# Patient Record
Sex: MALE | Race: WHITE | NOT HISPANIC OR LATINO | Employment: UNEMPLOYED | URBAN - METROPOLITAN AREA
[De-identification: names, ages, dates, MRNs, and addresses within clinical notes are randomized per-mention and may not be internally consistent; named-entity substitution may affect disease eponyms.]

---

## 2017-09-29 ENCOUNTER — HOSPITAL ENCOUNTER (OUTPATIENT)
Dept: ADMINISTRATIVE | Facility: HOSPITAL | Age: 52
End: 2017-09-30
Attending: INTERNAL MEDICINE | Admitting: INTERNAL MEDICINE

## 2017-09-29 LAB
ABS NEUT (OLG): 8.77 X10(3)/MCL (ref 2.1–9.2)
ALBUMIN SERPL-MCNC: 3.5 GM/DL (ref 3.4–5)
ALBUMIN/GLOB SERPL: 1 {RATIO}
ALP SERPL-CCNC: 122 UNIT/L (ref 45–117)
ALT SERPL-CCNC: 54 UNIT/L (ref 16–61)
AST SERPL-CCNC: 96 UNIT/L (ref 15–37)
BASOPHILS # BLD AUTO: 0.03 X10(3)/MCL (ref 0–0.2)
BASOPHILS NFR BLD AUTO: 0.3 % (ref 0–0.9)
BILIRUB SERPL-MCNC: 0.7 MG/DL (ref 0.2–1)
BILIRUBIN DIRECT+TOT PNL SERPL-MCNC: 0.2 MG/DL (ref 0–0.2)
BILIRUBIN DIRECT+TOT PNL SERPL-MCNC: 0.5 MG/DL (ref 0–1)
BUN SERPL-MCNC: 6 MG/DL (ref 7–18)
CALCIUM SERPL-MCNC: 8.8 MG/DL (ref 8.5–10.1)
CHLORIDE SERPL-SCNC: 99 MMOL/L (ref 98–107)
CO2 SERPL-SCNC: 27 MMOL/L (ref 21–32)
CREAT SERPL-MCNC: 0.93 MG/DL (ref 0.7–1.3)
EOSINOPHIL # BLD AUTO: 0.03 X10(3)/MCL (ref 0–0.9)
EOSINOPHIL NFR BLD AUTO: 0.3 % (ref 0–6.5)
ERYTHROCYTE [DISTWIDTH] IN BLOOD BY AUTOMATED COUNT: 13.2 % (ref 11.5–17)
GLOBULIN SER-MCNC: 3 GM/DL (ref 2–4)
GLUCOSE SERPL-MCNC: 154 MG/DL (ref 74–106)
HCT VFR BLD AUTO: 41.1 % (ref 42–52)
HGB BLD-MCNC: 14.2 GM/DL (ref 14–18)
IMM GRANULOCYTES # BLD AUTO: 0.05 10*3/UL (ref 0–0.02)
IMM GRANULOCYTES NFR BLD AUTO: 0.5 % (ref 0–0.43)
LYMPHOCYTES # BLD AUTO: 0.93 X10(3)/MCL (ref 0.6–4.6)
LYMPHOCYTES NFR BLD AUTO: 8.7 % (ref 16.2–38.3)
MAGNESIUM SERPL-MCNC: 1.9 MG/DL (ref 1.8–2.4)
MCH RBC QN AUTO: 33.5 PG (ref 27–31)
MCHC RBC AUTO-ENTMCNC: 34.5 GM/DL (ref 33–36)
MCV RBC AUTO: 96.9 FL (ref 80–94)
MONOCYTES # BLD AUTO: 0.9 X10(3)/MCL (ref 0.1–1.3)
MONOCYTES NFR BLD AUTO: 8.4 % (ref 4.7–11.3)
NEUTROPHILS # BLD AUTO: 8.77 X10(3)/MCL (ref 2.1–9.2)
NEUTROPHILS NFR BLD AUTO: 81.8 % (ref 49.1–73.4)
NRBC BLD AUTO-RTO: 0 % (ref 0–0.2)
PLATELET # BLD AUTO: 88 X10(3)/MCL (ref 130–400)
PMV BLD AUTO: 10.7 FL (ref 7.4–10.4)
POTASSIUM SERPL-SCNC: 3.5 MMOL/L (ref 3.5–5.1)
PROT SERPL-MCNC: 6.9 GM/DL (ref 6.4–8.2)
RBC # BLD AUTO: 4.24 X10(6)/MCL (ref 4.7–6.1)
SODIUM SERPL-SCNC: 136 MMOL/L (ref 136–145)
WBC # SPEC AUTO: 10.7 X10(3)/MCL (ref 4.5–11.5)

## 2018-07-03 ENCOUNTER — HISTORICAL (OUTPATIENT)
Dept: INTERNAL MEDICINE | Facility: CLINIC | Age: 53
End: 2018-07-03

## 2018-07-03 LAB
ABS NEUT (OLG): 2.69 X10(3)/MCL (ref 2.1–9.2)
ALBUMIN SERPL-MCNC: 4.1 GM/DL (ref 3.4–5)
ALBUMIN/GLOB SERPL: 1 RATIO (ref 1–2)
ALP SERPL-CCNC: 64 UNIT/L (ref 45–117)
ALT SERPL-CCNC: 15 UNIT/L (ref 12–78)
AMPHET UR QL SCN: NEGATIVE
APPEARANCE, UA: CLEAR
AST SERPL-CCNC: 14 UNIT/L (ref 15–37)
BACTERIA #/AREA URNS AUTO: ABNORMAL /[HPF]
BARBITURATE SCN PRESENT UR: NEGATIVE
BASOPHILS # BLD AUTO: 0.07 X10(3)/MCL
BASOPHILS NFR BLD AUTO: 1 %
BENZODIAZ UR QL SCN: NEGATIVE
BILIRUB SERPL-MCNC: 0.5 MG/DL (ref 0.2–1)
BILIRUB UR QL STRIP: 1 MG/DL
BILIRUBIN DIRECT+TOT PNL SERPL-MCNC: 0.2 MG/DL
BILIRUBIN DIRECT+TOT PNL SERPL-MCNC: 0.3 MG/DL
BUN SERPL-MCNC: 11 MG/DL (ref 7–18)
CALCIUM SERPL-MCNC: 9.1 MG/DL (ref 8.5–10.1)
CANNABINOIDS UR QL SCN: POSITIVE
CHLORIDE SERPL-SCNC: 103 MMOL/L (ref 98–107)
CHOLEST SERPL-MCNC: 226 MG/DL
CHOLEST/HDLC SERPL: 3.5 {RATIO} (ref 0–5)
CO2 SERPL-SCNC: 29 MMOL/L (ref 21–32)
COCAINE UR QL SCN: NEGATIVE
COLOR UR: YELLOW
CREAT SERPL-MCNC: 0.8 MG/DL (ref 0.6–1.3)
DEPRECATED CALCIDIOL+CALCIFEROL SERPL-MC: 20.41 NG/ML (ref 30–80)
EOSINOPHIL # BLD AUTO: 0.29 X10(3)/MCL
EOSINOPHIL NFR BLD AUTO: 6 %
ERYTHROCYTE [DISTWIDTH] IN BLOOD BY AUTOMATED COUNT: 12.9 % (ref 11.5–14.5)
EST. AVERAGE GLUCOSE BLD GHB EST-MCNC: 105 MG/DL
GLOBULIN SER-MCNC: 3.8 GM/ML (ref 2.3–3.5)
GLUCOSE (UA): NORMAL
GLUCOSE SERPL-MCNC: 96 MG/DL (ref 74–106)
HBA1C MFR BLD: 5.3 % (ref 4.2–6.3)
HCT VFR BLD AUTO: 45.7 % (ref 40–51)
HDLC SERPL-MCNC: 64 MG/DL
HGB BLD-MCNC: 15.5 GM/DL (ref 13.5–17.5)
HGB UR QL STRIP: NEGATIVE
HIV 1+2 AB+HIV1 P24 AG SERPL QL IA: NONREACTIVE
HYALINE CASTS #/AREA URNS LPF: ABNORMAL /[LPF]
IMM GRANULOCYTES # BLD AUTO: 0.01 10*3/UL
IMM GRANULOCYTES NFR BLD AUTO: 0 %
KETONES UR QL STRIP: ABNORMAL
LDLC SERPL CALC-MCNC: 135 MG/DL (ref 0–130)
LEUKOCYTE ESTERASE UR QL STRIP: NEGATIVE
LYMPHOCYTES # BLD AUTO: 1.73 X10(3)/MCL
LYMPHOCYTES NFR BLD AUTO: 33 % (ref 13–40)
MCH RBC QN AUTO: 33.3 PG (ref 26–34)
MCHC RBC AUTO-ENTMCNC: 33.9 GM/DL (ref 31–37)
MCV RBC AUTO: 98.1 FL (ref 80–100)
MONOCYTES # BLD AUTO: 0.47 X10(3)/MCL
MONOCYTES NFR BLD AUTO: 9 % (ref 4–12)
NEUTROPHILS # BLD AUTO: 2.69 X10(3)/MCL
NEUTROPHILS NFR BLD AUTO: 51 X10(3)/MCL
NITRITE UR QL STRIP: NEGATIVE
OPIATES UR QL SCN: NEGATIVE
PCP UR QL: NEGATIVE
PH UR STRIP.AUTO: 6 [PH] (ref 5–8)
PH UR STRIP: 6 [PH] (ref 4.5–8)
PHENYTOIN SERPL-MCNC: 13 MCG/ML (ref 10–20)
PLATELET # BLD AUTO: 248 X10(3)/MCL (ref 130–400)
PMV BLD AUTO: 9.6 FL (ref 7.4–10.4)
POTASSIUM SERPL-SCNC: 3.7 MMOL/L (ref 3.5–5.1)
PROT SERPL-MCNC: 7.9 GM/DL (ref 6.4–8.2)
PROT UR QL STRIP: 70 MG/DL
PSA SERPL-MCNC: 0.6 NG/ML
RBC # BLD AUTO: 4.66 X10(6)/MCL (ref 4.5–5.9)
RBC #/AREA URNS AUTO: ABNORMAL /[HPF]
SODIUM SERPL-SCNC: 139 MMOL/L (ref 136–145)
SP GR UR STRIP: 1.03 (ref 1–1.03)
SQUAMOUS #/AREA URNS LPF: ABNORMAL /[LPF]
TEMPERATURE, URINE (OHS): 25 DEGC (ref 20–25)
TRIGL SERPL-MCNC: 137 MG/DL
TSH SERPL-ACNC: 3.49 MIU/L (ref 0.36–3.74)
UROBILINOGEN UR STRIP-ACNC: 6 MG/DL
VLDLC SERPL CALC-MCNC: 27 MG/DL
WBC # SPEC AUTO: 5.3 X10(3)/MCL (ref 4.5–11)
WBC #/AREA URNS AUTO: ABNORMAL /HPF

## 2019-05-13 ENCOUNTER — HISTORICAL (OUTPATIENT)
Dept: INTERNAL MEDICINE | Facility: CLINIC | Age: 54
End: 2019-05-13

## 2019-05-13 LAB
ABS NEUT (OLG): 6.04 X10(3)/MCL (ref 2.1–9.2)
ALBUMIN SERPL-MCNC: 3.7 GM/DL (ref 3.4–5)
ALBUMIN/GLOB SERPL: 1 RATIO (ref 1.1–2)
ALP SERPL-CCNC: 91 UNIT/L (ref 45–117)
ALT SERPL-CCNC: 30 UNIT/L (ref 12–78)
AST SERPL-CCNC: 29 UNIT/L (ref 15–37)
BASOPHILS # BLD AUTO: 0.03 X10(3)/MCL
BASOPHILS NFR BLD AUTO: 0 %
BILIRUB SERPL-MCNC: 0.6 MG/DL (ref 0.2–1)
BILIRUBIN DIRECT+TOT PNL SERPL-MCNC: 0.2 MG/DL
BILIRUBIN DIRECT+TOT PNL SERPL-MCNC: 0.4 MG/DL
BUN SERPL-MCNC: 8 MG/DL (ref 7–18)
CALCIUM SERPL-MCNC: 9 MG/DL (ref 8.5–10.1)
CHLORIDE SERPL-SCNC: 107 MMOL/L (ref 98–107)
CHOLEST SERPL-MCNC: 213 MG/DL
CHOLEST/HDLC SERPL: 2.7 {RATIO} (ref 0–5)
CO2 SERPL-SCNC: 30 MMOL/L (ref 21–32)
CREAT SERPL-MCNC: 0.8 MG/DL (ref 0.6–1.3)
EOSINOPHIL # BLD AUTO: 0.15 X10(3)/MCL
EOSINOPHIL NFR BLD AUTO: 2 %
ERYTHROCYTE [DISTWIDTH] IN BLOOD BY AUTOMATED COUNT: 13.2 % (ref 11.5–14.5)
GLOBULIN SER-MCNC: 3.6 GM/ML (ref 2.3–3.5)
GLUCOSE SERPL-MCNC: 116 MG/DL (ref 74–106)
HCT VFR BLD AUTO: 42.2 % (ref 40–51)
HDLC SERPL-MCNC: 79 MG/DL
HGB BLD-MCNC: 14.6 GM/DL (ref 13.5–17.5)
IMM GRANULOCYTES # BLD AUTO: 0.03 10*3/UL
IMM GRANULOCYTES NFR BLD AUTO: 0 %
LDLC SERPL CALC-MCNC: 106 MG/DL (ref 0–130)
LYMPHOCYTES # BLD AUTO: 1.26 X10(3)/MCL
LYMPHOCYTES NFR BLD AUTO: 16 % (ref 13–40)
MCH RBC QN AUTO: 34.8 PG (ref 26–34)
MCHC RBC AUTO-ENTMCNC: 34.6 GM/DL (ref 31–37)
MCV RBC AUTO: 100.7 FL (ref 80–100)
MONOCYTES # BLD AUTO: 0.63 X10(3)/MCL
MONOCYTES NFR BLD AUTO: 8 % (ref 4–12)
NEUTROPHILS # BLD AUTO: 6.04 X10(3)/MCL
NEUTROPHILS NFR BLD AUTO: 74 X10(3)/MCL
PHENYTOIN SERPL-MCNC: 12.4 MCG/ML (ref 10–20)
PLATELET # BLD AUTO: 175 X10(3)/MCL (ref 130–400)
PMV BLD AUTO: 9.1 FL (ref 7.4–10.4)
POTASSIUM SERPL-SCNC: 4.9 MMOL/L (ref 3.5–5.1)
PROT SERPL-MCNC: 7.3 GM/DL (ref 6.4–8.2)
RBC # BLD AUTO: 4.19 X10(6)/MCL (ref 4.5–5.9)
SODIUM SERPL-SCNC: 141 MMOL/L (ref 136–145)
TRIGL SERPL-MCNC: 142 MG/DL
VLDLC SERPL CALC-MCNC: 28 MG/DL
WBC # SPEC AUTO: 8.1 X10(3)/MCL (ref 4.5–11)

## 2019-08-14 ENCOUNTER — HISTORICAL (OUTPATIENT)
Dept: ADMINISTRATIVE | Facility: HOSPITAL | Age: 54
End: 2019-08-14

## 2019-08-14 LAB
ALBUMIN SERPL-MCNC: 3.9 GM/DL (ref 3.4–5)
ALBUMIN/GLOB SERPL: 1.1 RATIO (ref 1.1–2)
ALP SERPL-CCNC: 87 UNIT/L (ref 45–117)
ALT SERPL-CCNC: 20 UNIT/L (ref 12–78)
AST SERPL-CCNC: 19 UNIT/L (ref 15–37)
BILIRUB SERPL-MCNC: 0.5 MG/DL (ref 0.2–1)
BILIRUBIN DIRECT+TOT PNL SERPL-MCNC: 0.2 MG/DL
BILIRUBIN DIRECT+TOT PNL SERPL-MCNC: 0.3 MG/DL
BUN SERPL-MCNC: 15 MG/DL (ref 7–18)
CALCIUM SERPL-MCNC: 9 MG/DL (ref 8.5–10.1)
CHLORIDE SERPL-SCNC: 102 MMOL/L (ref 98–107)
CO2 SERPL-SCNC: 32 MMOL/L (ref 21–32)
CREAT SERPL-MCNC: 0.8 MG/DL (ref 0.6–1.3)
DEPRECATED CALCIDIOL+CALCIFEROL SERPL-MC: 19.49 NG/ML (ref 30–80)
ETHANOL SERPL-MCNC: <3 MG/DL
GLOBULIN SER-MCNC: 3.7 GM/ML (ref 2.3–3.5)
GLUCOSE SERPL-MCNC: 88 MG/DL (ref 74–106)
HAV IGM SERPL QL IA: NONREACTIVE
HBV CORE IGM SERPL QL IA: NONREACTIVE
HBV SURFACE AG SERPL QL IA: NEGATIVE
HCV AB SERPL QL IA: NONREACTIVE
HIV 1+2 AB+HIV1 P24 AG SERPL QL IA: NONREACTIVE
POTASSIUM SERPL-SCNC: 3.9 MMOL/L (ref 3.5–5.1)
PROT SERPL-MCNC: 7.6 GM/DL (ref 6.4–8.2)
PSA SERPL-MCNC: 0.5 NG/ML
SODIUM SERPL-SCNC: 138 MMOL/L (ref 136–145)
T PALLIDUM AB SER QL: NONREACTIVE

## 2020-02-17 ENCOUNTER — HISTORICAL (OUTPATIENT)
Dept: INTERNAL MEDICINE | Facility: CLINIC | Age: 55
End: 2020-02-17

## 2020-02-17 LAB
ABS NEUT (OLG): 6.73 X10(3)/MCL (ref 2.1–9.2)
ALBUMIN SERPL-MCNC: 3.7 GM/DL (ref 3.4–5)
ALBUMIN/GLOB SERPL: 0.9 RATIO (ref 1.1–2)
ALP SERPL-CCNC: 92 UNIT/L (ref 45–117)
ALT SERPL-CCNC: 32 UNIT/L (ref 12–78)
AST SERPL-CCNC: 20 UNIT/L (ref 15–37)
BASOPHILS # BLD AUTO: 0 X10(3)/MCL (ref 0–0.2)
BASOPHILS NFR BLD AUTO: 0 %
BILIRUB SERPL-MCNC: 0.4 MG/DL (ref 0.2–1)
BILIRUBIN DIRECT+TOT PNL SERPL-MCNC: 0.2 MG/DL
BILIRUBIN DIRECT+TOT PNL SERPL-MCNC: 0.2 MG/DL (ref 0–0.2)
BUN SERPL-MCNC: 14 MG/DL (ref 7–18)
CALCIUM SERPL-MCNC: 8.8 MG/DL (ref 8.5–10.1)
CHLORIDE SERPL-SCNC: 106 MMOL/L (ref 98–107)
CHOLEST SERPL-MCNC: 193 MG/DL
CHOLEST/HDLC SERPL: 3.1 {RATIO} (ref 0–5)
CO2 SERPL-SCNC: 28 MMOL/L (ref 21–32)
CREAT SERPL-MCNC: 0.7 MG/DL (ref 0.6–1.3)
DEPRECATED CALCIDIOL+CALCIFEROL SERPL-MC: 85.4 NG/ML (ref 30–80)
EOSINOPHIL # BLD AUTO: 0.4 X10(3)/MCL (ref 0–0.9)
EOSINOPHIL NFR BLD AUTO: 4 %
ERYTHROCYTE [DISTWIDTH] IN BLOOD BY AUTOMATED COUNT: 11.9 % (ref 11.5–14.5)
GLOBULIN SER-MCNC: 4 GM/ML (ref 2.3–3.5)
GLUCOSE SERPL-MCNC: 117 MG/DL (ref 74–106)
HCT VFR BLD AUTO: 45.5 % (ref 40–51)
HDLC SERPL-MCNC: 63 MG/DL (ref 40–59)
HGB BLD-MCNC: 15.7 GM/DL (ref 13.5–17.5)
IMM GRANULOCYTES # BLD AUTO: 0.04 10*3/UL
IMM GRANULOCYTES NFR BLD AUTO: 0 %
LDLC SERPL CALC-MCNC: 100 MG/DL
LYMPHOCYTES # BLD AUTO: 1.7 X10(3)/MCL (ref 0.6–4.6)
LYMPHOCYTES NFR BLD AUTO: 18 %
MCH RBC QN AUTO: 35.1 PG (ref 26–34)
MCHC RBC AUTO-ENTMCNC: 34.5 GM/DL (ref 31–37)
MCV RBC AUTO: 101.8 FL (ref 80–100)
MONOCYTES # BLD AUTO: 0.7 X10(3)/MCL (ref 0.1–1.3)
MONOCYTES NFR BLD AUTO: 8 %
NEUTROPHILS # BLD AUTO: 6.73 X10(3)/MCL (ref 2.1–9.2)
NEUTROPHILS NFR BLD AUTO: 70 %
PHENYTOIN SERPL-MCNC: 14.8 MCG/ML (ref 10–20)
PLATELET # BLD AUTO: 265 X10(3)/MCL (ref 130–400)
PMV BLD AUTO: 9.1 FL (ref 7.4–10.4)
POTASSIUM SERPL-SCNC: 4.2 MMOL/L (ref 3.5–5.1)
PROT SERPL-MCNC: 7.7 GM/DL (ref 6.4–8.2)
RBC # BLD AUTO: 4.47 X10(6)/MCL (ref 4.5–5.9)
SODIUM SERPL-SCNC: 137 MMOL/L (ref 136–145)
TRIGL SERPL-MCNC: 151 MG/DL
TSH SERPL-ACNC: 3.17 MIU/L (ref 0.36–3.74)
VLDLC SERPL CALC-MCNC: 30 MG/DL
WBC # SPEC AUTO: 9.6 X10(3)/MCL (ref 4.5–11)

## 2020-02-27 ENCOUNTER — HISTORICAL (OUTPATIENT)
Dept: INTERNAL MEDICINE | Facility: CLINIC | Age: 55
End: 2020-02-27

## 2020-06-29 ENCOUNTER — HISTORICAL (OUTPATIENT)
Dept: INTERNAL MEDICINE | Facility: CLINIC | Age: 55
End: 2020-06-29

## 2020-06-29 LAB
ABS NEUT (OLG): 10.75 X10(3)/MCL (ref 2.1–9.2)
ALBUMIN SERPL-MCNC: 2.6 GM/DL (ref 3.4–5)
ALBUMIN/GLOB SERPL: 0.6 RATIO (ref 1.1–2)
ALP SERPL-CCNC: 145 UNIT/L (ref 45–117)
ALT SERPL-CCNC: 25 UNIT/L (ref 12–78)
AST SERPL-CCNC: 23 UNIT/L (ref 15–37)
BASOPHILS # BLD AUTO: 0.2 X10(3)/MCL (ref 0–0.2)
BASOPHILS NFR BLD AUTO: 1 %
BILIRUB SERPL-MCNC: 0.7 MG/DL (ref 0.2–1)
BILIRUBIN DIRECT+TOT PNL SERPL-MCNC: 0.3 MG/DL
BILIRUBIN DIRECT+TOT PNL SERPL-MCNC: 0.4 MG/DL (ref 0–0.2)
BUN SERPL-MCNC: 8 MG/DL (ref 7–18)
CALCIUM SERPL-MCNC: 8.7 MG/DL (ref 8.5–10.1)
CHLORIDE SERPL-SCNC: 106 MMOL/L (ref 98–107)
CO2 SERPL-SCNC: 30 MMOL/L (ref 21–32)
CREAT SERPL-MCNC: 1.3 MG/DL (ref 0.6–1.3)
DEPRECATED CALCIDIOL+CALCIFEROL SERPL-MC: 42.3 NG/ML (ref 30–80)
EOSINOPHIL # BLD AUTO: 1 X10(3)/MCL (ref 0–0.9)
EOSINOPHIL NFR BLD AUTO: 6 %
ERYTHROCYTE [DISTWIDTH] IN BLOOD BY AUTOMATED COUNT: 13.6 % (ref 11.5–14.5)
FOLATE SERPL-MCNC: 9 NG/ML (ref 3.1–17.5)
GLOBULIN SER-MCNC: 4.7 GM/ML (ref 2.3–3.5)
GLUCOSE SERPL-MCNC: 102 MG/DL (ref 74–106)
HCT VFR BLD AUTO: 32.4 % (ref 40–51)
HGB BLD-MCNC: 10.7 GM/DL (ref 13.5–17.5)
IMM GRANULOCYTES # BLD AUTO: 0.09 10*3/UL
IMM GRANULOCYTES NFR BLD AUTO: 1 %
LYMPHOCYTES # BLD AUTO: 2.1 X10(3)/MCL (ref 0.6–4.6)
LYMPHOCYTES NFR BLD AUTO: 14 %
MCH RBC QN AUTO: 34 PG (ref 26–34)
MCHC RBC AUTO-ENTMCNC: 33 GM/DL (ref 31–37)
MCV RBC AUTO: 102.9 FL (ref 80–100)
MONOCYTES # BLD AUTO: 0.9 X10(3)/MCL (ref 0.1–1.3)
MONOCYTES NFR BLD AUTO: 6 %
NEUTROPHILS # BLD AUTO: 10.75 X10(3)/MCL (ref 2.1–9.2)
NEUTROPHILS NFR BLD AUTO: 72 %
PLATELET # BLD AUTO: 667 X10(3)/MCL (ref 130–400)
PMV BLD AUTO: 8.6 FL (ref 7.4–10.4)
POTASSIUM SERPL-SCNC: 4.2 MMOL/L (ref 3.5–5.1)
PROT SERPL-MCNC: 7.3 GM/DL (ref 6.4–8.2)
RBC # BLD AUTO: 3.15 X10(6)/MCL (ref 4.5–5.9)
SODIUM SERPL-SCNC: 140 MMOL/L (ref 136–145)
VIT B12 SERPL-MCNC: 1480 PG/ML (ref 193–986)
WBC # SPEC AUTO: 14.9 X10(3)/MCL (ref 4.5–11)

## 2021-04-05 ENCOUNTER — HISTORICAL (OUTPATIENT)
Dept: INTERNAL MEDICINE | Facility: CLINIC | Age: 56
End: 2021-04-05

## 2021-04-05 LAB
ABS NEUT (OLG): 4.34 X10(3)/MCL (ref 2.1–9.2)
ALBUMIN SERPL-MCNC: 3.9 GM/DL (ref 3.5–5)
ALBUMIN/GLOB SERPL: 1.1 RATIO (ref 1.1–2)
ALP SERPL-CCNC: 81 UNIT/L (ref 40–150)
ALT SERPL-CCNC: 14 UNIT/L (ref 0–55)
APPEARANCE, UA: CLEAR
AST SERPL-CCNC: 20 UNIT/L (ref 5–34)
BACTERIA #/AREA URNS AUTO: ABNORMAL /HPF
BASOPHILS # BLD AUTO: 0 X10(3)/MCL (ref 0–0.2)
BASOPHILS NFR BLD AUTO: 1 %
BILIRUB SERPL-MCNC: 0.4 MG/DL
BILIRUB UR QL STRIP: NEGATIVE
BILIRUBIN DIRECT+TOT PNL SERPL-MCNC: 0.2 MG/DL (ref 0–0.5)
BILIRUBIN DIRECT+TOT PNL SERPL-MCNC: 0.2 MG/DL (ref 0–0.8)
BUN SERPL-MCNC: 14.8 MG/DL (ref 8.4–25.7)
CALCIUM SERPL-MCNC: 9.1 MG/DL (ref 8.4–10.2)
CHLORIDE SERPL-SCNC: 104 MMOL/L (ref 98–107)
CHOLEST SERPL-MCNC: 281 MG/DL
CHOLEST/HDLC SERPL: 3 {RATIO} (ref 0–5)
CO2 SERPL-SCNC: 26 MMOL/L (ref 22–29)
COLOR UR: YELLOW
CREAT SERPL-MCNC: 0.91 MG/DL (ref 0.73–1.18)
EOSINOPHIL # BLD AUTO: 0.2 X10(3)/MCL (ref 0–0.9)
EOSINOPHIL NFR BLD AUTO: 4 %
ERYTHROCYTE [DISTWIDTH] IN BLOOD BY AUTOMATED COUNT: 13.8 % (ref 11.5–14.5)
FOLATE SERPL-MCNC: 18.1 NG/ML (ref 7–31.4)
GLOBULIN SER-MCNC: 3.4 GM/DL (ref 2.4–3.5)
GLUCOSE (UA): NEGATIVE
GLUCOSE SERPL-MCNC: 143 MG/DL (ref 74–100)
HAV IGM SERPL QL IA: NONREACTIVE
HBV CORE IGM SERPL QL IA: NONREACTIVE
HBV SURFACE AG SERPL QL IA: NONREACTIVE
HCT VFR BLD AUTO: 41.4 % (ref 40–51)
HCV AB SERPL QL IA: NONREACTIVE
HDLC SERPL-MCNC: 95 MG/DL (ref 35–60)
HGB BLD-MCNC: 13.9 GM/DL (ref 13.5–17.5)
HGB UR QL STRIP: NEGATIVE
HIV 1+2 AB+HIV1 P24 AG SERPL QL IA: NONREACTIVE
HYALINE CASTS #/AREA URNS LPF: ABNORMAL /LPF
IMM GRANULOCYTES # BLD AUTO: 0.03 10*3/UL
IMM GRANULOCYTES NFR BLD AUTO: 0 %
KETONES UR QL STRIP: NEGATIVE
LDLC SERPL CALC-MCNC: 170 MG/DL (ref 50–140)
LEUKOCYTE ESTERASE UR QL STRIP: 25 LEU/UL
LYMPHOCYTES # BLD AUTO: 1.5 X10(3)/MCL (ref 0.6–4.6)
LYMPHOCYTES NFR BLD AUTO: 23 %
MCH RBC QN AUTO: 34.6 PG (ref 26–34)
MCHC RBC AUTO-ENTMCNC: 33.6 GM/DL (ref 31–37)
MCV RBC AUTO: 103 FL (ref 80–100)
MONOCYTES # BLD AUTO: 0.5 X10(3)/MCL (ref 0.1–1.3)
MONOCYTES NFR BLD AUTO: 8 %
NEUTROPHILS # BLD AUTO: 4.34 X10(3)/MCL (ref 2.1–9.2)
NEUTROPHILS NFR BLD AUTO: 65 %
NITRITE UR QL STRIP: NEGATIVE
PH UR STRIP: 6.5 [PH] (ref 4.5–8)
PLATELET # BLD AUTO: 269 X10(3)/MCL (ref 130–400)
PMV BLD AUTO: 8.8 FL (ref 7.4–10.4)
POTASSIUM SERPL-SCNC: 4.3 MMOL/L (ref 3.5–5.1)
PROT SERPL-MCNC: 7.3 GM/DL (ref 6.4–8.3)
PROT UR QL STRIP: 20 MG/DL
PSA SERPL-MCNC: 1.51 NG/ML
RBC # BLD AUTO: 4.02 X10(6)/MCL (ref 4.5–5.9)
RBC #/AREA URNS AUTO: ABNORMAL /HPF
SODIUM SERPL-SCNC: 141 MMOL/L (ref 136–145)
SP GR UR STRIP: 1.02 (ref 1–1.03)
SQUAMOUS #/AREA URNS LPF: ABNORMAL /LPF
T PALLIDUM AB SER QL: NONREACTIVE
T4 SERPL-MCNC: 3.97 UG/DL (ref 4.87–11.72)
TRIGL SERPL-MCNC: 82 MG/DL (ref 34–140)
TSH SERPL-ACNC: 2.63 UIU/ML (ref 0.35–4.94)
UROBILINOGEN UR STRIP-ACNC: NORMAL
VIT B12 SERPL-MCNC: 418 PG/ML (ref 213–816)
VLDLC SERPL CALC-MCNC: 16 MG/DL
WBC # SPEC AUTO: 6.7 X10(3)/MCL (ref 4.5–11)
WBC #/AREA URNS AUTO: ABNORMAL /HPF

## 2021-04-07 LAB — FINAL CULTURE: NO GROWTH

## 2021-10-20 ENCOUNTER — HISTORICAL (OUTPATIENT)
Dept: INTERNAL MEDICINE | Facility: CLINIC | Age: 56
End: 2021-10-20

## 2021-10-20 LAB
ABS NEUT (OLG): 4.62 X10(3)/MCL (ref 2.1–9.2)
ALBUMIN SERPL-MCNC: 4.8 GM/DL (ref 3.5–5)
ALBUMIN/GLOB SERPL: 1.4 RATIO (ref 1.1–2)
ALP SERPL-CCNC: 94 UNIT/L (ref 40–150)
ALT SERPL-CCNC: 82 UNIT/L (ref 0–55)
AST SERPL-CCNC: 122 UNIT/L (ref 5–34)
BASOPHILS # BLD AUTO: 0 X10(3)/MCL (ref 0–0.2)
BASOPHILS NFR BLD AUTO: 1 %
BILIRUB SERPL-MCNC: 0.8 MG/DL
BILIRUBIN DIRECT+TOT PNL SERPL-MCNC: 0.4 MG/DL (ref 0–0.5)
BILIRUBIN DIRECT+TOT PNL SERPL-MCNC: 0.4 MG/DL (ref 0–0.8)
BUN SERPL-MCNC: 12.7 MG/DL (ref 8.4–25.7)
CALCIUM SERPL-MCNC: 10.4 MG/DL (ref 8.4–10.2)
CHLORIDE SERPL-SCNC: 101 MMOL/L (ref 98–107)
CHOLEST SERPL-MCNC: 290 MG/DL
CHOLEST/HDLC SERPL: 2 {RATIO} (ref 0–5)
CO2 SERPL-SCNC: 24 MMOL/L (ref 22–29)
CREAT SERPL-MCNC: 1.04 MG/DL (ref 0.73–1.18)
EOSINOPHIL # BLD AUTO: 0.1 X10(3)/MCL (ref 0–0.9)
EOSINOPHIL NFR BLD AUTO: 2 %
ERYTHROCYTE [DISTWIDTH] IN BLOOD BY AUTOMATED COUNT: 15.2 % (ref 11.5–14.5)
GLOBULIN SER-MCNC: 3.5 GM/DL (ref 2.4–3.5)
GLUCOSE SERPL-MCNC: 109 MG/DL (ref 74–100)
HCT VFR BLD AUTO: 40.8 % (ref 40–51)
HDLC SERPL-MCNC: 138 MG/DL (ref 35–60)
HGB BLD-MCNC: 13.7 GM/DL (ref 13.5–17.5)
IMM GRANULOCYTES # BLD AUTO: 0.03 10*3/UL
IMM GRANULOCYTES NFR BLD AUTO: 0 %
LDLC SERPL CALC-MCNC: 141 MG/DL (ref 50–140)
LYMPHOCYTES # BLD AUTO: 1 X10(3)/MCL (ref 0.6–4.6)
LYMPHOCYTES NFR BLD AUTO: 15 %
MCH RBC QN AUTO: 35.2 PG (ref 26–34)
MCHC RBC AUTO-ENTMCNC: 33.6 GM/DL (ref 31–37)
MCV RBC AUTO: 104.9 FL (ref 80–100)
MONOCYTES # BLD AUTO: 0.7 X10(3)/MCL (ref 0.1–1.3)
MONOCYTES NFR BLD AUTO: 11 %
NEUTROPHILS # BLD AUTO: 4.62 X10(3)/MCL (ref 2.1–9.2)
NEUTROPHILS NFR BLD AUTO: 70 %
NRBC BLD AUTO-RTO: 0 % (ref 0–0.2)
PLATELET # BLD AUTO: 141 X10(3)/MCL (ref 130–400)
PMV BLD AUTO: 9.7 FL (ref 7.4–10.4)
POTASSIUM SERPL-SCNC: 4.1 MMOL/L (ref 3.5–5.1)
PROT SERPL-MCNC: 8.3 GM/DL (ref 6.4–8.3)
RBC # BLD AUTO: 3.89 X10(6)/MCL (ref 4.5–5.9)
SODIUM SERPL-SCNC: 139 MMOL/L (ref 136–145)
TRIGL SERPL-MCNC: 53 MG/DL (ref 34–140)
VLDLC SERPL CALC-MCNC: 11 MG/DL
WBC # SPEC AUTO: 6.6 X10(3)/MCL (ref 4.5–11)

## 2021-10-25 ENCOUNTER — HISTORICAL (OUTPATIENT)
Dept: ADMINISTRATIVE | Facility: HOSPITAL | Age: 56
End: 2021-10-25

## 2021-11-16 ENCOUNTER — HISTORICAL (OUTPATIENT)
Dept: RADIOLOGY | Facility: HOSPITAL | Age: 56
End: 2021-11-16

## 2022-01-01 ENCOUNTER — TELEPHONE (OUTPATIENT)
Dept: INTERNAL MEDICINE | Facility: CLINIC | Age: 57
End: 2022-01-01

## 2022-01-01 RX ORDER — LEVETIRACETAM 750 MG/1
750 TABLET ORAL 2 TIMES DAILY
Qty: 60 TABLET | Refills: 0 | Status: SHIPPED | OUTPATIENT
Start: 2022-01-01 | End: 2023-01-01 | Stop reason: SDUPTHER

## 2022-02-16 ENCOUNTER — HISTORICAL (OUTPATIENT)
Dept: INTERNAL MEDICINE | Facility: CLINIC | Age: 57
End: 2022-02-16

## 2022-02-16 LAB
ABS NEUT (OLG): 3.65 (ref 2.1–9.2)
ALBUMIN SERPL-MCNC: 4.3 G/DL (ref 3.5–5)
ALBUMIN/GLOB SERPL: 1.4 {RATIO} (ref 1.1–2)
ALP SERPL-CCNC: 65 U/L (ref 40–150)
ALT SERPL-CCNC: 38 U/L (ref 0–55)
APPEARANCE, UA: CLEAR
AST SERPL-CCNC: 50 U/L (ref 5–34)
BACTERIA SPEC CULT: NORMAL
BASOPHILS # BLD AUTO: 0.1 10*3/UL (ref 0–0.2)
BASOPHILS NFR BLD AUTO: 1 %
BILIRUB SERPL-MCNC: 0.3 MG/DL
BILIRUB UR QL STRIP: NEGATIVE
BILIRUBIN DIRECT+TOT PNL SERPL-MCNC: 0.1 (ref 0–0.8)
BILIRUBIN DIRECT+TOT PNL SERPL-MCNC: 0.2 (ref 0–0.5)
BUN SERPL-MCNC: 16.8 MG/DL (ref 8.4–25.7)
CALCIUM SERPL-MCNC: 9.5 MG/DL (ref 8.7–10.5)
CHLORIDE SERPL-SCNC: 106 MMOL/L (ref 98–107)
CHOLEST SERPL-MCNC: 235 MG/DL
CHOLEST/HDLC SERPL: 3 {RATIO} (ref 0–5)
CO2 SERPL-SCNC: 24 MMOL/L (ref 22–29)
COLOR UR: YELLOW
CREAT SERPL-MCNC: 1.16 MG/DL (ref 0.73–1.18)
DEPRECATED CALCIDIOL+CALCIFEROL SERPL-MC: 27.3 NG/ML (ref 30–80)
EOSINOPHIL # BLD AUTO: 0.3 10*3/UL (ref 0–0.9)
EOSINOPHIL NFR BLD AUTO: 6 %
ERYTHROCYTE [DISTWIDTH] IN BLOOD BY AUTOMATED COUNT: 13.3 % (ref 11.5–14.5)
FLAG2 (OHS): 80
FLAG3 (OHS): 90
FLAGS (OHS): 70
GLOBULIN SER-MCNC: 3 G/DL (ref 2.4–3.5)
GLUCOSE (UA): NORMAL
GLUCOSE SERPL-MCNC: 108 MG/DL (ref 74–100)
HCT VFR BLD AUTO: 39.6 % (ref 40–51)
HDLC SERPL-MCNC: 91 MG/DL (ref 35–60)
HEMOLYSIS INTERF INDEX SERPL-ACNC: 4
HGB BLD-MCNC: 13.5 G/DL (ref 13.5–17.5)
HGB UR QL STRIP: NEGATIVE
HYALINE CASTS #/AREA URNS LPF: NORMAL /[LPF]
ICTERIC INTERF INDEX SERPL-ACNC: 0
IMM GRANULOCYTES # BLD AUTO: 0.02 10*3/UL
IMM GRANULOCYTES NFR BLD AUTO: 0 %
KETONES UR QL STRIP: NORMAL
LDLC SERPL CALC-MCNC: 128 MG/DL (ref 50–140)
LEUKOCYTE ESTERASE UR QL STRIP: 25
LIPEMIC INTERF INDEX SERPL-ACNC: 8
LOW EVENT # SUSPECT FLAG (OHS): 70
LYMPHOCYTES # BLD AUTO: 1.4 10*3/UL (ref 0.6–4.6)
LYMPHOCYTES NFR BLD AUTO: 23 %
MANUAL DIFF? (OHS): NO
MCH RBC QN AUTO: 35.9 PG (ref 26–34)
MCHC RBC AUTO-ENTMCNC: 34.1 G/DL (ref 31–37)
MCV RBC AUTO: 105.3 FL (ref 80–100)
MO BLASTS SUSPECT FLAG (OHS): 40
MONOCYTES # BLD AUTO: 0.7 10*3/UL (ref 0.1–1.3)
MONOCYTES NFR BLD AUTO: 11 %
MUCOUS THREADS URNS QL MICRO: NORMAL
NEUTROPHILS # BLD AUTO: 3.65 10*3/UL (ref 2.1–9.2)
NEUTROPHILS NFR BLD AUTO: 59 %
NITRITE UR QL STRIP: NEGATIVE
NRBC BLD AUTO-RTO: 0 % (ref 0–0.2)
PH UR STRIP: 5.5 [PH] (ref 4.5–8)
PLATELET # BLD AUTO: 216 10*3/UL (ref 130–400)
PMV BLD AUTO: 9.4 FL (ref 7.4–10.4)
POTASSIUM SERPL-SCNC: 4.2 MMOL/L (ref 3.5–5.1)
PROT SERPL-MCNC: 7.3 G/DL (ref 6.4–8.3)
PROT UR QL STRIP: NORMAL
RBC # BLD AUTO: 3.76 10*6/UL (ref 4.5–5.9)
RBC #/AREA URNS HPF: NORMAL /[HPF] (ref 0–5)
SODIUM SERPL-SCNC: 140 MMOL/L (ref 136–145)
SP GR UR STRIP: 1.03 (ref 1–1.03)
SQUAMOUS EPITHELIAL, UA: NORMAL
TRIGL SERPL-MCNC: 80 MG/DL (ref 34–140)
UROBILINOGEN UR STRIP-ACNC: NORMAL
VLDLC SERPL CALC-MCNC: 16 MG/DL
WBC # SPEC AUTO: 6.2 10*3/UL (ref 4.5–11)
WBC #/AREA URNS HPF: NORMAL /[HPF] (ref 0–5)

## 2022-04-30 NOTE — ED PROVIDER NOTES
Patient:   Los Alatorre             MRN: [id number]            FIN: [id number]               Age:   52 years     Sex:  Male     :  1965   Associated Diagnoses:   Seizure   Author:   Los Begum MD      Basic Information   Time seen: Date & time 2017 14:18:00.   History source: Patient, EMS.   Arrival mode: Ambulance.   History limitation: None.   Additional information: Chief Complaint from Nursing Triage Note : Chief Complaint   2017 14:04 CDT      Chief Complaint           pt to er c/o having two minute witnessed seizure.  .      History of Present Illness   Presents with seizure.  The onset was just prior to arrival.  The occurrence was single episode.  Witnessed: yes by family.  The location where the incident occurred was at home.  The character of symptoms is generalized.  There are Postictal symptomss including confusion and HA.  The exacerbating factor is missed medication.  Risk factors consist of alcohol abuse.  Therapy today: emergency medical services.  Associated symptoms: headache.  Associated injury: none.  pt seen 3 days ago for same at [facility]. unable to afford the keppra prescribed.        Review of Systems             Additional review of systems information: All other systems reviewed and otherwise negative.      Health Status   Allergies:    Allergic Reactions (Selected)  No Known Medication Allergies,    Allergies (1) Active Reaction  No Known Medication Allergies None Documented  .   Medications:  (Selected)   Inpatient Medications  Ordered  Cerebyx (PE) 500 mg/10 mL injectable solution: 1,000 mg, form: Injection, IV, Once, first dose 17 15:00:00 CDT, stop date 17 15:00:00 CDT, 24  Prescriptions  Prescribed  Keppra 500 mg oral tablet: 500 mg = 1 tab(s), Oral, BID, # 60 tab(s), 0 Refill(s)  Documented Medications  Documented  nicotine 21 mg/24 hr transdermal film, extended release: TD, Daily, 0 Refill(s).      Past Medical/ Family/ Social History   Medical  history:    Resolved  Seizure ([id number]):  Resolved., Reviewed as documented in chart.   Surgical history:    No active procedure history items have been selected or recorded., Reviewed as documented in chart.   Family history:    No family history items have been selected or recorded., Reviewed as documented in chart.   Social history:    Social & Psychosocial Habits    Alcohol  04/29/2015 Risk Assessment: High Risk    08/20/2017  Use: Current    Type: Liquor    Frequency: 1-2 times per month    Previous treatment: None    Employment/School  08/20/2017  Status: disabled    Exercise  08/20/2017  Duration (average number of minutes): 30    Times per week: 1-2 times/week    Exercise type: Walking, Weight lifting    Substance Abuse  04/29/2015 Risk Assessment: Denies Substance Abuse    08/20/2017  Use: Never    Tobacco  04/29/2015 Risk Assessment: High Risk    08/20/2017  Use: Current every day smoker    Type: Cigarettes    Tobacco use per day: 10  , Reviewed as documented in chart, Tobacco use: Regularly.   Problem list:    Active Problems (8)  Appendectomy   Atrial fibrillation   Cholecystectomy   Forehead laceration   Seizure disorder   Tobacco user   Tobacco user   Tobacco user   .      Physical Examination               Vital Signs   Vital Signs   9/29/2017 14:04 CDT      Temperature Oral          37.1 DegC                             Peripheral Pulse Rate     107 bpm  HI                             SpO2                      95 %                             Oxygen Therapy            Room air                             Systolic Blood Pressure   149 mmHg  HI                             Diastolic Blood Pressure  85 mmHg  .      Vital Signs (last 24 hrs)_____  Last Charted___________  Temp Oral     37.1 DegC  (SEP 29 14:04)  Heart Rate Peripheral   H 107bpm  (SEP 29 14:04)  SBP      H 149mmHg  (SEP 29 14:04)  DBP      85 mmHg  (SEP 29 14:04)  SpO2      95 %  (SEP 29 14:04)  .   Measurements   9/29/2017 14:04 CDT       Weight Dosing             77 kg                             Weight Measured and Calculated in Lbs     169.75 lb                             Weight Estimated          77 kg                             Height/Length Dosing      179 cm                             Height/Length Estimated   179 cm                             Body Mass Index Estimated 24.03 kg/m2  .   Basic Oxygen Information   9/29/2017 14:04 CDT      SpO2                      95 %                             Oxygen Therapy            Room air  .   General:  Alert, no acute distress.    Skin:  Warm.   Head:  Normocephalic.   Neck:  Supple.   Eye:  Pupils are equal, round and reactive to light, extraocular movements are intact.    Ears, nose, mouth and throat:  Oral mucosa moist.   Cardiovascular:  Regular rate and rhythm.   Respiratory:  Lungs are clear to auscultation.   Chest wall:  No tenderness.   Back:  Normal range of motion.   Musculoskeletal:  Normal ROM.   Gastrointestinal:  Non distended.   Neurological:  Alert and oriented to person, place, time, and situation, No focal neurological deficit observed, CN II-XII intact, normal sensory observed, normal motor observed, normal speech observed.    Psychiatric:  Cooperative.      Medical Decision Making   Documents reviewed:  Emergency department nurses' notes.   Orders  Launch Order Profile (Selected)   Inpatient Orders  Ordered  Cerebyx (PE) 500 mg/10 mL injectable solution: 1,000 mg, form: Injection, IV, Once, first dose 09/29/17 15:00:00 CDT, stop date 09/29/17 15:00:00 CDT, 24  Discharge Planning Ongoing Assessment: 10/02/17 9:00:00 CDT, q3day.   Results review:     No qualifying data available, Interpretation Labs unremarkable.       Reexamination/ Reevaluation   Vital signs   Basic Oxygen Information   9/29/2017 14:04 CDT      SpO2                      95 %                             Oxygen Therapy            Room air        Impression and Plan   Diagnosis   Seizure ([id number] R56.9)       Calls-Consults   -  9/29/2017 17:42:00 , King AGUSTO, Michael LONDON    Plan   Condition: Stable.    Disposition: Admit time  9/29/2017 17:42:00, Admit to Inpatient Unit,  Discharged: To home, time  9/29/2017 16:11:00    Prescriptions:  Launch prescriptions   Pharmacy:  Dilantin 100 mg oral capsule, extended release (Prescribe): 300 mg = 3 cap(s), Oral, Once a day (at bedtime), # 90 cap(s), 0 Refill(s)  Keppra 500 mg oral tablet (Discontinue): 9/29/2017 16:11 CDT      Patient was given the following educational materials:  Seizure, Adult    Follow up with:  Your physician Within 2 to 4 days       Addendum      Teaching-Supervisory Addendum-Brief   Notes: pt had another seizure while in the lobby waiting for a cab. will admit. .

## 2022-04-30 NOTE — H&P
SHORT STAY SUMMARY:      CHIEF COMPLAINT:  Seizure.    HISTORY OF PRESENT ILLNESS:  This is a 52-year-old gentleman with a history of epilepsy, who presented to the emergency room complaining of having had a seizure at home.  He does have a history of epilepsy since he was a teenager.  He is not sure what triggered the onset of the seizures, they started spontaneously.  He said he has a few seizures a month.  He was treated here last month after he had three seizures.  At the time, he was supposed to be on Dilantin, but had run out, said he could not afford it. He was switched to Keppra which he never filled also because of the cost.  He was supposed to have followup at [facility].  He says he did get an appointment but missed it, he says he has been having a lot of family issues and his mother is sick and he is caring for her.  He does admit to drinking alcohol every couple of days.  I stressed to him the importance of refraining from alcohol due to his history of seizures and he has had elevated LFTs in the past.  At this point, he is cleared to be released.  I did stress to him the importance of taking the seizure medications to prevent ongoing seizures and also stressed to him the importance of following up at [facility] or a physician of his choice for ongoing treatment of this and his other medical problems.    REVIEW OF SYSTEMS:  He also complains of sinus trouble.  Except as documented, the rest of the review of systems are negative.  The patient's girlfriend is at bedside.    PAST MEDICAL HISTORY:    1. Seizure disorder.    2. Low platelets in the past.  3. Atrial fibrillation with rapid ventricular response, treated last month.  4. Alcohol abuse.    PAST SURGICAL HISTORY:    1. Appendectomy.  2. Cholecystectomy.    HOME MEDICATIONS:  None.    SOCIAL HISTORY:  He drinks a few times a week.  He does smoke.  No other drugs.    FAMILY HISTORY:  Denies history of epilepsy.    PHYSICAL EXAMINATION:  VITAL  SIGNS:  Blood pressure 125/73, temperature 36.7, pulse 103, respirations 18 and saturation 97%.   GENERAL:   Middle-aged gentleman in no acute distress.     HEENT:   Pupils are equal, round and icteric.  Mouth is pink and moist.   NECK:  No jugular venous distention.  No lymphadenopathy.   LUNGS:  Clear.  No wheezing, rhonchi or crackles.   CHEST:  S1 and S2.  No murmurs or gallops.   ABDOMEN:  Soft and nontender.  No rebound or guarding.     EXTREMITIES:  No clubbing, cyanosis or edema.   SKIN:  No rashes.   NEUROLOGIC: Awake, alert and oriented x3.  Cranial nerves II through XII are intact.   PSYCH:  Good judgment and insight.    LABORATORY DATA:  Sodium 136, potassium 3.5, BUN 6, creatinine 0.9.  Total bilirubin 0.7.  AST 96, ALT 54.  Liver function tests were higher last month.  Alkaline phosphatase 122, albumin 3.5.  White blood cell count 10.7, hemoglobin 14, platelets 88 (has been in the 40s in the past, last month).    IMAGING STUDIES:  CT scan of head without contrast showed no acute intracranial abnormalities, some paranasal sinus disease.    ASSESSMENT:    1. Seizures with a history of epilepsy, noncompliant with medical treatment.  2. Thrombocytopenia, unspecified.    3. Chronic alcohol use.    PLANS:  Will prescribe the patient antiepileptic medication again.  He does not believe he will be able to afford it.  I stressed to him the importance of having followup at [facility] where hopefully they can assist him with his medications.  I also stressed the importance of refraining from any alcohol use due to abnormal liver function tests in the past and thrombocytopenia along with seizure history.         Discharge time greater than 30 minutes.        ______________________________  MD KEERTHI Hawkins/RUTHY  DD:  09/30/2017  Time:  10:18AM  DT:  09/30/2017  Time:  10:55AM  Job #:  [id number]    The H&P was reviewed, the patient was examined, and the following changes to the patients condition are  noted:  ______________________________________________________________________________  ______________________________________________________________________________  ______________________________________________________________________________  [  ] No changes to the patient's condition:      ______________________________                                             ___________________  PHYSICIAN SIGNATURE                                                             DATE/TIME

## 2022-04-30 NOTE — DISCHARGE SUMMARY
* Final Report *    HP (Verified)    SHORT STAY SUMMARY:      CHIEF COMPLAINT:  Seizure.    HISTORY OF PRESENT ILLNESS:  This is a 52-year-old gentleman with a history of epilepsy, who presented to the emergency room complaining of having had a seizure at home.  He does have a history of epilepsy since he was a teenager.  He is not sure what triggered the onset of the seizures, they started spontaneously.  He said he has a few seizures a month.  He was treated here last month after he had three seizures.  At the time, he was supposed to be on Dilantin, but had run out, said he could not afford it. He was switched to Keppra which he never filled also because of the cost.  He was supposed to have followup at [facility].  He says he did get an appointment but missed it, he says he has been having a lot of family issues and his mother is sick and he is caring for her.  He does admit to drinking alcohol every couple of days.  I stressed to him the importance of refraining from alcohol due to his history of seizures and he has had elevated LFTs in the past.  At this point, he is cleared to be released.  I did stress to him the importance of taking the seizure medications to prevent ongoing seizures and also stressed to him the importance of following up at [facility] or a physician of his choice for ongoing treatment of this and his other medical problems.    REVIEW OF SYSTEMS:  He also complains of sinus trouble.  Except as documented, the rest of the review of systems are negative.  The patient's girlfriend is at bedside.    PAST MEDICAL HISTORY:    1. Seizure disorder.    2. Low platelets in the past.  3. Atrial fibrillation with rapid ventricular response, treated last month.  4. Alcohol abuse.    PAST SURGICAL HISTORY:    1. Appendectomy.  2. Cholecystectomy.    HOME MEDICATIONS:  None.    SOCIAL HISTORY:  He drinks a few times a week.  He does smoke.  No other drugs.    FAMILY HISTORY:  Denies history  of epilepsy.    PHYSICAL EXAMINATION:  VITAL SIGNS:  Blood pressure 125/73, temperature 36.7, pulse 103, respirations 18 and saturation 97%.   GENERAL:   Middle-aged gentleman in no acute distress.     HEENT:   Pupils are equal, round and icteric.  Mouth is pink and moist.   NECK:  No jugular venous distention.  No lymphadenopathy.   LUNGS:  Clear.  No wheezing, rhonchi or crackles.   CHEST:  S1 and S2.  No murmurs or gallops.   ABDOMEN:  Soft and nontender.  No rebound or guarding.     EXTREMITIES:  No clubbing, cyanosis or edema.   SKIN:  No rashes.   NEUROLOGIC: Awake, alert and oriented x3.  Cranial nerves II through XII are intact.   PSYCH:  Good judgment and insight.    LABORATORY DATA:  Sodium 136, potassium 3.5, BUN 6, creatinine 0.9.  Total bilirubin 0.7.  AST 96, ALT 54.  Liver function tests were higher last month.  Alkaline phosphatase 122, albumin 3.5.  White blood cell count 10.7, hemoglobin 14, platelets 88 (has been in the 40s in the past, last month).    IMAGING STUDIES:  CT scan of head without contrast showed no acute intracranial abnormalities, some paranasal sinus disease.    ASSESSMENT:    1. Seizures with a history of epilepsy, noncompliant with medical treatment.  2. Thrombocytopenia, unspecified.    3. Chronic alcohol use.    PLANS:  Will prescribe the patient antiepileptic medication again.  He does not believe he will be able to afford it.  I stressed to him the importance of having followup at [facility] where hopefully they can assist him with his medications.  I also stressed the importance of refraining from any alcohol use due to abnormal liver function tests in the past and thrombocytopenia along with seizure history.         Discharge time greater than 30 minutes.        ______________________________  MD KEERTHI Hawkins/RUTHY  DD:  09/30/2017  Time:  10:18AM  DT:  09/30/2017  Time:  10:55AM  Job #:  [id number]    The H&P was reviewed, the patient was examined, and the following changes to the  patients condition are noted:  ______________________________________________________________________________  ______________________________________________________________________________  ______________________________________________________________________________  [  ] No changes to the patient's condition:      ______________________________                                             ___________________  PHYSICIAN SIGNATURE                                                             DATE/TIME       Result type: History and Physical  Result date: September 30, 2017 10:55 CDT  Result status: Auth (Verified)  Result title: HP  Performed by: Michael Cartagena MD on September 30, 2017 10:18 CDT  Verified by: Michael Cartagena MD on September 30, 2017 16:09 CDT  Encounter info: [id number], LGSW, Observation, 9/29/2017 - 9/30/2017
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
## 2022-05-03 NOTE — HISTORICAL [facility] CERNER
This is a historical note converted from [employer]. Formatting and pictures may have been removed.  Please reference [employer] for original formatting and attached multimedia. Chief Complaint  FOLLOW UP  History of Present Illness  53 year old white male, here for f/u labs.?PMH seizure do, depression/anxiety,?tobacco use 3/4ppd, Etoh abuse, and marijuana use. states he has cut back on etoh use; drinking twice a week. States last seizure was in?19 at the  home. Compliant with Dilantin 300mg qd. Lexapro was started at last OV, with improvement in mood. Takes Vistaril bid prn. 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 Reports productive cough x 3 days, white sputum. No fever. Denies chest pain, shortness of breath,?cough, fever, headache,?dizziness, weakness, abdominal pain, nausea,?vomiting, diarrhea, constipation, dysuria, SI, HI. [facility] Neuro apt pending; referred in Oct 2017 by ED.  Review of Systems  Constitutional: negative except as stated in HPI  Eye: negative except as stated in HPI  ENMT: negative except as stated in HPI  Respiratory: negative except as stated in HPI  Cardiovascular: negative except as stated in HPI  Gastrointestinal: negative except as stated in HPI  Genitourinary: negative except as stated in HPI  Hema/Lymph: negative except as stated in HPI  Endocrine: negative except as stated in HPI  Immunologic: negative except as stated in HPI  Musculoskeletal: negative except as stated in HPI  Integumentary: negative except as stated in HPI  Neurologic: negative except as stated in HPI  ?   All Other ROS_ ?negative except as stated in HPI  Physical Exam  Vitals & Measurements  T:?36.5? ?C (Oral)? HR:?73(Peripheral)? RR:?16? BP:?130/77?  HT:?184?cm? WT:?71.9?kg? BMI:?21.24?  General: Alert and oriented, No acute distress.  Eye: Pupils are equal, round and reactive to light, Extraocular movements are intact, Normal  conjunctiva.  HENT: Normocephalic, Normal hearing, Oral mucosa is moist, No pharyngeal erythema.  Neck: Supple, Non-tender, No lymphadenopathy, No thyromegaly.  Respiratory: Lungs are clear to auscultation, Respirations are non-labored, Breath sounds are equal, Symmetrical chest wall expansion.  Cardiovascular: Normal rate, Regular rhythm, No murmur, Normal peripheral perfusion, No edema.  Gastrointestinal: Soft, Non-tender, Non-distended, Normal bowel sounds, No organomegaly.  Genitourinary: No costovertebral angle tenderness, No inguinal tenderness.  Lymphatics: No lymphadenopathy neck, axilla, groin.  Musculoskeletal: Normal range of motion, Normal strength, No tenderness, Normal gait.  Neurologic: No focal deficits, Cranial Nerves II-XII are grossly intact.  Integumentary: Warm, Dry, Intact.  Feet: Normal by visual exam, Normal pulses, Sensation intact, By monofilament exam; No ulcers, lesions, or calluses.? 2+ pedal pulses bilaterally.  ?  Assessment/Plan  Anxiety?F41.9  ?Refill on vistaril and lexapro  Mood improved  Ordered:  1160F- Medication reconciliation completed during visit, Seizure disorder  AR (allergic rhinitis)  Chronic low back pain  Vitamin D deficiency  Anxiety  Tobacco user  Thyroid disorder screen, [facility], 08/14/19 15:06:00 CDT  Clinic Follow up, *Est. 02/17/20 12:20:00 CST, Order for future visit, Seizure disorder, [facility]  Office/Outpatient Visit Level 4 Established 09421 , Seizure disorder  AR (allergic rhinitis)  Chronic low back pain  Vitamin D deficiency  Anxiety  Tobacco user  Thyroid disorder screen, [facility], 08/14/19 15:06:00 CDT  ?  AR (allergic rhinitis)?J30.9  flonase  mucinex for cough, notify clinic for no improvement, fever, or worsening of symptoms  Ordered:  1160F- Medication reconciliation completed during visit, Seizure disorder  AR (allergic rhinitis)  Chronic low back pain  Vitamin D deficiency  Anxiety  Tobacco user  Thyroid disorder  screen, [facility], 08/14/19 15:06:00 CDT  Clinic Follow up, *Est. 02/17/20 12:20:00 CST, Order for future visit, Seizure disorder, [facility]  Office/Outpatient Visit Level 4 Established [id number] PC, Seizure disorder  AR (allergic rhinitis)  Chronic low back pain  Vitamin D deficiency  Anxiety  Tobacco user  Thyroid disorder screen, [facility] , 08/14/19 15:06:00 CDT  ?  Chronic low back pain?M54.5  naproxen prn  HEP  Ordered:  1160F- Medication reconciliation completed during visit, Seizure disorder  AR (allergic rhinitis)  Chronic low back pain  Vitamin D deficiency  Anxiety  Tobacco user  Thyroid disorder screen, [facility] , 08/14/19 15:06:00 CDT  Clinic Follow up, *Est. 02/17/20 12:20:00 CST, Order for future visit, Seizure disorder, [facility]  Office/Outpatient Visit Level 4 Established 34866 PC, Seizure disorder  AR (allergic rhinitis)  Chronic low back pain  Vitamin D deficiency  Anxiety  Tobacco user  Thyroid disorder screen, [facility] , 08/14/19 15:06:00 CDT  ?  Seizure disorder?G40.909  Dilantin level  Seizure free x4 months  Ordered:  1160F- Medication reconciliation completed during visit, Seizure disorder  AR (allergic rhinitis)  Chronic low back pain  Vitamin D deficiency  Anxiety  Tobacco user  Thyroid disorder screen, [facility] , 08/14/19 15:06:00 CDT  CBC w/ Auto Diff, Routine collect, *Est. 02/14/20 3:00:00 CST, Blood, Order for future visit, *Est. Stop date 02/14/20 3:00:00 CST, Lab Collect, Seizure disorder, 08/14/19 15:06:00 CDT  Clinic Follow up, *Est. 02/17/20 12:20:00 CST, Order for future visit, Seizure disorder, [facility]  Comprehensive Metabolic Panel, Routine collect, *Est. 02/14/20 3:00:00 CST, Blood, Order for future visit, *Est. Stop date 02/14/20 3:00:00 CST, Lab Collect, Seizure disorder, 08/14/19 15:06:00 CDT  Lipid Panel, Routine collect, *Est. 02/14/20 3:00:00 CST, Blood, Order for future visit, *Est. Stop date 02/14/20 3:00:00 CST,  Lab Collect, Seizure disorder, 08/14/19 15:06:00 CDT  Office/Outpatient Visit Level 4 Established [id number] PC, Seizure disorder  AR (allergic rhinitis)  Chronic low back pain  Vitamin D deficiency  Anxiety  Tobacco user  Thyroid disorder screen, [facility], 08/14/19 15:06:00 CDT  Phenytoin Level Total, Routine collect, *Est. 02/14/20 3:00:00 CST, Blood, Order for future visit, *Est. Stop date 02/14/20 3:00:00 CST, Lab Collect, Seizure disorder, 08/14/19 15:08:00 CDT  ?  Thyroid disorder screen?Z13.29  ?TSH at f/u  Ordered:  1160F- Medication reconciliation completed during visit, Seizure disorder  AR (allergic rhinitis)  Chronic low back pain  Vitamin D deficiency  Anxiety  Tobacco user  Thyroid disorder screen, [facility], 08/14/19 15:06:00 CDT  Clinic Follow up, *Est. 02/17/20 12:20:00 CST, Order for future visit, Seizure disorder, [facility]  Office/Outpatient Visit Level 4 Established [id number] PC, Seizure disorder  AR (allergic rhinitis)  Chronic low back pain  Vitamin D deficiency  Anxiety  Tobacco user  Thyroid disorder screen, [facility], 08/14/19 15:06:00 CDT  Thyroid Stimulating Hormone, Routine collect, *Est. 02/14/20 3:00:00 CST, Blood, Order for future visit, *Est. Stop date 02/14/20 3:00:00 CST, Lab Collect, Thyroid disorder screen, 08/14/19 15:07:00 CDT  ?  Tobacco user?Z72.0  ?not ready to quit  smoking cessation  Ordered:  1160F- Medication reconciliation completed during visit, Seizure disorder  AR (allergic rhinitis)  Chronic low back pain  Vitamin D deficiency  Anxiety  Tobacco user  Thyroid disorder screen, [facility], 08/14/19 15:06:00 CDT  Clinic Follow up, *Est. 02/17/20 12:20:00 CST, Order for future visit, Seizure disorder, [facility]  Office/Outpatient Visit Level 4 Established [id number] PC, Seizure disorder  AR (allergic rhinitis)  Chronic low back pain  Vitamin D deficiency  Anxiety  Tobacco user  Thyroid disorder screen, [facility], 08/14/19  15:06:00 CDT  ?  Vitamin D deficiency?E55.9  ?vit d low at 19, Rx vit d started for the next 12 wks, then otc vit d + ca  Ordered:  1160F- Medication reconciliation completed during visit, Seizure disorder  AR (allergic rhinitis)  Chronic low back pain  Vitamin D deficiency  Anxiety  Tobacco user  Thyroid disorder screen, [facility], 08/14/19 15:06:00 CDT  Clinic Follow up, *Est. 02/17/20 12:20:00 CST, Order for future visit, Seizure disorder, [facility]  Office/Outpatient Visit Level 4 Established [id number] PC, Seizure disorder  AR (allergic rhinitis)  Chronic low back pain  Vitamin D deficiency  Anxiety  Tobacco user  Thyroid disorder screen, [facility], 08/14/19 15:06:00 CDT  Vitamin D, 25-Hydroxy Level, Routine collect, *Est. 02/14/20 3:00:00 CST, Blood, Order for future visit, *Est. Stop date 02/14/20 3:00:00 CST, Lab Collect, Vitamin D deficiency, 08/14/19 15:07:00 CDT  ?  Orders:  cholecalciferol, 50,000 IntUnit = 1 cap(s), Oral, qWeek, # 12 cap(s), 0 Refill(s), Pharmacy: [id number]  escitalopram, 5 mg = 1 tab(s), Oral, Daily, # 30 tab(s), 6 Refill(s), Pharmacy: [id number]  fluticasone nasal, 1 spray(s), Nasal, BID, # 1 bottle(s), 6 Refill(s), Pharmacy: [id number]  guaiFENesin, 600 mg = 1 tab(s), Oral, q12hr, # 20 tab(s), 0 Refill(s), Pharmacy: [id number]  hydrOXYzine, 25 mg = 1 cap(s), Oral, TID, PRN PRN for anxiety, # 60 cap(s), 4 Refill(s), Pharmacy: [id number]  naproxen, See Instructions, TAKE 1 TABLET BY MOUTH TWICE A DAY WITH FOOD AS NEEDED FOR PAIN, # 30 tab(s), 4 Refill(s), Pharmacy: [id number]  phenytoin, 300 mg = 3 cap(s), Oral, Once a day (at bedtime), # 90 cap(s), 6 Refill(s), Pharmacy: [id number]  Labs thoroughly reviewed with patient. Medication refills addressed today.  RTC prn and?6 months, with labs 1 week prior to  the apt.  Patient voices understanding of all discharge instructions.?  Referrals  Clinic Follow up, *Est. 02/17/20 12:20:00 CST, Order for future visit, Seizure disorder, [facility]   Problem List/Past Medical History  Ongoing  Appendectomy  Atrial fibrillation  Cholecystectomy  Forehead laceration  Seizure disorder  Tobacco user  Tobacco user  Tobacco user  Historical  Seizure  Procedure/Surgical History  Repair Face Skin, External Approach (08/20/2017)  Appendectomy  Cholecystectomy   Medications  Dilantin 100 mg oral capsule, extended release, 300 mg= 3 cap(s), Oral, Once a day (at bedtime), 3 refills,? ?Not taking: DUPLICATE  Dilantin 100 mg oral capsule, extended release, 300 mg= 3 cap(s), Oral, Once a day (at bedtime), 6 refills  Flonase 50 mcg/inh nasal spray, 1 spray(s), Nasal, BID, 6 refills  Lexapro 5 mg oral tablet, 5 mg= 1 tab(s), Oral, Daily, 3 refills  naproxen 500 mg oral tablet, See Instructions  sertraline 50 mg oral tablet, 50 mg= 1 tab(s), Oral, Daily,? ?Not Taking per Prescriber  Viagra 25 mg oral tablet, 25 mg= 1 tab(s), Oral, Daily, PRN, 1 refills,? ?Not taking  Vistaril 25 mg oral capsule, 25 mg= 1 cap(s), Oral, TID, PRN, 3 refills  Allergies  No Known Medication Allergies  Social History  Abuse/Neglect  No, 08/14/2019  Alcohol - High Risk, 04/29/2015  Current, Liquor, 1-2 times per week, Previous treatment: None., 01/11/2019  Employment/School  disabled, 08/20/2017  Home/Environment  Lives with Significant other, brother and sister in law. Living situation: Home/Independent. Home equipment: Walker/Cane, Wheelchair. Alcohol abuse in household: No. Substance abuse in household: No. Smoker in household: Yes. Injuries/Abuse/Neglect in household: No. Feels unsafe at home: No. Safe place to go: Yes. Family/Friends available for support: Yes. Concern for family members at home: No. Major illness in household: No. Financial concerns: No. TV/Computer concerns: No.,  03/01/2018  Nutrition/Health  Regular, Caffeine intake amount: caffeine--2 large cups., 01/11/2019  Substance Use - Denies Substance Abuse, 04/29/2015  Never, 08/20/2017  Tobacco - High Risk, 04/29/2015  10 or more cigarettes (1/2 pack or more)/day in last 30 days, Cigarettes, No, 08/14/2019  Family History  Alzheimers disease: Father.  Dementia: Father.  Primary malignant neoplasm of lung: Mother.  Immunizations  Vaccine Date Status   influenza virus vaccine, inactivated 11/30/2018 Recorded   influenza virus vaccine, inactivated 09/30/2017 Given   Health Maintenance  Health Maintenance  ???Pending?(in the next year)  ??? ??OverDue  ??? ? ? ?Diabetes Screening due??and every?  ??? ? ? ?Influenza Vaccine due??10/02/18??and every?  ??? ? ? ?Smoking Cessation due??01/01/19??and every 1??year(s)  ??? ? ? ?Aspirin Therapy for CVD Prevention due??03/01/19??and every 1??year(s)  ??? ??Due?  ??? ? ? ?Colorectal Screening due??07/03/19??and every 1??year(s)  ??? ? ? ?Tetanus Vaccine due??08/14/19??and every 10??year(s)  ??? ??Due In Future?  ??? ? ? ?Alcohol Misuse Screening not due until??01/01/20??and every 1??year(s)  ??? ? ? ?Obesity Screening not due until??01/01/20??and every 1??year(s)  ??? ? ? ?Blood Pressure Screening not due until??08/13/20??and every 1??year(s)  ??? ? ? ?Body Mass Index Check not due until??08/13/20??and every 1??year(s)  ??? ? ? ?Depression Screening not due until??08/13/20??and every 1??year(s)  ???Satisfied?(in the past 1 year)  ??? ??Satisfied?  ??? ? ? ?ADL Screening on??08/14/19.??Satisfied by Allie Sandra LPN  ??? ? ? ?Alcohol Misuse Screening on??05/13/19.??Satisfied by Elis Chavez  ??? ? ? ?Blood Pressure Screening on??08/14/19.??Satisfied by Allie Sandra LPN  ??? ? ? ?Body Mass Index Check on??08/14/19.??Satisfied by Allie Sandra LPN  ??? ? ? ?Depression Screening on??08/14/19.??Satisfied by Allie Sandra LPN  ??? ? ? ?Diabetes Screening  on??08/14/19.??Satisfied by Ava Mann  ??? ? ? ?Influenza Vaccine on??11/30/18.??Satisfied by Allie Sandra LPN  ??? ? ? ?Lipid Screening on??05/13/19.??Satisfied by Sindy North  ??? ? ? ?Obesity Screening on??08/14/19.??Satisfied by Allie Sandra LPN  ?

## 2022-05-11 RX ORDER — LEVETIRACETAM 750 MG/1
TABLET ORAL
Qty: 60 TABLET | Refills: 0 | Status: SHIPPED | OUTPATIENT
Start: 2022-05-11 | End: 2022-01-01

## 2022-06-17 ENCOUNTER — LAB VISIT (OUTPATIENT)
Dept: LAB | Facility: HOSPITAL | Age: 57
End: 2022-06-17
Attending: NURSE PRACTITIONER
Payer: MEDICAID

## 2022-06-17 DIAGNOSIS — I10 ESSENTIAL HYPERTENSION: ICD-10-CM

## 2022-06-17 DIAGNOSIS — E78.5 HYPERLIPIDEMIA, UNSPECIFIED HYPERLIPIDEMIA TYPE: Primary | ICD-10-CM

## 2022-06-17 LAB
ALBUMIN SERPL-MCNC: 4.6 GM/DL (ref 3.5–5)
ALBUMIN/GLOB SERPL: 1.4 RATIO (ref 1.1–2)
ALP SERPL-CCNC: 78 UNIT/L (ref 40–150)
ALT SERPL-CCNC: 37 UNIT/L (ref 0–55)
AST SERPL-CCNC: 55 UNIT/L (ref 5–34)
BASOPHILS # BLD AUTO: 0.05 X10(3)/MCL (ref 0–0.2)
BASOPHILS NFR BLD AUTO: 0.6 %
BILIRUBIN DIRECT+TOT PNL SERPL-MCNC: 0.8 MG/DL
BUN SERPL-MCNC: 25.6 MG/DL (ref 8.4–25.7)
CALCIUM SERPL-MCNC: 9.7 MG/DL (ref 8.4–10.2)
CHLORIDE SERPL-SCNC: 100 MMOL/L (ref 98–107)
CHOLEST SERPL-MCNC: 295 MG/DL
CHOLEST/HDLC SERPL: 4 {RATIO} (ref 0–5)
CO2 SERPL-SCNC: 27 MMOL/L (ref 22–29)
CREAT SERPL-MCNC: 2.2 MG/DL (ref 0.73–1.18)
DEPRECATED CALCIDIOL+CALCIFEROL SERPL-MC: 102.6 NG/ML (ref 30–80)
EOSINOPHIL # BLD AUTO: 0.15 X10(3)/MCL (ref 0–0.9)
EOSINOPHIL NFR BLD AUTO: 1.7 %
ERYTHROCYTE [DISTWIDTH] IN BLOOD BY AUTOMATED COUNT: 12.3 % (ref 11.5–17)
GLOBULIN SER-MCNC: 3.3 GM/DL (ref 2.4–3.5)
GLUCOSE SERPL-MCNC: 98 MG/DL (ref 74–100)
HCT VFR BLD AUTO: 38.7 % (ref 42–52)
HDLC SERPL-MCNC: 83 MG/DL (ref 35–60)
HGB BLD-MCNC: 13.4 GM/DL (ref 14–18)
IMM GRANULOCYTES # BLD AUTO: 0.03 X10(3)/MCL (ref 0–0.02)
IMM GRANULOCYTES NFR BLD AUTO: 0.3 % (ref 0–0.43)
LDLC SERPL CALC-MCNC: 195 MG/DL (ref 50–140)
LYMPHOCYTES # BLD AUTO: 1.52 X10(3)/MCL (ref 0.6–4.6)
LYMPHOCYTES NFR BLD AUTO: 17.7 %
MCH RBC QN AUTO: 35.9 PG (ref 27–31)
MCHC RBC AUTO-ENTMCNC: 34.6 MG/DL (ref 33–36)
MCV RBC AUTO: 103.8 FL (ref 80–94)
MONOCYTES # BLD AUTO: 0.75 X10(3)/MCL (ref 0.1–1.3)
MONOCYTES NFR BLD AUTO: 8.7 %
NEUTROPHILS # BLD AUTO: 6.1 X10(3)/MCL (ref 2.1–9.2)
NEUTROPHILS NFR BLD AUTO: 71 %
NRBC BLD AUTO-RTO: 0 %
PLATELET # BLD AUTO: 226 X10(3)/MCL (ref 130–400)
PMV BLD AUTO: 9.8 FL (ref 9.4–12.4)
POTASSIUM SERPL-SCNC: 5.5 MMOL/L (ref 3.5–5.1)
PROT SERPL-MCNC: 7.9 GM/DL (ref 6.4–8.3)
PSA SERPL-MCNC: 0.88 NG/ML
RBC # BLD AUTO: 3.73 X10(6)/MCL (ref 4.7–6.1)
SODIUM SERPL-SCNC: 140 MMOL/L (ref 136–145)
TRIGL SERPL-MCNC: 87 MG/DL (ref 34–140)
TSH SERPL-ACNC: 2.44 UIU/ML (ref 0.35–4.94)
VLDLC SERPL CALC-MCNC: 17 MG/DL
WBC # SPEC AUTO: 8.6 X10(3)/MCL (ref 4.5–11.5)

## 2022-06-17 PROCEDURE — 80061 LIPID PANEL: CPT

## 2022-06-17 PROCEDURE — 80053 COMPREHEN METABOLIC PANEL: CPT

## 2022-06-17 PROCEDURE — 84443 ASSAY THYROID STIM HORMONE: CPT

## 2022-06-17 PROCEDURE — 36415 COLL VENOUS BLD VENIPUNCTURE: CPT

## 2022-06-17 PROCEDURE — 82306 VITAMIN D 25 HYDROXY: CPT

## 2022-06-17 PROCEDURE — 84153 ASSAY OF PSA TOTAL: CPT

## 2022-06-17 PROCEDURE — 85025 COMPLETE CBC W/AUTO DIFF WBC: CPT

## 2022-06-24 ENCOUNTER — TELEPHONE (OUTPATIENT)
Dept: INTERNAL MEDICINE | Facility: CLINIC | Age: 57
End: 2022-06-24
Payer: MEDICAID

## 2022-06-24 NOTE — TELEPHONE ENCOUNTER
----- Message from DECLAN Small sent at 6/17/2022  3:37 PM CDT -----  Pt notified by phone of abnormal lab results. GFR decrease to 33, and his baseline is >79.   Cr high at 2.2. K elevated at 5.5. Advised to check in to ED for IV fluids, and assessment.  He verbalizes understanding. He complains of muscle aches, from working outside in the sun.

## 2023-01-01 ENCOUNTER — OFFICE VISIT (OUTPATIENT)
Dept: INTERNAL MEDICINE | Facility: CLINIC | Age: 58
End: 2023-01-01
Payer: MEDICAID

## 2023-01-01 ENCOUNTER — OFFICE VISIT (OUTPATIENT)
Dept: INTERNAL MEDICINE | Facility: CLINIC | Age: 58
DRG: 871 | End: 2023-01-01
Payer: MEDICAID

## 2023-01-01 ENCOUNTER — HOSPITAL ENCOUNTER (EMERGENCY)
Facility: HOSPITAL | Age: 58
Discharge: HOME | End: 2023-06-14
Attending: STUDENT IN AN ORGANIZED HEALTH CARE EDUCATION/TRAINING PROGRAM
Payer: MEDICAID

## 2023-01-01 ENCOUNTER — HOSPITAL ENCOUNTER (INPATIENT)
Facility: HOSPITAL | Age: 58
LOS: 19 days | DRG: 871 | End: 2023-07-19
Attending: EMERGENCY MEDICINE | Admitting: INTERNAL MEDICINE
Payer: MEDICAID

## 2023-01-01 ENCOUNTER — PATIENT OUTREACH (OUTPATIENT)
Dept: ADMINISTRATIVE | Facility: CLINIC | Age: 58
End: 2023-01-01
Payer: MEDICAID

## 2023-01-01 ENCOUNTER — HOSPITAL ENCOUNTER (EMERGENCY)
Facility: HOSPITAL | Age: 58
Discharge: ANOTHER HOSPITAL | End: 2023-06-04
Attending: EMERGENCY MEDICINE
Payer: MEDICAID

## 2023-01-01 ENCOUNTER — TELEPHONE (OUTPATIENT)
Dept: INTERNAL MEDICINE | Facility: CLINIC | Age: 58
End: 2023-01-01
Payer: MEDICAID

## 2023-01-01 ENCOUNTER — HOSPITAL ENCOUNTER (INPATIENT)
Facility: HOSPITAL | Age: 58
LOS: 4 days | Discharge: HOME HEALTH | DRG: 433 | End: 2023-05-03
Attending: FAMILY MEDICINE | Admitting: STUDENT IN AN ORGANIZED HEALTH CARE EDUCATION/TRAINING PROGRAM
Payer: MEDICAID

## 2023-01-01 VITALS
BODY MASS INDEX: 24.58 KG/M2 | SYSTOLIC BLOOD PRESSURE: 57 MMHG | OXYGEN SATURATION: 79 % | TEMPERATURE: 96 F | DIASTOLIC BLOOD PRESSURE: 36 MMHG | HEIGHT: 72 IN | WEIGHT: 181.44 LBS

## 2023-01-01 VITALS
OXYGEN SATURATION: 98 % | RESPIRATION RATE: 20 BRPM | SYSTOLIC BLOOD PRESSURE: 103 MMHG | DIASTOLIC BLOOD PRESSURE: 74 MMHG | HEART RATE: 117 BPM | TEMPERATURE: 98 F

## 2023-01-01 VITALS
TEMPERATURE: 99 F | BODY MASS INDEX: 21.77 KG/M2 | HEART RATE: 98 BPM | WEIGHT: 160.69 LBS | SYSTOLIC BLOOD PRESSURE: 123 MMHG | RESPIRATION RATE: 18 BRPM | HEIGHT: 72 IN | DIASTOLIC BLOOD PRESSURE: 84 MMHG | OXYGEN SATURATION: 98 %

## 2023-01-01 VITALS
TEMPERATURE: 98 F | HEART RATE: 103 BPM | RESPIRATION RATE: 16 BRPM | BODY MASS INDEX: 20.37 KG/M2 | WEIGHT: 150.38 LBS | DIASTOLIC BLOOD PRESSURE: 63 MMHG | HEIGHT: 72 IN | SYSTOLIC BLOOD PRESSURE: 108 MMHG

## 2023-01-01 VITALS
TEMPERATURE: 98 F | DIASTOLIC BLOOD PRESSURE: 75 MMHG | HEART RATE: 92 BPM | BODY MASS INDEX: 20.74 KG/M2 | SYSTOLIC BLOOD PRESSURE: 111 MMHG | HEIGHT: 73 IN | OXYGEN SATURATION: 97 % | RESPIRATION RATE: 15 BRPM | WEIGHT: 156.5 LBS

## 2023-01-01 VITALS
HEART RATE: 101 BPM | RESPIRATION RATE: 20 BRPM | DIASTOLIC BLOOD PRESSURE: 79 MMHG | HEIGHT: 72 IN | RESPIRATION RATE: 18 BRPM | WEIGHT: 160 LBS | WEIGHT: 148.19 LBS | DIASTOLIC BLOOD PRESSURE: 54 MMHG | HEIGHT: 72 IN | TEMPERATURE: 100 F | OXYGEN SATURATION: 98 % | SYSTOLIC BLOOD PRESSURE: 82 MMHG | HEART RATE: 125 BPM | OXYGEN SATURATION: 98 % | SYSTOLIC BLOOD PRESSURE: 118 MMHG | BODY MASS INDEX: 21.67 KG/M2 | BODY MASS INDEX: 20.07 KG/M2 | TEMPERATURE: 98 F

## 2023-01-01 DIAGNOSIS — F10.10 ALCOHOL ABUSE: ICD-10-CM

## 2023-01-01 DIAGNOSIS — I10 HYPERTENSION, UNSPECIFIED TYPE: ICD-10-CM

## 2023-01-01 DIAGNOSIS — K76.82 HEPATIC ENCEPHALOPATHY: ICD-10-CM

## 2023-01-01 DIAGNOSIS — N17.9 ACUTE RENAL FAILURE, UNSPECIFIED ACUTE RENAL FAILURE TYPE: Primary | ICD-10-CM

## 2023-01-01 DIAGNOSIS — K70.11 ALCOHOLIC HEPATITIS WITH ASCITES: ICD-10-CM

## 2023-01-01 DIAGNOSIS — D72.829 LEUKOCYTOSIS, UNSPECIFIED TYPE: ICD-10-CM

## 2023-01-01 DIAGNOSIS — R74.8 ELEVATED LIVER ENZYMES: ICD-10-CM

## 2023-01-01 DIAGNOSIS — R00.0 TACHYCARDIA: ICD-10-CM

## 2023-01-01 DIAGNOSIS — Z12.11 COLON CANCER SCREENING: ICD-10-CM

## 2023-01-01 DIAGNOSIS — K70.11 ASCITES DUE TO ALCOHOLIC HEPATITIS: ICD-10-CM

## 2023-01-01 DIAGNOSIS — N39.0 URINARY TRACT INFECTION WITHOUT HEMATURIA, SITE UNSPECIFIED: ICD-10-CM

## 2023-01-01 DIAGNOSIS — R09.02 HYPOXEMIA: ICD-10-CM

## 2023-01-01 DIAGNOSIS — K70.9 ALCOHOLIC LIVER DISEASE: ICD-10-CM

## 2023-01-01 DIAGNOSIS — D64.9 ANEMIA, UNSPECIFIED TYPE: ICD-10-CM

## 2023-01-01 DIAGNOSIS — R53.1 GENERALIZED WEAKNESS: Primary | ICD-10-CM

## 2023-01-01 DIAGNOSIS — K59.00 CONSTIPATION, UNSPECIFIED CONSTIPATION TYPE: ICD-10-CM

## 2023-01-01 DIAGNOSIS — R07.9 CHEST PAIN: ICD-10-CM

## 2023-01-01 DIAGNOSIS — D53.9 MACROCYTIC ANEMIA: ICD-10-CM

## 2023-01-01 DIAGNOSIS — R00.1 BRADYCARDIA: ICD-10-CM

## 2023-01-01 DIAGNOSIS — F17.210 CIGARETTE NICOTINE DEPENDENCE WITHOUT COMPLICATION: ICD-10-CM

## 2023-01-01 DIAGNOSIS — R53.1 WEAKNESS: ICD-10-CM

## 2023-01-01 DIAGNOSIS — K72.00 ACUTE LIVER FAILURE WITHOUT HEPATIC COMA: ICD-10-CM

## 2023-01-01 DIAGNOSIS — K70.31 ALCOHOLIC CIRRHOSIS OF LIVER WITH ASCITES: ICD-10-CM

## 2023-01-01 DIAGNOSIS — I95.9 HYPOTENSION, UNSPECIFIED HYPOTENSION TYPE: ICD-10-CM

## 2023-01-01 DIAGNOSIS — R53.1 WEAKNESS GENERALIZED: ICD-10-CM

## 2023-01-01 DIAGNOSIS — R10.9 ABDOMINAL PAIN: ICD-10-CM

## 2023-01-01 DIAGNOSIS — R17 JAUNDICE, PERSISTENT: ICD-10-CM

## 2023-01-01 DIAGNOSIS — E55.9 VITAMIN D DEFICIENCY: ICD-10-CM

## 2023-01-01 DIAGNOSIS — E78.5 HYPERLIPIDEMIA, UNSPECIFIED HYPERLIPIDEMIA TYPE: ICD-10-CM

## 2023-01-01 DIAGNOSIS — K92.2 GASTROINTESTINAL HEMORRHAGE, UNSPECIFIED GASTROINTESTINAL HEMORRHAGE TYPE: Primary | ICD-10-CM

## 2023-01-01 DIAGNOSIS — L29.9 ITCHING: ICD-10-CM

## 2023-01-01 DIAGNOSIS — R53.1 GENERAL WEAKNESS: ICD-10-CM

## 2023-01-01 DIAGNOSIS — F10.10 ALCOHOL ABUSE: Primary | ICD-10-CM

## 2023-01-01 DIAGNOSIS — H26.9 CATARACT, UNSPECIFIED CATARACT TYPE, UNSPECIFIED LATERALITY: Primary | ICD-10-CM

## 2023-01-01 DIAGNOSIS — R18.8 TENSE ASCITES: ICD-10-CM

## 2023-01-01 DIAGNOSIS — Z11.3 SCREEN FOR STD (SEXUALLY TRANSMITTED DISEASE): ICD-10-CM

## 2023-01-01 DIAGNOSIS — G40.909 SEIZURE DISORDER: ICD-10-CM

## 2023-01-01 DIAGNOSIS — F10.20 ALCOHOLISM: ICD-10-CM

## 2023-01-01 LAB
%MONO NUCL BF (OHS): 55 %
%POLYMORP BF(OHS): 45 %
A-ADO2 BLOOD GAS (OHS): 201 MMHG
A-ADO2 BLOOD GAS (OHS): 209 MMHG
A-ADO2 BLOOD GAS (OHS): 223 MMHG
A-ADO2 BLOOD GAS (OHS): 238 MMHG
A-ADO2 BLOOD GAS (OHS): 241 MMHG
A-ADO2 BLOOD GAS (OHS): 324 MMHG
A-ADO2 BLOOD GAS (OHS): 353 MMHG
A-ADO2 BLOOD GAS (OHS): 445 MMHG
A1AT STL-MCNC: 9 MG/DL
ABO + RH BLD: NORMAL
ABORH RETYPE: NORMAL
ABS NEUT CALC (OHS): 23.9 X10(3)/MCL (ref 2.1–9.2)
ALBUMIN FLD-MCNC: 1.2 GM/DL
ALBUMIN SERPL-MCNC: 1.8 G/DL (ref 3.5–5)
ALBUMIN SERPL-MCNC: 1.8 G/DL (ref 3.5–5)
ALBUMIN SERPL-MCNC: 2 G/DL (ref 3.5–5)
ALBUMIN SERPL-MCNC: 2.1 G/DL (ref 3.5–5)
ALBUMIN SERPL-MCNC: 2.2 G/DL (ref 3.5–5)
ALBUMIN SERPL-MCNC: 2.3 G/DL (ref 3.5–5)
ALBUMIN SERPL-MCNC: 2.4 G/DL (ref 3.5–5)
ALBUMIN SERPL-MCNC: 2.6 G/DL (ref 3.5–5)
ALBUMIN SERPL-MCNC: 2.8 G/DL (ref 3.5–5)
ALBUMIN SERPL-MCNC: 3.1 G/DL (ref 3.5–5)
ALBUMIN SERPL-MCNC: 3.2 G/DL (ref 3.5–5)
ALBUMIN SERPL-MCNC: 3.2 G/DL (ref 3.5–5)
ALBUMIN SERPL-MCNC: 3.4 G/DL (ref 3.5–5)
ALBUMIN SERPL-MCNC: 3.4 G/DL (ref 3.5–5)
ALBUMIN SERPL-MCNC: 3.5 G/DL (ref 3.5–5)
ALBUMIN SERPL-MCNC: 3.8 G/DL (ref 3.5–5)
ALBUMIN SERPL-MCNC: 3.8 G/DL (ref 3.5–5)
ALBUMIN SERPL-MCNC: 3.9 G/DL (ref 3.5–5)
ALBUMIN SERPL-MCNC: 4 G/DL (ref 3.5–5)
ALBUMIN SERPL-MCNC: 4.1 G/DL (ref 3.5–5)
ALBUMIN SERPL-MCNC: 4.2 G/DL (ref 3.5–5)
ALBUMIN/GLOB SERPL: 0.5 RATIO (ref 1.1–2)
ALBUMIN/GLOB SERPL: 0.5 RATIO (ref 1.1–2)
ALBUMIN/GLOB SERPL: 0.6 RATIO (ref 1.1–2)
ALBUMIN/GLOB SERPL: 0.8 RATIO (ref 1.1–2)
ALBUMIN/GLOB SERPL: 1 RATIO (ref 1.1–2)
ALBUMIN/GLOB SERPL: 1.2 RATIO (ref 1.1–2)
ALBUMIN/GLOB SERPL: 1.2 RATIO (ref 1.1–2)
ALBUMIN/GLOB SERPL: 1.3 RATIO (ref 1.1–2)
ALBUMIN/GLOB SERPL: 1.4 RATIO (ref 1.1–2)
ALBUMIN/GLOB SERPL: 1.5 RATIO (ref 1.1–2)
ALBUMIN/GLOB SERPL: 1.7 RATIO (ref 1.1–2)
ALBUMIN/GLOB SERPL: 1.9 RATIO (ref 1.1–2)
ALBUMIN/GLOB SERPL: 1.9 RATIO (ref 1.1–2)
ALBUMIN/GLOB SERPL: 2.1 RATIO (ref 1.1–2)
ALBUMIN/GLOB SERPL: 2.2 RATIO (ref 1.1–2)
ALBUMIN/GLOB SERPL: 2.3 RATIO (ref 1.1–2)
ALBUMIN/GLOB SERPL: 2.4 RATIO (ref 1.1–2)
ALBUMIN/GLOB SERPL: 2.8 RATIO (ref 1.1–2)
ALBUMIN/GLOB SERPL: 2.9 RATIO (ref 1.1–2)
ALLENS TEST BLOOD GAS (OHS): YES
ALP SERPL-CCNC: 124 UNIT/L (ref 40–150)
ALP SERPL-CCNC: 128 UNIT/L (ref 40–150)
ALP SERPL-CCNC: 132 UNIT/L (ref 40–150)
ALP SERPL-CCNC: 139 UNIT/L (ref 40–150)
ALP SERPL-CCNC: 140 UNIT/L (ref 40–150)
ALP SERPL-CCNC: 145 UNIT/L (ref 40–150)
ALP SERPL-CCNC: 148 UNIT/L (ref 40–150)
ALP SERPL-CCNC: 150 UNIT/L (ref 40–150)
ALP SERPL-CCNC: 160 UNIT/L (ref 40–150)
ALP SERPL-CCNC: 167 UNIT/L (ref 40–150)
ALP SERPL-CCNC: 169 UNIT/L (ref 40–150)
ALP SERPL-CCNC: 171 UNIT/L (ref 40–150)
ALP SERPL-CCNC: 176 UNIT/L (ref 40–150)
ALP SERPL-CCNC: 191 UNIT/L (ref 40–150)
ALP SERPL-CCNC: 211 UNIT/L (ref 40–150)
ALP SERPL-CCNC: 242 UNIT/L (ref 40–150)
ALP SERPL-CCNC: 249 UNIT/L (ref 40–150)
ALP SERPL-CCNC: 249 UNIT/L (ref 40–150)
ALP SERPL-CCNC: 253 UNIT/L (ref 40–150)
ALP SERPL-CCNC: 277 UNIT/L (ref 40–150)
ALP SERPL-CCNC: 295 UNIT/L (ref 40–150)
ALP SERPL-CCNC: 337 UNIT/L (ref 40–150)
ALP SERPL-CCNC: 396 UNIT/L (ref 40–150)
ALP SERPL-CCNC: 417 UNIT/L (ref 40–150)
ALP SERPL-CCNC: 470 UNIT/L (ref 40–150)
ALP SERPL-CCNC: 549 UNIT/L (ref 40–150)
ALP SERPL-CCNC: 776 UNIT/L (ref 40–150)
ALT SERPL-CCNC: 14 UNIT/L (ref 0–55)
ALT SERPL-CCNC: 14 UNIT/L (ref 0–55)
ALT SERPL-CCNC: 16 UNIT/L (ref 0–55)
ALT SERPL-CCNC: 19 UNIT/L (ref 0–55)
ALT SERPL-CCNC: 23 UNIT/L (ref 0–55)
ALT SERPL-CCNC: 31 UNIT/L (ref 0–55)
ALT SERPL-CCNC: 5 UNIT/L (ref 0–55)
ALT SERPL-CCNC: 6 UNIT/L (ref 0–55)
ALT SERPL-CCNC: 7 UNIT/L (ref 0–55)
ALT SERPL-CCNC: 8 UNIT/L (ref 0–55)
ALT SERPL-CCNC: 9 UNIT/L (ref 0–55)
ALT SERPL-CCNC: 9 UNIT/L (ref 0–55)
ALT SERPL-CCNC: <5 UNIT/L (ref 0–55)
AMMONIA PLAS-MSCNC: 112.9 UMOL/L (ref 18–72)
AMMONIA PLAS-MSCNC: 43.6 UMOL/L (ref 18–72)
AMMONIA PLAS-MSCNC: 45.2 UMOL/L (ref 18–72)
AMMONIA PLAS-MSCNC: 57.2 UMOL/L (ref 18–72)
AMMONIA PLAS-MSCNC: 60.6 UMOL/L (ref 18–72)
AMPHET UR QL SCN: NEGATIVE
AMPHET UR QL SCN: NEGATIVE
ANION GAP SERPL CALC-SCNC: 7 MEQ/L
ANISOCYTOSIS BLD QL SMEAR: ABNORMAL
APAP SERPL-MCNC: <17.4 UG/ML (ref 17.4–30)
APAP SERPL-MCNC: <17.4 UG/ML (ref 17.4–30)
APPEARANCE UR: CLEAR
APTT PPP: 32 SECONDS
APTT PPP: 35.8 SECONDS
AST SERPL-CCNC: 126 UNIT/L (ref 5–34)
AST SERPL-CCNC: 139 UNIT/L (ref 5–34)
AST SERPL-CCNC: 19 UNIT/L (ref 5–34)
AST SERPL-CCNC: 25 UNIT/L (ref 5–34)
AST SERPL-CCNC: 28 UNIT/L (ref 5–34)
AST SERPL-CCNC: 29 UNIT/L (ref 5–34)
AST SERPL-CCNC: 32 UNIT/L (ref 5–34)
AST SERPL-CCNC: 34 UNIT/L (ref 5–34)
AST SERPL-CCNC: 35 UNIT/L (ref 5–34)
AST SERPL-CCNC: 39 UNIT/L (ref 5–34)
AST SERPL-CCNC: 39 UNIT/L (ref 5–34)
AST SERPL-CCNC: 41 UNIT/L (ref 5–34)
AST SERPL-CCNC: 44 UNIT/L (ref 5–34)
AST SERPL-CCNC: 55 UNIT/L (ref 5–34)
AST SERPL-CCNC: 56 UNIT/L (ref 5–34)
AST SERPL-CCNC: 56 UNIT/L (ref 5–34)
AST SERPL-CCNC: 61 UNIT/L (ref 5–34)
AST SERPL-CCNC: 64 UNIT/L (ref 5–34)
AST SERPL-CCNC: 68 UNIT/L (ref 5–34)
AST SERPL-CCNC: 70 UNIT/L (ref 5–34)
AST SERPL-CCNC: 78 UNIT/L (ref 5–34)
AST SERPL-CCNC: 90 UNIT/L (ref 5–34)
AV INDEX (PROSTH): 0.85
AV MEAN GRADIENT: 4 MMHG
AV PEAK GRADIENT: 6 MMHG
AV VALVE AREA: 3.19 CM2
AV VELOCITY RATIO: 0.72
BACTERIA #/AREA URNS AUTO: ABNORMAL /HPF
BACTERIA BLD CULT: NORMAL
BACTERIA FLD CULT: NORMAL
BACTERIA FLD CULT: NORMAL
BACTERIA STL CULT: NORMAL
BARBITURATE SCN PRESENT UR: NEGATIVE
BARBITURATE SCN PRESENT UR: NEGATIVE
BASE EXCESS BLD CALC-SCNC: -0.2 MMOL/L (ref -2–2)
BASE EXCESS BLD CALC-SCNC: -0.5 MMOL/L (ref -2–2)
BASE EXCESS BLD CALC-SCNC: 0.1 MMOL/L (ref -2–2)
BASE EXCESS BLD CALC-SCNC: 0.2 MMOL/L (ref -2–2)
BASE EXCESS BLD CALC-SCNC: 0.5 MMOL/L (ref -2–2)
BASE EXCESS BLD CALC-SCNC: 0.9 MMOL/L (ref -2–2)
BASE EXCESS BLD CALC-SCNC: 2.9 MMOL/L (ref -2–2)
BASE EXCESS BLD CALC-SCNC: 4.7 MMOL/L (ref -2–2)
BASOPHIL MANUAL BF (OHS): 1 %
BASOPHILS # BLD AUTO: 0.03 X10(3)/MCL
BASOPHILS # BLD AUTO: 0.06 X10(3)/MCL
BASOPHILS # BLD AUTO: 0.07 X10(3)/MCL
BASOPHILS # BLD AUTO: 0.08 X10(3)/MCL
BASOPHILS # BLD AUTO: 0.09 X10(3)/MCL
BASOPHILS # BLD AUTO: 0.1 X10(3)/MCL
BASOPHILS # BLD AUTO: 0.1 X10(3)/MCL
BASOPHILS # BLD AUTO: 0.11 X10(3)/MCL
BASOPHILS # BLD AUTO: 0.11 X10(3)/MCL
BASOPHILS # BLD AUTO: 0.12 X10(3)/MCL (ref 0–0.2)
BASOPHILS # BLD AUTO: 0.13 X10(3)/MCL
BASOPHILS # BLD AUTO: 0.14 X10(3)/MCL
BASOPHILS # BLD AUTO: 0.15 X10(3)/MCL
BASOPHILS # BLD AUTO: 0.18 X10(3)/MCL (ref 0–0.2)
BASOPHILS NFR BLD AUTO: 0.2 %
BASOPHILS NFR BLD AUTO: 0.3 %
BASOPHILS NFR BLD AUTO: 0.4 %
BASOPHILS NFR BLD AUTO: 0.5 %
BASOPHILS NFR BLD AUTO: 0.6 %
BASOPHILS NFR BLD AUTO: 0.7 %
BASOPHILS NFR BLD AUTO: 0.7 %
BASOPHILS NFR BLD AUTO: 0.8 %
BASOPHILS NFR BLD AUTO: 1 %
BASOPHILS NFR BLD AUTO: 1.2 %
BENZODIAZ UR QL SCN: NEGATIVE
BENZODIAZ UR QL SCN: NEGATIVE
BILIRUB UR QL STRIP.AUTO: ABNORMAL MG/DL
BILIRUB UR QL STRIP.AUTO: NEGATIVE MG/DL
BILIRUBIN DIRECT+TOT PNL SERPL-MCNC: 1.8 MG/DL
BILIRUBIN DIRECT+TOT PNL SERPL-MCNC: 1.8 MG/DL
BILIRUBIN DIRECT+TOT PNL SERPL-MCNC: 1.9 MG/DL (ref 0–?)
BILIRUBIN DIRECT+TOT PNL SERPL-MCNC: 2.1 MG/DL
BILIRUBIN DIRECT+TOT PNL SERPL-MCNC: 2.3 MG/DL
BILIRUBIN DIRECT+TOT PNL SERPL-MCNC: 2.3 MG/DL
BILIRUBIN DIRECT+TOT PNL SERPL-MCNC: 2.4 MG/DL
BILIRUBIN DIRECT+TOT PNL SERPL-MCNC: 2.8 MG/DL (ref 0–?)
BILIRUBIN DIRECT+TOT PNL SERPL-MCNC: 2.9 MG/DL
BILIRUBIN DIRECT+TOT PNL SERPL-MCNC: 3 MG/DL
BILIRUBIN DIRECT+TOT PNL SERPL-MCNC: 3 MG/DL
BILIRUBIN DIRECT+TOT PNL SERPL-MCNC: 3.1 MG/DL
BILIRUBIN DIRECT+TOT PNL SERPL-MCNC: 3.7 MG/DL
BILIRUBIN DIRECT+TOT PNL SERPL-MCNC: 3.8 MG/DL
BILIRUBIN DIRECT+TOT PNL SERPL-MCNC: 3.9 MG/DL (ref 0–?)
BILIRUBIN DIRECT+TOT PNL SERPL-MCNC: 4.2 MG/DL
BILIRUBIN DIRECT+TOT PNL SERPL-MCNC: 4.5 MG/DL
BILIRUBIN DIRECT+TOT PNL SERPL-MCNC: 4.7 MG/DL
BILIRUBIN DIRECT+TOT PNL SERPL-MCNC: 4.8 MG/DL
BILIRUBIN DIRECT+TOT PNL SERPL-MCNC: 5 MG/DL
BILIRUBIN DIRECT+TOT PNL SERPL-MCNC: 5.1 MG/DL
BILIRUBIN DIRECT+TOT PNL SERPL-MCNC: 5.1 MG/DL
BILIRUBIN DIRECT+TOT PNL SERPL-MCNC: 5.3 MG/DL
BILIRUBIN DIRECT+TOT PNL SERPL-MCNC: 5.6 MG/DL
BILIRUBIN DIRECT+TOT PNL SERPL-MCNC: 6.3 MG/DL
BILIRUBIN DIRECT+TOT PNL SERPL-MCNC: 6.7 MG/DL
BILIRUBIN DIRECT+TOT PNL SERPL-MCNC: 6.7 MG/DL
BILIRUBIN DIRECT+TOT PNL SERPL-MCNC: 6.8 MG/DL
BILIRUBIN DIRECT+TOT PNL SERPL-MCNC: 7 MG/DL
BILIRUBIN DIRECT+TOT PNL SERPL-MCNC: 7.1 MG/DL
BIPAP(E) BLOOD GAS (OHS): 6 CM H2O
BIPAP(I) BLOOD GAS (OHS): 12 CM H2O
BLD PROD TYP BPU: NORMAL
BLOOD GAS SAMPLE TYPE (OHS): ABNORMAL
BLOOD UNIT EXPIRATION DATE: NORMAL
BLOOD UNIT TYPE CODE: 5100
BLOOD UNIT TYPE CODE: 7300
BNP BLD-MCNC: 1083.5 PG/ML
BSA FOR ECHO PROCEDURE: 1.93 M2
BUN SERPL-MCNC: 16.5 MG/DL (ref 8.4–25.7)
BUN SERPL-MCNC: 16.9 MG/DL (ref 8.4–25.7)
BUN SERPL-MCNC: 17 MG/DL (ref 8.4–25.7)
BUN SERPL-MCNC: 17.2 MG/DL (ref 8.4–25.7)
BUN SERPL-MCNC: 18.9 MG/DL (ref 8.4–25.7)
BUN SERPL-MCNC: 19.9 MG/DL (ref 8.4–25.7)
BUN SERPL-MCNC: 20.3 MG/DL (ref 8.4–25.7)
BUN SERPL-MCNC: 23.1 MG/DL (ref 8.4–25.7)
BUN SERPL-MCNC: 23.7 MG/DL (ref 8.4–25.7)
BUN SERPL-MCNC: 26.3 MG/DL (ref 8.4–25.7)
BUN SERPL-MCNC: 26.5 MG/DL (ref 8.4–25.7)
BUN SERPL-MCNC: 27.8 MG/DL (ref 8.4–25.7)
BUN SERPL-MCNC: 28.9 MG/DL (ref 8.4–25.7)
BUN SERPL-MCNC: 29.4 MG/DL (ref 8.4–25.7)
BUN SERPL-MCNC: 3.1 MG/DL (ref 8.4–25.7)
BUN SERPL-MCNC: 3.7 MG/DL (ref 8.4–25.7)
BUN SERPL-MCNC: 3.9 MG/DL (ref 8.4–25.7)
BUN SERPL-MCNC: 31.2 MG/DL (ref 8.4–25.7)
BUN SERPL-MCNC: 31.9 MG/DL (ref 8.4–25.7)
BUN SERPL-MCNC: 35.5 MG/DL (ref 8.4–25.7)
BUN SERPL-MCNC: 39.8 MG/DL (ref 8.4–25.7)
BUN SERPL-MCNC: 4.3 MG/DL (ref 8.4–25.7)
BUN SERPL-MCNC: 44.7 MG/DL (ref 8.4–25.7)
BUN SERPL-MCNC: 45.6 MG/DL (ref 8.4–25.7)
BUN SERPL-MCNC: 50.4 MG/DL (ref 8.4–25.7)
BUN SERPL-MCNC: 51.2 MG/DL (ref 8.4–25.7)
BUN SERPL-MCNC: 6.3 MG/DL (ref 8.4–25.7)
BUN SERPL-MCNC: <3 MG/DL (ref 8.4–25.7)
BURR CELLS (OLG): SLIGHT
C DIFF TOX GENS STL QL NAA+PROBE: NOT DETECTED
CALCIUM SERPL-MCNC: 10 MG/DL (ref 8.4–10.2)
CALCIUM SERPL-MCNC: 10 MG/DL (ref 8.4–10.2)
CALCIUM SERPL-MCNC: 10.1 MG/DL (ref 8.4–10.2)
CALCIUM SERPL-MCNC: 10.2 MG/DL (ref 8.4–10.2)
CALCIUM SERPL-MCNC: 10.3 MG/DL (ref 8.4–10.2)
CALCIUM SERPL-MCNC: 7.5 MG/DL (ref 8.4–10.2)
CALCIUM SERPL-MCNC: 7.6 MG/DL (ref 8.4–10.2)
CALCIUM SERPL-MCNC: 7.7 MG/DL (ref 8.4–10.2)
CALCIUM SERPL-MCNC: 8 MG/DL (ref 8.4–10.2)
CALCIUM SERPL-MCNC: 8.1 MG/DL (ref 8.4–10.2)
CALCIUM SERPL-MCNC: 8.1 MG/DL (ref 8.4–10.2)
CALCIUM SERPL-MCNC: 8.5 MG/DL (ref 8.4–10.2)
CALCIUM SERPL-MCNC: 8.6 MG/DL (ref 8.4–10.2)
CALCIUM SERPL-MCNC: 8.8 MG/DL (ref 8.4–10.2)
CALCIUM SERPL-MCNC: 8.9 MG/DL (ref 8.4–10.2)
CALCIUM SERPL-MCNC: 9.1 MG/DL (ref 8.4–10.2)
CALCIUM SERPL-MCNC: 9.2 MG/DL (ref 8.4–10.2)
CALCIUM SERPL-MCNC: 9.3 MG/DL (ref 8.4–10.2)
CALCIUM SERPL-MCNC: 9.4 MG/DL (ref 8.4–10.2)
CALCIUM SERPL-MCNC: 9.5 MG/DL (ref 8.4–10.2)
CALCIUM SERPL-MCNC: 9.5 MG/DL (ref 8.4–10.2)
CALCIUM SERPL-MCNC: 9.7 MG/DL (ref 8.4–10.2)
CALCIUM SERPL-MCNC: 9.7 MG/DL (ref 8.4–10.2)
CALCIUM SERPL-MCNC: 9.9 MG/DL (ref 8.4–10.2)
CALPROTECTIN STL-MCNT: 58 MCG/G
CANNABINOIDS UR QL SCN: POSITIVE
CANNABINOIDS UR QL SCN: POSITIVE
CASTS URNS MICRO: ABNORMAL /LPF
CHLORIDE SERPL-SCNC: 102 MMOL/L (ref 98–107)
CHLORIDE SERPL-SCNC: 102 MMOL/L (ref 98–107)
CHLORIDE SERPL-SCNC: 103 MMOL/L (ref 98–107)
CHLORIDE SERPL-SCNC: 103 MMOL/L (ref 98–107)
CHLORIDE SERPL-SCNC: 104 MMOL/L (ref 98–107)
CHLORIDE SERPL-SCNC: 105 MMOL/L (ref 98–107)
CHLORIDE SERPL-SCNC: 106 MMOL/L (ref 98–107)
CHLORIDE SERPL-SCNC: 108 MMOL/L (ref 98–107)
CHLORIDE SERPL-SCNC: 109 MMOL/L (ref 98–107)
CHLORIDE SERPL-SCNC: 110 MMOL/L (ref 98–107)
CHLORIDE SERPL-SCNC: 111 MMOL/L (ref 98–107)
CHLORIDE SERPL-SCNC: 97 MMOL/L (ref 98–107)
CHLORIDE SERPL-SCNC: 98 MMOL/L (ref 98–107)
CHLORIDE SERPL-SCNC: 99 MMOL/L (ref 98–107)
CHLORIDE UR-SCNC: 69.4 MMOL/L
CHLORIDE UR-SCNC: <20 MMOL/L
CLARITY BODY FLUID (OHS): CLEAR
CLARITY BODY FLUID (OHS): NORMAL
CLINICAL BIOCHEMIST REVIEW: NORMAL
CLOSTRIDIUM DIFFICILE GDH ANTIGEN (OHS): POSITIVE
CLOSTRIDIUM DIFFICILE TOXIN A/B (OHS): NEGATIVE
CO2 BLDA-SCNC: 26.4 MMOL/L (ref 22–26)
CO2 BLDA-SCNC: 26.7 MMOL/L (ref 22–26)
CO2 BLDA-SCNC: 26.7 MMOL/L (ref 22–26)
CO2 BLDA-SCNC: 27.3 MMOL/L (ref 22–26)
CO2 BLDA-SCNC: 29.3 MMOL/L (ref 22–26)
CO2 BLDA-SCNC: 30.1 MMOL/L (ref 22–26)
CO2 BLDA-SCNC: 30.3 MMOL/L (ref 22–26)
CO2 BLDA-SCNC: 33.6 MMOL/L (ref 22–26)
CO2 SERPL-SCNC: 17 MMOL/L (ref 22–29)
CO2 SERPL-SCNC: 18 MMOL/L (ref 22–29)
CO2 SERPL-SCNC: 19 MMOL/L (ref 22–29)
CO2 SERPL-SCNC: 20 MMOL/L (ref 22–29)
CO2 SERPL-SCNC: 21 MMOL/L (ref 22–29)
CO2 SERPL-SCNC: 22 MMOL/L (ref 22–29)
CO2 SERPL-SCNC: 23 MMOL/L (ref 22–29)
CO2 SERPL-SCNC: 24 MMOL/L (ref 22–29)
CO2 SERPL-SCNC: 25 MMOL/L (ref 22–29)
CO2 SERPL-SCNC: 25 MMOL/L (ref 22–29)
CO2 SERPL-SCNC: 26 MMOL/L (ref 22–29)
CO2 SERPL-SCNC: 28 MMOL/L (ref 22–29)
COCAINE UR QL SCN: NEGATIVE
COCAINE UR QL SCN: NEGATIVE
COHGB MFR BLDA: 2 % (ref 0.5–1.5)
COHGB MFR BLDA: 2.3 % (ref 0.5–1.5)
COHGB MFR BLDA: 2.6 % (ref 0.5–1.5)
COHGB MFR BLDA: 2.8 % (ref 0.5–1.5)
COHGB MFR BLDA: 3.2 % (ref 0.5–1.5)
COHGB MFR BLDA: 3.4 % (ref 0.5–1.5)
COHGB MFR BLDA: 4 % (ref 0.5–1.5)
COHGB MFR BLDA: 4 % (ref 0.5–1.5)
COLOR BODY FLUID (OHS): YELLOW
COLOR BODY FLUID (OHS): YELLOW
COLOR STL: NORMAL
COLOR UR AUTO: ABNORMAL
COLOR UR: ABNORMAL
COLOR UR: ABNORMAL
COLOR UR: YELLOW
CONSISTENCY STL: NORMAL
CREAT SERPL-MCNC: 0.64 MG/DL (ref 0.73–1.18)
CREAT SERPL-MCNC: 0.66 MG/DL (ref 0.73–1.18)
CREAT SERPL-MCNC: 0.68 MG/DL (ref 0.73–1.18)
CREAT SERPL-MCNC: 0.7 MG/DL (ref 0.73–1.18)
CREAT SERPL-MCNC: 0.73 MG/DL (ref 0.73–1.18)
CREAT SERPL-MCNC: 0.91 MG/DL (ref 0.73–1.18)
CREAT SERPL-MCNC: 2.35 MG/DL (ref 0.73–1.18)
CREAT SERPL-MCNC: 2.46 MG/DL (ref 0.73–1.18)
CREAT SERPL-MCNC: 2.48 MG/DL (ref 0.73–1.18)
CREAT SERPL-MCNC: 2.56 MG/DL (ref 0.73–1.18)
CREAT SERPL-MCNC: 2.68 MG/DL (ref 0.73–1.18)
CREAT SERPL-MCNC: 3.12 MG/DL (ref 0.73–1.18)
CREAT SERPL-MCNC: 3.16 MG/DL (ref 0.73–1.18)
CREAT SERPL-MCNC: 3.16 MG/DL (ref 0.73–1.18)
CREAT SERPL-MCNC: 3.3 MG/DL (ref 0.73–1.18)
CREAT SERPL-MCNC: 3.32 MG/DL (ref 0.73–1.18)
CREAT SERPL-MCNC: 3.56 MG/DL (ref 0.73–1.18)
CREAT SERPL-MCNC: 3.6 MG/DL (ref 0.73–1.18)
CREAT SERPL-MCNC: 3.61 MG/DL (ref 0.73–1.18)
CREAT SERPL-MCNC: 3.61 MG/DL (ref 0.73–1.18)
CREAT SERPL-MCNC: 3.64 MG/DL (ref 0.73–1.18)
CREAT SERPL-MCNC: 3.77 MG/DL (ref 0.73–1.18)
CREAT SERPL-MCNC: 3.83 MG/DL (ref 0.73–1.18)
CREAT SERPL-MCNC: 3.88 MG/DL (ref 0.73–1.18)
CREAT SERPL-MCNC: 3.94 MG/DL (ref 0.73–1.18)
CREAT SERPL-MCNC: 3.97 MG/DL (ref 0.73–1.18)
CREAT SERPL-MCNC: 4.54 MG/DL (ref 0.73–1.18)
CREAT SERPL-MCNC: 4.82 MG/DL (ref 0.73–1.18)
CREAT UR-MCNC: 180.2 MG/DL (ref 63–166)
CREAT UR-MCNC: 70.3 MG/DL (ref 63–166)
CREAT UR-MCNC: 70.5 MG/DL (ref 63–166)
CREAT/UREA NIT SERPL: 5
CROSSMATCH INTERPRETATION: NORMAL
CRP SERPL-MCNC: 39.3 MG/L
CTP QC/QA: YES
CV ECHO LV RWT: 0.29 CM
DISPENSE STATUS: NORMAL
DOP CALC AO PEAK VEL: 1.19 M/S
DOP CALC AO VTI: 21 CM
DOP CALC LVOT AREA: 3.8 CM2
DOP CALC LVOT DIAMETER: 2.19 CM
DOP CALC LVOT PEAK VEL: 0.86 M/S
DOP CALC LVOT STROKE VOLUME: 67.02 CM3
DOP CALC MV VTI: 30.2 CM
DOP CALCLVOT PEAK VEL VTI: 17.8 CM
DRAWN BY BLOOD GAS (OHS): ABNORMAL
E WAVE DECELERATION TIME: 112.23 MSEC
E/A RATIO: 0.86
ECHO LV POSTERIOR WALL: 0.74 CM (ref 0.6–1.1)
EJECTION FRACTION: 54 %
ELASTASE PANC STL-MCNT: <40 MCG/G
EOSINOPHIL # BLD AUTO: 0.05 X10(3)/MCL (ref 0–0.9)
EOSINOPHIL # BLD AUTO: 0.08 X10(3)/MCL (ref 0–0.9)
EOSINOPHIL # BLD AUTO: 0.12 X10(3)/MCL (ref 0–0.9)
EOSINOPHIL # BLD AUTO: 0.12 X10(3)/MCL (ref 0–0.9)
EOSINOPHIL # BLD AUTO: 0.16 X10(3)/MCL (ref 0–0.9)
EOSINOPHIL # BLD AUTO: 0.18 X10(3)/MCL (ref 0–0.9)
EOSINOPHIL # BLD AUTO: 0.18 X10(3)/MCL (ref 0–0.9)
EOSINOPHIL # BLD AUTO: 0.19 X10(3)/MCL (ref 0–0.9)
EOSINOPHIL # BLD AUTO: 0.2 X10(3)/MCL (ref 0–0.9)
EOSINOPHIL # BLD AUTO: 0.2 X10(3)/MCL (ref 0–0.9)
EOSINOPHIL # BLD AUTO: 0.21 X10(3)/MCL (ref 0–0.9)
EOSINOPHIL # BLD AUTO: 0.22 X10(3)/MCL (ref 0–0.9)
EOSINOPHIL # BLD AUTO: 0.23 X10(3)/MCL (ref 0–0.9)
EOSINOPHIL # BLD AUTO: 0.25 X10(3)/MCL (ref 0–0.9)
EOSINOPHIL # BLD AUTO: 0.26 X10(3)/MCL (ref 0–0.9)
EOSINOPHIL # BLD AUTO: 0.27 X10(3)/MCL (ref 0–0.9)
EOSINOPHIL # BLD AUTO: 0.27 X10(3)/MCL (ref 0–0.9)
EOSINOPHIL # BLD AUTO: 0.31 X10(3)/MCL (ref 0–0.9)
EOSINOPHIL NFR BLD AUTO: 0.2 %
EOSINOPHIL NFR BLD AUTO: 0.5 %
EOSINOPHIL NFR BLD AUTO: 0.5 %
EOSINOPHIL NFR BLD AUTO: 0.6 %
EOSINOPHIL NFR BLD AUTO: 0.7 %
EOSINOPHIL NFR BLD AUTO: 0.8 %
EOSINOPHIL NFR BLD AUTO: 0.9 %
EOSINOPHIL NFR BLD AUTO: 1 %
EOSINOPHIL NFR BLD AUTO: 1.1 %
EOSINOPHIL NFR BLD AUTO: 1.3 %
EOSINOPHIL NFR BLD AUTO: 1.4 %
EOSINOPHIL NFR BLD AUTO: 1.7 %
EOSINOPHIL NFR BLD AUTO: 1.8 %
EOSINOPHIL NFR BLD AUTO: 2 %
EOSINOPHIL NFR BLD AUTO: 2.1 %
EOSINOPHIL NFR BLD AUTO: 2.2 %
EOSINOPHIL NFR BLD MANUAL: 0.51 X10(3)/MCL (ref 0–0.9)
EOSINOPHIL NFR BLD MANUAL: 2 % (ref 0–8)
EPAP (OHS): 6 CMH2O
EPAP (OHS): 6 CMH2O
ERYTHROCYTE [DISTWIDTH] IN BLOOD BY AUTOMATED COUNT: 13.6 % (ref 11.5–17)
ERYTHROCYTE [DISTWIDTH] IN BLOOD BY AUTOMATED COUNT: 13.6 % (ref 11.5–17)
ERYTHROCYTE [DISTWIDTH] IN BLOOD BY AUTOMATED COUNT: 14 % (ref 11.5–17)
ERYTHROCYTE [DISTWIDTH] IN BLOOD BY AUTOMATED COUNT: 15.1 % (ref 11.5–17)
ERYTHROCYTE [DISTWIDTH] IN BLOOD BY AUTOMATED COUNT: 15.3 % (ref 11.5–17)
ERYTHROCYTE [DISTWIDTH] IN BLOOD BY AUTOMATED COUNT: 15.4 % (ref 11.5–17)
ERYTHROCYTE [DISTWIDTH] IN BLOOD BY AUTOMATED COUNT: 15.5 % (ref 11.5–17)
ERYTHROCYTE [DISTWIDTH] IN BLOOD BY AUTOMATED COUNT: 15.8 % (ref 11.5–17)
ERYTHROCYTE [DISTWIDTH] IN BLOOD BY AUTOMATED COUNT: 15.9 % (ref 11.5–17)
ERYTHROCYTE [DISTWIDTH] IN BLOOD BY AUTOMATED COUNT: 16.1 % (ref 11.5–17)
ERYTHROCYTE [DISTWIDTH] IN BLOOD BY AUTOMATED COUNT: 16.7 % (ref 11.5–17)
ERYTHROCYTE [DISTWIDTH] IN BLOOD BY AUTOMATED COUNT: 17.2 % (ref 11.5–17)
ERYTHROCYTE [DISTWIDTH] IN BLOOD BY AUTOMATED COUNT: 17.4 % (ref 11.5–17)
ERYTHROCYTE [DISTWIDTH] IN BLOOD BY AUTOMATED COUNT: 17.4 % (ref 11.5–17)
ERYTHROCYTE [DISTWIDTH] IN BLOOD BY AUTOMATED COUNT: 17.7 % (ref 11.5–17)
ERYTHROCYTE [DISTWIDTH] IN BLOOD BY AUTOMATED COUNT: 17.9 % (ref 11.5–17)
ERYTHROCYTE [DISTWIDTH] IN BLOOD BY AUTOMATED COUNT: 18 % (ref 11.5–17)
ERYTHROCYTE [DISTWIDTH] IN BLOOD BY AUTOMATED COUNT: 18.7 % (ref 11.5–17)
ERYTHROCYTE [DISTWIDTH] IN BLOOD BY AUTOMATED COUNT: 18.9 % (ref 11.5–17)
ERYTHROCYTE [DISTWIDTH] IN BLOOD BY AUTOMATED COUNT: 20 % (ref 11.5–17)
ERYTHROCYTE [DISTWIDTH] IN BLOOD BY AUTOMATED COUNT: 20.1 % (ref 11.5–17)
ERYTHROCYTE [DISTWIDTH] IN BLOOD BY AUTOMATED COUNT: 20.4 % (ref 11.5–17)
ERYTHROCYTE [DISTWIDTH] IN BLOOD BY AUTOMATED COUNT: 21.2 % (ref 11.5–17)
ERYTHROCYTE [DISTWIDTH] IN BLOOD BY AUTOMATED COUNT: 21.3 % (ref 11.5–17)
ERYTHROCYTE [DISTWIDTH] IN BLOOD BY AUTOMATED COUNT: 21.8 % (ref 11.5–17)
ERYTHROCYTE [DISTWIDTH] IN BLOOD BY AUTOMATED COUNT: 21.9 % (ref 11.5–17)
ESTROGEN SERPL-MCNC: NORMAL PG/ML
ETHANOL SERPL-MCNC: 369 MG/DL
ETHANOL SERPL-MCNC: <10 MG/DL
FECAL OCCULT BLOOD, POC: NEGATIVE
FENTANYL UR QL SCN: NEGATIVE
FENTANYL UR QL SCN: NEGATIVE
FERRITIN SERPL-MCNC: 174.79 NG/ML (ref 21.81–274.66)
FERRITIN SERPL-MCNC: 220.52 NG/ML (ref 21.81–274.66)
FIO2 BLOOD GAS (OHS): 50 %
FIO2 BLOOD GAS (OHS): 65 %
FIO2 BLOOD GAS (OHS): 65 %
FIO2 BLOOD GAS (OHS): 80 %
FLUAV AG UPPER RESP QL IA.RAPID: NOT DETECTED
FLUAV AG UPPER RESP QL IA.RAPID: NOT DETECTED
FLUBV AG UPPER RESP QL IA.RAPID: NOT DETECTED
FLUBV AG UPPER RESP QL IA.RAPID: NOT DETECTED
FOLATE SERPL-MCNC: 6.8 NG/ML (ref 7–31.4)
FOLATE SERPL-MCNC: >80 NG/ML (ref 7–31.4)
FRACTIONAL SHORTENING: 26 % (ref 28–44)
GFR SERPLBLD CREATININE-BSD FMLA CKD-EPI: 13 MLS/MIN/1.73/M2
GFR SERPLBLD CREATININE-BSD FMLA CKD-EPI: 14 MLS/MIN/1.73/M2
GFR SERPLBLD CREATININE-BSD FMLA CKD-EPI: 17 MLS/MIN/1.73/M2
GFR SERPLBLD CREATININE-BSD FMLA CKD-EPI: 18 MLS/MIN/1.73/M2
GFR SERPLBLD CREATININE-BSD FMLA CKD-EPI: 18 MLS/MIN/1.73/M2
GFR SERPLBLD CREATININE-BSD FMLA CKD-EPI: 19 MLS/MIN/1.73/M2
GFR SERPLBLD CREATININE-BSD FMLA CKD-EPI: 21 MLS/MIN/1.73/M2
GFR SERPLBLD CREATININE-BSD FMLA CKD-EPI: 21 MLS/MIN/1.73/M2
GFR SERPLBLD CREATININE-BSD FMLA CKD-EPI: 22 MLS/MIN/1.73/M2
GFR SERPLBLD CREATININE-BSD FMLA CKD-EPI: 27 MLS/MIN/1.73/M2
GFR SERPLBLD CREATININE-BSD FMLA CKD-EPI: 28 MLS/MIN/1.73/M2
GFR SERPLBLD CREATININE-BSD FMLA CKD-EPI: 30 MLS/MIN/1.73/M2
GFR SERPLBLD CREATININE-BSD FMLA CKD-EPI: 30 MLS/MIN/1.73/M2
GFR SERPLBLD CREATININE-BSD FMLA CKD-EPI: 31 MLS/MIN/1.73/M2
GFR SERPLBLD CREATININE-BSD FMLA CKD-EPI: >60 MLS/MIN/1.73/M2
GLOBULIN SER-MCNC: 1.4 GM/DL (ref 2.4–3.5)
GLOBULIN SER-MCNC: 1.4 GM/DL (ref 2.4–3.5)
GLOBULIN SER-MCNC: 1.7 GM/DL (ref 2.4–3.5)
GLOBULIN SER-MCNC: 1.9 GM/DL (ref 2.4–3.5)
GLOBULIN SER-MCNC: 2 GM/DL (ref 2.4–3.5)
GLOBULIN SER-MCNC: 2.1 GM/DL (ref 2.4–3.5)
GLOBULIN SER-MCNC: 2.1 GM/DL (ref 2.4–3.5)
GLOBULIN SER-MCNC: 2.2 GM/DL (ref 2.4–3.5)
GLOBULIN SER-MCNC: 2.3 GM/DL (ref 2.4–3.5)
GLOBULIN SER-MCNC: 2.4 GM/DL (ref 2.4–3.5)
GLOBULIN SER-MCNC: 2.4 GM/DL (ref 2.4–3.5)
GLOBULIN SER-MCNC: 2.5 GM/DL (ref 2.4–3.5)
GLOBULIN SER-MCNC: 2.8 GM/DL (ref 2.4–3.5)
GLOBULIN SER-MCNC: 2.9 GM/DL (ref 2.4–3.5)
GLOBULIN SER-MCNC: 2.9 GM/DL (ref 2.4–3.5)
GLOBULIN SER-MCNC: 3.1 GM/DL (ref 2.4–3.5)
GLOBULIN SER-MCNC: 3.6 GM/DL (ref 2.4–3.5)
GLOBULIN SER-MCNC: 4.2 GM/DL (ref 2.4–3.5)
GLOBULIN SER-MCNC: 4.6 GM/DL (ref 2.4–3.5)
GLUCOSE SERPL-MCNC: 100 MG/DL (ref 74–100)
GLUCOSE SERPL-MCNC: 101 MG/DL (ref 74–100)
GLUCOSE SERPL-MCNC: 104 MG/DL (ref 74–100)
GLUCOSE SERPL-MCNC: 105 MG/DL (ref 74–100)
GLUCOSE SERPL-MCNC: 106 MG/DL (ref 74–100)
GLUCOSE SERPL-MCNC: 110 MG/DL (ref 74–100)
GLUCOSE SERPL-MCNC: 111 MG/DL (ref 74–100)
GLUCOSE SERPL-MCNC: 111 MG/DL (ref 74–100)
GLUCOSE SERPL-MCNC: 115 MG/DL (ref 74–100)
GLUCOSE SERPL-MCNC: 120 MG/DL (ref 74–100)
GLUCOSE SERPL-MCNC: 124 MG/DL (ref 74–100)
GLUCOSE SERPL-MCNC: 124 MG/DL (ref 74–100)
GLUCOSE SERPL-MCNC: 125 MG/DL (ref 74–100)
GLUCOSE SERPL-MCNC: 127 MG/DL (ref 74–100)
GLUCOSE SERPL-MCNC: 128 MG/DL (ref 74–100)
GLUCOSE SERPL-MCNC: 129 MG/DL (ref 74–100)
GLUCOSE SERPL-MCNC: 129 MG/DL (ref 74–100)
GLUCOSE SERPL-MCNC: 130 MG/DL (ref 74–100)
GLUCOSE SERPL-MCNC: 130 MG/DL (ref 74–100)
GLUCOSE SERPL-MCNC: 131 MG/DL (ref 74–100)
GLUCOSE SERPL-MCNC: 132 MG/DL (ref 74–100)
GLUCOSE SERPL-MCNC: 134 MG/DL (ref 74–100)
GLUCOSE SERPL-MCNC: 139 MG/DL (ref 74–100)
GLUCOSE SERPL-MCNC: 75 MG/DL (ref 74–100)
GLUCOSE SERPL-MCNC: 84 MG/DL (ref 74–100)
GLUCOSE SERPL-MCNC: 87 MG/DL (ref 74–100)
GLUCOSE SERPL-MCNC: 94 MG/DL (ref 74–100)
GLUCOSE SERPL-MCNC: 97 MG/DL (ref 74–100)
GLUCOSE UR QL STRIP.AUTO: NEGATIVE MG/DL
GLUCOSE UR QL STRIP.AUTO: NORMAL MG/DL
GRAM STN SPEC: NORMAL
GROUP & RH: NORMAL
HAPTOGLOB SERPL-MCNC: 55 MG/DL (ref 14–258)
HAV AB SER QL IA: NONREACTIVE
HAV IGM SERPL QL IA: NONREACTIVE
HAV IGM SERPL QL IA: NONREACTIVE
HBV CORE IGM SERPL QL IA: NONREACTIVE
HBV CORE IGM SERPL QL IA: NONREACTIVE
HBV SURFACE AB SER-ACNC: 0.38 MIU/ML
HBV SURFACE AB SERPL IA-ACNC: NONREACTIVE M[IU]/ML
HBV SURFACE AG SERPL QL IA: NONREACTIVE
HBV SURFACE AG SERPL QL IA: NONREACTIVE
HCO3 BLDA-SCNC: 25.2 MMOL/L (ref 22–26)
HCO3 BLDA-SCNC: 25.4 MMOL/L (ref 22–26)
HCO3 BLDA-SCNC: 25.4 MMOL/L (ref 22–26)
HCO3 BLDA-SCNC: 25.8 MMOL/L (ref 22–26)
HCO3 BLDA-SCNC: 27.9 MMOL/L (ref 22–26)
HCO3 BLDA-SCNC: 28.1 MMOL/L (ref 22–26)
HCO3 BLDA-SCNC: 28.2 MMOL/L (ref 22–26)
HCO3 BLDA-SCNC: 31.7 MMOL/L (ref 22–26)
HCT VFR BLD AUTO: 19.2 % (ref 42–52)
HCT VFR BLD AUTO: 20.5 % (ref 42–52)
HCT VFR BLD AUTO: 21.1 % (ref 42–52)
HCT VFR BLD AUTO: 21.3 % (ref 42–52)
HCT VFR BLD AUTO: 22 % (ref 42–52)
HCT VFR BLD AUTO: 22 % (ref 42–52)
HCT VFR BLD AUTO: 22.7 % (ref 42–52)
HCT VFR BLD AUTO: 23.7 % (ref 42–52)
HCT VFR BLD AUTO: 23.9 % (ref 42–52)
HCT VFR BLD AUTO: 23.9 % (ref 42–52)
HCT VFR BLD AUTO: 24.1 % (ref 42–52)
HCT VFR BLD AUTO: 24.2 % (ref 42–52)
HCT VFR BLD AUTO: 24.3 % (ref 42–52)
HCT VFR BLD AUTO: 24.5 % (ref 42–52)
HCT VFR BLD AUTO: 24.7 % (ref 42–52)
HCT VFR BLD AUTO: 24.7 % (ref 42–52)
HCT VFR BLD AUTO: 24.8 % (ref 42–52)
HCT VFR BLD AUTO: 25.2 % (ref 42–52)
HCT VFR BLD AUTO: 25.4 % (ref 42–52)
HCT VFR BLD AUTO: 25.5 % (ref 42–52)
HCT VFR BLD AUTO: 25.9 % (ref 42–52)
HCT VFR BLD AUTO: 27.1 % (ref 42–52)
HCT VFR BLD AUTO: 28 % (ref 42–52)
HCT VFR BLD AUTO: 28.7 % (ref 42–52)
HCT VFR BLD AUTO: 28.7 % (ref 42–52)
HCT VFR BLD AUTO: 28.9 % (ref 42–52)
HCT VFR BLD AUTO: 29.3 % (ref 42–52)
HCT VFR BLD AUTO: 30 % (ref 42–52)
HCT VFR BLD AUTO: 33.4 % (ref 42–52)
HCT VFR BLD AUTO: 35.7 % (ref 42–52)
HCV AB SERPL QL IA: NONREACTIVE
HCV AB SERPL QL IA: NONREACTIVE
HEMOCCULT SP1 STL QL: NEGATIVE
HEMOCCULT SP1 STL QL: NEGATIVE
HEMOCCULT SP2 STL QL: NEGATIVE
HEMOCCULT SP3 STL QL: NEGATIVE
HGB BLD-MCNC: 10.1 G/DL (ref 14–18)
HGB BLD-MCNC: 11.4 G/DL (ref 14–18)
HGB BLD-MCNC: 11.7 G/DL (ref 14–18)
HGB BLD-MCNC: 6.1 G/DL (ref 14–18)
HGB BLD-MCNC: 6.7 G/DL (ref 14–18)
HGB BLD-MCNC: 6.9 G/DL (ref 14–18)
HGB BLD-MCNC: 7 G/DL (ref 14–18)
HGB BLD-MCNC: 7.1 G/DL (ref 14–18)
HGB BLD-MCNC: 7.2 G/DL (ref 14–18)
HGB BLD-MCNC: 7.4 G/DL (ref 14–18)
HGB BLD-MCNC: 7.5 G/DL (ref 14–18)
HGB BLD-MCNC: 7.8 G/DL (ref 14–18)
HGB BLD-MCNC: 8 G/DL (ref 14–18)
HGB BLD-MCNC: 8.1 G/DL (ref 14–18)
HGB BLD-MCNC: 8.1 G/DL (ref 14–18)
HGB BLD-MCNC: 8.2 G/DL (ref 14–18)
HGB BLD-MCNC: 8.2 G/DL (ref 14–18)
HGB BLD-MCNC: 8.3 G/DL (ref 14–18)
HGB BLD-MCNC: 8.4 G/DL (ref 14–18)
HGB BLD-MCNC: 8.5 G/DL (ref 14–18)
HGB BLD-MCNC: 8.5 G/DL (ref 14–18)
HGB BLD-MCNC: 8.6 G/DL (ref 14–18)
HGB BLD-MCNC: 9 G/DL (ref 14–18)
HGB BLD-MCNC: 9 G/DL (ref 14–18)
HGB BLD-MCNC: 9.3 G/DL (ref 14–18)
HGB BLD-MCNC: 9.4 G/DL (ref 14–18)
HGB BLD-MCNC: 9.4 G/DL (ref 14–18)
HGB BLD-MCNC: 9.5 G/DL (ref 14–18)
HIV 1+2 AB+HIV1 P24 AG SERPL QL IA: NONREACTIVE
HYALINE CASTS #/AREA URNS LPF: ABNORMAL /LPF
HYPOCHROMIA BLD QL SMEAR: ABNORMAL
IMM GRANULOCYTES # BLD AUTO: 0.04 X10(3)/MCL (ref 0–0.04)
IMM GRANULOCYTES # BLD AUTO: 0.05 X10(3)/MCL (ref 0–0.04)
IMM GRANULOCYTES # BLD AUTO: 0.05 X10(3)/MCL (ref 0–0.04)
IMM GRANULOCYTES # BLD AUTO: 0.06 X10(3)/MCL (ref 0–0.04)
IMM GRANULOCYTES # BLD AUTO: 0.07 X10(3)/MCL (ref 0–0.04)
IMM GRANULOCYTES # BLD AUTO: 0.08 X10(3)/MCL (ref 0–0.04)
IMM GRANULOCYTES # BLD AUTO: 0.08 X10(3)/MCL (ref 0–0.04)
IMM GRANULOCYTES # BLD AUTO: 0.1 X10(3)/MCL (ref 0–0.04)
IMM GRANULOCYTES # BLD AUTO: 0.11 X10(3)/MCL (ref 0–0.04)
IMM GRANULOCYTES # BLD AUTO: 0.12 X10(3)/MCL (ref 0–0.04)
IMM GRANULOCYTES # BLD AUTO: 0.12 X10(3)/MCL (ref 0–0.04)
IMM GRANULOCYTES # BLD AUTO: 0.15 X10(3)/MCL (ref 0–0.04)
IMM GRANULOCYTES # BLD AUTO: 0.15 X10(3)/MCL (ref 0–0.04)
IMM GRANULOCYTES # BLD AUTO: 0.16 X10(3)/MCL (ref 0–0.04)
IMM GRANULOCYTES # BLD AUTO: 0.16 X10(3)/MCL (ref 0–0.04)
IMM GRANULOCYTES # BLD AUTO: 0.21 X10(3)/MCL (ref 0–0.04)
IMM GRANULOCYTES # BLD AUTO: 0.24 X10(3)/MCL (ref 0–0.04)
IMM GRANULOCYTES # BLD AUTO: 0.25 X10(3)/MCL (ref 0–0.04)
IMM GRANULOCYTES # BLD AUTO: 0.25 X10(3)/MCL (ref 0–0.04)
IMM GRANULOCYTES # BLD AUTO: 0.27 X10(3)/MCL (ref 0–0.04)
IMM GRANULOCYTES # BLD AUTO: 0.28 X10(3)/MCL (ref 0–0.04)
IMM GRANULOCYTES # BLD AUTO: 0.54 X10(3)/MCL (ref 0–0.04)
IMM GRANULOCYTES NFR BLD AUTO: 0.3 %
IMM GRANULOCYTES NFR BLD AUTO: 0.3 %
IMM GRANULOCYTES NFR BLD AUTO: 0.4 %
IMM GRANULOCYTES NFR BLD AUTO: 0.5 %
IMM GRANULOCYTES NFR BLD AUTO: 0.6 %
IMM GRANULOCYTES NFR BLD AUTO: 0.7 %
IMM GRANULOCYTES NFR BLD AUTO: 0.8 %
IMM GRANULOCYTES NFR BLD AUTO: 0.9 %
IMM GRANULOCYTES NFR BLD AUTO: 1 %
IMM GRANULOCYTES NFR BLD AUTO: 1 %
IMM GRANULOCYTES NFR BLD AUTO: 1.1 %
IMM GRANULOCYTES NFR BLD AUTO: 1.2 %
IMM GRANULOCYTES NFR BLD AUTO: 1.2 %
IMM GRANULOCYTES NFR BLD AUTO: 1.5 %
INDIRECT COOMBS GEL: NORMAL
INSULIN SERPL-ACNC: NORMAL U[IU]/ML
INTERVENTRICULAR SEPTUM: 1.01 CM (ref 0.6–1.1)
IPAP (OHS): 12 CMH2O
IPAP (OHS): 12 CMH2O
IRON SATN MFR SERPL: 31 % (ref 20–50)
IRON SATN MFR SERPL: 9 % (ref 20–50)
IRON SERPL-MCNC: 17 UG/DL (ref 65–175)
IRON SERPL-MCNC: 58 UG/DL (ref 65–175)
KETONES UR QL STRIP.AUTO: ABNORMAL MG/DL
KETONES UR QL STRIP.AUTO: NEGATIVE MG/DL
LAB AP CLINICAL INFORMATION: NORMAL
LAB AP GROSS DESCRIPTION: NORMAL
LACTATE SERPL-SCNC: 0.9 MMOL/L (ref 0.5–2.2)
LACTATE SERPL-SCNC: 1.6 MMOL/L (ref 0.5–2.2)
LACTATE SERPL-SCNC: 1.8 MMOL/L (ref 0.5–2.2)
LACTATE SERPL-SCNC: 3.2 MMOL/L (ref 0.5–2.2)
LDH SERPL-CCNC: 160 U/L (ref 125–220)
LEFT ATRIUM SIZE: 4.05 CM
LEFT INTERNAL DIMENSION IN SYSTOLE: 3.78 CM (ref 2.1–4)
LEFT VENTRICLE DIASTOLIC VOLUME INDEX: 63.19 ML/M2
LEFT VENTRICLE DIASTOLIC VOLUME: 122.58 ML
LEFT VENTRICLE MASS INDEX: 81 G/M2
LEFT VENTRICLE SYSTOLIC VOLUME INDEX: 31.6 ML/M2
LEFT VENTRICLE SYSTOLIC VOLUME: 61.21 ML
LEFT VENTRICULAR INTERNAL DIMENSION IN DIASTOLE: 5.08 CM (ref 3.5–6)
LEFT VENTRICULAR MASS: 156.61 G
LEUKOCYTE ESTERASE UR QL STRIP.AUTO: 25 UNIT/L
LEUKOCYTE ESTERASE UR QL STRIP.AUTO: 75 UNIT/L
LEUKOCYTE ESTERASE UR QL STRIP.AUTO: NEGATIVE UNIT/L
LEUKOCYTE ESTERASE UR QL STRIP.AUTO: NEGATIVE UNIT/L
LEVETIRACETAM SERPL-MCNC: 20.8 MCG/ML (ref 10–40)
LIPASE SERPL-CCNC: 117 U/L
LIPASE SERPL-CCNC: 127 U/L
LIPASE SERPL-CCNC: 52 U/L
LVOT MG: 1.51 MMHG
LVOT MV: 0.58 CM/S
LYMPHOCYTE MANUAL BF (OHS): 17 %
LYMPHOCYTE MANUAL BF (OHS): 24 %
LYMPHOCYTES # BLD AUTO: 0.5 X10(3)/MCL (ref 0.6–4.6)
LYMPHOCYTES # BLD AUTO: 0.61 X10(3)/MCL (ref 0.6–4.6)
LYMPHOCYTES # BLD AUTO: 0.74 X10(3)/MCL (ref 0.6–4.6)
LYMPHOCYTES # BLD AUTO: 0.81 X10(3)/MCL (ref 0.6–4.6)
LYMPHOCYTES # BLD AUTO: 0.98 X10(3)/MCL (ref 0.6–4.6)
LYMPHOCYTES # BLD AUTO: 0.98 X10(3)/MCL (ref 0.6–4.6)
LYMPHOCYTES # BLD AUTO: 1.04 X10(3)/MCL (ref 0.6–4.6)
LYMPHOCYTES # BLD AUTO: 1.15 X10(3)/MCL (ref 0.6–4.6)
LYMPHOCYTES # BLD AUTO: 1.17 X10(3)/MCL (ref 0.6–4.6)
LYMPHOCYTES # BLD AUTO: 1.18 X10(3)/MCL (ref 0.6–4.6)
LYMPHOCYTES # BLD AUTO: 1.18 X10(3)/MCL (ref 0.6–4.6)
LYMPHOCYTES # BLD AUTO: 1.26 X10(3)/MCL (ref 0.6–4.6)
LYMPHOCYTES # BLD AUTO: 1.3 X10(3)/MCL (ref 0.6–4.6)
LYMPHOCYTES # BLD AUTO: 1.33 X10(3)/MCL (ref 0.6–4.6)
LYMPHOCYTES # BLD AUTO: 1.34 X10(3)/MCL (ref 0.6–4.6)
LYMPHOCYTES # BLD AUTO: 1.35 X10(3)/MCL (ref 0.6–4.6)
LYMPHOCYTES # BLD AUTO: 1.35 X10(3)/MCL (ref 0.6–4.6)
LYMPHOCYTES # BLD AUTO: 1.41 X10(3)/MCL (ref 0.6–4.6)
LYMPHOCYTES # BLD AUTO: 1.42 X10(3)/MCL (ref 0.6–4.6)
LYMPHOCYTES # BLD AUTO: 1.43 X10(3)/MCL (ref 0.6–4.6)
LYMPHOCYTES # BLD AUTO: 1.47 X10(3)/MCL (ref 0.6–4.6)
LYMPHOCYTES # BLD AUTO: 1.55 X10(3)/MCL (ref 0.6–4.6)
LYMPHOCYTES # BLD AUTO: 1.64 X10(3)/MCL (ref 0.6–4.6)
LYMPHOCYTES # BLD AUTO: 1.7 X10(3)/MCL (ref 0.6–4.6)
LYMPHOCYTES # BLD AUTO: 1.8 X10(3)/MCL (ref 0.6–4.6)
LYMPHOCYTES NFR BLD AUTO: 11.6 %
LYMPHOCYTES NFR BLD AUTO: 4.3 %
LYMPHOCYTES NFR BLD AUTO: 4.3 %
LYMPHOCYTES NFR BLD AUTO: 4.8 %
LYMPHOCYTES NFR BLD AUTO: 4.9 %
LYMPHOCYTES NFR BLD AUTO: 4.9 %
LYMPHOCYTES NFR BLD AUTO: 5.2 %
LYMPHOCYTES NFR BLD AUTO: 5.8 %
LYMPHOCYTES NFR BLD AUTO: 5.8 %
LYMPHOCYTES NFR BLD AUTO: 6.1 %
LYMPHOCYTES NFR BLD AUTO: 6.3 %
LYMPHOCYTES NFR BLD AUTO: 6.7 %
LYMPHOCYTES NFR BLD AUTO: 6.7 %
LYMPHOCYTES NFR BLD AUTO: 6.8 %
LYMPHOCYTES NFR BLD AUTO: 7 %
LYMPHOCYTES NFR BLD AUTO: 7.2 %
LYMPHOCYTES NFR BLD AUTO: 7.9 %
LYMPHOCYTES NFR BLD AUTO: 8 %
LYMPHOCYTES NFR BLD AUTO: 8.1 %
LYMPHOCYTES NFR BLD AUTO: 8.6 %
LYMPHOCYTES NFR BLD AUTO: 8.7 %
LYMPHOCYTES NFR BLD AUTO: 9.1 %
LYMPHOCYTES NFR BLD AUTO: 9.1 %
LYMPHOCYTES NFR BLD AUTO: 9.4 %
LYMPHOCYTES NFR BLD AUTO: 9.9 %
LYMPHOCYTES NFR BLD MANUAL: 0.76 X10(3)/MCL
LYMPHOCYTES NFR BLD MANUAL: 3 % (ref 13–40)
MACROPHAGES, FLUID MAN COUNT (OHS): 16
MACROPHAGES, FLUID MAN COUNT (OHS): 23
MAGNESIUM SERPL-MCNC: 1.2 MG/DL (ref 1.6–2.6)
MAGNESIUM SERPL-MCNC: 1.4 MG/DL (ref 1.6–2.6)
MAGNESIUM SERPL-MCNC: 1.5 MG/DL (ref 1.6–2.6)
MAGNESIUM SERPL-MCNC: 1.6 MG/DL (ref 1.6–2.6)
MAGNESIUM SERPL-MCNC: 1.8 MG/DL (ref 1.6–2.6)
MAGNESIUM SERPL-MCNC: 1.9 MG/DL (ref 1.6–2.6)
MAGNESIUM SERPL-MCNC: 2 MG/DL (ref 1.6–2.6)
MAGNESIUM SERPL-MCNC: 2.1 MG/DL (ref 1.6–2.6)
MAGNESIUM SERPL-MCNC: 2.4 MG/DL (ref 1.6–2.6)
MAGNESIUM SERPL-MCNC: 2.7 MG/DL (ref 1.6–2.6)
MCH RBC QN AUTO: 30.5 PG (ref 27–31)
MCH RBC QN AUTO: 30.6 PG (ref 27–31)
MCH RBC QN AUTO: 30.8 PG (ref 27–31)
MCH RBC QN AUTO: 31 PG (ref 27–31)
MCH RBC QN AUTO: 31.3 PG (ref 27–31)
MCH RBC QN AUTO: 31.4 PG (ref 27–31)
MCH RBC QN AUTO: 31.4 PG (ref 27–31)
MCH RBC QN AUTO: 31.6 PG (ref 27–31)
MCH RBC QN AUTO: 31.6 PG (ref 27–31)
MCH RBC QN AUTO: 31.7 PG (ref 27–31)
MCH RBC QN AUTO: 31.8 PG (ref 27–31)
MCH RBC QN AUTO: 31.9 PG (ref 27–31)
MCH RBC QN AUTO: 31.9 PG (ref 27–31)
MCH RBC QN AUTO: 32.1 PG (ref 27–31)
MCH RBC QN AUTO: 32.2 PG (ref 27–31)
MCH RBC QN AUTO: 32.3 PG (ref 27–31)
MCH RBC QN AUTO: 32.6 PG (ref 27–31)
MCH RBC QN AUTO: 32.8 PG (ref 27–31)
MCH RBC QN AUTO: 32.9 PG (ref 27–31)
MCH RBC QN AUTO: 34.1 PG (ref 27–31)
MCH RBC QN AUTO: 35.4 PG (ref 27–31)
MCH RBC QN AUTO: 35.6 PG (ref 27–31)
MCHC RBC AUTO-ENTMCNC: 30.7 G/DL (ref 33–36)
MCHC RBC AUTO-ENTMCNC: 31.7 G/DL (ref 33–36)
MCHC RBC AUTO-ENTMCNC: 31.8 G/DL (ref 33–36)
MCHC RBC AUTO-ENTMCNC: 31.9 G/DL (ref 33–36)
MCHC RBC AUTO-ENTMCNC: 32.1 G/DL (ref 33–36)
MCHC RBC AUTO-ENTMCNC: 32.1 G/DL (ref 33–36)
MCHC RBC AUTO-ENTMCNC: 32.3 G/DL (ref 33–36)
MCHC RBC AUTO-ENTMCNC: 32.4 G/DL (ref 33–36)
MCHC RBC AUTO-ENTMCNC: 32.5 G/DL (ref 33–36)
MCHC RBC AUTO-ENTMCNC: 32.7 G/DL (ref 33–36)
MCHC RBC AUTO-ENTMCNC: 32.7 G/DL (ref 33–36)
MCHC RBC AUTO-ENTMCNC: 32.8 G/DL (ref 33–36)
MCHC RBC AUTO-ENTMCNC: 32.8 G/DL (ref 33–36)
MCHC RBC AUTO-ENTMCNC: 32.9 G/DL (ref 33–36)
MCHC RBC AUTO-ENTMCNC: 33 G/DL (ref 33–36)
MCHC RBC AUTO-ENTMCNC: 33.2 G/DL (ref 33–36)
MCHC RBC AUTO-ENTMCNC: 33.2 G/DL (ref 33–36)
MCHC RBC AUTO-ENTMCNC: 33.5 G/DL (ref 33–36)
MCHC RBC AUTO-ENTMCNC: 33.5 G/DL (ref 33–36)
MCHC RBC AUTO-ENTMCNC: 33.7 G/DL (ref 33–36)
MCHC RBC AUTO-ENTMCNC: 33.9 G/DL (ref 33–36)
MCHC RBC AUTO-ENTMCNC: 34.1 G/DL (ref 33–36)
MCHC RBC AUTO-ENTMCNC: 34.3 G/DL (ref 33–36)
MCHC RBC AUTO-ENTMCNC: 34.4 G/DL (ref 33–36)
MCV RBC AUTO: 100.7 FL (ref 80–94)
MCV RBC AUTO: 100.7 FL (ref 80–94)
MCV RBC AUTO: 106.2 FL (ref 80–94)
MCV RBC AUTO: 108.2 FL (ref 80–94)
MCV RBC AUTO: 111.1 FL (ref 80–94)
MCV RBC AUTO: 93 FL (ref 80–94)
MCV RBC AUTO: 93.7 FL (ref 80–94)
MCV RBC AUTO: 93.7 FL (ref 80–94)
MCV RBC AUTO: 94 FL (ref 80–94)
MCV RBC AUTO: 94.6 FL (ref 80–94)
MCV RBC AUTO: 94.7 FL (ref 80–94)
MCV RBC AUTO: 95 FL (ref 80–94)
MCV RBC AUTO: 95.4 FL (ref 80–94)
MCV RBC AUTO: 95.6 FL (ref 80–94)
MCV RBC AUTO: 95.7 FL (ref 80–94)
MCV RBC AUTO: 96 FL (ref 80–94)
MCV RBC AUTO: 96.1 FL (ref 80–94)
MCV RBC AUTO: 96.3 FL (ref 80–94)
MCV RBC AUTO: 96.4 FL (ref 80–94)
MCV RBC AUTO: 96.5 FL (ref 80–94)
MCV RBC AUTO: 96.5 FL (ref 80–94)
MCV RBC AUTO: 96.9 FL (ref 80–94)
MCV RBC AUTO: 97.7 FL (ref 80–94)
MCV RBC AUTO: 98.4 FL (ref 80–94)
MCV RBC AUTO: 98.6 FL (ref 80–94)
MCV RBC AUTO: 99.4 FL (ref 80–94)
MDMA UR QL SCN: NEGATIVE
MDMA UR QL SCN: NEGATIVE
MESOTHELIAL CELLS, FLUID MAN COUNT (OHS): 2
MESOTHELIAL CELLS, FLUID MAN COUNT (OHS): 2
METHGB MFR BLDA: 0 % (ref 0–1.5)
METHGB MFR BLDA: 0.5 % (ref 0–1.5)
METHGB MFR BLDA: 0.7 % (ref 0–1.5)
METHGB MFR BLDA: 0.8 % (ref 0–1.5)
METHGB MFR BLDA: 0.9 % (ref 0–1.5)
METHGB MFR BLDA: 1 % (ref 0–1.5)
METHGB MFR BLDA: 1.3 % (ref 0–1.5)
METHGB MFR BLDA: 1.9 % (ref 0–1.5)
MICROCYTES BLD QL SMEAR: ABNORMAL
MODE (OHS): ABNORMAL
MONOCYTE MANUAL BF (OHS): 39 %
MONOCYTE MANUAL BF (OHS): 60 %
MONOCYTES # BLD AUTO: 0.94 X10(3)/MCL (ref 0.1–1.3)
MONOCYTES # BLD AUTO: 1 X10(3)/MCL (ref 0.1–1.3)
MONOCYTES # BLD AUTO: 1 X10(3)/MCL (ref 0.1–1.3)
MONOCYTES # BLD AUTO: 1.05 X10(3)/MCL (ref 0.1–1.3)
MONOCYTES # BLD AUTO: 1.11 X10(3)/MCL (ref 0.1–1.3)
MONOCYTES # BLD AUTO: 1.11 X10(3)/MCL (ref 0.1–1.3)
MONOCYTES # BLD AUTO: 1.21 X10(3)/MCL (ref 0.1–1.3)
MONOCYTES # BLD AUTO: 1.27 X10(3)/MCL (ref 0.1–1.3)
MONOCYTES # BLD AUTO: 1.32 X10(3)/MCL (ref 0.1–1.3)
MONOCYTES # BLD AUTO: 1.33 X10(3)/MCL (ref 0.1–1.3)
MONOCYTES # BLD AUTO: 1.49 X10(3)/MCL (ref 0.1–1.3)
MONOCYTES # BLD AUTO: 1.5 X10(3)/MCL (ref 0.1–1.3)
MONOCYTES # BLD AUTO: 1.5 X10(3)/MCL (ref 0.1–1.3)
MONOCYTES # BLD AUTO: 1.65 X10(3)/MCL (ref 0.1–1.3)
MONOCYTES # BLD AUTO: 1.68 X10(3)/MCL (ref 0.1–1.3)
MONOCYTES # BLD AUTO: 1.7 X10(3)/MCL (ref 0.1–1.3)
MONOCYTES # BLD AUTO: 1.73 X10(3)/MCL (ref 0.1–1.3)
MONOCYTES # BLD AUTO: 1.88 X10(3)/MCL (ref 0.1–1.3)
MONOCYTES # BLD AUTO: 1.93 X10(3)/MCL (ref 0.1–1.3)
MONOCYTES # BLD AUTO: 2 X10(3)/MCL (ref 0.1–1.3)
MONOCYTES # BLD AUTO: 2.16 X10(3)/MCL (ref 0.1–1.3)
MONOCYTES # BLD AUTO: 2.18 X10(3)/MCL (ref 0.1–1.3)
MONOCYTES # BLD AUTO: 2.46 X10(3)/MCL (ref 0.1–1.3)
MONOCYTES # BLD AUTO: 2.66 X10(3)/MCL (ref 0.1–1.3)
MONOCYTES # BLD AUTO: 2.96 X10(3)/MCL (ref 0.1–1.3)
MONOCYTES NFR BLD AUTO: 10 %
MONOCYTES NFR BLD AUTO: 10.5 %
MONOCYTES NFR BLD AUTO: 10.6 %
MONOCYTES NFR BLD AUTO: 11.6 %
MONOCYTES NFR BLD AUTO: 11.7 %
MONOCYTES NFR BLD AUTO: 6.4 %
MONOCYTES NFR BLD AUTO: 7.6 %
MONOCYTES NFR BLD AUTO: 7.7 %
MONOCYTES NFR BLD AUTO: 7.8 %
MONOCYTES NFR BLD AUTO: 8.1 %
MONOCYTES NFR BLD AUTO: 8.1 %
MONOCYTES NFR BLD AUTO: 8.3 %
MONOCYTES NFR BLD AUTO: 8.4 %
MONOCYTES NFR BLD AUTO: 8.5 %
MONOCYTES NFR BLD AUTO: 8.5 %
MONOCYTES NFR BLD AUTO: 8.6 %
MONOCYTES NFR BLD AUTO: 8.8 %
MONOCYTES NFR BLD AUTO: 9.1 %
MONOCYTES NFR BLD AUTO: 9.2 %
MONOCYTES NFR BLD AUTO: 9.5 %
MONOCYTES NFR BLD AUTO: 9.6 %
MONOCYTES NFR BLD AUTO: 9.6 %
MONOCYTES NFR BLD AUTO: 9.8 %
MONOCYTES NFR BLD MANUAL: 0.25 X10(3)/MCL (ref 0.1–1.3)
MONOCYTES NFR BLD MANUAL: 1 % (ref 2–11)
MRSA PCR SCRN (OHS): NOT DETECTED
MUCOUS THREADS URNS QL MICRO: ABNORMAL /LPF
MV MEAN GRADIENT: 3 MMHG
MV PEAK A VEL: 1.2 M/S
MV PEAK E VEL: 1.03 M/S
MV PEAK GRADIENT: 5 MMHG
MV STENOSIS PRESSURE HALF TIME: 32.55 MS
MV VALVE AREA BY CONTINUITY EQUATION: 2.22 CM2
MV VALVE AREA P 1/2 METHOD: 6.76 CM2
NEUTROPHILS # BLD AUTO: 10.08 X10(3)/MCL (ref 2.1–9.2)
NEUTROPHILS # BLD AUTO: 11.02 X10(3)/MCL (ref 2.1–9.2)
NEUTROPHILS # BLD AUTO: 11.25 X10(3)/MCL (ref 2.1–9.2)
NEUTROPHILS # BLD AUTO: 11.32 X10(3)/MCL (ref 2.1–9.2)
NEUTROPHILS # BLD AUTO: 11.95 X10(3)/MCL (ref 2.1–9.2)
NEUTROPHILS # BLD AUTO: 12.07 X10(3)/MCL (ref 2.1–9.2)
NEUTROPHILS # BLD AUTO: 12.3 X10(3)/MCL (ref 2.1–9.2)
NEUTROPHILS # BLD AUTO: 12.34 X10(3)/MCL (ref 2.1–9.2)
NEUTROPHILS # BLD AUTO: 12.46 X10(3)/MCL (ref 2.1–9.2)
NEUTROPHILS # BLD AUTO: 13.43 X10(3)/MCL (ref 2.1–9.2)
NEUTROPHILS # BLD AUTO: 14.21 X10(3)/MCL (ref 2.1–9.2)
NEUTROPHILS # BLD AUTO: 17.46 X10(3)/MCL (ref 2.1–9.2)
NEUTROPHILS # BLD AUTO: 17.8 X10(3)/MCL (ref 2.1–9.2)
NEUTROPHILS # BLD AUTO: 18.11 X10(3)/MCL (ref 2.1–9.2)
NEUTROPHILS # BLD AUTO: 18.14 X10(3)/MCL (ref 2.1–9.2)
NEUTROPHILS # BLD AUTO: 18.66 X10(3)/MCL (ref 2.1–9.2)
NEUTROPHILS # BLD AUTO: 18.71 X10(3)/MCL (ref 2.1–9.2)
NEUTROPHILS # BLD AUTO: 18.72 X10(3)/MCL (ref 2.1–9.2)
NEUTROPHILS # BLD AUTO: 18.76 X10(3)/MCL (ref 2.1–9.2)
NEUTROPHILS # BLD AUTO: 20.17 X10(3)/MCL (ref 2.1–9.2)
NEUTROPHILS # BLD AUTO: 29.36 X10(3)/MCL (ref 2.1–9.2)
NEUTROPHILS # BLD AUTO: 9.31 X10(3)/MCL (ref 2.1–9.2)
NEUTROPHILS # BLD AUTO: 9.31 X10(3)/MCL (ref 2.1–9.2)
NEUTROPHILS # BLD AUTO: 9.93 X10(3)/MCL (ref 2.1–9.2)
NEUTROPHILS # BLD AUTO: 9.97 X10(3)/MCL (ref 2.1–9.2)
NEUTROPHILS MAN BF (OHS): 22 %
NEUTROPHILS MAN BF (OHS): 37 %
NEUTROPHILS NFR BLD AUTO: 76 %
NEUTROPHILS NFR BLD AUTO: 77.4 %
NEUTROPHILS NFR BLD AUTO: 78.6 %
NEUTROPHILS NFR BLD AUTO: 78.7 %
NEUTROPHILS NFR BLD AUTO: 78.7 %
NEUTROPHILS NFR BLD AUTO: 79.2 %
NEUTROPHILS NFR BLD AUTO: 79.5 %
NEUTROPHILS NFR BLD AUTO: 80.1 %
NEUTROPHILS NFR BLD AUTO: 80.6 %
NEUTROPHILS NFR BLD AUTO: 80.8 %
NEUTROPHILS NFR BLD AUTO: 80.8 %
NEUTROPHILS NFR BLD AUTO: 81.1 %
NEUTROPHILS NFR BLD AUTO: 81.2 %
NEUTROPHILS NFR BLD AUTO: 81.5 %
NEUTROPHILS NFR BLD AUTO: 81.6 %
NEUTROPHILS NFR BLD AUTO: 81.9 %
NEUTROPHILS NFR BLD AUTO: 81.9 %
NEUTROPHILS NFR BLD AUTO: 82 %
NEUTROPHILS NFR BLD AUTO: 82.8 %
NEUTROPHILS NFR BLD AUTO: 83.1 %
NEUTROPHILS NFR BLD AUTO: 84 %
NEUTROPHILS NFR BLD AUTO: 84.2 %
NEUTROPHILS NFR BLD AUTO: 84.5 %
NEUTROPHILS NFR BLD AUTO: 85.7 %
NEUTROPHILS NFR BLD AUTO: 86.2 %
NEUTROPHILS NFR BLD MANUAL: 94 % (ref 47–80)
NITRITE UR QL STRIP.AUTO: NEGATIVE
NON-SQ EPI CELLS URNS QL MICRO: ABNORMAL /HPF
NRBC BLD AUTO-RTO: 0 %
NRBC BLD AUTO-RTO: 0.1 %
NRBC BLD AUTO-RTO: 0.4 %
NRBC BLD AUTO-RTO: 0.7 %
O+P STL MICRO: NORMAL
O2 HB BLOOD GAS (OHS): 85.7 % (ref 94–100)
O2 HB BLOOD GAS (OHS): 88.7 % (ref 94–100)
O2 HB BLOOD GAS (OHS): 89.9 % (ref 94–100)
O2 HB BLOOD GAS (OHS): 90.5 % (ref 94–100)
O2 HB BLOOD GAS (OHS): 92.7 % (ref 94–100)
O2 HB BLOOD GAS (OHS): 93.2 % (ref 94–100)
O2 HB BLOOD GAS (OHS): 93.6 % (ref 94–100)
O2 HB BLOOD GAS (OHS): 96.2 % (ref 94–100)
OHS LV EJECTION FRACTION SIMPSONS BIPLANE MOD: 6 %
OPIATES UR QL SCN: NEGATIVE
OPIATES UR QL SCN: NEGATIVE
OSMOLALITY SERPL: 315 MOSM/KG (ref 280–300)
OSMOLALITY UR: 334 MOSM/KG (ref 300–1300)
OVALOCYTES (OLG): SLIGHT
OXYGEN DEVICE BLOOD GAS (OHS): ABNORMAL
OXYHGB MFR BLDA: 7.2 G/DL (ref 12–18)
OXYHGB MFR BLDA: 7.6 G/DL (ref 12–18)
OXYHGB MFR BLDA: 7.9 G/DL (ref 12–18)
OXYHGB MFR BLDA: 8.1 G/DL (ref 12–18)
OXYHGB MFR BLDA: 8.5 G/DL (ref 12–18)
OXYHGB MFR BLDA: 8.6 G/DL (ref 12–18)
OXYHGB MFR BLDA: 8.9 G/DL (ref 12–18)
OXYHGB MFR BLDA: 9.6 G/DL (ref 12–18)
PAO2 BLOOD GAS (OHS): 270 MMHG
PAO2 BLOOD GAS (OHS): 277 MMHG
PAO2 BLOOD GAS (OHS): 303 MMHG
PAO2 BLOOD GAS (OHS): 305 MMHG
PAO2 BLOOD GAS (OHS): 309 MMHG
PAO2 BLOOD GAS (OHS): 385 MMHG
PAO2 BLOOD GAS (OHS): 408 MMHG
PAO2 BLOOD GAS (OHS): 508 MMHG
PATH REV: NORMAL
PATH REV: NORMAL
PCO2 BLDA: 38 MMHG (ref 35–45)
PCO2 BLDA: 41 MMHG (ref 35–45)
PCO2 BLDA: 43 MMHG (ref 35–45)
PCO2 BLDA: 44 MMHG (ref 35–45)
PCO2 BLDA: 50 MMHG (ref 35–45)
PCO2 BLDA: 63 MMHG (ref 35–45)
PCO2 BLDA: 64 MMHG (ref 35–45)
PCO2 BLDA: 69 MMHG (ref 35–45)
PCP UR QL: NEGATIVE
PCP UR QL: NEGATIVE
PH BLDA: 7.22 [PH] (ref 7.35–7.45)
PH BLDA: 7.25 [PH] (ref 7.35–7.45)
PH BLDA: 7.31 [PH] (ref 7.35–7.45)
PH BLDA: 7.32 [PH] (ref 7.35–7.45)
PH BLDA: 7.38 [PH] (ref 7.35–7.45)
PH BLDA: 7.4 [PH] (ref 7.35–7.45)
PH BLDA: 7.41 [PH] (ref 7.35–7.45)
PH BLDA: 7.43 [PH] (ref 7.35–7.45)
PH UR STRIP.AUTO: 5.5 [PH]
PH UR STRIP.AUTO: 5.5 [PH]
PH UR STRIP.AUTO: 6 [PH]
PH UR STRIP.AUTO: 6.5 [PH]
PH UR: 6 [PH] (ref 3–11)
PH UR: 6.5 [PH] (ref 3–11)
PHOSPHATE SERPL-MCNC: 1.4 MG/DL (ref 2.3–4.7)
PHOSPHATE SERPL-MCNC: 1.7 MG/DL (ref 2.3–4.7)
PHOSPHATE SERPL-MCNC: 1.9 MG/DL (ref 2.3–4.7)
PHOSPHATE SERPL-MCNC: 2.1 MG/DL (ref 2.3–4.7)
PHOSPHATE SERPL-MCNC: 2.2 MG/DL (ref 2.3–4.7)
PHOSPHATE SERPL-MCNC: 2.5 MG/DL (ref 2.3–4.7)
PHOSPHATE SERPL-MCNC: 2.6 MG/DL (ref 2.3–4.7)
PHOSPHATE SERPL-MCNC: 2.8 MG/DL (ref 2.3–4.7)
PHOSPHATE SERPL-MCNC: 2.9 MG/DL (ref 2.3–4.7)
PHOSPHATE SERPL-MCNC: 2.9 MG/DL (ref 2.3–4.7)
PHOSPHATE SERPL-MCNC: 3 MG/DL (ref 2.3–4.7)
PHOSPHATE SERPL-MCNC: 3.1 MG/DL (ref 2.3–4.7)
PHOSPHATE SERPL-MCNC: 3.4 MG/DL (ref 2.3–4.7)
PHOSPHATE SERPL-MCNC: 3.5 MG/DL (ref 2.3–4.7)
PHOSPHATE SERPL-MCNC: 3.6 MG/DL (ref 2.3–4.7)
PHOSPHATE SERPL-MCNC: 4.2 MG/DL (ref 2.3–4.7)
PHOSPHATE SERPL-MCNC: 4.4 MG/DL (ref 2.3–4.7)
PHOSPHATE SERPL-MCNC: 4.5 MG/DL (ref 2.3–4.7)
PHOSPHATE SERPL-MCNC: 4.9 MG/DL (ref 2.3–4.7)
PISA TR MAX VEL: 2.42 M/S
PLATELET # BLD AUTO: 103 X10(3)/MCL (ref 130–400)
PLATELET # BLD AUTO: 113 X10(3)/MCL (ref 130–400)
PLATELET # BLD AUTO: 117 X10(3)/MCL (ref 130–400)
PLATELET # BLD AUTO: 130 X10(3)/MCL (ref 130–400)
PLATELET # BLD AUTO: 130 X10(3)/MCL (ref 130–400)
PLATELET # BLD AUTO: 134 X10(3)/MCL (ref 130–400)
PLATELET # BLD AUTO: 138 X10(3)/MCL (ref 130–400)
PLATELET # BLD AUTO: 142 X10(3)/MCL (ref 130–400)
PLATELET # BLD AUTO: 146 X10(3)/MCL (ref 130–400)
PLATELET # BLD AUTO: 148 X10(3)/MCL (ref 130–400)
PLATELET # BLD AUTO: 156 X10(3)/MCL (ref 130–400)
PLATELET # BLD AUTO: 157 X10(3)/MCL (ref 130–400)
PLATELET # BLD AUTO: 176 X10(3)/MCL (ref 130–400)
PLATELET # BLD AUTO: 221 X10(3)/MCL (ref 130–400)
PLATELET # BLD AUTO: 233 X10(3)/MCL (ref 130–400)
PLATELET # BLD AUTO: 255 X10(3)/MCL (ref 130–400)
PLATELET # BLD AUTO: 256 X10(3)/MCL (ref 130–400)
PLATELET # BLD AUTO: 286 X10(3)/MCL (ref 130–400)
PLATELET # BLD AUTO: 398 X10(3)/MCL (ref 130–400)
PLATELET # BLD AUTO: 71 X10(3)/MCL (ref 130–400)
PLATELET # BLD AUTO: 72 X10(3)/MCL (ref 130–400)
PLATELET # BLD AUTO: 76 X10(3)/MCL (ref 130–400)
PLATELET # BLD AUTO: 85 X10(3)/MCL (ref 130–400)
PLATELET # BLD AUTO: 92 X10(3)/MCL (ref 130–400)
PLATELET # BLD EST: ADEQUATE 10*3/UL
PLATELETS.RETICULATED NFR BLD AUTO: 11.4 % (ref 0.9–11.2)
PLATELETS.RETICULATED NFR BLD AUTO: 18.6 % (ref 0.9–11.2)
PLATELETS.RETICULATED NFR BLD AUTO: 21.6 % (ref 0.9–11.2)
PLATELETS.RETICULATED NFR BLD AUTO: 5.8 % (ref 0.9–11.2)
PLATELETS.RETICULATED NFR BLD AUTO: 5.9 % (ref 0.9–11.2)
PLATELETS.RETICULATED NFR BLD AUTO: 7.9 % (ref 0.9–11.2)
PLATELETS.RETICULATED NFR BLD AUTO: 7.9 % (ref 0.9–11.2)
PLATELETS.RETICULATED NFR BLD AUTO: 8.8 % (ref 0.9–11.2)
PLATELETS.RETICULATED NFR BLD AUTO: 9.2 % (ref 0.9–11.2)
PLPETH BLD-MCNC: <20 NG/ML
PMV BLD AUTO: 10 FL (ref 7.4–10.4)
PMV BLD AUTO: 10.4 FL (ref 7.4–10.4)
PMV BLD AUTO: 10.7 FL (ref 7.4–10.4)
PMV BLD AUTO: 10.9 FL (ref 7.4–10.4)
PMV BLD AUTO: 11 FL (ref 7.4–10.4)
PMV BLD AUTO: 11.1 FL (ref 7.4–10.4)
PMV BLD AUTO: 11.1 FL (ref 7.4–10.4)
PMV BLD AUTO: 11.2 FL (ref 7.4–10.4)
PMV BLD AUTO: 11.2 FL (ref 7.4–10.4)
PMV BLD AUTO: 11.4 FL (ref 7.4–10.4)
PMV BLD AUTO: 11.5 FL (ref 7.4–10.4)
PMV BLD AUTO: 11.7 FL (ref 7.4–10.4)
PMV BLD AUTO: 11.9 FL (ref 7.4–10.4)
PMV BLD AUTO: 12.1 FL (ref 7.4–10.4)
PMV BLD AUTO: 12.1 FL (ref 7.4–10.4)
PMV BLD AUTO: 12.7 FL (ref 7.4–10.4)
PMV BLD AUTO: 12.7 FL (ref 7.4–10.4)
PMV BLD AUTO: 12.9 FL (ref 7.4–10.4)
PMV BLD AUTO: 13.5 FL (ref 7.4–10.4)
PMV BLD AUTO: 13.9 FL (ref 7.4–10.4)
PMV BLD AUTO: 9.3 FL (ref 7.4–10.4)
PMV BLD AUTO: 9.5 FL (ref 7.4–10.4)
PMV BLD AUTO: 9.7 FL (ref 7.4–10.4)
PO2 BLDA: 55 MMHG (ref 75–100)
PO2 BLDA: 61 MMHG (ref 75–100)
PO2 BLDA: 61 MMHG (ref 75–100)
PO2 BLDA: 63 MMHG (ref 75–100)
PO2 BLDA: 65 MMHG (ref 75–100)
PO2 BLDA: 68 MMHG (ref 75–100)
PO2 BLDA: 76 MMHG (ref 75–100)
PO2 BLDA: 82 MMHG (ref 75–100)
POIKILOCYTOSIS BLD QL SMEAR: SLIGHT
POPETH BLD-MCNC: <20 NG/ML
POTASSIUM SERPL-SCNC: 2.8 MMOL/L (ref 3.5–5.1)
POTASSIUM SERPL-SCNC: 2.9 MMOL/L (ref 3.5–5.1)
POTASSIUM SERPL-SCNC: 3 MMOL/L (ref 3.5–5.1)
POTASSIUM SERPL-SCNC: 3.1 MMOL/L (ref 3.5–5.1)
POTASSIUM SERPL-SCNC: 3.2 MMOL/L (ref 3.5–5.1)
POTASSIUM SERPL-SCNC: 3.4 MMOL/L (ref 3.5–5.1)
POTASSIUM SERPL-SCNC: 3.5 MMOL/L (ref 3.5–5.1)
POTASSIUM SERPL-SCNC: 3.6 MMOL/L (ref 3.5–5.1)
POTASSIUM SERPL-SCNC: 3.6 MMOL/L (ref 3.5–5.1)
POTASSIUM SERPL-SCNC: 3.8 MMOL/L (ref 3.5–5.1)
POTASSIUM SERPL-SCNC: 4 MMOL/L (ref 3.5–5.1)
POTASSIUM SERPL-SCNC: 4.3 MMOL/L (ref 3.5–5.1)
POTASSIUM UR-SCNC: 42 MMOL/L
POTASSIUM UR-SCNC: 75 MMOL/L
PROT FLD-MCNC: 1.6 GM/DL
PROT SERPL-MCNC: 4.6 GM/DL (ref 6.4–8.3)
PROT SERPL-MCNC: 4.7 GM/DL (ref 6.4–8.3)
PROT SERPL-MCNC: 5.1 GM/DL (ref 6.4–8.3)
PROT SERPL-MCNC: 5.1 GM/DL (ref 6.4–8.3)
PROT SERPL-MCNC: 5.2 GM/DL (ref 6.4–8.3)
PROT SERPL-MCNC: 5.3 GM/DL (ref 6.4–8.3)
PROT SERPL-MCNC: 5.5 GM/DL (ref 6.4–8.3)
PROT SERPL-MCNC: 5.5 GM/DL (ref 6.4–8.3)
PROT SERPL-MCNC: 5.6 GM/DL (ref 6.4–8.3)
PROT SERPL-MCNC: 5.7 GM/DL (ref 6.4–8.3)
PROT SERPL-MCNC: 5.7 GM/DL (ref 6.4–8.3)
PROT SERPL-MCNC: 5.8 GM/DL (ref 6.4–8.3)
PROT SERPL-MCNC: 5.9 GM/DL (ref 6.4–8.3)
PROT SERPL-MCNC: 6 GM/DL (ref 6.4–8.3)
PROT SERPL-MCNC: 6.1 GM/DL (ref 6.4–8.3)
PROT SERPL-MCNC: 6.3 GM/DL (ref 6.4–8.3)
PROT SERPL-MCNC: 6.5 GM/DL (ref 6.4–8.3)
PROT SERPL-MCNC: 6.7 GM/DL (ref 6.4–8.3)
PROT UR QL STRIP.AUTO: 100 MG/DL
PROT UR QL STRIP.AUTO: ABNORMAL MG/DL
PROT UR STRIP-MCNC: 107.8 MG/DL
PROT UR STRIP-MCNC: 45.6 MG/DL
RA PRESSURE: 3 MMHG
RBC # BLD AUTO: 1.95 X10(6)/MCL (ref 4.7–6.1)
RBC # BLD AUTO: 2 X10(6)/MCL (ref 4.7–6.1)
RBC # BLD AUTO: 2.17 X10(6)/MCL (ref 4.7–6.1)
RBC # BLD AUTO: 2.25 X10(6)/MCL (ref 4.7–6.1)
RBC # BLD AUTO: 2.27 X10(6)/MCL (ref 4.7–6.1)
RBC # BLD AUTO: 2.28 X10(6)/MCL (ref 4.7–6.1)
RBC # BLD AUTO: 2.29 X10(6)/MCL (ref 4.7–6.1)
RBC # BLD AUTO: 2.39 X10(6)/MCL (ref 4.7–6.1)
RBC # BLD AUTO: 2.52 X10(6)/MCL (ref 4.7–6.1)
RBC # BLD AUTO: 2.53 X10(6)/MCL (ref 4.7–6.1)
RBC # BLD AUTO: 2.55 X10(6)/MCL (ref 4.7–6.1)
RBC # BLD AUTO: 2.56 X10(6)/MCL (ref 4.7–6.1)
RBC # BLD AUTO: 2.58 X10(6)/MCL (ref 4.7–6.1)
RBC # BLD AUTO: 2.59 X10(6)/MCL (ref 4.7–6.1)
RBC # BLD AUTO: 2.59 X10(6)/MCL (ref 4.7–6.1)
RBC # BLD AUTO: 2.61 X10(6)/MCL (ref 4.7–6.1)
RBC # BLD AUTO: 2.62 X10(6)/MCL (ref 4.7–6.1)
RBC # BLD AUTO: 2.68 X10(6)/MCL (ref 4.7–6.1)
RBC # BLD AUTO: 2.71 X10(6)/MCL (ref 4.7–6.1)
RBC # BLD AUTO: 2.82 X10(6)/MCL (ref 4.7–6.1)
RBC # BLD AUTO: 2.84 X10(6)/MCL (ref 4.7–6.1)
RBC # BLD AUTO: 2.91 X10(6)/MCL (ref 4.7–6.1)
RBC # BLD AUTO: 2.98 X10(6)/MCL (ref 4.7–6.1)
RBC # BLD AUTO: 3 X10(6)/MCL (ref 4.7–6.1)
RBC # BLD AUTO: 3.47 X10(6)/MCL (ref 4.7–6.1)
RBC # BLD AUTO: 3.59 X10(6)/MCL (ref 4.7–6.1)
RBC #/AREA URNS AUTO: ABNORMAL /HPF
RBC COUNT BODY FLUID (OHS): 0 /UL
RBC COUNT BODY FLUID (OHS): 39 /UL
RBC MORPH BLD: ABNORMAL
RBC UR QL AUTO: ABNORMAL UNIT/L
RBC UR QL AUTO: ABNORMAL UNIT/L
RBC UR QL AUTO: NEGATIVE UNIT/L
RBC UR QL AUTO: NEGATIVE UNIT/L
RET# (OHS): 0.13 (ref 0.03–0.1)
RETICULOCYTE COUNT AUTOMATED (OLG): 5.67 % (ref 1.1–2.1)
RIGHT VENTRICULAR END-DIASTOLIC DIMENSION: 3.45 CM
ROTA NEG CONTROL (OHS): NEGATIVE
ROTA POS CONTROL (OHS): POSITIVE
ROTAVIRUS ANTIGEN STOOL (OHS): NEGATIVE
SALICYLATES SERPL-MCNC: <5 MG/DL
SAMPLE SITE BLOOD GAS (OHS): ABNORMAL
SAO2 % BLDA: 88.6 %
SAO2 % BLDA: 92.4 %
SAO2 % BLDA: 94.3 %
SAO2 % BLDA: 94.9 %
SAO2 % BLDA: 96.2 %
SAO2 % BLDA: 96.8 %
SAO2 % BLDA: 98.3 %
SAO2 % BLDA: 98.9 %
SARS-COV-2 RNA RESP QL NAA+PROBE: NOT DETECTED
SARS-COV-2 RNA RESP QL NAA+PROBE: NOT DETECTED
SODIUM SERPL-SCNC: 132 MMOL/L (ref 136–145)
SODIUM SERPL-SCNC: 134 MMOL/L (ref 136–145)
SODIUM SERPL-SCNC: 134 MMOL/L (ref 136–145)
SODIUM SERPL-SCNC: 135 MMOL/L (ref 136–145)
SODIUM SERPL-SCNC: 135 MMOL/L (ref 136–145)
SODIUM SERPL-SCNC: 136 MMOL/L (ref 136–145)
SODIUM SERPL-SCNC: 137 MMOL/L (ref 136–145)
SODIUM SERPL-SCNC: 138 MMOL/L (ref 136–145)
SODIUM SERPL-SCNC: 140 MMOL/L (ref 136–145)
SODIUM SERPL-SCNC: 141 MMOL/L (ref 136–145)
SODIUM SERPL-SCNC: 142 MMOL/L (ref 136–145)
SODIUM SERPL-SCNC: 143 MMOL/L (ref 136–145)
SODIUM SERPL-SCNC: 143 MMOL/L (ref 136–145)
SODIUM SERPL-SCNC: 144 MMOL/L (ref 136–145)
SODIUM SERPL-SCNC: 145 MMOL/L (ref 136–145)
SODIUM SERPL-SCNC: 145 MMOL/L (ref 136–145)
SODIUM SERPL-SCNC: 146 MMOL/L (ref 136–145)
SODIUM SERPL-SCNC: 147 MMOL/L (ref 136–145)
SODIUM SERPL-SCNC: 147 MMOL/L (ref 136–145)
SODIUM SERPL-SCNC: 149 MMOL/L (ref 136–145)
SODIUM UR-SCNC: 31.7 MMOL/L
SODIUM UR-SCNC: <20 MMOL/L
SP GR UR STRIP.AUTO: 1.02
SP GR UR STRIP.AUTO: 1.02
SP GR UR STRIP.AUTO: 1.05
SP GR UR STRIP.AUTO: >1.05
SPECIFIC GRAVITY, URINE AUTO (.000) (OHS): 1.02 (ref 1–1.03)
SPECIMEN OUTDATE: NORMAL
SQUAMOUS #/AREA URNS LPF: ABNORMAL /HPF
TARGETS BLD QL SMEAR: SLIGHT
TIBC SERPL-MCNC: 130 UG/DL (ref 69–240)
TIBC SERPL-MCNC: 180 UG/DL (ref 69–240)
TIBC SERPL-MCNC: 188 UG/DL (ref 250–450)
TIBC SERPL-MCNC: 197 UG/DL (ref 250–450)
TR MAX PG: 23 MMHG
TRANSFERRIN SERPL-MCNC: 163 MG/DL (ref 174–364)
TRANSFERRIN SERPL-MCNC: 163 MG/DL (ref 174–364)
TRANSFERRIN SERPL-MCNC: 176 MG/DL (ref 174–364)
TROPONIN I SERPL-MCNC: <0.01 NG/ML (ref 0–0.04)
TV REST PULMONARY ARTERY PRESSURE: 26 MMHG
UNIT NUMBER: NORMAL
URATE SERPL-MCNC: 9.9 MG/DL (ref 3.5–7.2)
URATE UR-MCNC: 34.6 MG/DL
URINE PROTEIN/CREATININE RATIO (OHS): 0.3
URINE PROTEIN/CREATININE RATIO (OHS): 1.5
UROBILINOGEN UR STRIP-ACNC: ABNORMAL
UROBILINOGEN UR STRIP-ACNC: ABNORMAL MG/DL
UROBILINOGEN UR STRIP-ACNC: NORMAL MG/DL
UROBILINOGEN UR STRIP-ACNC: NORMAL MG/DL
VANCOMYCIN TROUGH SERPL-MCNC: 15.2 UG/ML (ref 15–20)
VANCOMYCIN TROUGH SERPL-MCNC: 18.7 UG/ML (ref 15–20)
VIT B12 SERPL-MCNC: >2000 PG/ML (ref 213–816)
VIT B12 SERPL-MCNC: >2000 PG/ML (ref 213–816)
WBC # FLD AUTO: 20 /UL
WBC # FLD AUTO: 69 /UL
WBC # SPEC AUTO: 11.63 X10(3)/MCL (ref 4.5–11.5)
WBC # SPEC AUTO: 11.73 X10(3)/MCL (ref 4.5–11.5)
WBC # SPEC AUTO: 11.84 X10(3)/MCL (ref 4.5–11.5)
WBC # SPEC AUTO: 12.29 X10(3)/MCL (ref 4.5–11.5)
WBC # SPEC AUTO: 12.4 X10(3)/MCL (ref 4.5–11.5)
WBC # SPEC AUTO: 13.64 X10(3)/MCL (ref 4.5–11.5)
WBC # SPEC AUTO: 14.34 X10(3)/MCL (ref 4.5–11.5)
WBC # SPEC AUTO: 14.64 X10(3)/MCL (ref 4.5–11.5)
WBC # SPEC AUTO: 14.8 X10(3)/MCL (ref 4.5–11.5)
WBC # SPEC AUTO: 15.02 X10(3)/MCL (ref 4.5–11.5)
WBC # SPEC AUTO: 15.19 X10(3)/MCL (ref 4.5–11.5)
WBC # SPEC AUTO: 15.62 X10(3)/MCL (ref 4.5–11.5)
WBC # SPEC AUTO: 15.7 X10(3)/MCL (ref 4.5–11.5)
WBC # SPEC AUTO: 16.46 X10(3)/MCL (ref 4.5–11.5)
WBC # SPEC AUTO: 17.62 X10(3)/MCL (ref 4.5–11.5)
WBC # SPEC AUTO: 21.3 X10(3)/MCL (ref 4.5–11.5)
WBC # SPEC AUTO: 21.77 X10(3)/MCL (ref 4.5–11.5)
WBC # SPEC AUTO: 21.8 X10(3)/MCL (ref 4.5–11.5)
WBC # SPEC AUTO: 21.82 X10(3)/MCL (ref 4.5–11.5)
WBC # SPEC AUTO: 22.59 X10(3)/MCL (ref 4.5–11.5)
WBC # SPEC AUTO: 22.82 X10(3)/MCL (ref 4.5–11.5)
WBC # SPEC AUTO: 22.91 X10(3)/MCL (ref 4.5–11.5)
WBC # SPEC AUTO: 23.23 X10(3)/MCL (ref 4.5–11.5)
WBC # SPEC AUTO: 23.4 X10(3)/MCL (ref 4.5–11.5)
WBC # SPEC AUTO: 25.43 X10(3)/MCL (ref 4.5–11.5)
WBC # SPEC AUTO: 34.95 X10(3)/MCL (ref 4.5–11.5)
WBC #/AREA URNS AUTO: ABNORMAL /HPF

## 2023-01-01 PROCEDURE — 85014 HEMATOCRIT: CPT

## 2023-01-01 PROCEDURE — 94660 CPAP INITIATION&MGMT: CPT

## 2023-01-01 PROCEDURE — 97166 OT EVAL MOD COMPLEX 45 MIN: CPT

## 2023-01-01 PROCEDURE — 80143 DRUG ASSAY ACETAMINOPHEN: CPT

## 2023-01-01 PROCEDURE — 25000003 PHARM REV CODE 250: Performed by: STUDENT IN AN ORGANIZED HEALTH CARE EDUCATION/TRAINING PROGRAM

## 2023-01-01 PROCEDURE — 36600 WITHDRAWAL OF ARTERIAL BLOOD: CPT

## 2023-01-01 PROCEDURE — 25000003 PHARM REV CODE 250

## 2023-01-01 PROCEDURE — P9047 ALBUMIN (HUMAN), 25%, 50ML: HCPCS | Mod: JZ,JG

## 2023-01-01 PROCEDURE — 82570 ASSAY OF URINE CREATININE: CPT | Performed by: STUDENT IN AN ORGANIZED HEALTH CARE EDUCATION/TRAINING PROGRAM

## 2023-01-01 PROCEDURE — 3078F DIAST BP <80 MM HG: CPT | Mod: CPTII,,, | Performed by: NURSE PRACTITIONER

## 2023-01-01 PROCEDURE — 99900035 HC TECH TIME PER 15 MIN (STAT)

## 2023-01-01 PROCEDURE — 83605 ASSAY OF LACTIC ACID: CPT

## 2023-01-01 PROCEDURE — 84560 ASSAY OF URINE/URIC ACID: CPT | Performed by: STUDENT IN AN ORGANIZED HEALTH CARE EDUCATION/TRAINING PROGRAM

## 2023-01-01 PROCEDURE — 87040 BLOOD CULTURE FOR BACTERIA: CPT | Performed by: EMERGENCY MEDICINE

## 2023-01-01 PROCEDURE — 84133 ASSAY OF URINE POTASSIUM: CPT | Performed by: STUDENT IN AN ORGANIZED HEALTH CARE EDUCATION/TRAINING PROGRAM

## 2023-01-01 PROCEDURE — 63600175 PHARM REV CODE 636 W HCPCS: Performed by: STUDENT IN AN ORGANIZED HEALTH CARE EDUCATION/TRAINING PROGRAM

## 2023-01-01 PROCEDURE — 99214 PR OFFICE/OUTPT VISIT, EST, LEVL IV, 30-39 MIN: ICD-10-PCS | Mod: S$PBB,25,, | Performed by: NURSE PRACTITIONER

## 2023-01-01 PROCEDURE — 85025 COMPLETE CBC W/AUTO DIFF WBC: CPT

## 2023-01-01 PROCEDURE — 80053 COMPREHEN METABOLIC PANEL: CPT | Performed by: FAMILY MEDICINE

## 2023-01-01 PROCEDURE — 25000003 PHARM REV CODE 250: Performed by: INTERNAL MEDICINE

## 2023-01-01 PROCEDURE — 82803 BLOOD GASES ANY COMBINATION: CPT

## 2023-01-01 PROCEDURE — 97116 GAIT TRAINING THERAPY: CPT

## 2023-01-01 PROCEDURE — 85045 AUTOMATED RETICULOCYTE COUNT: CPT

## 2023-01-01 PROCEDURE — 85610 PROTHROMBIN TIME: CPT | Performed by: STUDENT IN AN ORGANIZED HEALTH CARE EDUCATION/TRAINING PROGRAM

## 2023-01-01 PROCEDURE — 63600175 PHARM REV CODE 636 W HCPCS: Mod: JA | Performed by: STUDENT IN AN ORGANIZED HEALTH CARE EDUCATION/TRAINING PROGRAM

## 2023-01-01 PROCEDURE — 21400001 HC TELEMETRY ROOM

## 2023-01-01 PROCEDURE — 1159F PR MEDICATION LIST DOCUMENTED IN MEDICAL RECORD: ICD-10-PCS | Mod: CPTII,,, | Performed by: NURSE PRACTITIONER

## 2023-01-01 PROCEDURE — 63600175 PHARM REV CODE 636 W HCPCS

## 2023-01-01 PROCEDURE — 63600175 PHARM REV CODE 636 W HCPCS: Performed by: INTERNAL MEDICINE

## 2023-01-01 PROCEDURE — 82040 ASSAY OF SERUM ALBUMIN: CPT

## 2023-01-01 PROCEDURE — 89051 BODY FLUID CELL COUNT: CPT

## 2023-01-01 PROCEDURE — 86318 IA INFECTIOUS AGENT ANTIBODY: CPT | Performed by: STUDENT IN AN ORGANIZED HEALTH CARE EDUCATION/TRAINING PROGRAM

## 2023-01-01 PROCEDURE — 94761 N-INVAS EAR/PLS OXIMETRY MLT: CPT

## 2023-01-01 PROCEDURE — G0378 HOSPITAL OBSERVATION PER HR: HCPCS

## 2023-01-01 PROCEDURE — 27000249 HC VAPOTHERM CIRCUIT

## 2023-01-01 PROCEDURE — 96367 TX/PROPH/DG ADDL SEQ IV INF: CPT

## 2023-01-01 PROCEDURE — 3078F PR MOST RECENT DIASTOLIC BLOOD PRESSURE < 80 MM HG: ICD-10-PCS | Mod: CPTII,,, | Performed by: NURSE PRACTITIONER

## 2023-01-01 PROCEDURE — P9045 ALBUMIN (HUMAN), 5%, 250 ML: HCPCS | Mod: JZ,JG | Performed by: STUDENT IN AN ORGANIZED HEALTH CARE EDUCATION/TRAINING PROGRAM

## 2023-01-01 PROCEDURE — 80053 COMPREHEN METABOLIC PANEL: CPT | Performed by: STUDENT IN AN ORGANIZED HEALTH CARE EDUCATION/TRAINING PROGRAM

## 2023-01-01 PROCEDURE — 27000207 HC ISOLATION

## 2023-01-01 PROCEDURE — 83735 ASSAY OF MAGNESIUM: CPT | Performed by: FAMILY MEDICINE

## 2023-01-01 PROCEDURE — 86900 BLOOD TYPING SEROLOGIC ABO: CPT | Performed by: STUDENT IN AN ORGANIZED HEALTH CARE EDUCATION/TRAINING PROGRAM

## 2023-01-01 PROCEDURE — 83615 LACTATE (LD) (LDH) ENZYME: CPT | Performed by: STUDENT IN AN ORGANIZED HEALTH CARE EDUCATION/TRAINING PROGRAM

## 2023-01-01 PROCEDURE — 84100 ASSAY OF PHOSPHORUS: CPT

## 2023-01-01 PROCEDURE — 63600175 PHARM REV CODE 636 W HCPCS: Mod: JZ,JG

## 2023-01-01 PROCEDURE — 99213 OFFICE O/P EST LOW 20 MIN: CPT | Mod: PBBFAC,25 | Performed by: NURSE PRACTITIONER

## 2023-01-01 PROCEDURE — 25000003 PHARM REV CODE 250: Performed by: PHYSICIAN ASSISTANT

## 2023-01-01 PROCEDURE — 83735 ASSAY OF MAGNESIUM: CPT

## 2023-01-01 PROCEDURE — 27100171 HC OXYGEN HIGH FLOW UP TO 24 HOURS

## 2023-01-01 PROCEDURE — 84484 ASSAY OF TROPONIN QUANT: CPT | Performed by: PHYSICIAN ASSISTANT

## 2023-01-01 PROCEDURE — 82607 VITAMIN B-12: CPT

## 2023-01-01 PROCEDURE — 20000000 HC ICU ROOM

## 2023-01-01 PROCEDURE — P9016 RBC LEUKOCYTES REDUCED: HCPCS | Performed by: STUDENT IN AN ORGANIZED HEALTH CARE EDUCATION/TRAINING PROGRAM

## 2023-01-01 PROCEDURE — 99215 OFFICE O/P EST HI 40 MIN: CPT | Mod: S$PBB,,, | Performed by: NURSE PRACTITIONER

## 2023-01-01 PROCEDURE — 63600175 PHARM REV CODE 636 W HCPCS: Performed by: NURSE PRACTITIONER

## 2023-01-01 PROCEDURE — 87205 SMEAR GRAM STAIN: CPT | Performed by: EMERGENCY MEDICINE

## 2023-01-01 PROCEDURE — 80053 COMPREHEN METABOLIC PANEL: CPT

## 2023-01-01 PROCEDURE — 86900 BLOOD TYPING SEROLOGIC ABO: CPT | Performed by: EMERGENCY MEDICINE

## 2023-01-01 PROCEDURE — 99291 PR CRITICAL CARE, E/M 30-74 MINUTES: ICD-10-PCS | Mod: ,,, | Performed by: INTERNAL MEDICINE

## 2023-01-01 PROCEDURE — 80074 ACUTE HEPATITIS PANEL: CPT

## 2023-01-01 PROCEDURE — 86706 HEP B SURFACE ANTIBODY: CPT | Performed by: INTERNAL MEDICINE

## 2023-01-01 PROCEDURE — 25000003 PHARM REV CODE 250: Performed by: NURSE PRACTITIONER

## 2023-01-01 PROCEDURE — 99214 OFFICE O/P EST MOD 30 MIN: CPT | Mod: S$PBB,25,, | Performed by: NURSE PRACTITIONER

## 2023-01-01 PROCEDURE — 82248 BILIRUBIN DIRECT: CPT | Performed by: STUDENT IN AN ORGANIZED HEALTH CARE EDUCATION/TRAINING PROGRAM

## 2023-01-01 PROCEDURE — 99285 EMERGENCY DEPT VISIT HI MDM: CPT | Mod: 25

## 2023-01-01 PROCEDURE — 99214 OFFICE O/P EST MOD 30 MIN: CPT | Mod: PBBFAC,25 | Performed by: NURSE PRACTITIONER

## 2023-01-01 PROCEDURE — 63600175 PHARM REV CODE 636 W HCPCS: Mod: JZ,JG | Performed by: STUDENT IN AN ORGANIZED HEALTH CARE EDUCATION/TRAINING PROGRAM

## 2023-01-01 PROCEDURE — 80053 COMPREHEN METABOLIC PANEL: CPT | Performed by: EMERGENCY MEDICINE

## 2023-01-01 PROCEDURE — 85027 COMPLETE CBC AUTOMATED: CPT | Performed by: FAMILY MEDICINE

## 2023-01-01 PROCEDURE — 87209 SMEAR COMPLEX STAIN: CPT | Mod: 90

## 2023-01-01 PROCEDURE — 82607 VITAMIN B-12: CPT | Performed by: EMERGENCY MEDICINE

## 2023-01-01 PROCEDURE — 80053 COMPREHEN METABOLIC PANEL: CPT | Performed by: INTERNAL MEDICINE

## 2023-01-01 PROCEDURE — 82140 ASSAY OF AMMONIA: CPT | Performed by: STUDENT IN AN ORGANIZED HEALTH CARE EDUCATION/TRAINING PROGRAM

## 2023-01-01 PROCEDURE — 87641 MR-STAPH DNA AMP PROBE: CPT | Performed by: STUDENT IN AN ORGANIZED HEALTH CARE EDUCATION/TRAINING PROGRAM

## 2023-01-01 PROCEDURE — 88108 CYTOPATH CONCENTRATE TECH: CPT | Mod: TC

## 2023-01-01 PROCEDURE — 82653 EL-1 FECAL QUANTITATIVE: CPT | Mod: 90

## 2023-01-01 PROCEDURE — 27000221 HC OXYGEN, UP TO 24 HOURS

## 2023-01-01 PROCEDURE — 84100 ASSAY OF PHOSPHORUS: CPT | Performed by: STUDENT IN AN ORGANIZED HEALTH CARE EDUCATION/TRAINING PROGRAM

## 2023-01-01 PROCEDURE — 97535 SELF CARE MNGMENT TRAINING: CPT

## 2023-01-01 PROCEDURE — 85730 THROMBOPLASTIN TIME PARTIAL: CPT | Performed by: EMERGENCY MEDICINE

## 2023-01-01 PROCEDURE — 3008F PR BODY MASS INDEX (BMI) DOCUMENTED: ICD-10-PCS | Mod: CPTII,,, | Performed by: NURSE PRACTITIONER

## 2023-01-01 PROCEDURE — 82103 ALPHA-1-ANTITRYPSIN TOTAL: CPT | Mod: 90

## 2023-01-01 PROCEDURE — 83550 IRON BINDING TEST: CPT

## 2023-01-01 PROCEDURE — 99291 CRITICAL CARE FIRST HOUR: CPT | Mod: ,,, | Performed by: INTERNAL MEDICINE

## 2023-01-01 PROCEDURE — 83605 ASSAY OF LACTIC ACID: CPT | Performed by: EMERGENCY MEDICINE

## 2023-01-01 PROCEDURE — 82746 ASSAY OF FOLIC ACID SERUM: CPT

## 2023-01-01 PROCEDURE — 84484 ASSAY OF TROPONIN QUANT: CPT | Performed by: FAMILY MEDICINE

## 2023-01-01 PROCEDURE — 0240U COVID/FLU A&B PCR: CPT | Performed by: FAMILY MEDICINE

## 2023-01-01 PROCEDURE — 97162 PT EVAL MOD COMPLEX 30 MIN: CPT

## 2023-01-01 PROCEDURE — 25000003 PHARM REV CODE 250: Performed by: FAMILY MEDICINE

## 2023-01-01 PROCEDURE — 85025 COMPLETE CBC W/AUTO DIFF WBC: CPT | Performed by: EMERGENCY MEDICINE

## 2023-01-01 PROCEDURE — 93005 ELECTROCARDIOGRAM TRACING: CPT

## 2023-01-01 PROCEDURE — S5010 5% DEXTROSE AND 0.45% SALINE: HCPCS | Performed by: INTERNAL MEDICINE

## 2023-01-01 PROCEDURE — 99233 PR SUBSEQUENT HOSPITAL CARE,LEVL III: ICD-10-PCS | Mod: ,,, | Performed by: INTERNAL MEDICINE

## 2023-01-01 PROCEDURE — 87045 FECES CULTURE AEROBIC BACT: CPT

## 2023-01-01 PROCEDURE — 94760 N-INVAS EAR/PLS OXIMETRY 1: CPT

## 2023-01-01 PROCEDURE — 83735 ASSAY OF MAGNESIUM: CPT | Performed by: EMERGENCY MEDICINE

## 2023-01-01 PROCEDURE — 86140 C-REACTIVE PROTEIN: CPT

## 2023-01-01 PROCEDURE — 82077 ASSAY SPEC XCP UR&BREATH IA: CPT | Performed by: EMERGENCY MEDICINE

## 2023-01-01 PROCEDURE — 25000003 PHARM REV CODE 250: Performed by: EMERGENCY MEDICINE

## 2023-01-01 PROCEDURE — 87070 CULTURE OTHR SPECIMN AEROBIC: CPT | Performed by: STUDENT IN AN ORGANIZED HEALTH CARE EDUCATION/TRAINING PROGRAM

## 2023-01-01 PROCEDURE — 83550 IRON BINDING TEST: CPT | Performed by: EMERGENCY MEDICINE

## 2023-01-01 PROCEDURE — 1159F MED LIST DOCD IN RCRD: CPT | Mod: CPTII,,, | Performed by: NURSE PRACTITIONER

## 2023-01-01 PROCEDURE — 82728 ASSAY OF FERRITIN: CPT | Performed by: EMERGENCY MEDICINE

## 2023-01-01 PROCEDURE — 3008F BODY MASS INDEX DOCD: CPT | Mod: CPTII,,, | Performed by: NURSE PRACTITIONER

## 2023-01-01 PROCEDURE — 99285 EMERGENCY DEPT VISIT HI MDM: CPT | Mod: 25,27

## 2023-01-01 PROCEDURE — 80074 ACUTE HEPATITIS PANEL: CPT | Performed by: FAMILY MEDICINE

## 2023-01-01 PROCEDURE — 80202 ASSAY OF VANCOMYCIN: CPT

## 2023-01-01 PROCEDURE — C9113 INJ PANTOPRAZOLE SODIUM, VIA: HCPCS | Performed by: STUDENT IN AN ORGANIZED HEALTH CARE EDUCATION/TRAINING PROGRAM

## 2023-01-01 PROCEDURE — 80143 DRUG ASSAY ACETAMINOPHEN: CPT | Performed by: EMERGENCY MEDICINE

## 2023-01-01 PROCEDURE — 27000190 HC CPAP FULL FACE MASK W/VALVE

## 2023-01-01 PROCEDURE — 11000001 HC ACUTE MED/SURG PRIVATE ROOM

## 2023-01-01 PROCEDURE — 96365 THER/PROPH/DIAG IV INF INIT: CPT

## 2023-01-01 PROCEDURE — 86920 COMPATIBILITY TEST SPIN: CPT | Performed by: STUDENT IN AN ORGANIZED HEALTH CARE EDUCATION/TRAINING PROGRAM

## 2023-01-01 PROCEDURE — 84300 ASSAY OF URINE SODIUM: CPT | Performed by: STUDENT IN AN ORGANIZED HEALTH CARE EDUCATION/TRAINING PROGRAM

## 2023-01-01 PROCEDURE — 1160F PR REVIEW ALL MEDS BY PRESCRIBER/CLIN PHARMACIST DOCUMENTED: ICD-10-PCS | Mod: CPTII,,, | Performed by: NURSE PRACTITIONER

## 2023-01-01 PROCEDURE — 99215 PR OFFICE/OUTPT VISIT, EST, LEVL V, 40-54 MIN: ICD-10-PCS | Mod: S$PBB,25,, | Performed by: NURSE PRACTITIONER

## 2023-01-01 PROCEDURE — 27100092 HC HIGH FLOW DELIVERY CANNULA

## 2023-01-01 PROCEDURE — 99406 PR TOBACCO USE CESSATION INTERMEDIATE 3-10 MINUTES: ICD-10-PCS | Mod: S$PBB,,, | Performed by: NURSE PRACTITIONER

## 2023-01-01 PROCEDURE — 82140 ASSAY OF AMMONIA: CPT | Performed by: PHYSICIAN ASSISTANT

## 2023-01-01 PROCEDURE — 84550 ASSAY OF BLOOD/URIC ACID: CPT | Performed by: STUDENT IN AN ORGANIZED HEALTH CARE EDUCATION/TRAINING PROGRAM

## 2023-01-01 PROCEDURE — 4010F PR ACE/ARB THEARPY RXD/TAKEN: ICD-10-PCS | Mod: CPTII,,, | Performed by: NURSE PRACTITIONER

## 2023-01-01 PROCEDURE — 83735 ASSAY OF MAGNESIUM: CPT | Performed by: STUDENT IN AN ORGANIZED HEALTH CARE EDUCATION/TRAINING PROGRAM

## 2023-01-01 PROCEDURE — 82140 ASSAY OF AMMONIA: CPT | Performed by: EMERGENCY MEDICINE

## 2023-01-01 PROCEDURE — 63600175 PHARM REV CODE 636 W HCPCS: Performed by: FAMILY MEDICINE

## 2023-01-01 PROCEDURE — 83605 ASSAY OF LACTIC ACID: CPT | Performed by: FAMILY MEDICINE

## 2023-01-01 PROCEDURE — 81001 URINALYSIS AUTO W/SCOPE: CPT | Performed by: EMERGENCY MEDICINE

## 2023-01-01 PROCEDURE — 84466 ASSAY OF TRANSFERRIN: CPT | Performed by: EMERGENCY MEDICINE

## 2023-01-01 PROCEDURE — 85014 HEMATOCRIT: CPT | Performed by: STUDENT IN AN ORGANIZED HEALTH CARE EDUCATION/TRAINING PROGRAM

## 2023-01-01 PROCEDURE — 87493 C DIFF AMPLIFIED PROBE: CPT | Performed by: STUDENT IN AN ORGANIZED HEALTH CARE EDUCATION/TRAINING PROGRAM

## 2023-01-01 PROCEDURE — 80307 DRUG TEST PRSMV CHEM ANLYZR: CPT | Performed by: FAMILY MEDICINE

## 2023-01-01 PROCEDURE — 83690 ASSAY OF LIPASE: CPT | Performed by: EMERGENCY MEDICINE

## 2023-01-01 PROCEDURE — 82248 BILIRUBIN DIRECT: CPT | Performed by: FAMILY MEDICINE

## 2023-01-01 PROCEDURE — 3074F SYST BP LT 130 MM HG: CPT | Mod: CPTII,,, | Performed by: NURSE PRACTITIONER

## 2023-01-01 PROCEDURE — 25500020 PHARM REV CODE 255: Performed by: FAMILY MEDICINE

## 2023-01-01 PROCEDURE — 84484 ASSAY OF TROPONIN QUANT: CPT | Performed by: EMERGENCY MEDICINE

## 2023-01-01 PROCEDURE — 99406 BEHAV CHNG SMOKING 3-10 MIN: CPT | Mod: S$PBB,,, | Performed by: NURSE PRACTITIONER

## 2023-01-01 PROCEDURE — 82042 OTHER SOURCE ALBUMIN QUAN EA: CPT | Performed by: STUDENT IN AN ORGANIZED HEALTH CARE EDUCATION/TRAINING PROGRAM

## 2023-01-01 PROCEDURE — 36430 TRANSFUSION BLD/BLD COMPNT: CPT

## 2023-01-01 PROCEDURE — 82077 ASSAY SPEC XCP UR&BREATH IA: CPT | Performed by: FAMILY MEDICINE

## 2023-01-01 PROCEDURE — 96366 THER/PROPH/DIAG IV INF ADDON: CPT

## 2023-01-01 PROCEDURE — 85610 PROTHROMBIN TIME: CPT | Performed by: PHYSICIAN ASSISTANT

## 2023-01-01 PROCEDURE — 85027 COMPLETE CBC AUTOMATED: CPT

## 2023-01-01 PROCEDURE — 99214 OFFICE O/P EST MOD 30 MIN: CPT | Mod: PBBFAC | Performed by: NURSE PRACTITIONER

## 2023-01-01 PROCEDURE — S0030 INJECTION, METRONIDAZOLE: HCPCS | Performed by: STUDENT IN AN ORGANIZED HEALTH CARE EDUCATION/TRAINING PROGRAM

## 2023-01-01 PROCEDURE — 83993 ASSAY FOR CALPROTECTIN FECAL: CPT | Mod: 90

## 2023-01-01 PROCEDURE — 83935 ASSAY OF URINE OSMOLALITY: CPT | Performed by: STUDENT IN AN ORGANIZED HEALTH CARE EDUCATION/TRAINING PROGRAM

## 2023-01-01 PROCEDURE — 82728 ASSAY OF FERRITIN: CPT

## 2023-01-01 PROCEDURE — 84155 ASSAY OF PROTEIN SERUM: CPT

## 2023-01-01 PROCEDURE — 99233 SBSQ HOSP IP/OBS HIGH 50: CPT | Mod: ,,, | Performed by: INTERNAL MEDICINE

## 2023-01-01 PROCEDURE — 83010 ASSAY OF HAPTOGLOBIN QUANT: CPT | Performed by: STUDENT IN AN ORGANIZED HEALTH CARE EDUCATION/TRAINING PROGRAM

## 2023-01-01 PROCEDURE — 1160F RVW MEDS BY RX/DR IN RCRD: CPT | Mod: CPTII,,, | Performed by: NURSE PRACTITIONER

## 2023-01-01 PROCEDURE — S0030 INJECTION, METRONIDAZOLE: HCPCS

## 2023-01-01 PROCEDURE — 86759 ROTAVIRUS ANTIBODY: CPT

## 2023-01-01 PROCEDURE — 84157 ASSAY OF PROTEIN OTHER: CPT | Performed by: STUDENT IN AN ORGANIZED HEALTH CARE EDUCATION/TRAINING PROGRAM

## 2023-01-01 PROCEDURE — 0240U COVID/FLU A&B PCR: CPT | Performed by: EMERGENCY MEDICINE

## 2023-01-01 PROCEDURE — P9016 RBC LEUKOCYTES REDUCED: HCPCS

## 2023-01-01 PROCEDURE — 86920 COMPATIBILITY TEST SPIN: CPT

## 2023-01-01 PROCEDURE — 82436 ASSAY OF URINE CHLORIDE: CPT | Performed by: STUDENT IN AN ORGANIZED HEALTH CARE EDUCATION/TRAINING PROGRAM

## 2023-01-01 PROCEDURE — 85025 COMPLETE CBC W/AUTO DIFF WBC: CPT | Performed by: STUDENT IN AN ORGANIZED HEALTH CARE EDUCATION/TRAINING PROGRAM

## 2023-01-01 PROCEDURE — 63600175 PHARM REV CODE 636 W HCPCS: Mod: JA | Performed by: EMERGENCY MEDICINE

## 2023-01-01 PROCEDURE — 83690 ASSAY OF LIPASE: CPT | Performed by: FAMILY MEDICINE

## 2023-01-01 PROCEDURE — 89051 BODY FLUID CELL COUNT: CPT | Performed by: EMERGENCY MEDICINE

## 2023-01-01 PROCEDURE — 80307 DRUG TEST PRSMV CHEM ANLYZR: CPT | Performed by: EMERGENCY MEDICINE

## 2023-01-01 PROCEDURE — 85025 COMPLETE CBC W/AUTO DIFF WBC: CPT | Performed by: FAMILY MEDICINE

## 2023-01-01 PROCEDURE — 51702 INSERT TEMP BLADDER CATH: CPT

## 2023-01-01 PROCEDURE — 83880 ASSAY OF NATRIURETIC PEPTIDE: CPT | Performed by: FAMILY MEDICINE

## 2023-01-01 PROCEDURE — 87205 SMEAR GRAM STAIN: CPT | Performed by: STUDENT IN AN ORGANIZED HEALTH CARE EDUCATION/TRAINING PROGRAM

## 2023-01-01 PROCEDURE — 80177 DRUG SCRN QUAN LEVETIRACETAM: CPT | Performed by: EMERGENCY MEDICINE

## 2023-01-01 PROCEDURE — C9113 INJ PANTOPRAZOLE SODIUM, VIA: HCPCS | Performed by: EMERGENCY MEDICINE

## 2023-01-01 PROCEDURE — 99284 EMERGENCY DEPT VISIT MOD MDM: CPT | Mod: 25,27

## 2023-01-01 PROCEDURE — 81001 URINALYSIS AUTO W/SCOPE: CPT | Performed by: FAMILY MEDICINE

## 2023-01-01 PROCEDURE — 85610 PROTHROMBIN TIME: CPT | Performed by: EMERGENCY MEDICINE

## 2023-01-01 PROCEDURE — 83930 ASSAY OF BLOOD OSMOLALITY: CPT | Performed by: STUDENT IN AN ORGANIZED HEALTH CARE EDUCATION/TRAINING PROGRAM

## 2023-01-01 PROCEDURE — 31720 CLEARANCE OF AIRWAYS: CPT

## 2023-01-01 PROCEDURE — 86900 BLOOD TYPING SEROLOGIC ABO: CPT

## 2023-01-01 PROCEDURE — 96375 TX/PRO/DX INJ NEW DRUG ADDON: CPT

## 2023-01-01 PROCEDURE — 99215 OFFICE O/P EST HI 40 MIN: CPT | Mod: S$PBB,25,, | Performed by: NURSE PRACTITIONER

## 2023-01-01 PROCEDURE — 97530 THERAPEUTIC ACTIVITIES: CPT

## 2023-01-01 PROCEDURE — 87040 BLOOD CULTURE FOR BACTERIA: CPT | Performed by: STUDENT IN AN ORGANIZED HEALTH CARE EDUCATION/TRAINING PROGRAM

## 2023-01-01 PROCEDURE — 49083 ABD PARACENTESIS W/IMAGING: CPT

## 2023-01-01 PROCEDURE — 25500020 PHARM REV CODE 255

## 2023-01-01 PROCEDURE — 4010F ACE/ARB THERAPY RXD/TAKEN: CPT | Mod: CPTII,,, | Performed by: NURSE PRACTITIONER

## 2023-01-01 PROCEDURE — 82272 OCCULT BLD FECES 1-3 TESTS: CPT

## 2023-01-01 PROCEDURE — 87389 HIV-1 AG W/HIV-1&-2 AB AG IA: CPT | Performed by: FAMILY MEDICINE

## 2023-01-01 PROCEDURE — 82746 ASSAY OF FOLIC ACID SERUM: CPT | Performed by: EMERGENCY MEDICINE

## 2023-01-01 PROCEDURE — 96365 THER/PROPH/DIAG IV INF INIT: CPT | Mod: 59

## 2023-01-01 PROCEDURE — 84157 ASSAY OF PROTEIN OTHER: CPT

## 2023-01-01 PROCEDURE — 99215 PR OFFICE/OUTPT VISIT, EST, LEVL V, 40-54 MIN: ICD-10-PCS | Mod: S$PBB,,, | Performed by: NURSE PRACTITIONER

## 2023-01-01 PROCEDURE — 80321 ALCOHOLS BIOMARKERS 1OR 2: CPT

## 2023-01-01 PROCEDURE — 3074F PR MOST RECENT SYSTOLIC BLOOD PRESSURE < 130 MM HG: ICD-10-PCS | Mod: CPTII,,, | Performed by: NURSE PRACTITIONER

## 2023-01-01 PROCEDURE — 81001 URINALYSIS AUTO W/SCOPE: CPT

## 2023-01-01 PROCEDURE — 82270 OCCULT BLOOD FECES: CPT | Performed by: EMERGENCY MEDICINE

## 2023-01-01 PROCEDURE — 80179 DRUG ASSAY SALICYLATE: CPT

## 2023-01-01 PROCEDURE — 87070 CULTURE OTHR SPECIMN AEROBIC: CPT | Performed by: EMERGENCY MEDICINE

## 2023-01-01 PROCEDURE — 85014 HEMATOCRIT: CPT | Mod: 59 | Performed by: EMERGENCY MEDICINE

## 2023-01-01 PROCEDURE — 86708 HEPATITIS A ANTIBODY: CPT | Performed by: INTERNAL MEDICINE

## 2023-01-01 RX ORDER — SODIUM CHLORIDE, SODIUM LACTATE, POTASSIUM CHLORIDE, CALCIUM CHLORIDE 600; 310; 30; 20 MG/100ML; MG/100ML; MG/100ML; MG/100ML
INJECTION, SOLUTION INTRAVENOUS CONTINUOUS
Status: DISCONTINUED | OUTPATIENT
Start: 2023-01-01 | End: 2023-01-01

## 2023-01-01 RX ORDER — DIAZEPAM 10 MG/2ML
2 INJECTION INTRAMUSCULAR ONCE
Status: DISCONTINUED | OUTPATIENT
Start: 2023-01-01 | End: 2023-01-01 | Stop reason: HOSPADM

## 2023-01-01 RX ORDER — NOREPINEPHRINE BITARTRATE/D5W 4MG/250ML
0-3 PLASTIC BAG, INJECTION (ML) INTRAVENOUS CONTINUOUS
Status: DISCONTINUED | OUTPATIENT
Start: 2023-01-01 | End: 2023-01-01

## 2023-01-01 RX ORDER — ALBUMIN HUMAN 250 G/1000ML
50 SOLUTION INTRAVENOUS ONCE
Status: COMPLETED | OUTPATIENT
Start: 2023-01-01 | End: 2023-01-01

## 2023-01-01 RX ORDER — IBUPROFEN 200 MG
24 TABLET ORAL
Status: DISCONTINUED | OUTPATIENT
Start: 2023-01-01 | End: 2023-07-20 | Stop reason: HOSPADM

## 2023-01-01 RX ORDER — LORAZEPAM 2 MG/ML
2 INJECTION INTRAMUSCULAR
Status: DISCONTINUED | OUTPATIENT
Start: 2023-01-01 | End: 2023-01-01

## 2023-01-01 RX ORDER — METRONIDAZOLE 500 MG/100ML
500 INJECTION, SOLUTION INTRAVENOUS
Status: DISCONTINUED | OUTPATIENT
Start: 2023-01-01 | End: 2023-01-01

## 2023-01-01 RX ORDER — HYDROCODONE BITARTRATE AND ACETAMINOPHEN 500; 5 MG/1; MG/1
TABLET ORAL
Status: DISCONTINUED | OUTPATIENT
Start: 2023-01-01 | End: 2023-07-20 | Stop reason: HOSPADM

## 2023-01-01 RX ORDER — LEVETIRACETAM 5 MG/ML
500 INJECTION INTRAVASCULAR
Status: COMPLETED | OUTPATIENT
Start: 2023-01-01 | End: 2023-01-01

## 2023-01-01 RX ORDER — LACTULOSE 10 G/15ML
10 SOLUTION ORAL 2 TIMES DAILY
Status: DISCONTINUED | OUTPATIENT
Start: 2023-01-01 | End: 2023-01-01

## 2023-01-01 RX ORDER — SODIUM CHLORIDE 0.9 % (FLUSH) 0.9 %
10 SYRINGE (ML) INJECTION
Status: DISCONTINUED | OUTPATIENT
Start: 2023-01-01 | End: 2023-07-20 | Stop reason: HOSPADM

## 2023-01-01 RX ORDER — POTASSIUM CHLORIDE 20 MEQ/1
40 TABLET, EXTENDED RELEASE ORAL ONCE
Status: COMPLETED | OUTPATIENT
Start: 2023-01-01 | End: 2023-01-01

## 2023-01-01 RX ORDER — MAGNESIUM SULFATE HEPTAHYDRATE 40 MG/ML
2 INJECTION, SOLUTION INTRAVENOUS ONCE
Status: COMPLETED | OUTPATIENT
Start: 2023-01-01 | End: 2023-01-01

## 2023-01-01 RX ORDER — SODIUM,POTASSIUM PHOSPHATES 280-250MG
2 POWDER IN PACKET (EA) ORAL ONCE
Status: COMPLETED | OUTPATIENT
Start: 2023-01-01 | End: 2023-01-01

## 2023-01-01 RX ORDER — LORAZEPAM 1 MG/1
2 TABLET ORAL EVERY 4 HOURS
Status: DISCONTINUED | OUTPATIENT
Start: 2023-01-01 | End: 2023-01-01

## 2023-01-01 RX ORDER — SODIUM CHLORIDE 450 MG/100ML
INJECTION, SOLUTION INTRAVENOUS CONTINUOUS
Status: DISCONTINUED | OUTPATIENT
Start: 2023-01-01 | End: 2023-01-01

## 2023-01-01 RX ORDER — CIPROFLOXACIN 500 MG/1
500 TABLET ORAL
Status: COMPLETED | OUTPATIENT
Start: 2023-01-01 | End: 2023-01-01

## 2023-01-01 RX ORDER — IBUPROFEN 200 MG
16 TABLET ORAL
Status: DISCONTINUED | OUTPATIENT
Start: 2023-01-01 | End: 2023-07-20 | Stop reason: HOSPADM

## 2023-01-01 RX ORDER — POTASSIUM CHLORIDE 7.45 MG/ML
10 INJECTION INTRAVENOUS ONCE
Status: DISCONTINUED | OUTPATIENT
Start: 2023-01-01 | End: 2023-01-01

## 2023-01-01 RX ORDER — SODIUM CHLORIDE 9 MG/ML
INJECTION, SOLUTION INTRAVENOUS CONTINUOUS
Status: DISCONTINUED | OUTPATIENT
Start: 2023-01-01 | End: 2023-01-01

## 2023-01-01 RX ORDER — HYDROMORPHONE HYDROCHLORIDE 1 MG/ML
0.5 INJECTION, SOLUTION INTRAMUSCULAR; INTRAVENOUS; SUBCUTANEOUS ONCE
Status: COMPLETED | OUTPATIENT
Start: 2023-01-01 | End: 2023-01-01

## 2023-01-01 RX ORDER — HALOPERIDOL 1 MG/1
1 TABLET ORAL ONCE
Status: DISCONTINUED | OUTPATIENT
Start: 2023-01-01 | End: 2023-01-01

## 2023-01-01 RX ORDER — HYDROCODONE BITARTRATE AND ACETAMINOPHEN 500; 5 MG/1; MG/1
TABLET ORAL
Status: DISCONTINUED | OUTPATIENT
Start: 2023-01-01 | End: 2023-01-01 | Stop reason: HOSPADM

## 2023-01-01 RX ORDER — NALOXONE HCL 0.4 MG/ML
0.02 VIAL (ML) INJECTION
Status: DISCONTINUED | OUTPATIENT
Start: 2023-01-01 | End: 2023-07-20 | Stop reason: HOSPADM

## 2023-01-01 RX ORDER — NALTREXONE HYDROCHLORIDE 50 MG/1
50 TABLET, FILM COATED ORAL DAILY
Qty: 30 TABLET | Refills: 0 | Status: SHIPPED | OUTPATIENT
Start: 2023-01-01 | End: 2023-01-01

## 2023-01-01 RX ORDER — MAGNESIUM SULFATE 1 G/100ML
1 INJECTION INTRAVENOUS ONCE
Status: COMPLETED | OUTPATIENT
Start: 2023-01-01 | End: 2023-01-01

## 2023-01-01 RX ORDER — ALBUMIN HUMAN 250 G/1000ML
25 SOLUTION INTRAVENOUS ONCE
Status: COMPLETED | OUTPATIENT
Start: 2023-01-01 | End: 2023-01-01

## 2023-01-01 RX ORDER — LACTULOSE 10 G/15ML
200 SOLUTION ORAL; RECTAL 3 TIMES DAILY
Status: DISCONTINUED | OUTPATIENT
Start: 2023-01-01 | End: 2023-01-01

## 2023-01-01 RX ORDER — FOLIC ACID 1 MG/1
1 TABLET ORAL DAILY
Status: DISCONTINUED | OUTPATIENT
Start: 2023-01-01 | End: 2023-01-01

## 2023-01-01 RX ORDER — POTASSIUM CHLORIDE 7.45 MG/ML
10 INJECTION INTRAVENOUS
Status: COMPLETED | OUTPATIENT
Start: 2023-01-01 | End: 2023-01-01

## 2023-01-01 RX ORDER — PANTOPRAZOLE SODIUM 40 MG/1
40 TABLET, DELAYED RELEASE ORAL DAILY
Status: DISCONTINUED | OUTPATIENT
Start: 2023-01-01 | End: 2023-01-01

## 2023-01-01 RX ORDER — LEVETIRACETAM 750 MG/1
750 TABLET ORAL 2 TIMES DAILY
Status: DISCONTINUED | OUTPATIENT
Start: 2023-01-01 | End: 2023-01-01

## 2023-01-01 RX ORDER — PANTOPRAZOLE SODIUM 40 MG/10ML
40 INJECTION, POWDER, LYOPHILIZED, FOR SOLUTION INTRAVENOUS
Status: COMPLETED | OUTPATIENT
Start: 2023-01-01 | End: 2023-01-01

## 2023-01-01 RX ORDER — LEVETIRACETAM 750 MG/1
750 TABLET ORAL 2 TIMES DAILY
Qty: 28 TABLET | Refills: 0 | Status: SHIPPED | OUTPATIENT
Start: 2023-01-01 | End: 2023-01-01 | Stop reason: SDUPTHER

## 2023-01-01 RX ORDER — CETIRIZINE HYDROCHLORIDE 10 MG/1
10 TABLET ORAL DAILY
Qty: 30 TABLET | Refills: 4 | Status: ON HOLD | OUTPATIENT
Start: 2023-01-01 | End: 2023-01-01 | Stop reason: SDUPTHER

## 2023-01-01 RX ORDER — ALBUMIN HUMAN 250 G/1000ML
50 SOLUTION INTRAVENOUS 2 TIMES DAILY
Status: DISCONTINUED | OUTPATIENT
Start: 2023-01-01 | End: 2023-01-01

## 2023-01-01 RX ORDER — HYDROXYZINE PAMOATE 25 MG/1
25 CAPSULE ORAL 4 TIMES DAILY
Qty: 20 CAPSULE | Refills: 0 | Status: SHIPPED | OUTPATIENT
Start: 2023-01-01 | End: 2023-01-01

## 2023-01-01 RX ORDER — LEVOFLOXACIN 5 MG/ML
500 INJECTION, SOLUTION INTRAVENOUS
Status: DISCONTINUED | OUTPATIENT
Start: 2023-01-01 | End: 2023-01-01

## 2023-01-01 RX ORDER — MIDODRINE HYDROCHLORIDE 5 MG/1
10 TABLET ORAL
Status: DISCONTINUED | OUTPATIENT
Start: 2023-01-01 | End: 2023-07-20 | Stop reason: HOSPADM

## 2023-01-01 RX ORDER — LEVETIRACETAM 750 MG/1
750 TABLET ORAL 2 TIMES DAILY
Qty: 60 TABLET | Refills: 0 | Status: SHIPPED | OUTPATIENT
Start: 2023-01-01 | End: 2023-01-01

## 2023-01-01 RX ORDER — HEPARIN SODIUM 5000 [USP'U]/ML
5000 INJECTION, SOLUTION INTRAVENOUS; SUBCUTANEOUS EVERY 12 HOURS
Status: DISCONTINUED | OUTPATIENT
Start: 2023-01-01 | End: 2023-01-01

## 2023-01-01 RX ORDER — LEVETIRACETAM 750 MG/1
750 TABLET ORAL 2 TIMES DAILY
Status: DISCONTINUED | OUTPATIENT
Start: 2023-01-01 | End: 2023-01-01 | Stop reason: HOSPADM

## 2023-01-01 RX ORDER — LACTULOSE 10 G/15ML
10 SOLUTION ORAL DAILY
Status: DISCONTINUED | OUTPATIENT
Start: 2023-01-01 | End: 2023-01-01

## 2023-01-01 RX ORDER — LEVETIRACETAM 750 MG/1
750 TABLET ORAL 2 TIMES DAILY
Qty: 60 TABLET | Refills: 2 | Status: ON HOLD | OUTPATIENT
Start: 2023-01-01 | End: 2023-01-01

## 2023-01-01 RX ORDER — LORAZEPAM 1 MG/1
1 TABLET ORAL EVERY 6 HOURS PRN
Status: DISCONTINUED | OUTPATIENT
Start: 2023-01-01 | End: 2023-01-01 | Stop reason: HOSPADM

## 2023-01-01 RX ORDER — LACTULOSE 10 G/15ML
10 SOLUTION ORAL 3 TIMES DAILY
Status: DISCONTINUED | OUTPATIENT
Start: 2023-01-01 | End: 2023-01-01

## 2023-01-01 RX ORDER — DIPHENOXYLATE HYDROCHLORIDE AND ATROPINE SULFATE 2.5; .025 MG/1; MG/1
1 TABLET ORAL 4 TIMES DAILY PRN
Status: DISCONTINUED | OUTPATIENT
Start: 2023-01-01 | End: 2023-01-01

## 2023-01-01 RX ORDER — HYDROCORTISONE 1 %
CREAM (GRAM) TOPICAL 2 TIMES DAILY
Status: DISCONTINUED | OUTPATIENT
Start: 2023-01-01 | End: 2023-07-20 | Stop reason: HOSPADM

## 2023-01-01 RX ORDER — DOXYLAMINE SUCCINATE 25 MG
TABLET ORAL 2 TIMES DAILY
Status: DISCONTINUED | OUTPATIENT
Start: 2023-01-01 | End: 2023-07-20 | Stop reason: HOSPADM

## 2023-01-01 RX ORDER — DOXYLAMINE SUCCINATE 25 MG
TABLET ORAL 2 TIMES DAILY
Status: DISCONTINUED | OUTPATIENT
Start: 2023-01-01 | End: 2023-01-01 | Stop reason: HOSPADM

## 2023-01-01 RX ORDER — SODIUM CHLORIDE, SODIUM LACTATE, POTASSIUM CHLORIDE, CALCIUM CHLORIDE 600; 310; 30; 20 MG/100ML; MG/100ML; MG/100ML; MG/100ML
INJECTION, SOLUTION INTRAVENOUS CONTINUOUS
Status: ACTIVE | OUTPATIENT
Start: 2023-01-01 | End: 2023-01-01

## 2023-01-01 RX ORDER — LORAZEPAM 2 MG/ML
1 INJECTION INTRAMUSCULAR
Status: DISCONTINUED | OUTPATIENT
Start: 2023-01-01 | End: 2023-07-20 | Stop reason: HOSPADM

## 2023-01-01 RX ORDER — METRONIDAZOLE 500 MG/1
500 TABLET ORAL EVERY 8 HOURS
Status: DISCONTINUED | OUTPATIENT
Start: 2023-01-01 | End: 2023-01-01

## 2023-01-01 RX ORDER — HYDROMORPHONE HYDROCHLORIDE 1 MG/ML
0.5 INJECTION, SOLUTION INTRAMUSCULAR; INTRAVENOUS; SUBCUTANEOUS EVERY 4 HOURS PRN
Status: DISCONTINUED | OUTPATIENT
Start: 2023-01-01 | End: 2023-07-20 | Stop reason: HOSPADM

## 2023-01-01 RX ORDER — LEVETIRACETAM 500 MG/1
500 TABLET ORAL 2 TIMES DAILY
Status: DISCONTINUED | OUTPATIENT
Start: 2023-01-01 | End: 2023-07-20 | Stop reason: HOSPADM

## 2023-01-01 RX ORDER — ONDANSETRON 2 MG/ML
4 INJECTION INTRAMUSCULAR; INTRAVENOUS EVERY 8 HOURS PRN
Status: DISCONTINUED | OUTPATIENT
Start: 2023-01-01 | End: 2023-07-20 | Stop reason: HOSPADM

## 2023-01-01 RX ORDER — POLYETHYLENE GLYCOL 3350 17 G/17G
17 POWDER, FOR SOLUTION ORAL DAILY
Status: DISCONTINUED | OUTPATIENT
Start: 2023-01-01 | End: 2023-01-01

## 2023-01-01 RX ORDER — LACTULOSE 10 G/15ML
15 SOLUTION ORAL 2 TIMES DAILY
Status: DISCONTINUED | OUTPATIENT
Start: 2023-01-01 | End: 2023-01-01

## 2023-01-01 RX ORDER — POLYETHYLENE GLYCOL 3350 17 G/17G
17 POWDER, FOR SOLUTION ORAL DAILY PRN
Qty: 289 G | Refills: 0 | Status: SHIPPED | OUTPATIENT
Start: 2023-01-01

## 2023-01-01 RX ORDER — MUPIROCIN 20 MG/G
OINTMENT TOPICAL 2 TIMES DAILY
Status: COMPLETED | OUTPATIENT
Start: 2023-01-01 | End: 2023-01-01

## 2023-01-01 RX ORDER — MIDODRINE HYDROCHLORIDE 5 MG/1
5 TABLET ORAL
Status: DISCONTINUED | OUTPATIENT
Start: 2023-01-01 | End: 2023-01-01

## 2023-01-01 RX ORDER — ACETAMINOPHEN 325 MG/1
650 TABLET ORAL EVERY 6 HOURS PRN
Status: DISCONTINUED | OUTPATIENT
Start: 2023-01-01 | End: 2023-07-20 | Stop reason: HOSPADM

## 2023-01-01 RX ORDER — PANTOPRAZOLE SODIUM 40 MG/10ML
40 INJECTION, POWDER, LYOPHILIZED, FOR SOLUTION INTRAVENOUS DAILY
Status: DISCONTINUED | OUTPATIENT
Start: 2023-01-01 | End: 2023-07-20 | Stop reason: HOSPADM

## 2023-01-01 RX ORDER — DEXTROSE 40 %
30 GEL (GRAM) ORAL
Status: DISCONTINUED | OUTPATIENT
Start: 2023-01-01 | End: 2023-01-01 | Stop reason: HOSPADM

## 2023-01-01 RX ORDER — POTASSIUM CHLORIDE 7.45 MG/ML
40 INJECTION INTRAVENOUS ONCE
Status: COMPLETED | OUTPATIENT
Start: 2023-01-01 | End: 2023-01-01

## 2023-01-01 RX ORDER — FENTANYL CITRATE 50 UG/ML
25 INJECTION, SOLUTION INTRAMUSCULAR; INTRAVENOUS
Status: DISCONTINUED | OUTPATIENT
Start: 2023-01-01 | End: 2023-07-20 | Stop reason: HOSPADM

## 2023-01-01 RX ORDER — DEXTROSE 40 %
15 GEL (GRAM) ORAL
Status: DISCONTINUED | OUTPATIENT
Start: 2023-01-01 | End: 2023-01-01 | Stop reason: HOSPADM

## 2023-01-01 RX ORDER — LACTULOSE 10 G/15ML
20 SOLUTION ORAL 3 TIMES DAILY
Status: DISCONTINUED | OUTPATIENT
Start: 2023-01-01 | End: 2023-07-20 | Stop reason: HOSPADM

## 2023-01-01 RX ORDER — LIDOCAINE HYDROCHLORIDE 10 MG/ML
5 INJECTION, SOLUTION EPIDURAL; INFILTRATION; INTRACAUDAL; PERINEURAL
Status: COMPLETED | OUTPATIENT
Start: 2023-01-01 | End: 2023-01-01

## 2023-01-01 RX ORDER — OCTREOTIDE ACETATE 100 UG/ML
100 INJECTION, SOLUTION INTRAVENOUS; SUBCUTANEOUS
Status: COMPLETED | OUTPATIENT
Start: 2023-01-01 | End: 2023-01-01

## 2023-01-01 RX ORDER — SODIUM CHLORIDE 0.9 % (FLUSH) 0.9 %
10 SYRINGE (ML) INJECTION EVERY 12 HOURS PRN
Status: DISCONTINUED | OUTPATIENT
Start: 2023-01-01 | End: 2023-07-20 | Stop reason: HOSPADM

## 2023-01-01 RX ORDER — POLYETHYLENE GLYCOL 3350 17 G/17G
17 POWDER, FOR SOLUTION ORAL DAILY PRN
Qty: 289 G | Refills: 0 | Status: ON HOLD | OUTPATIENT
Start: 2023-01-01 | End: 2023-01-01 | Stop reason: SDUPTHER

## 2023-01-01 RX ORDER — DIPHENHYDRAMINE HCL 25 MG
25 CAPSULE ORAL EVERY 6 HOURS PRN
Status: DISCONTINUED | OUTPATIENT
Start: 2023-01-01 | End: 2023-07-20 | Stop reason: HOSPADM

## 2023-01-01 RX ORDER — FAMOTIDINE 20 MG/1
20 TABLET, FILM COATED ORAL 2 TIMES DAILY
Status: DISCONTINUED | OUTPATIENT
Start: 2023-01-01 | End: 2023-01-01 | Stop reason: HOSPADM

## 2023-01-01 RX ORDER — HALOPERIDOL 1 MG/1
1 TABLET ORAL ONCE
Status: COMPLETED | OUTPATIENT
Start: 2023-01-01 | End: 2023-01-01

## 2023-01-01 RX ORDER — LIDOCAINE HYDROCHLORIDE 10 MG/ML
10 INJECTION, SOLUTION EPIDURAL; INFILTRATION; INTRACAUDAL; PERINEURAL ONCE
Status: DISCONTINUED | OUTPATIENT
Start: 2023-01-01 | End: 2023-07-20 | Stop reason: HOSPADM

## 2023-01-01 RX ORDER — ALBUMIN HUMAN 50 G/1000ML
25 SOLUTION INTRAVENOUS 2 TIMES DAILY
Status: DISCONTINUED | OUTPATIENT
Start: 2023-01-01 | End: 2023-01-01

## 2023-01-01 RX ORDER — MAGNESIUM SULFATE HEPTAHYDRATE 40 MG/ML
2 INJECTION, SOLUTION INTRAVENOUS
Status: COMPLETED | OUTPATIENT
Start: 2023-01-01 | End: 2023-01-01

## 2023-01-01 RX ORDER — POTASSIUM CHLORIDE 7.45 MG/ML
10 INJECTION INTRAVENOUS ONCE
Status: COMPLETED | OUTPATIENT
Start: 2023-01-01 | End: 2023-01-01

## 2023-01-01 RX ORDER — LOSARTAN POTASSIUM 25 MG/1
25 TABLET ORAL
COMMUNITY
Start: 2021-10-25 | End: 2023-01-01

## 2023-01-01 RX ORDER — POTASSIUM CHLORIDE 20 MEQ/1
40 TABLET, EXTENDED RELEASE ORAL 2 TIMES DAILY
Status: COMPLETED | OUTPATIENT
Start: 2023-01-01 | End: 2023-01-01

## 2023-01-01 RX ORDER — GLUCAGON 1 MG
1 KIT INJECTION
Status: DISCONTINUED | OUTPATIENT
Start: 2023-01-01 | End: 2023-07-20 | Stop reason: HOSPADM

## 2023-01-01 RX ORDER — MAGNESIUM SULFATE HEPTAHYDRATE 40 MG/ML
4 INJECTION, SOLUTION INTRAVENOUS ONCE
Status: COMPLETED | OUTPATIENT
Start: 2023-01-01 | End: 2023-01-01

## 2023-01-01 RX ORDER — THIAMINE HCL 100 MG
300 TABLET ORAL DAILY
Status: DISCONTINUED | OUTPATIENT
Start: 2023-01-01 | End: 2023-01-01 | Stop reason: HOSPADM

## 2023-01-01 RX ORDER — DIPHENOXYLATE HYDROCHLORIDE AND ATROPINE SULFATE 2.5; .025 MG/1; MG/1
1 TABLET ORAL ONCE
Status: COMPLETED | OUTPATIENT
Start: 2023-01-01 | End: 2023-01-01

## 2023-01-01 RX ORDER — LORAZEPAM 1 MG/1
1 TABLET ORAL EVERY 6 HOURS
Status: DISCONTINUED | OUTPATIENT
Start: 2023-01-01 | End: 2023-01-01

## 2023-01-01 RX ORDER — HYDROCODONE BITARTRATE AND ACETAMINOPHEN 500; 5 MG/1; MG/1
TABLET ORAL
Status: DISCONTINUED | OUTPATIENT
Start: 2023-01-01 | End: 2023-01-01

## 2023-01-01 RX ORDER — PANTOPRAZOLE SODIUM 40 MG/1
40 TABLET, DELAYED RELEASE ORAL
Status: DISCONTINUED | OUTPATIENT
Start: 2023-01-01 | End: 2023-01-01

## 2023-01-01 RX ORDER — LEVOFLOXACIN 5 MG/ML
750 INJECTION, SOLUTION INTRAVENOUS ONCE
Status: COMPLETED | OUTPATIENT
Start: 2023-01-01 | End: 2023-01-01

## 2023-01-01 RX ORDER — SODIUM CHLORIDE, SODIUM LACTATE, POTASSIUM CHLORIDE, CALCIUM CHLORIDE 600; 310; 30; 20 MG/100ML; MG/100ML; MG/100ML; MG/100ML
INJECTION, SOLUTION INTRAVENOUS CONTINUOUS
Status: DISCONTINUED | OUTPATIENT
Start: 2023-01-01 | End: 2023-07-20 | Stop reason: HOSPADM

## 2023-01-01 RX ORDER — POTASSIUM CHLORIDE 20 MEQ/1
40 TABLET, EXTENDED RELEASE ORAL
Status: COMPLETED | OUTPATIENT
Start: 2023-01-01 | End: 2023-01-01

## 2023-01-01 RX ORDER — LORAZEPAM 1 MG/1
2 TABLET ORAL EVERY 4 HOURS PRN
Status: DISCONTINUED | OUTPATIENT
Start: 2023-01-01 | End: 2023-01-01 | Stop reason: HOSPADM

## 2023-01-01 RX ORDER — BUMETANIDE 0.25 MG/ML
2 INJECTION INTRAMUSCULAR; INTRAVENOUS ONCE
Status: COMPLETED | OUTPATIENT
Start: 2023-01-01 | End: 2023-01-01

## 2023-01-01 RX ORDER — FAMOTIDINE 20 MG/1
20 TABLET, FILM COATED ORAL DAILY
Status: COMPLETED | OUTPATIENT
Start: 2023-01-01 | End: 2023-01-01

## 2023-01-01 RX ORDER — NALTREXONE HYDROCHLORIDE 50 MG/1
50 TABLET, FILM COATED ORAL DAILY
Qty: 30 TABLET | Refills: 0 | Status: SHIPPED | OUTPATIENT
Start: 2023-01-01 | End: 2023-01-01 | Stop reason: SDUPTHER

## 2023-01-01 RX ORDER — CETIRIZINE HYDROCHLORIDE 10 MG/1
10 TABLET ORAL DAILY
Qty: 30 TABLET | Refills: 0 | Status: SHIPPED | OUTPATIENT
Start: 2023-01-01 | End: 2023-01-01 | Stop reason: HOSPADM

## 2023-01-01 RX ADMIN — LACTULOSE 20 G: 20 SOLUTION ORAL at 08:07

## 2023-01-01 RX ADMIN — PANTOPRAZOLE SODIUM 40 MG: 40 TABLET, DELAYED RELEASE ORAL at 09:07

## 2023-01-01 RX ADMIN — ALBUMIN HUMAN 50 G: 250 SOLUTION INTRAVENOUS at 08:07

## 2023-01-01 RX ADMIN — PIPERACILLIN AND TAZOBACTAM 4.5 G: 4; .5 INJECTION, POWDER, LYOPHILIZED, FOR SOLUTION INTRAVENOUS; PARENTERAL at 07:07

## 2023-01-01 RX ADMIN — MIDODRINE HYDROCHLORIDE 10 MG: 5 TABLET ORAL at 11:07

## 2023-01-01 RX ADMIN — LORAZEPAM 2 MG: 2 INJECTION INTRAMUSCULAR; INTRAVENOUS at 06:07

## 2023-01-01 RX ADMIN — MIDODRINE HYDROCHLORIDE 10 MG: 5 TABLET ORAL at 08:07

## 2023-01-01 RX ADMIN — HYDROCORTISONE: 1 CREAM TOPICAL at 08:07

## 2023-01-01 RX ADMIN — MUPIROCIN: 20 OINTMENT TOPICAL at 08:07

## 2023-01-01 RX ADMIN — PIPERACILLIN AND TAZOBACTAM 4.5 G: 4; .5 INJECTION, POWDER, LYOPHILIZED, FOR SOLUTION INTRAVENOUS; PARENTERAL at 04:07

## 2023-01-01 RX ADMIN — LEVETIRACETAM 750 MG: 750 TABLET, FILM COATED ORAL at 09:04

## 2023-01-01 RX ADMIN — Medication 125 MG: at 12:07

## 2023-01-01 RX ADMIN — CEFEPIME 2 G: 2 INJECTION, POWDER, FOR SOLUTION INTRAVENOUS at 09:06

## 2023-01-01 RX ADMIN — HEPARIN SODIUM 5000 UNITS: 5000 INJECTION INTRAVENOUS; SUBCUTANEOUS at 08:07

## 2023-01-01 RX ADMIN — SODIUM BICARBONATE: 84 INJECTION, SOLUTION INTRAVENOUS at 06:07

## 2023-01-01 RX ADMIN — LACTULOSE 10 G: 20 SOLUTION ORAL at 08:07

## 2023-01-01 RX ADMIN — MIDODRINE HYDROCHLORIDE 10 MG: 5 TABLET ORAL at 07:07

## 2023-01-01 RX ADMIN — Medication 125 MG: at 11:07

## 2023-01-01 RX ADMIN — OCTREOTIDE ACETATE 50 MCG/HR: 500 INJECTION, SOLUTION INTRAVENOUS; SUBCUTANEOUS at 02:07

## 2023-01-01 RX ADMIN — MIDODRINE HYDROCHLORIDE 10 MG: 5 TABLET ORAL at 12:07

## 2023-01-01 RX ADMIN — HYDROCORTISONE: 1 CREAM TOPICAL at 09:07

## 2023-01-01 RX ADMIN — MICONAZOLE NITRATE: 20 CREAM TOPICAL at 08:07

## 2023-01-01 RX ADMIN — POTASSIUM CHLORIDE 40 MEQ: 7.46 INJECTION, SOLUTION INTRAVENOUS at 04:07

## 2023-01-01 RX ADMIN — OCTREOTIDE ACETATE 50 MCG/HR: 500 INJECTION, SOLUTION INTRAVENOUS; SUBCUTANEOUS at 12:07

## 2023-01-01 RX ADMIN — LEVETIRACETAM 500 MG: 500 TABLET, FILM COATED ORAL at 08:07

## 2023-01-01 RX ADMIN — MICONAZOLE NITRATE: 20 CREAM TOPICAL at 09:05

## 2023-01-01 RX ADMIN — CEFEPIME 2 G: 2 INJECTION, POWDER, FOR SOLUTION INTRAVENOUS at 09:07

## 2023-01-01 RX ADMIN — Medication 125 MG: at 01:07

## 2023-01-01 RX ADMIN — MICONAZOLE NITRATE: 20 CREAM TOPICAL at 11:05

## 2023-01-01 RX ADMIN — PIPERACILLIN AND TAZOBACTAM 4.5 G: 4; .5 INJECTION, POWDER, LYOPHILIZED, FOR SOLUTION INTRAVENOUS; PARENTERAL at 11:07

## 2023-01-01 RX ADMIN — LACTULOSE 20 G: 20 SOLUTION ORAL at 04:07

## 2023-01-01 RX ADMIN — POTASSIUM CHLORIDE 10 MEQ: 7.46 INJECTION, SOLUTION INTRAVENOUS at 03:07

## 2023-01-01 RX ADMIN — METRONIDAZOLE 500 MG: 500 TABLET ORAL at 02:07

## 2023-01-01 RX ADMIN — LACTULOSE 20 G: 20 SOLUTION ORAL at 02:07

## 2023-01-01 RX ADMIN — HEPARIN SODIUM 5000 UNITS: 5000 INJECTION INTRAVENOUS; SUBCUTANEOUS at 09:07

## 2023-01-01 RX ADMIN — PANTOPRAZOLE SODIUM 40 MG: 40 TABLET, DELAYED RELEASE ORAL at 08:07

## 2023-01-01 RX ADMIN — FOLIC ACID 1 MG: 1 TABLET ORAL at 09:05

## 2023-01-01 RX ADMIN — LEVETIRACETAM 750 MG: 750 TABLET ORAL at 08:07

## 2023-01-01 RX ADMIN — METRONIDAZOLE 500 MG: 5 INJECTION, SOLUTION INTRAVENOUS at 05:04

## 2023-01-01 RX ADMIN — PANTOPRAZOLE SODIUM 40 MG: 40 TABLET, DELAYED RELEASE ORAL at 11:07

## 2023-01-01 RX ADMIN — MAGNESIUM SULFATE HEPTAHYDRATE 2 G: 40 INJECTION, SOLUTION INTRAVENOUS at 09:07

## 2023-01-01 RX ADMIN — FAMOTIDINE 20 MG: 20 TABLET, FILM COATED ORAL at 12:07

## 2023-01-01 RX ADMIN — POTASSIUM CHLORIDE 10 MEQ: 7.46 INJECTION, SOLUTION INTRAVENOUS at 10:07

## 2023-01-01 RX ADMIN — Medication 125 MG: at 06:07

## 2023-01-01 RX ADMIN — OCTREOTIDE ACETATE 50 MCG/HR: 500 INJECTION, SOLUTION INTRAVENOUS; SUBCUTANEOUS at 08:07

## 2023-01-01 RX ADMIN — MICONAZOLE NITRATE: 20 CREAM TOPICAL at 09:07

## 2023-01-01 RX ADMIN — DIPHENHYDRAMINE HYDROCHLORIDE 25 MG: 25 CAPSULE ORAL at 11:07

## 2023-01-01 RX ADMIN — OCTREOTIDE ACETATE 50 MCG/HR: 500 INJECTION, SOLUTION INTRAVENOUS; SUBCUTANEOUS at 05:07

## 2023-01-01 RX ADMIN — POTASSIUM CHLORIDE 40 MEQ: 1500 TABLET, EXTENDED RELEASE ORAL at 10:04

## 2023-01-01 RX ADMIN — MIDODRINE HYDROCHLORIDE 10 MG: 5 TABLET ORAL at 06:07

## 2023-01-01 RX ADMIN — LORAZEPAM 2 MG: 2 INJECTION INTRAMUSCULAR; INTRAVENOUS at 02:07

## 2023-01-01 RX ADMIN — LORAZEPAM 2 MG: 1 TABLET ORAL at 01:05

## 2023-01-01 RX ADMIN — ACETAMINOPHEN 650 MG: 325 TABLET, FILM COATED ORAL at 01:07

## 2023-01-01 RX ADMIN — PANTOPRAZOLE SODIUM 40 MG: 40 INJECTION, POWDER, FOR SOLUTION INTRAVENOUS at 02:06

## 2023-01-01 RX ADMIN — LEVETIRACETAM INJECTION 500 MG: 5 INJECTION INTRAVENOUS at 02:06

## 2023-01-01 RX ADMIN — FOLIC ACID: 5 INJECTION, SOLUTION INTRAMUSCULAR; INTRAVENOUS; SUBCUTANEOUS at 02:06

## 2023-01-01 RX ADMIN — METRONIDAZOLE 500 MG: 5 INJECTION, SOLUTION INTRAVENOUS at 09:07

## 2023-01-01 RX ADMIN — LORAZEPAM 2 MG: 1 TABLET ORAL at 07:05

## 2023-01-01 RX ADMIN — CIPROFLOXACIN 500 MG: 500 TABLET ORAL at 05:06

## 2023-01-01 RX ADMIN — RIFAXIMIN 550 MG: 550 TABLET ORAL at 08:07

## 2023-01-01 RX ADMIN — MIDODRINE HYDROCHLORIDE 10 MG: 5 TABLET ORAL at 09:07

## 2023-01-01 RX ADMIN — METRONIDAZOLE 500 MG: 5 INJECTION, SOLUTION INTRAVENOUS at 08:07

## 2023-01-01 RX ADMIN — LEVETIRACETAM 500 MG: 500 TABLET, FILM COATED ORAL at 09:07

## 2023-01-01 RX ADMIN — POTASSIUM CHLORIDE 40 MEQ: 1500 TABLET, EXTENDED RELEASE ORAL at 08:07

## 2023-01-01 RX ADMIN — LORAZEPAM 1 MG: 2 INJECTION INTRAMUSCULAR; INTRAVENOUS at 04:07

## 2023-01-01 RX ADMIN — POTASSIUM CHLORIDE 10 MEQ: 7.46 INJECTION, SOLUTION INTRAVENOUS at 05:05

## 2023-01-01 RX ADMIN — LEVETIRACETAM 750 MG: 750 TABLET, FILM COATED ORAL at 08:05

## 2023-01-01 RX ADMIN — POTASSIUM CHLORIDE 10 MEQ: 7.46 INJECTION, SOLUTION INTRAVENOUS at 08:07

## 2023-01-01 RX ADMIN — SODIUM CHLORIDE, POTASSIUM CHLORIDE, SODIUM LACTATE AND CALCIUM CHLORIDE: 600; 310; 30; 20 INJECTION, SOLUTION INTRAVENOUS at 03:04

## 2023-01-01 RX ADMIN — VANCOMYCIN HYDROCHLORIDE 1250 MG: 1 INJECTION, POWDER, LYOPHILIZED, FOR SOLUTION INTRAVENOUS at 11:07

## 2023-01-01 RX ADMIN — PIPERACILLIN AND TAZOBACTAM 4.5 G: 4; .5 INJECTION, POWDER, LYOPHILIZED, FOR SOLUTION INTRAVENOUS; PARENTERAL at 10:07

## 2023-01-01 RX ADMIN — LACTULOSE 15 G: 20 SOLUTION ORAL at 08:07

## 2023-01-01 RX ADMIN — LEVETIRACETAM 750 MG: 750 TABLET ORAL at 09:07

## 2023-01-01 RX ADMIN — IOHEXOL 100 ML: 350 INJECTION, SOLUTION INTRAVENOUS at 11:04

## 2023-01-01 RX ADMIN — METRONIDAZOLE 500 MG: 5 INJECTION, SOLUTION INTRAVENOUS at 05:07

## 2023-01-01 RX ADMIN — MIDODRINE HYDROCHLORIDE 10 MG: 5 TABLET ORAL at 04:07

## 2023-01-01 RX ADMIN — DIPHENHYDRAMINE HYDROCHLORIDE 25 MG: 25 CAPSULE ORAL at 04:07

## 2023-01-01 RX ADMIN — LORAZEPAM 2 MG: 1 TABLET ORAL at 06:04

## 2023-01-01 RX ADMIN — THIAMINE HYDROCHLORIDE 500 MG: 100 INJECTION, SOLUTION INTRAMUSCULAR; INTRAVENOUS at 09:05

## 2023-01-01 RX ADMIN — ALBUMIN HUMAN 50 G: 250 SOLUTION INTRAVENOUS at 09:07

## 2023-01-01 RX ADMIN — LORAZEPAM 1 MG: 1 TABLET ORAL at 02:06

## 2023-01-01 RX ADMIN — LIDOCAINE HYDROCHLORIDE 50 MG: 10 INJECTION, SOLUTION EPIDURAL; INFILTRATION; INTRACAUDAL; PERINEURAL at 04:06

## 2023-01-01 RX ADMIN — LACTULOSE 10 G: 20 SOLUTION ORAL at 09:04

## 2023-01-01 RX ADMIN — OCTREOTIDE ACETATE 50 MCG/HR: 500 INJECTION, SOLUTION INTRAVENOUS; SUBCUTANEOUS at 07:07

## 2023-01-01 RX ADMIN — SODIUM BICARBONATE: 84 INJECTION, SOLUTION INTRAVENOUS at 11:07

## 2023-01-01 RX ADMIN — ALBUMIN HUMAN 50 G: 250 SOLUTION INTRAVENOUS at 10:07

## 2023-01-01 RX ADMIN — LORAZEPAM 1 MG: 2 INJECTION INTRAMUSCULAR; INTRAVENOUS at 05:07

## 2023-01-01 RX ADMIN — Medication 125 MG: at 05:07

## 2023-01-01 RX ADMIN — MICONAZOLE NITRATE: 20 CREAM TOPICAL at 08:05

## 2023-01-01 RX ADMIN — POTASSIUM CHLORIDE 10 MEQ: 7.46 INJECTION, SOLUTION INTRAVENOUS at 04:07

## 2023-01-01 RX ADMIN — THIAMINE HCL TAB 100 MG 300 MG: 100 TAB at 10:05

## 2023-01-01 RX ADMIN — SODIUM CHLORIDE, POTASSIUM CHLORIDE, SODIUM LACTATE AND CALCIUM CHLORIDE: 600; 310; 30; 20 INJECTION, SOLUTION INTRAVENOUS at 10:07

## 2023-01-01 RX ADMIN — METRONIDAZOLE 500 MG: 500 TABLET ORAL at 05:07

## 2023-01-01 RX ADMIN — OCTREOTIDE ACETATE 50 MCG/HR: 500 INJECTION, SOLUTION INTRAVENOUS; SUBCUTANEOUS at 06:07

## 2023-01-01 RX ADMIN — MIDODRINE HYDROCHLORIDE 5 MG: 5 TABLET ORAL at 05:07

## 2023-01-01 RX ADMIN — SODIUM CHLORIDE: 4.5 INJECTION, SOLUTION INTRAVENOUS at 05:07

## 2023-01-01 RX ADMIN — POTASSIUM CHLORIDE 10 MEQ: 7.46 INJECTION, SOLUTION INTRAVENOUS at 11:07

## 2023-01-01 RX ADMIN — POTASSIUM CHLORIDE 10 MEQ: 7.46 INJECTION, SOLUTION INTRAVENOUS at 02:05

## 2023-01-01 RX ADMIN — MUPIROCIN: 20 OINTMENT TOPICAL at 09:07

## 2023-01-01 RX ADMIN — LORAZEPAM 2 MG: 2 INJECTION INTRAMUSCULAR; INTRAVENOUS at 12:07

## 2023-01-01 RX ADMIN — CEFTRIAXONE SODIUM 1 G: 1 INJECTION, POWDER, FOR SOLUTION INTRAMUSCULAR; INTRAVENOUS at 03:05

## 2023-01-01 RX ADMIN — LACTULOSE 20 G: 20 SOLUTION ORAL at 03:07

## 2023-01-01 RX ADMIN — POTASSIUM PHOSPHATE, MONOBASIC AND POTASSIUM PHOSPHATE, DIBASIC 20 MMOL: 224; 236 INJECTION, SOLUTION, CONCENTRATE INTRAVENOUS at 09:07

## 2023-01-01 RX ADMIN — LORAZEPAM 2 MG: 2 INJECTION INTRAMUSCULAR; INTRAVENOUS at 09:07

## 2023-01-01 RX ADMIN — MAGNESIUM SULFATE HEPTAHYDRATE 4 G: 40 INJECTION, SOLUTION INTRAVENOUS at 02:05

## 2023-01-01 RX ADMIN — PIPERACILLIN AND TAZOBACTAM 4.5 G: 4; .5 INJECTION, POWDER, LYOPHILIZED, FOR SOLUTION INTRAVENOUS; PARENTERAL at 08:07

## 2023-01-01 RX ADMIN — SODIUM BICARBONATE: 84 INJECTION, SOLUTION INTRAVENOUS at 03:07

## 2023-01-01 RX ADMIN — METRONIDAZOLE 500 MG: 5 INJECTION, SOLUTION INTRAVENOUS at 02:05

## 2023-01-01 RX ADMIN — OCTREOTIDE ACETATE 50 MCG/HR: 500 INJECTION, SOLUTION INTRAVENOUS; SUBCUTANEOUS at 11:07

## 2023-01-01 RX ADMIN — FENTANYL CITRATE 25 MCG: 50 INJECTION, SOLUTION INTRAMUSCULAR; INTRAVENOUS at 12:07

## 2023-01-01 RX ADMIN — LORAZEPAM 2 MG: 2 INJECTION INTRAMUSCULAR; INTRAVENOUS at 07:07

## 2023-01-01 RX ADMIN — METRONIDAZOLE 500 MG: 5 INJECTION, SOLUTION INTRAVENOUS at 09:05

## 2023-01-01 RX ADMIN — CEFEPIME 2 G: 2 INJECTION, POWDER, FOR SOLUTION INTRAVENOUS at 08:07

## 2023-01-01 RX ADMIN — CEFTRIAXONE SODIUM 1 G: 1 INJECTION, POWDER, FOR SOLUTION INTRAMUSCULAR; INTRAVENOUS at 05:07

## 2023-01-01 RX ADMIN — POTASSIUM CHLORIDE 10 MEQ: 7.46 INJECTION, SOLUTION INTRAVENOUS at 03:05

## 2023-01-01 RX ADMIN — METRONIDAZOLE 500 MG: 5 INJECTION, SOLUTION INTRAVENOUS at 09:06

## 2023-01-01 RX ADMIN — ACETAMINOPHEN 650 MG: 325 TABLET, FILM COATED ORAL at 02:07

## 2023-01-01 RX ADMIN — RIFAXIMIN 550 MG: 550 TABLET ORAL at 12:07

## 2023-01-01 RX ADMIN — ONDANSETRON 4 MG: 2 INJECTION INTRAMUSCULAR; INTRAVENOUS at 08:07

## 2023-01-01 RX ADMIN — ALBUMIN HUMAN 50 G: 250 SOLUTION INTRAVENOUS at 12:07

## 2023-01-01 RX ADMIN — METRONIDAZOLE 500 MG: 5 INJECTION, SOLUTION INTRAVENOUS at 10:07

## 2023-01-01 RX ADMIN — ALBUMIN HUMAN 50 G: 250 SOLUTION INTRAVENOUS at 10:06

## 2023-01-01 RX ADMIN — PANTOPRAZOLE SODIUM 40 MG: 40 INJECTION, POWDER, FOR SOLUTION INTRAVENOUS at 08:07

## 2023-01-01 RX ADMIN — LORAZEPAM 2 MG: 1 TABLET ORAL at 02:04

## 2023-01-01 RX ADMIN — LEVETIRACETAM 750 MG: 750 TABLET, FILM COATED ORAL at 09:05

## 2023-01-01 RX ADMIN — LORAZEPAM 1 MG: 2 INJECTION INTRAMUSCULAR; INTRAVENOUS at 01:07

## 2023-01-01 RX ADMIN — MIDODRINE HYDROCHLORIDE 10 MG: 5 TABLET ORAL at 05:07

## 2023-01-01 RX ADMIN — OCTREOTIDE ACETATE 50 MCG/HR: 500 INJECTION, SOLUTION INTRAVENOUS; SUBCUTANEOUS at 09:07

## 2023-01-01 RX ADMIN — SODIUM CHLORIDE, POTASSIUM CHLORIDE, SODIUM LACTATE AND CALCIUM CHLORIDE: 600; 310; 30; 20 INJECTION, SOLUTION INTRAVENOUS at 09:07

## 2023-01-01 RX ADMIN — PANTOPRAZOLE SODIUM 40 MG: 40 INJECTION, POWDER, FOR SOLUTION INTRAVENOUS at 09:07

## 2023-01-01 RX ADMIN — OCTREOTIDE ACETATE 0.1 MG: 100 INJECTION, SOLUTION INTRAVENOUS; SUBCUTANEOUS at 02:06

## 2023-01-01 RX ADMIN — MAGNESIUM SULFATE HEPTAHYDRATE 2 G: 40 INJECTION, SOLUTION INTRAVENOUS at 07:07

## 2023-01-01 RX ADMIN — MIDODRINE HYDROCHLORIDE 10 MG: 5 TABLET ORAL at 01:07

## 2023-01-01 RX ADMIN — MAGNESIUM SULFATE IN DEXTROSE 1 G: 10 INJECTION, SOLUTION INTRAVENOUS at 04:07

## 2023-01-01 RX ADMIN — SODIUM BICARBONATE: 84 INJECTION, SOLUTION INTRAVENOUS at 10:07

## 2023-01-01 RX ADMIN — THERA TABS 1 TABLET: TAB at 09:05

## 2023-01-01 RX ADMIN — LACTULOSE 10 G: 20 SOLUTION ORAL at 09:07

## 2023-01-01 RX ADMIN — CEFEPIME 2 G: 2 INJECTION, POWDER, FOR SOLUTION INTRAVENOUS at 10:07

## 2023-01-01 RX ADMIN — LORAZEPAM 2 MG: 1 TABLET ORAL at 09:05

## 2023-01-01 RX ADMIN — PIPERACILLIN AND TAZOBACTAM 4.5 G: 4; .5 INJECTION, POWDER, LYOPHILIZED, FOR SOLUTION INTRAVENOUS; PARENTERAL at 01:07

## 2023-01-01 RX ADMIN — LACTULOSE 10 G: 20 SOLUTION ORAL at 02:04

## 2023-01-01 RX ADMIN — LEVOFLOXACIN 750 MG: 750 INJECTION, SOLUTION INTRAVENOUS at 12:07

## 2023-01-01 RX ADMIN — LORAZEPAM 2 MG: 1 TABLET ORAL at 09:04

## 2023-01-01 RX ADMIN — POTASSIUM CHLORIDE 40 MEQ: 1500 TABLET, EXTENDED RELEASE ORAL at 09:07

## 2023-01-01 RX ADMIN — DIPHENHYDRAMINE HYDROCHLORIDE 25 MG: 25 CAPSULE ORAL at 03:07

## 2023-01-01 RX ADMIN — METRONIDAZOLE 500 MG: 5 INJECTION, SOLUTION INTRAVENOUS at 01:07

## 2023-01-01 RX ADMIN — PANTOPRAZOLE SODIUM 40 MG: 40 INJECTION, POWDER, FOR SOLUTION INTRAVENOUS at 12:07

## 2023-01-01 RX ADMIN — DIPHENHYDRAMINE HYDROCHLORIDE 25 MG: 25 CAPSULE ORAL at 05:07

## 2023-01-01 RX ADMIN — POTASSIUM CHLORIDE 10 MEQ: 7.46 INJECTION, SOLUTION INTRAVENOUS at 05:07

## 2023-01-01 RX ADMIN — ALBUMIN (HUMAN) 25 G: 12.5 INJECTION, SOLUTION INTRAVENOUS at 08:07

## 2023-01-01 RX ADMIN — LORAZEPAM 1 MG: 2 INJECTION INTRAMUSCULAR; INTRAVENOUS at 11:07

## 2023-01-01 RX ADMIN — FAMOTIDINE 20 MG: 20 TABLET, FILM COATED ORAL at 08:07

## 2023-01-01 RX ADMIN — SODIUM CHLORIDE, POTASSIUM CHLORIDE, SODIUM LACTATE AND CALCIUM CHLORIDE: 600; 310; 30; 20 INJECTION, SOLUTION INTRAVENOUS at 03:07

## 2023-01-01 RX ADMIN — VANCOMYCIN HYDROCHLORIDE 500 MG: 500 INJECTION, POWDER, LYOPHILIZED, FOR SOLUTION INTRAVENOUS at 01:07

## 2023-01-01 RX ADMIN — IOHEXOL 100 ML: 350 INJECTION, SOLUTION INTRAVENOUS at 02:06

## 2023-01-01 RX ADMIN — METRONIDAZOLE 500 MG: 500 TABLET ORAL at 08:07

## 2023-01-01 RX ADMIN — LACTULOSE 20 G: 20 SOLUTION ORAL at 09:07

## 2023-01-01 RX ADMIN — CEFTRIAXONE SODIUM 1 G: 1 INJECTION, POWDER, FOR SOLUTION INTRAMUSCULAR; INTRAVENOUS at 02:07

## 2023-01-01 RX ADMIN — FOLIC ACID 1 MG: 1 TABLET ORAL at 09:04

## 2023-01-01 RX ADMIN — OCTREOTIDE ACETATE 50 MCG/HR: 500 INJECTION, SOLUTION INTRAVENOUS; SUBCUTANEOUS at 10:07

## 2023-01-01 RX ADMIN — POTASSIUM CHLORIDE 10 MEQ: 7.46 INJECTION, SOLUTION INTRAVENOUS at 09:07

## 2023-01-01 RX ADMIN — POTASSIUM PHOSPHATE, MONOBASIC AND POTASSIUM PHOSPHATE, DIBASIC 30 MMOL: 224; 236 INJECTION, SOLUTION, CONCENTRATE INTRAVENOUS at 06:07

## 2023-01-01 RX ADMIN — POTASSIUM CHLORIDE 10 MEQ: 7.46 INJECTION, SOLUTION INTRAVENOUS at 06:07

## 2023-01-01 RX ADMIN — ALBUMIN (HUMAN) 50 G: 12.5 SOLUTION INTRAVENOUS at 04:07

## 2023-01-01 RX ADMIN — LORAZEPAM 1 MG: 1 TABLET ORAL at 09:06

## 2023-01-01 RX ADMIN — SODIUM CHLORIDE, POTASSIUM CHLORIDE, SODIUM LACTATE AND CALCIUM CHLORIDE: 600; 310; 30; 20 INJECTION, SOLUTION INTRAVENOUS at 08:07

## 2023-01-01 RX ADMIN — ALBUMIN (HUMAN) 25 G: 12.5 INJECTION, SOLUTION INTRAVENOUS at 09:07

## 2023-01-01 RX ADMIN — LORAZEPAM 2 MG: 2 INJECTION INTRAMUSCULAR; INTRAVENOUS at 03:07

## 2023-01-01 RX ADMIN — METRONIDAZOLE 500 MG: 5 INJECTION, SOLUTION INTRAVENOUS at 12:07

## 2023-01-01 RX ADMIN — LORAZEPAM 2 MG: 1 TABLET ORAL at 02:05

## 2023-01-01 RX ADMIN — OCTREOTIDE ACETATE 50 MCG/HR: 500 INJECTION, SOLUTION INTRAVENOUS; SUBCUTANEOUS at 04:07

## 2023-01-01 RX ADMIN — THERA TABS 1 TABLET: TAB at 10:05

## 2023-01-01 RX ADMIN — MIDODRINE HYDROCHLORIDE 5 MG: 5 TABLET ORAL at 11:07

## 2023-01-01 RX ADMIN — THIAMINE HYDROCHLORIDE 500 MG: 100 INJECTION, SOLUTION INTRAMUSCULAR; INTRAVENOUS at 02:04

## 2023-01-01 RX ADMIN — LACTULOSE 10 G: 20 SOLUTION ORAL at 03:05

## 2023-01-01 RX ADMIN — DIPHENHYDRAMINE HYDROCHLORIDE 25 MG: 25 CAPSULE ORAL at 06:07

## 2023-01-01 RX ADMIN — BUMETANIDE 2 MG: 0.25 INJECTION INTRAMUSCULAR; INTRAVENOUS at 11:07

## 2023-01-01 RX ADMIN — SODIUM CHLORIDE: 4.5 INJECTION, SOLUTION INTRAVENOUS at 04:07

## 2023-01-01 RX ADMIN — HYDROCORTISONE: 1 CREAM TOPICAL at 10:07

## 2023-01-01 RX ADMIN — LORAZEPAM 2 MG: 1 TABLET ORAL at 03:04

## 2023-01-01 RX ADMIN — LORAZEPAM 2 MG: 2 INJECTION INTRAMUSCULAR; INTRAVENOUS at 08:07

## 2023-01-01 RX ADMIN — ACETAMINOPHEN 650 MG: 325 TABLET, FILM COATED ORAL at 12:07

## 2023-01-01 RX ADMIN — VANCOMYCIN HYDROCHLORIDE 500 MG: 500 INJECTION, POWDER, LYOPHILIZED, FOR SOLUTION INTRAVENOUS at 12:07

## 2023-01-01 RX ADMIN — LACTULOSE 10 G: 20 SOLUTION ORAL at 09:05

## 2023-01-01 RX ADMIN — SODIUM CHLORIDE, POTASSIUM CHLORIDE, SODIUM LACTATE AND CALCIUM CHLORIDE: 600; 310; 30; 20 INJECTION, SOLUTION INTRAVENOUS at 12:07

## 2023-01-01 RX ADMIN — HALOPERIDOL 1 MG: 1 TABLET ORAL at 11:07

## 2023-01-01 RX ADMIN — SODIUM CHLORIDE, POTASSIUM CHLORIDE, SODIUM LACTATE AND CALCIUM CHLORIDE: 600; 310; 30; 20 INJECTION, SOLUTION INTRAVENOUS at 11:06

## 2023-01-01 RX ADMIN — RIFAXIMIN 550 MG: 550 TABLET ORAL at 09:07

## 2023-01-01 RX ADMIN — DIPHENHYDRAMINE HYDROCHLORIDE 25 MG: 25 CAPSULE ORAL at 10:07

## 2023-01-01 RX ADMIN — POTASSIUM PHOSPHATE, MONOBASIC AND POTASSIUM PHOSPHATE, DIBASIC 30 MMOL: 224; 236 INJECTION, SOLUTION, CONCENTRATE INTRAVENOUS at 11:05

## 2023-01-01 RX ADMIN — HYDROMORPHONE HYDROCHLORIDE 0.5 MG: 0.5 INJECTION, SOLUTION INTRAMUSCULAR; INTRAVENOUS; SUBCUTANEOUS at 12:07

## 2023-01-01 RX ADMIN — THIAMINE HYDROCHLORIDE: 100 INJECTION, SOLUTION INTRAMUSCULAR; INTRAVENOUS at 11:04

## 2023-01-01 RX ADMIN — SODIUM CHLORIDE, POTASSIUM CHLORIDE, SODIUM LACTATE AND CALCIUM CHLORIDE: 600; 310; 30; 20 INJECTION, SOLUTION INTRAVENOUS at 11:07

## 2023-01-01 RX ADMIN — POTASSIUM, SODIUM PHOSPHATES 280 MG-160 MG-250 MG ORAL POWDER PACKET 2 PACKET: POWDER IN PACKET at 02:05

## 2023-01-01 RX ADMIN — PIPERACILLIN AND TAZOBACTAM 4.5 G: 4; .5 INJECTION, POWDER, LYOPHILIZED, FOR SOLUTION INTRAVENOUS; PARENTERAL at 12:07

## 2023-01-01 RX ADMIN — NOREPINEPHRINE BITARTRATE 0.02 MCG/KG/MIN: 4 INJECTION, SOLUTION INTRAVENOUS at 01:07

## 2023-01-01 RX ADMIN — LEVETIRACETAM 750 MG: 750 TABLET, FILM COATED ORAL at 10:05

## 2023-01-01 RX ADMIN — METHOTREXATE 1 TABLET: 2.5 TABLET ORAL at 01:05

## 2023-01-01 RX ADMIN — DIPHENHYDRAMINE HYDROCHLORIDE 25 MG: 25 CAPSULE ORAL at 08:07

## 2023-01-01 RX ADMIN — THERA TABS 1 TABLET: TAB at 09:04

## 2023-01-01 RX ADMIN — POTASSIUM CHLORIDE 10 MEQ: 7.46 INJECTION, SOLUTION INTRAVENOUS at 07:07

## 2023-01-01 RX ADMIN — POTASSIUM CHLORIDE 40 MEQ: 1500 TABLET, EXTENDED RELEASE ORAL at 07:07

## 2023-01-01 RX ADMIN — CEFTRIAXONE SODIUM 1 G: 1 INJECTION, POWDER, FOR SOLUTION INTRAMUSCULAR; INTRAVENOUS at 02:04

## 2023-01-01 RX ADMIN — SODIUM CHLORIDE 1000 ML: 9 INJECTION, SOLUTION INTRAVENOUS at 11:06

## 2023-01-01 RX ADMIN — POTASSIUM CHLORIDE 10 MEQ: 7.46 INJECTION, SOLUTION INTRAVENOUS at 02:07

## 2023-01-01 RX ADMIN — POTASSIUM CHLORIDE 10 MEQ: 7.46 INJECTION, SOLUTION INTRAVENOUS at 06:05

## 2023-01-01 RX ADMIN — OCTREOTIDE ACETATE 50 MCG/HR: 500 INJECTION, SOLUTION INTRAVENOUS; SUBCUTANEOUS at 03:07

## 2023-01-01 RX ADMIN — HEPARIN SODIUM 5000 UNITS: 5000 INJECTION INTRAVENOUS; SUBCUTANEOUS at 09:06

## 2023-01-01 RX ADMIN — ALBUMIN HUMAN 25 G: 250 SOLUTION INTRAVENOUS at 12:07

## 2023-01-01 RX ADMIN — LACTULOSE 15 G: 20 SOLUTION ORAL at 09:06

## 2023-01-01 RX ADMIN — LORAZEPAM 2 MG: 2 INJECTION INTRAMUSCULAR; INTRAVENOUS at 05:07

## 2023-01-01 RX ADMIN — FENTANYL CITRATE 25 MCG: 50 INJECTION, SOLUTION INTRAMUSCULAR; INTRAVENOUS at 02:07

## 2023-01-01 RX ADMIN — LORAZEPAM 2 MG: 1 TABLET ORAL at 05:04

## 2023-01-01 RX ADMIN — METRONIDAZOLE 500 MG: 5 INJECTION, SOLUTION INTRAVENOUS at 09:04

## 2023-01-01 RX ADMIN — POTASSIUM BICARBONATE 20 MEQ: 391 TABLET, EFFERVESCENT ORAL at 01:07

## 2023-01-01 RX ADMIN — Medication 125 MG: at 07:07

## 2023-01-01 RX ADMIN — FAMOTIDINE 20 MG: 20 TABLET ORAL at 12:05

## 2023-01-01 RX ADMIN — SODIUM CHLORIDE 1000 ML: 9 INJECTION, SOLUTION INTRAVENOUS at 09:04

## 2023-01-01 RX ADMIN — CEFTRIAXONE SODIUM 1 G: 1 INJECTION, POWDER, FOR SOLUTION INTRAMUSCULAR; INTRAVENOUS at 03:07

## 2023-01-01 RX ADMIN — POTASSIUM BICARBONATE 25 MEQ: 978 TABLET, EFFERVESCENT ORAL at 09:07

## 2023-01-01 RX ADMIN — MAGNESIUM SULFATE HEPTAHYDRATE 2 G: 40 INJECTION, SOLUTION INTRAVENOUS at 06:07

## 2023-01-01 RX ADMIN — POTASSIUM CHLORIDE 10 MEQ: 7.46 INJECTION, SOLUTION INTRAVENOUS at 10:04
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
## 2023-04-30 PROBLEM — I10 PRIMARY HYPERTENSION: Status: ACTIVE | Noted: 2023-01-01

## 2023-04-30 PROBLEM — G40.909 SEIZURE DISORDER: Status: ACTIVE | Noted: 2023-01-01

## 2023-04-30 PROBLEM — Z90.49 HISTORY OF APPENDECTOMY: Status: ACTIVE | Noted: 2023-01-01

## 2023-04-30 PROBLEM — Z90.49 HISTORY OF CHOLECYSTECTOMY: Status: ACTIVE | Noted: 2023-01-01

## 2023-04-30 PROBLEM — I48.91 ATRIAL FIBRILLATION: Status: ACTIVE | Noted: 2023-01-01

## 2023-04-30 PROBLEM — Z72.0 TOBACCO USER: Status: ACTIVE | Noted: 2023-01-01

## 2023-04-30 NOTE — H&P
History and Physical     Date of Admit: 4/29/2023  Current Hospital Day: 2     Subjective:      Brief HPI:  Los Alatorre is a 57 y.o. male who  has a past medical history of Seizures, etoh abuse.  He presented to [facility] on 4/29/2023  with a primary complaint of generalized weakness the past 3 weeks. History attained by patient who is a poor historian and significant other in the room. Reports 12 lbs weight loss the past month, decreased appetite. Has had some N/V and loose stools during this timeframe but denies any hematemesis/hematochezia/melena. Reported to have altered gait/balance but etoh level is >300. SO reports patient has had difficulty ambulating at home due to his weakness but patient reports this is because of poor vision 2/2 cataracts. He was noted in the ED to be able to transfer to the wheelchair for imaging without difficulty.  Denies any changes in vision recently. Denies any fever, CP, SOB, abd pain, falls/injury, back pain, HA, focal weakness/numbness, blood in stool, hallucinations, tremors.       Pt smokes 1ppd. Drinks 1 pint daily. Uses marijuana occasionally and denies any other use of illicit substances.   Pt states his last seizure was 6 months ago, although may have been about a year ago.     Past Medical History:   Diagnosis Date    Seizures        Past Surgical History:   Procedure Laterality Date    APPENDECTOMY      CHOLECYSTECTOMY         Review of patient's allergies indicates:  No Known Allergies    Current Outpatient Medications   Medication Instructions    cetirizine (ZYRTEC) 10 mg, Oral, Daily    levETIRAcetam (KEPPRA) 750 mg, Oral, 2 times daily    polyethylene glycol (GLYCOLAX) 17 g, Oral, Daily PRN        Family History       Problem Relation (Age of Onset)    Alzheimer's disease Father    Dementia Father    Lung cancer Mother            Tobacco Use    Smoking status: Every Day     Packs/day: 1.00     Types: Cigarettes    Smokeless tobacco: Never   Substance and Sexual Activity     Alcohol use: Yes     Comment: Drinks daily    Drug use: Not Currently     Types: Marijuana     Comment: 1- 2 times a month    Sexual activity: Not on file        Review of Systems:  Review of Systems   Constitutional:  Positive for weight loss. Negative for chills and fever.   Respiratory:  Negative for cough and shortness of breath.    Cardiovascular:  Negative for chest pain and palpitations.   Gastrointestinal:  Positive for diarrhea and vomiting. Negative for abdominal pain and nausea.   Genitourinary:  Negative for flank pain and hematuria.   Skin:  Negative for itching and rash.   Neurological:  Positive for weakness. Negative for dizziness, tingling, tremors, sensory change, speech change, focal weakness, seizures and headaches.   Endo/Heme/Allergies:  Does not bruise/bleed easily.   Psychiatric/Behavioral:  Positive for substance abuse. Negative for hallucinations. The patient is not nervous/anxious.         Objective:     Vital Signs:  Vital Signs (Most Recent):  Temp: 98.1 °F (36.7 °C) (04/29/23 2017)  Pulse: 92 (04/30/23 0100)  Resp: (!) 25 (04/30/23 0100)  BP: 105/72 (04/30/23 0100)  SpO2: (!) 94 % (04/30/23 0100)   Vital Signs (24h Range):  Temp:  [98.1 °F (36.7 °C)] 98.1 °F (36.7 °C)  Pulse:  [] 92  Resp:  [12-25] 25  SpO2:  [93 %-97 %] 94 %  BP: ()/(60-84) 105/72   Body mass index is 16.27 kg/m².     Intake/Output Summary (Last 24 hours) at 4/30/2023 0200  Last data filed at 4/29/2023 2313  Gross per 24 hour   Intake 100 ml   Output --   Net 100 ml       Physical Examination:  General:  thin, chronically ill appearing. no acute distress. Disheveled.   Head: Normocephalic, atraumatic  ENT: PERRL, MMM.   Neck: supple, normal ROM, no JVD  CVS:  RRR. S1 and S2 normal. No murmurs, rubs, or gallops. Regular peripheral pulses. No peripheral edema  Resp:  Lungs clear to auscultation bilaterally, no wheezes, rales, or rhonchi. Normal respiratory effort.  GI:  Abdomen soft, non-tender,  non-distended, normoactive bowel sounds  MSK:  Full range of motion, no obvious deformities  Skin:  No rashes, ulcers, erythema  Neuro:  Alert and oriented x3, No focal neuro deficits, CNII-XII grossly intact. Deferred gait exam at this time of night with patient off balance. Good strength of dorsiflexion/plantarflexion bilaterally, good  strength. No tremor noted.       Laboratory:    Recent Labs   Lab 04/29/23 2101   WBC 14.8*   HGB 8.0*   HCT 23.9*      .2*   RDW 13.6     Recent Labs   Lab 04/29/23 2101   TROPONINI <0.010     Recent Labs   Lab 04/29/23 2101   TROPONINI <0.010     No results for input(s): CHOL, HDL, LDLCALC, TRIG, CHOLHDL in the last 168 hours. Recent Labs   Lab 04/29/23 2101      K 3.0*   CO2 28   BUN 4.3*   CREATININE 0.73   CALCIUM 7.7*   MG 1.90     Recent Labs   Lab 04/29/23 2101   ALBUMIN 2.2*   BILITOT 2.3*   *   ALKPHOS 549*   ALT 23     No results for input(s): IRON, TIBC, FERRITIN, SATURATEDIRO, [id number], FOLATE in the last 168 hours.  No results for input(s): TSH, P6KOUGC, HGBA1C, INR, PROTIME, PTT in the last 168 hours.       Microbiology Data:  Microbiology Results (last 7 days)       ** No results found for the last 168 hours. **             Other Results:    Radiology:  Imaging Results              CT Head Without Contrast (Preliminary result)  Result time 04/29/23 23:18:46      Preliminary result by Adriel Cha MD (04/29/23 23:18:46)                   Narrative:    START OF REPORT:  Technique: CT of the head was performed without intravenous contrast with axial as well as coronal and sagittal images.    Comparison: Comparison is with study dated2021-06-04 11:26:18.    Dosage Information: Automated exposure control was utilized.    Clinical history: UndefinedtWeakness undefinedtFatigue (States malaise, anorexia, incontinence, constipation for 3 weeks. Unable to walk at this time. Etoh abuse.    Findings:  Hemorrhage: No acute intracranial  hemorrhage is seen.  CSF spaces: The ventricles, sulci and basal cisterns all appear moderately prominent consistent with global cerebral atrophy.  Brain parenchyma: There is preservation of the grey white junction throughout. Mild microvascular change is seen in portions of the periventricular and deep white matter tracts.  Cerebellum: Unremarkable.  Sella and skull base: The sella appears to be within normal limits for age.  Herniation: None.  Intracranial calcifications: Incidental note is made of bilateral choroid plexus calcification. Incidental note is made of some pineal region calcification. Incidental note is made of some calcification of the falx.  Calvarium: No acute linear or depressed skull fracture is seen.    Maxillofacial Structures:  Paranasal sinuses: There is some mucoperiosteal thickening in the bilateral maxillary sinuses. There is interval resolution of mucosal thickening and fluid within the right sphenoid sinus.  Orbits: The orbits appear unremarkable.  Zygomatic arches: The zygomatic arches are intact and unremarkable.  Temporal bones and mastoids: The temporal bones and mastoids appear unremarkable.  TMJ: The mandibular condyles appear normally placed with respect to the mandibular fossa.      Impression:  1. No acute intracranial process identified. Details and other findings as noted above.                                         CT Cervical Spine Without Contrast (Preliminary result)  Result time 04/29/23 23:14:38      Preliminary result by Adriel Cha MD (04/29/23 23:14:38)                   Narrative:    START OF REPORT:  Technique: CT of the cervical spine was performed without intravenous contrast with axial as well as sagittal and coronal images.    Comparison: Comparison is with study [id number] 06:33:47.    Dosage Information: Automated exposure control was utilized.    Clinical history: UndefinedtWeakness undefinedtFatigue (States malaise, anorexia, incontinence,  constipation for 3 weeks. Unable to walk at this time. Etoh abuse.    Findings:  Lung apices: The visualized lung apices appear unremarkable.  Spine:  Spinal canal: Moderate spinal canal narrowing is seen at C5-C6, secondary to a large disc osteophyte complex. Similar findings are also seen on the prior examination.  Spinal cord: The spinal cord appears unremarkable.  Mineralization: Within normal limits.  Scoliosis: No significant scoliosis is seen.  Vertebral Fusion: No vertebral fusion is identified.  Listhesis: No significant listhesis is identified.  Lordosis: The cervical lordosis is maintained.  Intervertebral disc spaces: Mildly decreased disc height is seen at C5-C6 and C6-C7.  Osteophytes: Anterior marginal multilevel endplate osteophytes are seen.  Endplate Sclerosis: Subtle endplate sclerosis is seen off the opposing endplates at C5-C6 and C6-C7.  Uncovertebral degenerative changes: Mild multilevel uncovertebral joint arthrosis is seen.  Facet degenerative changes: Mild multilevel facet degenerative changes are seen. This is new since the prior examination.  Fractures: No acute cervical spine fracture dislocation or subluxation is seen.    Miscellaneous:  Soft Tissues: Unremarkable.      Impression:  1. No acute cervical spine fracture dislocation or subluxation is seen.  2. Degenerative changes and other details as above.                                         CT Abdomen Pelvis With Contrast (Preliminary result)  Result time 04/29/23 23:08:09      Preliminary result by Adriel Cha MD (04/29/23 23:08:09)                   Narrative:    START OF REPORT:  Technique: CT of the abdomen and pelvis was performed with axial images as well as sagittal and coronal reconstruction images with intravenous contrast.    Comparison: Comparison is with study [id number] 15:38:25.    Clinical History: UndefinedtWeakness undefinedtFatigue (States malaise, anorexia, incontinence, constipation for 3 weeks. Unable  to walk at this time. Etoh abuse.    Dosage Information: Automated Exposure Control was utilized.    Findings:  Thorax:  Lungs: The visualized lung bases appear unremarkable.  Pleura: No pleural effusion is seen.  Heart: The heart size is within normal limits.  Abdomen:  Abdominal Wall: No abdominal wall pathology is seen.  Liver: Moderate fatty infiltration of the liver is present. There is interval development of minimal perihepatic fluid. The liver otherwise appears unremarkable.  Biliary System: No intrahepatic or extrahepatic biliary duct dilatation is seen.  Gallbladder: Surgical clips are seen in the gallbladder fossa consistent with prior cholecystectomy.  Pancreas: The pancreas appears unremarkable.  Spleen: The spleen appears unremarkable.  Adrenals: The adrenal glands appear unremarkable.  Kidneys: The kidneys appear unremarkable with no stones cysts masses or hydronephrosis.  Aorta: There is mild calcification of the abdominal aorta.  IVC: Unremarkable.  Bowel:  Esophagus: The visualized esophagus appears unremarkable.  Stomach: The stomach appears unremarkable.  Duodenum: There is mucosal enhancement of the duodenum with mild periduodenal fat stranding (series 2; images 30-44). An element of duodenitis cannot be excluded.  Small Bowel: A small transient jejunojejunal intussusception is seen in the left mid abdomen (series 2; image 26). No bowel obstruction is identified.  Colon: Multiple diverticula are seen in the hepatic flexure through to the sigmoid colon. No associated inflammatory stranding or pericolonic fluid is seen to suggest diverticulitis. There is thickening versus underdistention of the cecum through hepatic flexure of the colon with significant appearing fatty infiltration of the walls of the colon.  Appendix: No appendix is identified and a suture line is seen at the base of the cecum consistent with appendectomy.  Peritoneum: No free intraperitoneal air is seen. There is minimal  ascites. This is new since the prior examination.    Pelvis:  Bladder: The bladder is nondistended and cannot be definitively evaluated.  Male:  Prostate gland: The prostate gland appears unremarkable.    Bony structures: The bones are osteopenic.  Dorsal Spine: There are stable appearing chronic insufficiency fracture of the thoracolumbar vertebrae.  Bony Pelvis: Again noted is a chronic deformity of the right superior pubic ramus.      Impression:  1. Moderate fatty infiltration of the liver is present. This has become more pronounced since the prior examination.  2. There is mucosal enhancement of the duodenum with mild periduodenal fat stranding (series 2; images 30-44). An element of duodenitis cannot be excluded. Correlate with clinical and laboratory findings and followup to full resolution.  3. There is thickening versus underdistention of the cecum through hepatic flexure of the colon with significant appearing fatty infiltration of the walls of the colon. This suggests an element of chronic colitis with an acute component not excluded. Correlate with clinical and laboratory findings and followup to full resolution.  4. There is minimal ascites. This is new since the prior examination.  5. Details and other findings as discussed above.                                         XR ABDOMEN  ACUTE 2 OR MORE VIEWS WITH CHEST (Preliminary result)  Result time 04/29/23 23:00:00      Wet Read by Briana Miller MD (04/29/23 23:00:00, [facility], Emergency Medicine)    No acute cardiopulmonary process. Non obstructive gas bowel pattern                                   No results found in the last 24 hours.     Current Medications:     Infusions:        Scheduled:   folic acid  1 mg Oral Daily    LORazepam  2 mg Oral Q4H    multivitamin  1 tablet Oral Daily    thiamine (VITAMIN B1) IVPB  500 mg Intravenous Daily    Followed by    [START ON 5/3/2023] thiamine  300 mg Oral Daily         PRN:    dextrose 10%    dextrose 10%    dextrose    dextrose    LORazepam        Antibiotics and Day Number of Therapy:  Antibiotics (From admission, onward)      None                 Assessment & Plan:     Alcoholic steatohepatitis  Acute intoxication  - , ast 126, lipase 127, alt wnl.   - CIWA protocol with prn ativan for withdrawal sx.   - ethanol level  369. Reports last drink was 1.5 days ago. Cannabis positive on UDS.   - given 1L banana bag in ED. Will continue to replete vitamins and fluids.  - ruq us ordered.   - negative hepatitis, HIV panel.  - ASA, tylenol level ordered.   - PT/INR. CRP  ordered.  - XR abd and chest - no acute intrapulmonary/abd findings. Non-obstructive gas pattern.   - CT head non acute.   - CT C-spine non-acute, chronic degenerative changes.   - CT abd pelvis   1. Moderate fatty infiltration of the liver is present. This has become more pronounced since the prior examination.  2. There is mucosal enhancement of the duodenum with mild periduodenal fat stranding (series 2; images 30-44). An element of duodenitis cannot be excluded. Correlate with clinical and laboratory findings and followup to full resolution.  3. There is thickening versus underdistention of the cecum through hepatic flexure of the colon with significant appearing fatty infiltration of the walls of the colon. This suggests an element of chronic colitis with an acute component not excluded. Correlate with clinical and laboratory findings and followup to full resolution.  4. There is minimal ascites. This is new since the prior examination.        Leukocytosis  diarrhea  - Potentially intra-abdominal or sepsis of unknown source. Starting 1g rocephin daily.   - Blood cultures ordered.   - stool studies ordered.   - Coivd/flu negative.   - ctm    Macrocytic anemia  - B12, folate, iron, tibc, retic, ferritin ordered.       Seizure disorder  - Continue home keppra  - Seizure precautions.     Hypokalemia  - Given 50 meq in  ED.   - mag wnl  - ctm    CODE STATUS: full   Access: piv  Antibiotics: rocephin  Diet: regular  DVT Prophylaxis: SCD  GI Prophylaxis: none  Fluids: LR  Fall, seizure precautions.      Keith Wooten DO  Internal Medicine Resident PGY-I      Attending addendum to follow

## 2023-04-30 NOTE — ED PROVIDER NOTES
"Encounter Date: 4/29/2023       History     Chief Complaint   Patient presents with    Weakness    Fatigue     States malaise, anorexia, incontinence, constipation for 3 weeks.  Unable to walk at this time.       58 y/o M presents with complaint of malaise, anorexia, constipation. Pt also reports incontinence-bowel and bladder,. When further questioning patient regarding incontinence- pt reports " I can feel it but I can't make it to the bathroom. Reviewed triage note- when questioning patient regarding his inability to walk - pt reports " I can walk but I feel unbalanced, like I have to hold onto the walls , this has been going on for past 3-4 weeks. Pt denies recent trauma or falls. Pt reports " I drink alcohol everyday"     The history is provided by the patient and a significant other. No  was used.   Review of patient's allergies indicates:  No Known Allergies  Past Medical History:   Diagnosis Date    Seizures      Past Surgical History:   Procedure Laterality Date    APPENDECTOMY      CHOLECYSTECTOMY       Family History   Problem Relation Age of Onset    Lung cancer Mother     Dementia Father     Alzheimer's disease Father      Social History     Tobacco Use    Smoking status: Every Day     Packs/day: 1.00     Types: Cigarettes    Smokeless tobacco: Never   Substance Use Topics    Alcohol use: Yes     Comment: Drinks daily    Drug use: Not Currently     Types: Marijuana     Comment: 1- 2 times a month     Review of Systems   Constitutional:  Positive for appetite change and fatigue. Negative for fever.   HENT:  Negative for sore throat.    Respiratory:  Negative for cough, shortness of breath and wheezing.    Cardiovascular:  Negative for chest pain, palpitations and leg swelling.   Gastrointestinal:  Negative for diarrhea, nausea and vomiting.   Genitourinary:  Negative for dysuria.   Musculoskeletal:  Positive for myalgias. Negative for back pain.   Skin:  Negative for rash. "   Neurological:  Positive for weakness. Negative for dizziness, syncope, facial asymmetry, speech difficulty, numbness and headaches.     Physical Exam     Initial Vitals [04/29/23 2017]   BP Pulse Resp Temp SpO2   112/72 98 18 98.1 °F (36.7 °C) 95 %      MAP       --         Physical Exam    Nursing note and vitals reviewed.  Constitutional: He appears well-developed and well-nourished. No distress.   HENT:   Head: Normocephalic and atraumatic.   Eyes: Conjunctivae are normal.   Neck: Neck supple. No tracheal deviation present.   Normal range of motion.  Cardiovascular:  Regular rhythm.           Pulmonary/Chest: Breath sounds normal. No stridor.   Abdominal: Abdomen is soft. Bowel sounds are normal. There is no abdominal tenderness. There is no rebound and no guarding.   Genitourinary:    Genitourinary Comments: ER klarissa gutierrez present as chaperone-   Rectal exam- good rectal tone.       Musculoskeletal:         General: Normal range of motion.      Cervical back: Normal range of motion and neck supple.     Neurological: He is alert and oriented to person, place, and time. He has normal strength. No sensory deficit. Gait normal. GCS score is 15. GCS eye subscore is 4. GCS verbal subscore is 5. GCS motor subscore is 6.   Skin: Skin is warm and dry. Capillary refill takes less than 2 seconds.   Psychiatric: He has a normal mood and affect. His behavior is normal. Judgment and thought content normal.       ED Course   Procedures  Labs Reviewed   COMPREHENSIVE METABOLIC PANEL - Abnormal; Notable for the following components:       Result Value    Potassium Level 3.0 (*)     Glucose Level 124 (*)     Blood Urea Nitrogen 4.3 (*)     Calcium Level Total 7.7 (*)     Protein Total 5.8 (*)     Albumin Level 2.2 (*)     Globulin 3.6 (*)     Albumin/Globulin Ratio 0.6 (*)     Bilirubin Total 2.3 (*)     Alkaline Phosphatase 549 (*)     Aspartate Aminotransferase 126 (*)     All other components within normal limits   DRUG  SCREEN, URINE (BEAKER) - Abnormal; Notable for the following components:    Cannabinoids, Urine Positive (*)     All other components within normal limits    Narrative:     Cut off concentrations:    Amphetamines - 1000 ng/ml  Barbiturates - 200 ng/ml  Benzodiazepine - 200 ng/ml  Cannabinoids (THC) - 50 ng/ml  Cocaine - 300 ng/ml  Fentanyl - 1.0 ng/ml  MDMA - 500 ng/ml  Opiates - 300 ng/ml   Phencyclidine (PCP) - 25 ng/ml    Specimen submitted for drug analysis and tested for pH and specific gravity in order to evaluate sample integrity. Suspect tampering if specific gravity is <1.003 and/or pH is not within the range of 4.5 - 8.0  False negatives may result form substances such as bleach added to urine.  False positives may result for the presence of a substance with similar chemical structure to the drug or its metabolite.    This test provides only a PRELIMINARY analytical test result. A more specific alternate chemical method must be used in order to obtain a confirmed analytical result. Gas chromatography/mass spectrometry (GC/MS) is the preferred confirmatory method. Other chemical confirmation methods are available. Clinical consideration and professional judgement should be applied to any drug of abuse test result, particularly when preliminary positive results are used.    Positive results will be confirmed only at the physicians request. Unconfirmed screening results are to be used only for medical purposes (treatment).        ALCOHOL,MEDICAL (ETHANOL) - Abnormal; Notable for the following components:    Ethanol Level 369.0 (*)     All other components within normal limits   URINALYSIS, REFLEX TO URINE CULTURE - Abnormal; Notable for the following components:    Protein, UA Trace (*)     Bilirubin, UA 1+ (*)     Squamous Epithelial Cells, UA Trace (*)     Mucous, UA Trace (*)     Hyaline Casts, UA 0-2 (*)     All other components within normal limits   CBC WITH DIFFERENTIAL - Abnormal; Notable for the  following components:    WBC 14.8 (*)     RBC 2.25 (*)     Hgb 8.0 (*)     Hct 23.9 (*)     .2 (*)     MCH 35.6 (*)     Neut # 11.25 (*)     Mono # 1.73 (*)     IG# 0.10 (*)     All other components within normal limits   BILIRUBIN, DIRECT - Abnormal; Notable for the following components:    Bilirubin Direct 1.9 (*)     All other components within normal limits   LIPASE - Abnormal; Notable for the following components:    Lipase Level 127 (*)     All other components within normal limits   VITAMIN B12 - Abnormal; Notable for the following components:    Vitamin B12 Level >2,000 (*)     All other components within normal limits   FOLATE - Abnormal; Notable for the following components:    Folate Level >80.0 (*)     All other components within normal limits   IRON AND TIBC - Abnormal; Notable for the following components:    Iron Level 17 (*)     Iron Binding Capacity Total 197 (*)     Iron Saturation 9 (*)     All other components within normal limits   RETICULOCYTES - Abnormal; Notable for the following components:    Retic Cnt Auto 5.67 (*)     RET# 0.1281 (*)     All other components within normal limits   ACETAMINOPHEN LEVEL - Abnormal; Notable for the following components:    Acetaminophen Level <17.4 (*)     All other components within normal limits   C-REACTIVE PROTEIN - Abnormal; Notable for the following components:    C-Reactive Protein 39.30 (*)     All other components within normal limits   COVID/FLU A&B PCR - Normal    Narrative:     The Xpert Xpress SARS-CoV-2/FLU/RSV plus is a rapid, multiplexed real-time PCR test intended for the simultaneous qualitative detection and differentiation of SARS-CoV-2, Influenza A, Influenza B, and respiratory syncytial virus (RSV) viral RNA in either nasopharyngeal swab or nasal swab specimens.         MAGNESIUM - Normal   TROPONIN I - Normal   HEPATITIS PANEL, ACUTE - Normal   HIV 1 / 2 ANTIBODY - Normal   FERRITIN - Normal   STOOL CULTURE [facility]   CBC W/ AUTO  DIFFERENTIAL    Narrative:     The following orders were created for panel order CBC auto differential.  Procedure                               Abnormality         Status                     ---------                               -----------         ------                     CBC with Differential[[id number]]        Abnormal            Final result                 Please view results for these tests on the individual orders.   SALICYLATE LEVEL   PROTIME-INR   EXTRA TUBES    Narrative:     The following orders were created for panel order EXTRA TUBES.  Procedure                               Abnormality         Status                     ---------                               -----------         ------                     Light Blue Top Hold[[id number]]                              In process                 Red Top Hold[[id number]]                                     In process                   Please view results for these tests on the individual orders.   LIGHT BLUE TOP HOLD   RED TOP HOLD   EXTRA TUBES    Narrative:     The following orders were created for panel order EXTRA TUBES.  Procedure                               Abnormality         Status                     ---------                               -----------         ------                     Light Green Top Hold[[id number]]                             In process                 Lavender Top Hold[[id number]]                                In process                   Please view results for these tests on the individual orders.   LIGHT GREEN TOP HOLD   LAVENDER TOP HOLD   PATHOLOGIST INTERPRETATION   PANCREATIC ELASTASE, FECAL   ROTAVIRUS ANTIGEN, STOOL   CALPROTECTIN, STOOL   STOOL EXAM-OVA,CYSTS,PARASITES   ALPHA-1-ANTITRYPSIN, STOOL   EXTRA TUBES    Narrative:     The following orders were created for panel order EXTRA TUBES.  Procedure                               Abnormality         Status                     ---------                                -----------         ------                     Lavender Top Hold[[id number]]                                                             Please view results for these tests on the individual orders.   LAVENDER TOP HOLD   POCT OCCULT BLOOD STOOL          Imaging Results              US Abdomen Limited_Liver (Final result)  Result time 04/30/23 08:37:08      Final result by Shiv Leung MD (04/30/23 08:37:08)                   Impression:      1. The liver is mildly enlarged and measures 19 cm in greatest dimension and demonstrates moderate increased echogenicity which may reflect fatty infiltration. Correlate with clinical and laboratory findings.    2.  Trace volume ascites      Electronically signed by: Shiv Leung MD  Date:    04/30/2023  Time:    08:37               Narrative:    EXAMINATION:  US ABDOMEN LIMITED_LIVER    CLINICAL HISTORY:  transaminitis;    TECHNIQUE:  Limited ultrasound of the right upper quadrant of the abdomen (including pancreas, liver, gallbladder, common bile duct, and right kidney) was performed.    COMPARISON:  CT abdomen and pelvis from 04/29/2023    FINDINGS:  Liver: The liver is mildly enlarged and measures 19 centimeters in greatest dimension and demonstrates moderate increased echogenicity which may reflect fatty infiltration. A 1.2 x 1.2 x 1.1 cm simple cyst is seen in the right lobe of the liver. Follow-up as clinically indicated.    Biliary ducts: The common bile duct is within normal limits at 4 mm in diameter.    Gallbladder: The gallbladder is surgically absent.    Pancreas: The pancreas cannot be definitively evaluated to to obscuration by bowel gas.    Right kidney:    Dimensions: The right kidney measures 9.6 x 5.6 x 4.5 cm and appears unremarkable.    Vascular:    IVC: The inferior vena cava is suboptimally visualized due to bowel gas.                        Preliminary result by Shiv Leung MD (04/30/23 02:23:08)                   Narrative:    START  OF REPORT:  Technique: Limited right upper quadrant abdominal ultrasound was performed.    Comparison: Comparison is with CT dated2023-04-29 22:57:29.    Clinical History: Transaminitis.    Findings:  Liver: The liver is mildly enlarged and measures 19 centimeters in greatest dimension and demonstrates moderate increased echogenicity which may reflect fatty infiltration. A 1.2 x 1.2 x 1.1 cm simple cyst is seen in the right lobe of the liver. Follow-up as clinically indicated.  Biliary ducts: The common bile duct is within normal limits at 4 mm in diameter.  Gallbladder: The gallbladder is surgically absent.  Pancreas: The pancreas cannot be definitively evaluated to to obscuration by bowel gas.  Right kidney:  Dimensions: The right kidney measures 9.6 x 5.6 x 4.5 cm and appears unremarkable.  Vascular:  IVC: The inferior vena cava is suboptimally visualized due to bowel gas.      Impression:  1. The liver is mildly enlarged and measures 19 centimeters in greatest dimension and demonstrates moderate increased echogenicity which may reflect fatty infiltration. Correlate with clinical and laboratory findings.  2. No acute intraabdominal process identified. Details and other findings as noted above.                                         CT Head Without Contrast (Final result)  Result time 04/30/23 08:38:27      Final result by Shiv Leung MD (04/30/23 08:38:27)                   Impression:      1. No acute intracranial process identified. Details and other findings as noted above.      Electronically signed by: Shiv Leung MD  Date:    04/30/2023  Time:    08:38               Narrative:    EXAMINATION:  CT HEAD WITHOUT CONTRAST    INDICATION:  weakness;    TECHNIQUE:  Contiguous axial images are acquired through the head without IV contrast.  These images were reconstructed into the coronal and sagittal plane.  Automated exposure control, dose radiation lowering technique was  utilized.    COMPARISON:  Head CT from 06/04/2021    FINDINGS:  Hemorrhage: No acute intracranial hemorrhage is seen.    CSF spaces: The ventricles, sulci and basal cisterns all appear moderately prominent consistent with global cerebral atrophy.    Brain parenchyma: There is preservation of the grey white junction throughout. Mild microvascular change is seen in portions of the periventricular and deep white matter tracts.    Cerebellum: Unremarkable.    Sella and skull base: The sella appears to be within normal limits for age.    Herniation: None.    Intracranial calcifications: Incidental note is made of bilateral choroid plexus calcification. Incidental note is made of some pineal region calcification. Incidental note is made of some calcification of the falx.    Calvarium: No acute linear or depressed skull fracture is seen.    Maxillofacial Structures:    Paranasal sinuses: There is some mucoperiosteal thickening in the bilateral maxillary sinuses. There is interval resolution of mucosal thickening and fluid within the right sphenoid sinus.    Orbits: The orbits appear unremarkable.    Zygomatic arches: The zygomatic arches are intact and unremarkable.    Temporal bones and mastoids: The temporal bones and mastoids appear unremarkable.    TMJ: The mandibular condyles appear normally placed with respect to the mandibular fossa.                        Preliminary result by Shiv Leung MD (04/29/23 23:18:46)                   Narrative:    START OF REPORT:  Technique: CT of the head was performed without intravenous contrast with axial as well as coronal and sagittal images.    Comparison: Comparison is with study dated2021-06-04 11:26:18.    Dosage Information: Automated exposure control was utilized.    Clinical history: UndefinedtWeakness undefinedtFatigue (States malaise, anorexia, incontinence, constipation for 3 weeks. Unable to walk at this time. Etoh abuse.    Findings:  Hemorrhage: No acute  intracranial hemorrhage is seen.  CSF spaces: The ventricles, sulci and basal cisterns all appear moderately prominent consistent with global cerebral atrophy.  Brain parenchyma: There is preservation of the grey white junction throughout. Mild microvascular change is seen in portions of the periventricular and deep white matter tracts.  Cerebellum: Unremarkable.  Sella and skull base: The sella appears to be within normal limits for age.  Herniation: None.  Intracranial calcifications: Incidental note is made of bilateral choroid plexus calcification. Incidental note is made of some pineal region calcification. Incidental note is made of some calcification of the falx.  Calvarium: No acute linear or depressed skull fracture is seen.    Maxillofacial Structures:  Paranasal sinuses: There is some mucoperiosteal thickening in the bilateral maxillary sinuses. There is interval resolution of mucosal thickening and fluid within the right sphenoid sinus.  Orbits: The orbits appear unremarkable.  Zygomatic arches: The zygomatic arches are intact and unremarkable.  Temporal bones and mastoids: The temporal bones and mastoids appear unremarkable.  TMJ: The mandibular condyles appear normally placed with respect to the mandibular fossa.      Impression:  1. No acute intracranial process identified. Details and other findings as noted above.                                         CT Cervical Spine Without Contrast (Final result)  Result time 04/30/23 08:41:28      Final result by Shiv Leung MD (04/30/23 08:41:28)                   Impression:      1. No acute cervical spine fracture dislocation or subluxation is seen.    2. Degenerative changes and other details as above.    Concurrence      Electronically signed by: Shiv Leung MD  Date:    04/30/2023  Time:    08:41               Narrative:    EXAMINATION:  CT CERVICAL SPINE WITHOUT CONTRAST    CLINICAL HISTORY:  Neck pain, acute, no red  flags;    TECHNIQUE:  Helical axial images are acquired through the cervical spine without IV contrast.  Sagittal and coronal reformations were performed.  Automated exposure control, dose radiation lowering technique was utilized.    COMPARISON:  Cervical spine CT from 08/20/2017    FINDINGS:  Lung apices: The visualized lung apices appear unremarkable.    Spine:    Spinal canal: Moderate spinal canal narrowing is seen at C5-C6, secondary to a large disc osteophyte complex. Similar findings are also seen on the prior examination.    Spinal cord: The spinal cord appears unremarkable.    Mineralization: Within normal limits.    Scoliosis: No significant scoliosis is seen.    Vertebral Fusion: No vertebral fusion is identified.    Listhesis: No significant listhesis is identified.    Lordosis: The cervical lordosis is maintained.    Intervertebral disc spaces: Mildly decreased disc height is seen at C5-C6 and C6-C7.    Osteophytes: Anterior marginal multilevel endplate osteophytes are seen.    Endplate Sclerosis: Subtle endplate sclerosis is seen off the opposing endplates at C5-C6 and C6-C7.    Uncovertebral degenerative changes: Mild multilevel uncovertebral joint arthrosis is seen.    Facet degenerative changes: Mild multilevel facet degenerative changes are seen. This is new since the prior examination.    Fractures: No acute cervical spine fracture dislocation or subluxation is seen.    Miscellaneous:    Soft Tissues: Unremarkable.                        Preliminary result by Shiv Leung MD (04/29/23 23:14:38)                   Narrative:    START OF REPORT:  Technique: CT of the cervical spine was performed without intravenous contrast with axial as well as sagittal and coronal images.    Comparison: Comparison is with study [id number] 06:33:47.    Dosage Information: Automated exposure control was utilized.    Clinical history: UndefinedtWeakness undefinedtFatigue (States malaise, anorexia,  incontinence, constipation for 3 weeks. Unable to walk at this time. Etoh abuse.    Findings:  Lung apices: The visualized lung apices appear unremarkable.  Spine:  Spinal canal: Moderate spinal canal narrowing is seen at C5-C6, secondary to a large disc osteophyte complex. Similar findings are also seen on the prior examination.  Spinal cord: The spinal cord appears unremarkable.  Mineralization: Within normal limits.  Scoliosis: No significant scoliosis is seen.  Vertebral Fusion: No vertebral fusion is identified.  Listhesis: No significant listhesis is identified.  Lordosis: The cervical lordosis is maintained.  Intervertebral disc spaces: Mildly decreased disc height is seen at C5-C6 and C6-C7.  Osteophytes: Anterior marginal multilevel endplate osteophytes are seen.  Endplate Sclerosis: Subtle endplate sclerosis is seen off the opposing endplates at C5-C6 and C6-C7.  Uncovertebral degenerative changes: Mild multilevel uncovertebral joint arthrosis is seen.  Facet degenerative changes: Mild multilevel facet degenerative changes are seen. This is new since the prior examination.  Fractures: No acute cervical spine fracture dislocation or subluxation is seen.    Miscellaneous:  Soft Tissues: Unremarkable.      Impression:  1. No acute cervical spine fracture dislocation or subluxation is seen.  2. Degenerative changes and other details as above.                                         CT Abdomen Pelvis With Contrast (Final result)  Result time 04/30/23 08:44:14      Final result by Shiv Leung MD (04/30/23 08:44:14)                   Impression:      1. Moderate fatty infiltration of the liver is present. This has become more pronounced since the prior examination.    2. There is mucosal enhancement of the duodenum with mild periduodenal fat stranding (series 2; images 30-44). An element of duodenitis cannot be excluded. Correlate with clinical and laboratory findings and followup to full  resolution.    3. There is thickening versus underdistention of the cecum through hepatic flexure of the colon with significant appearing fatty infiltration of the walls of the colon. This suggests an element of chronic colitis with an acute component not excluded. Correlate with clinical and laboratory findings and followup to full resolution.    4. There is minimal ascites. This is new since the prior examination.    5. Details and other findings as discussed above.    [employer] concurrence      Electronically signed by: Shiv Leung MD  Date:    04/30/2023  Time:    08:44               Narrative:    EXAMINATION:  CT ABDOMEN PELVIS WITH CONTRAST    CLINICAL HISTORY:  Alcohol abuse, elevated alkaline phosphatase, elevated bilirubin    TECHNIQUE:  Axial CT images were obtained through the abdomen and pelvis following IV administration of contrast.  100 mL Isovue 370.  Coronal and sagittal reconstructions submitted and interpreted.  Automated exposure control, dose radiation lowering technique, was utilized.    COMPARISON:  06/21/2020    FINDINGS:  Thorax:    Lungs: The visualized lung bases appear unremarkable.    Pleura: No pleural effusion is seen.    Heart: The heart size is within normal limits.    Abdomen:    Abdominal Wall: No abdominal wall pathology is seen.    Liver: Moderate fatty infiltration of the liver is present. There is interval development of minimal perihepatic fluid. The liver otherwise appears unremarkable.    Biliary System: No intrahepatic or extrahepatic biliary duct dilatation is seen.    Gallbladder: Surgical clips are seen in the gallbladder fossa consistent with prior cholecystectomy.    Pancreas: The pancreas appears unremarkable.    Spleen: The spleen appears unremarkable.    Adrenals: The adrenal glands appear unremarkable.    Kidneys: The kidneys appear unremarkable with no stones cysts masses or hydronephrosis.    Aorta: There is mild calcification of the abdominal  aorta.    IVC: Unremarkable.    Bowel:    Esophagus: The visualized esophagus appears unremarkable.    Stomach: The stomach appears unremarkable.    Duodenum: There is mucosal enhancement of the duodenum with mild periduodenal fat stranding (series 2; images 30-44). An element of duodenitis cannot be excluded.    Small Bowel: A small transient jejunojejunal intussusception is seen in the left mid abdomen (series 2; image 26). No bowel obstruction is identified.    Colon: Multiple diverticula are seen in the hepatic flexure through to the sigmoid colon. No associated inflammatory stranding or pericolonic fluid is seen to suggest diverticulitis. There is thickening versus underdistention of the cecum through hepatic flexure of the colon with significant appearing fatty infiltration of the walls of the colon.    Appendix: No appendix is identified and a suture line is seen at the base of the cecum consistent with appendectomy.    Peritoneum: No free intraperitoneal air is seen. There is minimal ascites. This is new since the prior examination.    Pelvis:    Bladder: The bladder is nondistended and cannot be definitively evaluated.    Male:    Prostate gland: The prostate gland appears unremarkable.    Bony structures: The bones are osteopenic.    Dorsal Spine: There are stable appearing chronic insufficiency fracture of the thoracolumbar vertebrae.    Bony Pelvis: Again noted is a chronic deformity of the right superior pubic ramus.                        Preliminary result by Shiv Leung MD (04/29/23 23:08:09)                   Narrative:    START OF REPORT:  Technique: CT of the abdomen and pelvis was performed with axial images as well as sagittal and coronal reconstruction images with intravenous contrast.    Comparison: Comparison is with study dated2020-06-21 15:38:25.    Clinical History: UndefinedtWeakness undefinedtFatigue (States malaise, anorexia, incontinence, constipation for 3 weeks. Unable to  walk at this time. Etoh abuse.    Dosage Information: Automated Exposure Control was utilized.    Findings:  Thorax:  Lungs: The visualized lung bases appear unremarkable.  Pleura: No pleural effusion is seen.  Heart: The heart size is within normal limits.  Abdomen:  Abdominal Wall: No abdominal wall pathology is seen.  Liver: Moderate fatty infiltration of the liver is present. There is interval development of minimal perihepatic fluid. The liver otherwise appears unremarkable.  Biliary System: No intrahepatic or extrahepatic biliary duct dilatation is seen.  Gallbladder: Surgical clips are seen in the gallbladder fossa consistent with prior cholecystectomy.  Pancreas: The pancreas appears unremarkable.  Spleen: The spleen appears unremarkable.  Adrenals: The adrenal glands appear unremarkable.  Kidneys: The kidneys appear unremarkable with no stones cysts masses or hydronephrosis.  Aorta: There is mild calcification of the abdominal aorta.  IVC: Unremarkable.  Bowel:  Esophagus: The visualized esophagus appears unremarkable.  Stomach: The stomach appears unremarkable.  Duodenum: There is mucosal enhancement of the duodenum with mild periduodenal fat stranding (series 2; images 30-44). An element of duodenitis cannot be excluded.  Small Bowel: A small transient jejunojejunal intussusception is seen in the left mid abdomen (series 2; image 26). No bowel obstruction is identified.  Colon: Multiple diverticula are seen in the hepatic flexure through to the sigmoid colon. No associated inflammatory stranding or pericolonic fluid is seen to suggest diverticulitis. There is thickening versus underdistention of the cecum through hepatic flexure of the colon with significant appearing fatty infiltration of the walls of the colon.  Appendix: No appendix is identified and a suture line is seen at the base of the cecum consistent with appendectomy.  Peritoneum: No free intraperitoneal air is seen. There is minimal ascites.  This is new since the prior examination.    Pelvis:  Bladder: The bladder is nondistended and cannot be definitively evaluated.  Male:  Prostate gland: The prostate gland appears unremarkable.    Bony structures: The bones are osteopenic.  Dorsal Spine: There are stable appearing chronic insufficiency fracture of the thoracolumbar vertebrae.  Bony Pelvis: Again noted is a chronic deformity of the right superior pubic ramus.      Impression:  1. Moderate fatty infiltration of the liver is present. This has become more pronounced since the prior examination.  2. There is mucosal enhancement of the duodenum with mild periduodenal fat stranding (series 2; images 30-44). An element of duodenitis cannot be excluded. Correlate with clinical and laboratory findings and followup to full resolution.  3. There is thickening versus underdistention of the cecum through hepatic flexure of the colon with significant appearing fatty infiltration of the walls of the colon. This suggests an element of chronic colitis with an acute component not excluded. Correlate with clinical and laboratory findings and followup to full resolution.  4. There is minimal ascites. This is new since the prior examination.  5. Details and other findings as discussed above.                                         XR ABDOMEN  ACUTE 2 OR MORE VIEWS WITH CHEST (Final result)  Result time 04/30/23 08:44:59      Final result by Shiv Leung MD (04/30/23 08:44:59)                   Impression:      Nonobstructive bowel gas pattern.      Electronically signed by: Shiv Leung MD  Date:    04/30/2023  Time:    08:44               Narrative:    EXAMINATION:  XR ABDOMEN ACUTE 2 OR MORE VIEWS WITH CHEST    CLINICAL HISTORY:  constipation;    TECHNIQUE:  PA view of the chest obtained with supine and upright AP views of the abdomen.    COMPARISON:  Chest radiograph from 10/25/2021    FINDINGS:  Heart size is normal.  There is slightly increased  interstitial markings throughout both lungs.  No free air.  Bowel gas pattern is nonobstructive.  Cholecystectomy clips are present right upper quadrant.                        Wet Read by Briana Miller MD (04/29/23 23:00:00, [facility], Emergency Medicine)    No acute cardiopulmonary process. Non obstructive gas bowel pattern                                      Medications   dextrose 10% bolus 125 mL 125 mL (has no administration in time range)   dextrose 10% bolus 250 mL 250 mL (has no administration in time range)   dextrose 40 % gel 15,000 mg (has no administration in time range)   dextrose 40 % gel 30,000 mg (has no administration in time range)   LORazepam tablet 2 mg (2 mg Oral Given 4/30/23 0935)   folic acid tablet 1 mg (1 mg Oral Given 4/30/23 0936)   multivitamin tablet (1 tablet Oral Given 4/30/23 0900)   thiamine (B-1) 500 mg in dextrose 5 % (D5W) 100 mL IVPB (0 mg Intravenous Stopped 4/30/23 0332)     Followed by   thiamine tablet 300 mg (has no administration in time range)   LORazepam tablet 2 mg (has no administration in time range)   lactated ringers infusion ( Intravenous New Bag 4/30/23 0343)   levETIRAcetam tablet 750 mg (750 mg Oral Given 4/30/23 0958)   cefTRIAXone (ROCEPHIN) 1 g in dextrose 5 % in water (D5W) 5 % 50 mL IVPB (MB+) (0 g Intravenous Stopped 4/30/23 0334)   lactulose 20 gram/30 mL solution Soln 10 g (10 g Oral Given 4/30/23 0935)   metronidazole IVPB 500 mg (0 mg Intravenous Stopped 4/30/23 1036)   sodium chloride 0.9% bolus 1,000 mL 1,000 mL (0 mLs Intravenous Stopped 4/29/23 2251)   potassium chloride SA CR tablet 40 mEq (40 mEq Oral Given 4/29/23 2213)   potassium chloride 10 mEq in 100 mL IVPB (0 mEq Intravenous Stopped 4/29/23 2313)   sodium chloride 0.9% 1,000 mL with mvi, (ADULT) no.4 with vit K 3,300 unit- 150 mcg/10 mL 10 mL, thiamine 100 mg, folic acid 1 mg infusion ( Intravenous Stopped 4/30/23 0419)   iohexoL (OMNIPAQUE 350) injection 100 mL  "(100 mLs Intravenous Given 4/29/23 2317)                 ED Course as of 04/30/23 1407   Sat Apr 29, 2023   2130 ER tech Matheus at  bedside. Pt was able to get up out out of bed with minimal assistance. Pt was able to walk a few steps towards me and turned around  by holding onto wall and walked towards Er tech with minimal assistance. Pt reports " I fell unbalanced".  [AK]   2140 Reviewed labs. Oral and IV potassium ordered for patient . Pt corrected calcuim is 9.1. His total bili and alk phos are elevated.  He has leukocytosis. He is anemic most likely secondary to pt alcoholism.  Pt denies BRBPR, melena, hemetemesis, hemoptysis.  Further labs and imaging ordered.   [AK]   2200 Reviewed labs with patient.  Pt reports he drinks alcohol everyday " but not a lot". He is not interested in alcohol detox programs at this time.   [AK]   2240 Pt was able to provide a urine sample on his own accord.   [AK]   2247 Ct tech [facility] at bedside.  Trena Byers and myself observed pt getting onto wheelchair with no difficulty. Pt was able to get out of bed with minimal assistance and walk a few steps to get into wheelchair.   [AK]   2258 Pt currently in CT at this time.   [AK]   2301 Pt care transitioned to Dr. Duff at this time [AK]   2320 Color, UA: Dark Yellow [MW]   2320 Appearance, UA: Clear [MW]   2320 Protein, UA(!): Trace [MW]   2320 Glucose, UA: Normal [MW]   2320 Ketones, UA: Negative [MW]   2320 Occult Blood UA: Negative [MW]   2320 Bilirubin, UA(!): 1+ [MW]   2320 NITRITE UA: Negative [MW]   2320 Leukocytes, UA: Negative [MW]   2320 Squamous Epithelial Cells, UA(!): Trace [MW]   2320 Mucous, UA(!): Trace [MW]   2320 Hyaline Casts, UA(!): 0-2 [MW]   2328 HIV: Nonreactive [MW]   2345 Cannabinoids, Urine(!): Positive [MW]   2345 Benzodiazepine Screen, Urine: Negative [MW]   2345 Barbiturate Screen, Ur: Negative [MW]   2345 Amphetamine Screen, Ur: Negative [MW]   2345 Cocaine (Metab.): Negative [MW]   2345 Fentanyl, " Urine: Negative [MW]   2345 MDMA, Urine: Negative [MW]   2345 Opiate Scrn, Ur: Negative [MW]   2345 Phencyclidine: Negative [MW]   Sun Apr 30, 2023 0021 Amphetamine Screen, Ur: Negative [MW]   0021 Barbiturate Screen, Ur: Negative [MW]   0021 Benzodiazepine Screen, Urine: Negative [MW]   0021 Cannabinoids, Urine(!): Positive [MW]   0021 Hep A IgM: Nonreactive [MW]   0021 Hep B C IgM: Nonreactive [MW]   0021 Hepatitis B Surface Antigen: Nonreactive [MW]   0021 Hepatitis C Ab: Nonreactive [MW]   0042 Patient is FOBT negative.  On evaluation, patient has had a drop in his hemoglobin and hematocrit over the last 10 months from essentially a hemoglobin of 13 down to hemoglobin of 8.  Patient does have ascites noted on CT scan along with some degree of duodenitis.  Suspect this is likely secondary to alcohol use/gastritis.  Patient has a macrocytic anemia, possibly mixed picture.  His occult blood on rectal examination is negative.  Patient has good rectal tone.  Patient is able to ambulate, but slightly wobbly when he turns to walk back.  Patient is able to urinate on his own without difficulty.  Due to the degree of anemia, internal medicine consulted for observation admission and possibly to administer blood products. [MW]   0045 Lipase(!): 127 [MW]      ED Course User Index  [AK] Briana Miller MD  [MW] Johnson Duff MD                   Clinical Impression:   Final diagnoses:  [F10.10] Alcohol abuse (Primary)  [R53.1] General weakness  [R53.1] Weakness  [D53.9] Macrocytic anemia        ED Disposition Condition    Observation Stable                Briana Miller MD  04/30/23 2224

## 2023-04-30 NOTE — CARE UPDATE
Patient seen and examined. No significant change from earlier. Remains intoxicated. NAD. HRRR. Lungs CTA. Continuing rochephin, multivitamin repletion, pending US and other labs.      Alcoholic steatohepatitis  Acute intoxication  - , ast 126, lipase 127, alt wnl.   - CIWA protocol with prn ativan for withdrawal sx.   - ethanol level  369. Reports last drink was 1.5 days ago. Cannabis positive on UDS.   - Will continue to replete vitamins and fluids.  - ruq us ordered.   - negative hepatitis, HIV panel.  - ASA, tylenol level negative.   - PT/INR = .86. CRP  39.3.  - XR abd and chest - no acute intrapulmonary/abd findings. Non-obstructive gas pattern.   -  Ct done of abd, suggest possible duodenitis, steatohepatitis.      Leukocytosis  diarrhea  - Potentially intra-abdominal or sepsis of unknown source. Starting 1g rocephin daily.   - Blood cultures ordered.   - stool studies ordered.   - Coivd/flu negative.   - ctm     Macrocytic anemia  - B12, folate, iron, tibc, retic, ferritin ordered.    b12, folate not deficient. Iron, TIBC low suggesting chronic disease. Retic elevated showing compensation.      Seizure disorder  - Continue home keppra  - Seizure precautions.      Hypokalemia  - Given 50 meq in ED.   - mag wnl  - ctm     CODE STATUS: full   Access: piv  Antibiotics: rocephin  Diet: regular  DVT Prophylaxis: SCD  GI Prophylaxis: none  Fluids: LR  Fall, seizure precautions.        Keith Wooten DO  [facility], HO[facility]

## 2023-05-01 NOTE — PLAN OF CARE
05/01/23 1256   Discharge Assessment   Assessment Type Discharge Planning Assessment   Confirmed/corrected address, phone number and insurance Yes   Confirmed Demographics Correct on Facesheet   Source of Information family   When was your last doctors appointment?   (Elis Kay)   Reason For Admission Alcohol abuse, Weakness, Macrocytic anemia   People in Home significant other   Facility Arrived From: Home   Do you expect to return to your current living situation? Yes   Do you have help at home or someone to help you manage your care at home? Yes   Who are your caregiver(s) and their phone number(s)? PARMJIT GARCIA (Friend)   [phone]   Prior to hospitilization cognitive status: Alert/Oriented   Current cognitive status: Unable to Assess   Equipment Currently Used at Home none   Readmission within 30 days? No   Patient currently being followed by outpatient case management? No   Do you currently have service(s) that help you manage your care at home? No   Do you take prescription medications? Yes  ([employer])   Do you have prescription coverage? Yes   Coverage [facility] M/D   Do you have any problems affording any of your prescribed medications? No   Is the patient taking medications as prescribed? yes   Who is going to help you get home at discharge? Healthplan Transportation   How do you get to doctors appointments? health plan transportation   Are you on dialysis? No   Do you take coumadin? No   Discharge Plan A Home with family   DME Needed Upon Discharge  none   Discharge Plan discussed with: Spouse/sig other   Name(s) and Number(s) PARMJIT GARCIA (Friend)   [phone]   Discharge Barriers Identified Substance Abuse   Physical Activity   On average, how many days per week do you engage in moderate to strenuous exercise (like a brisk walk)? 0 days   On average, how many minutes do you engage in exercise at this level? 0 min   Financial Resource Strain   How hard is it for you to  pay for the very basics like food, housing, medical care, and heating? Very Hard   Housing Stability   In the last 12 months, was there a time when you were not able to pay the mortgage or rent on time? Y   In the last 12 months, how many places have you lived? 1   In the last 12 months, was there a time when you did not have a steady place to sleep or slept in a shelter (including now)? N   Transportation Needs   In the past 12 months, has lack of transportation kept you from medical appointments or from getting medications? no   In the past 12 months, has lack of transportation kept you from meetings, work, or from getting things needed for daily living? No   Food Insecurity   Within the past 12 months, you worried that your food would run out before you got the money to buy more. Sometimes   Within the past 12 months, the food you bought just didn't last and you didn't have money to get more. Sometimes   Social Connections   In a typical week, how many times do you talk on the phone with family, friends, or neighbors? Three   How often do you get together with friends or relatives? More than 3   How often do you attend Temple or Jehovah's witness services? Never   Do you belong to any clubs or organizations such as Temple groups, unions, fraternal or athletic groups, or school groups? No   How often do you attend meetings of the clubs or organizations you belong to? Never   Are you , , , , never , or living with a partner? Living with   Alcohol Use   Q1: How often do you have a drink containing alcohol? 4 or more ti   Q2: How many drinks containing alcohol do you have on a typical day when you are drinking? 7 to 9   Q3: How often do you have six or more drinks on one occasion? Weekly   OTHER   Name(s) of People in Home PARMJIT GARCIA (Friend)   [phone]     Pt resides with Parmjit FLORES; Estranged from only son; Unable to work as a  due to physical limitations; Pt/SO both  unemployed with only SNAP benefits; [facility] referral submitted for resource assistance; CM to follow for d/c planning needs.

## 2023-05-01 NOTE — PROGRESS NOTES
[facility] Wards Progress Note     Resident Team: [facility] Medicine List 3  Attending Physician: Dyana Grimes MD   Resident: Isaiah   Intern: Obdulia        Subjective:      Brief HPI:  Los Alatorre is a 57 y.o. male who has a past medical history of Seizures, alcohol use disorder.  He presented to [facility] on 2023  with a primary complaint of generalized weakness the past 3 weeks. History attained by patient who is a poor historian and significant other in the room. Reports 12 lbs weight loss the past month, decreased appetite. Has had some N/V and loose stools during this timeframe but denies any hematemesis/hematochezia/melena. Reported to have altered gait/balance but etoh level is >300. SO reports patient has had difficulty ambulating at home due to his weakness but patient reports this is because of poor vision 2/2 cataracts. He was noted in the ED to be able to transfer to the wheelchair for imaging without difficulty.  Denies any changes in vision recently. Denies any fever, CP, SOB, abd pain, falls/injury, back pain, HA, focal weakness/numbness, blood in stool, hallucinations, tremors. Pt smokes 1ppd. Drinks 1 pint daily. Uses marijuana occasionally and denies any other use of illicit substances. Pt states his last seizure was 6 months ago, although may have been about a year ago.        Interval History: NAEON. Tachycardic this AM. Improved mentation this morning, however still slightly disoriented and not at his baseline. Received ativan x 4 overnight and this morning. Continues to have multiple loose brown bowel movements (x6 per nursing). Abdomen no longer tender to palpation  Repleting electrolytes this morning and stopping rocephin and flagyl.       Review of Systems:  ROS completed and negative except as indicated above.     Objective:     Last 24 Hour Vital Signs:  BP  Min: 114/74  Max: 132/78  Temp  Av.5 °F (36.9 °C)  Min: 98.2 °F (36.8 °C)  Max: 98.7 °F (37.1 °C)  Pulse  Av.8  Min: 99   Max: 118  Resp  Av.7  Min: 18  Max: 20  SpO2  Av.4 %  Min: 97 %  Max: 99 %  I/O last 3 completed shifts:  In: 3070 [P.O.:1920; I.V.:1000; IV Piggyback:150]  Out: -     Physical Examination:  General:  thin, chronically ill appearing. no acute distress. Disheveled.   Head: Normocephalic, atraumatic  ENT: PERRL, MMM.   Neck: supple, normal ROM, no JVD  CVS:  RRR. S1 and S2 normal. No murmurs, rubs, or gallops. Regular peripheral pulses. No peripheral edema  Resp:  Lungs clear to auscultation bilaterally, no wheezes, rales, or rhonchi. Normal respiratory effort.  GI:  Abdomen soft, non-tender, non-distended, normoactive bowel sounds  MSK:  Full range of motion, no obvious deformities  Skin: jaundice, chronic hyperpigmented patches on buttock, diffuse erythematous papules lower abdomen  Neuro:  alert and oriented to person and place, No focal neuro deficits, CNII-XII grossly intact. Good strength of dorsiflexion/plantarflexion bilaterally, good  strength. No tremor noted.      Laboratory:  Most Recent Data:  CBC:   Lab Results   Component Value Date    WBC 12.4 (H) 2023    HGB 7.4 (L) 2023    HCT 24.1 (L) 2023     2023    .1 (H) 2023    RDW 13.6 2023     WBC Differential:   Recent Labs   Lab 23  2101 23  0852   WBC 14.8* 12.4*   HGB 8.0* 7.4*   HCT 23.9* 24.1*    256   .2* 111.1*     BMP:   Lab Results   Component Value Date     (L) 2023    K 2.9 (L) 2023    CO2 26 2023    BUN 3.7 (L) 2023    CREATININE 0.70 (L) 2023    CALCIUM 7.6 (L) 2023    MG 1.20 (L) 2023    PHOS 1.9 (L) 2023     LFTs:   Lab Results   Component Value Date    ALBUMIN 2.0 (L) 2023    BILITOT 3.1 (H) 2023    AST 90 (H) 2023    ALKPHOS 470 (H) 2023    ALT 19 2023     (H) 2020     Coags:   Lab Results   Component Value Date    INR 1.17 2023    PROTIME 14.7  04/30/2023    PTT 28.5 06/04/2021     FLP:   Lab Results   Component Value Date    CHOL 295 (H) 06/17/2022    HDL 83 (H) 06/17/2022    TRIG 87 06/17/2022     DM:   Lab Results   Component Value Date    HGBA1C 4.9 06/04/2021    HGBA1C 5.5 06/24/2020    HGBA1C 5.3 07/03/2018    CREATININE 0.70 (L) 05/01/2023     Thyroid:   Lab Results   Component Value Date    TSH 2.4391 06/17/2022      Anemia:   Lab Results   Component Value Date    IRON 17 (L) 04/29/2023    TIBC 197 (L) 04/29/2023    FERRITIN 220.52 04/29/2023       Lab Results   Component Value Date    WDCRWADU84 >2,000 (H) 04/29/2023       Lab Results   Component Value Date    FOLATE >80.0 (H) 04/30/2023        Cardiac:   Lab Results   Component Value Date    TROPONINI <0.010 04/29/2023         Microbiology Data:  Microbiology Results (last 7 days)       Procedure Component Value Units Date/Time    Stool Culture [[id number]]  (Normal) Collected: 04/30/23 0637    Order Status: Completed Specimen: Stool Updated: 05/01/23 0839     Stool Culture Negative for Salmonella, Shigella, Campylobacter, Vibrio, Aeromonas, Pleisiomonas,Yersinia, or Shiga Toxin 1 and 2.    C Diff Toxin by PCR [[id number]]  (Normal) Collected: 04/30/23 0637    Order Status: Completed Specimen: Stool Updated: 05/01/23 0110     Clostridium difficile toxin PCR Not Detected    Narrative:      The Xpert C.difficile is a qualitative in vitro diagnostic test for rapid detection of the toxin B gene sequences of toxigenic Clostridium difficile from unformed (liquid or soft) stool specimens collected from patients suspected of having C. difficile infection (CDI).    Clostridium Diff Toxin, A & B, EIA [[id number]]  (Abnormal) Collected: 04/30/23 0637    Order Status: Completed Specimen: Stool Updated: 04/30/23 0903     Clostridium Difficile GDH Antigen Positive     Clostridium Difficile Toxin A/B Negative    C. Difficile By Rapid Pcr [[id number]] Collected: 04/30/23 0637    Order Status: Canceled Specimen:  Stool              Other Results:    Radiology:  Imaging Results              US Abdomen Limited_Liver (Final result)  Result time 04/30/23 08:37:08      Final result by Shiv Leung MD (04/30/23 08:37:08)                   Impression:      1. The liver is mildly enlarged and measures 19 cm in greatest dimension and demonstrates moderate increased echogenicity which may reflect fatty infiltration. Correlate with clinical and laboratory findings.    2.  Trace volume ascites      Electronically signed by: Shiv Leung MD  Date:    04/30/2023  Time:    08:37               Narrative:    EXAMINATION:  US ABDOMEN LIMITED_LIVER    CLINICAL HISTORY:  transaminitis;    TECHNIQUE:  Limited ultrasound of the right upper quadrant of the abdomen (including pancreas, liver, gallbladder, common bile duct, and right kidney) was performed.    COMPARISON:  CT abdomen and pelvis from 04/29/2023    FINDINGS:  Liver: The liver is mildly enlarged and measures 19 centimeters in greatest dimension and demonstrates moderate increased echogenicity which may reflect fatty infiltration. A 1.2 x 1.2 x 1.1 cm simple cyst is seen in the right lobe of the liver. Follow-up as clinically indicated.    Biliary ducts: The common bile duct is within normal limits at 4 mm in diameter.    Gallbladder: The gallbladder is surgically absent.    Pancreas: The pancreas cannot be definitively evaluated to to obscuration by bowel gas.    Right kidney:    Dimensions: The right kidney measures 9.6 x 5.6 x 4.5 cm and appears unremarkable.    Vascular:    IVC: The inferior vena cava is suboptimally visualized due to bowel gas.                        Preliminary result by Shiv Leung MD (04/30/23 02:23:08)                   Narrative:    START OF REPORT:  Technique: Limited right upper quadrant abdominal ultrasound was performed.    Comparison: Comparison is with CT dated2023-04-29 22:57:29.    Clinical History:  Transaminitis.    Findings:  Liver: The liver is mildly enlarged and measures 19 centimeters in greatest dimension and demonstrates moderate increased echogenicity which may reflect fatty infiltration. A 1.2 x 1.2 x 1.1 cm simple cyst is seen in the right lobe of the liver. Follow-up as clinically indicated.  Biliary ducts: The common bile duct is within normal limits at 4 mm in diameter.  Gallbladder: The gallbladder is surgically absent.  Pancreas: The pancreas cannot be definitively evaluated to to obscuration by bowel gas.  Right kidney:  Dimensions: The right kidney measures 9.6 x 5.6 x 4.5 cm and appears unremarkable.  Vascular:  IVC: The inferior vena cava is suboptimally visualized due to bowel gas.      Impression:  1. The liver is mildly enlarged and measures 19 centimeters in greatest dimension and demonstrates moderate increased echogenicity which may reflect fatty infiltration. Correlate with clinical and laboratory findings.  2. No acute intraabdominal process identified. Details and other findings as noted above.                                         CT Head Without Contrast (Final result)  Result time 04/30/23 08:38:27      Final result by Shiv Leung MD (04/30/23 08:38:27)                   Impression:      1. No acute intracranial process identified. Details and other findings as noted above.      Electronically signed by: Shiv Leung MD  Date:    04/30/2023  Time:    08:38               Narrative:    EXAMINATION:  CT HEAD WITHOUT CONTRAST    INDICATION:  weakness;    TECHNIQUE:  Contiguous axial images are acquired through the head without IV contrast.  These images were reconstructed into the coronal and sagittal plane.  Automated exposure control, dose radiation lowering technique was utilized.    COMPARISON:  Head CT from 06/04/2021    FINDINGS:  Hemorrhage: No acute intracranial hemorrhage is seen.    CSF spaces: The ventricles, sulci and basal cisterns all appear moderately  prominent consistent with global cerebral atrophy.    Brain parenchyma: There is preservation of the grey white junction throughout. Mild microvascular change is seen in portions of the periventricular and deep white matter tracts.    Cerebellum: Unremarkable.    Sella and skull base: The sella appears to be within normal limits for age.    Herniation: None.    Intracranial calcifications: Incidental note is made of bilateral choroid plexus calcification. Incidental note is made of some pineal region calcification. Incidental note is made of some calcification of the falx.    Calvarium: No acute linear or depressed skull fracture is seen.    Maxillofacial Structures:    Paranasal sinuses: There is some mucoperiosteal thickening in the bilateral maxillary sinuses. There is interval resolution of mucosal thickening and fluid within the right sphenoid sinus.    Orbits: The orbits appear unremarkable.    Zygomatic arches: The zygomatic arches are intact and unremarkable.    Temporal bones and mastoids: The temporal bones and mastoids appear unremarkable.    TMJ: The mandibular condyles appear normally placed with respect to the mandibular fossa.                        Preliminary result by Shiv Leung MD (04/29/23 23:18:46)                   Narrative:    START OF REPORT:  Technique: CT of the head was performed without intravenous contrast with axial as well as coronal and sagittal images.    Comparison: Comparison is with study [id number] 11:26:18.    Dosage Information: Automated exposure control was utilized.    Clinical history: UndefinedtWeakness undefinedtFatigue (States malaise, anorexia, incontinence, constipation for 3 weeks. Unable to walk at this time. Etoh abuse.    Findings:  Hemorrhage: No acute intracranial hemorrhage is seen.  CSF spaces: The ventricles, sulci and basal cisterns all appear moderately prominent consistent with global cerebral atrophy.  Brain parenchyma: There is preservation  of the grey white junction throughout. Mild microvascular change is seen in portions of the periventricular and deep white matter tracts.  Cerebellum: Unremarkable.  Sella and skull base: The sella appears to be within normal limits for age.  Herniation: None.  Intracranial calcifications: Incidental note is made of bilateral choroid plexus calcification. Incidental note is made of some pineal region calcification. Incidental note is made of some calcification of the falx.  Calvarium: No acute linear or depressed skull fracture is seen.    Maxillofacial Structures:  Paranasal sinuses: There is some mucoperiosteal thickening in the bilateral maxillary sinuses. There is interval resolution of mucosal thickening and fluid within the right sphenoid sinus.  Orbits: The orbits appear unremarkable.  Zygomatic arches: The zygomatic arches are intact and unremarkable.  Temporal bones and mastoids: The temporal bones and mastoids appear unremarkable.  TMJ: The mandibular condyles appear normally placed with respect to the mandibular fossa.      Impression:  1. No acute intracranial process identified. Details and other findings as noted above.                                         CT Cervical Spine Without Contrast (Final result)  Result time 04/30/23 08:41:28      Final result by Shiv Leung MD (04/30/23 08:41:28)                   Impression:      1. No acute cervical spine fracture dislocation or subluxation is seen.    2. Degenerative changes and other details as above.    Concurrence      Electronically signed by: Shiv Leung MD  Date:    04/30/2023  Time:    08:41               Narrative:    EXAMINATION:  CT CERVICAL SPINE WITHOUT CONTRAST    CLINICAL HISTORY:  Neck pain, acute, no red flags;    TECHNIQUE:  Helical axial images are acquired through the cervical spine without IV contrast.  Sagittal and coronal reformations were performed.  Automated exposure control, dose radiation lowering technique was  utilized.    COMPARISON:  Cervical spine CT from 08/20/2017    FINDINGS:  Lung apices: The visualized lung apices appear unremarkable.    Spine:    Spinal canal: Moderate spinal canal narrowing is seen at C5-C6, secondary to a large disc osteophyte complex. Similar findings are also seen on the prior examination.    Spinal cord: The spinal cord appears unremarkable.    Mineralization: Within normal limits.    Scoliosis: No significant scoliosis is seen.    Vertebral Fusion: No vertebral fusion is identified.    Listhesis: No significant listhesis is identified.    Lordosis: The cervical lordosis is maintained.    Intervertebral disc spaces: Mildly decreased disc height is seen at C5-C6 and C6-C7.    Osteophytes: Anterior marginal multilevel endplate osteophytes are seen.    Endplate Sclerosis: Subtle endplate sclerosis is seen off the opposing endplates at C5-C6 and C6-C7.    Uncovertebral degenerative changes: Mild multilevel uncovertebral joint arthrosis is seen.    Facet degenerative changes: Mild multilevel facet degenerative changes are seen. This is new since the prior examination.    Fractures: No acute cervical spine fracture dislocation or subluxation is seen.    Miscellaneous:    Soft Tissues: Unremarkable.                        Preliminary result by Shiv Leung MD (04/29/23 23:14:38)                   Narrative:    START OF REPORT:  Technique: CT of the cervical spine was performed without intravenous contrast with axial as well as sagittal and coronal images.    Comparison: Comparison is with study dated2017-08-20 06:33:47.    Dosage Information: Automated exposure control was utilized.    Clinical history: UndefinedtWeakness undefinedtFatigue (States malaise, anorexia, incontinence, constipation for 3 weeks. Unable to walk at this time. Etoh abuse.    Findings:  Lung apices: The visualized lung apices appear unremarkable.  Spine:  Spinal canal: Moderate spinal canal narrowing is seen at C5-C6,  secondary to a large disc osteophyte complex. Similar findings are also seen on the prior examination.  Spinal cord: The spinal cord appears unremarkable.  Mineralization: Within normal limits.  Scoliosis: No significant scoliosis is seen.  Vertebral Fusion: No vertebral fusion is identified.  Listhesis: No significant listhesis is identified.  Lordosis: The cervical lordosis is maintained.  Intervertebral disc spaces: Mildly decreased disc height is seen at C5-C6 and C6-C7.  Osteophytes: Anterior marginal multilevel endplate osteophytes are seen.  Endplate Sclerosis: Subtle endplate sclerosis is seen off the opposing endplates at C5-C6 and C6-C7.  Uncovertebral degenerative changes: Mild multilevel uncovertebral joint arthrosis is seen.  Facet degenerative changes: Mild multilevel facet degenerative changes are seen. This is new since the prior examination.  Fractures: No acute cervical spine fracture dislocation or subluxation is seen.    Miscellaneous:  Soft Tissues: Unremarkable.      Impression:  1. No acute cervical spine fracture dislocation or subluxation is seen.  2. Degenerative changes and other details as above.                                         CT Abdomen Pelvis With Contrast (Final result)  Result time 04/30/23 08:44:14      Final result by Shiv Leung MD (04/30/23 08:44:14)                   Impression:      1. Moderate fatty infiltration of the liver is present. This has become more pronounced since the prior examination.    2. There is mucosal enhancement of the duodenum with mild periduodenal fat stranding (series 2; images 30-44). An element of duodenitis cannot be excluded. Correlate with clinical and laboratory findings and followup to full resolution.    3. There is thickening versus underdistention of the cecum through hepatic flexure of the colon with significant appearing fatty infiltration of the walls of the colon. This suggests an element of chronic colitis with an acute  component not excluded. Correlate with clinical and laboratory findings and followup to full resolution.    4. There is minimal ascites. This is new since the prior examination.    5. Details and other findings as discussed above.    [place] concurrence      Electronically signed by: Shiv Leung MD  Date:    04/30/2023  Time:    08:44               Narrative:    EXAMINATION:  CT ABDOMEN PELVIS WITH CONTRAST    CLINICAL HISTORY:  Alcohol abuse, elevated alkaline phosphatase, elevated bilirubin    TECHNIQUE:  Axial CT images were obtained through the abdomen and pelvis following IV administration of contrast.  100 mL Isovue 370.  Coronal and sagittal reconstructions submitted and interpreted.  Automated exposure control, dose radiation lowering technique, was utilized.    COMPARISON:  06/21/2020    FINDINGS:  Thorax:    Lungs: The visualized lung bases appear unremarkable.    Pleura: No pleural effusion is seen.    Heart: The heart size is within normal limits.    Abdomen:    Abdominal Wall: No abdominal wall pathology is seen.    Liver: Moderate fatty infiltration of the liver is present. There is interval development of minimal perihepatic fluid. The liver otherwise appears unremarkable.    Biliary System: No intrahepatic or extrahepatic biliary duct dilatation is seen.    Gallbladder: Surgical clips are seen in the gallbladder fossa consistent with prior cholecystectomy.    Pancreas: The pancreas appears unremarkable.    Spleen: The spleen appears unremarkable.    Adrenals: The adrenal glands appear unremarkable.    Kidneys: The kidneys appear unremarkable with no stones cysts masses or hydronephrosis.    Aorta: There is mild calcification of the abdominal aorta.    IVC: Unremarkable.    Bowel:    Esophagus: The visualized esophagus appears unremarkable.    Stomach: The stomach appears unremarkable.    Duodenum: There is mucosal enhancement of the duodenum with mild periduodenal fat stranding (series 2;  images 30-44). An element of duodenitis cannot be excluded.    Small Bowel: A small transient jejunojejunal intussusception is seen in the left mid abdomen (series 2; image 26). No bowel obstruction is identified.    Colon: Multiple diverticula are seen in the hepatic flexure through to the sigmoid colon. No associated inflammatory stranding or pericolonic fluid is seen to suggest diverticulitis. There is thickening versus underdistention of the cecum through hepatic flexure of the colon with significant appearing fatty infiltration of the walls of the colon.    Appendix: No appendix is identified and a suture line is seen at the base of the cecum consistent with appendectomy.    Peritoneum: No free intraperitoneal air is seen. There is minimal ascites. This is new since the prior examination.    Pelvis:    Bladder: The bladder is nondistended and cannot be definitively evaluated.    Male:    Prostate gland: The prostate gland appears unremarkable.    Bony structures: The bones are osteopenic.    Dorsal Spine: There are stable appearing chronic insufficiency fracture of the thoracolumbar vertebrae.    Bony Pelvis: Again noted is a chronic deformity of the right superior pubic ramus.                        Preliminary result by Shiv Leung MD (04/29/23 23:08:09)                   Narrative:    START OF REPORT:  Technique: CT of the abdomen and pelvis was performed with axial images as well as sagittal and coronal reconstruction images with intravenous contrast.    Comparison: Comparison is with study dated2020-06-21 15:38:25.    Clinical History: UndefinedtWeakness undefinedtFatigue (States malaise, anorexia, incontinence, constipation for 3 weeks. Unable to walk at this time. Etoh abuse.    Dosage Information: Automated Exposure Control was utilized.    Findings:  Thorax:  Lungs: The visualized lung bases appear unremarkable.  Pleura: No pleural effusion is seen.  Heart: The heart size is within normal  limits.  Abdomen:  Abdominal Wall: No abdominal wall pathology is seen.  Liver: Moderate fatty infiltration of the liver is present. There is interval development of minimal perihepatic fluid. The liver otherwise appears unremarkable.  Biliary System: No intrahepatic or extrahepatic biliary duct dilatation is seen.  Gallbladder: Surgical clips are seen in the gallbladder fossa consistent with prior cholecystectomy.  Pancreas: The pancreas appears unremarkable.  Spleen: The spleen appears unremarkable.  Adrenals: The adrenal glands appear unremarkable.  Kidneys: The kidneys appear unremarkable with no stones cysts masses or hydronephrosis.  Aorta: There is mild calcification of the abdominal aorta.  IVC: Unremarkable.  Bowel:  Esophagus: The visualized esophagus appears unremarkable.  Stomach: The stomach appears unremarkable.  Duodenum: There is mucosal enhancement of the duodenum with mild periduodenal fat stranding (series 2; images 30-44). An element of duodenitis cannot be excluded.  Small Bowel: A small transient jejunojejunal intussusception is seen in the left mid abdomen (series 2; image 26). No bowel obstruction is identified.  Colon: Multiple diverticula are seen in the hepatic flexure through to the sigmoid colon. No associated inflammatory stranding or pericolonic fluid is seen to suggest diverticulitis. There is thickening versus underdistention of the cecum through hepatic flexure of the colon with significant appearing fatty infiltration of the walls of the colon.  Appendix: No appendix is identified and a suture line is seen at the base of the cecum consistent with appendectomy.  Peritoneum: No free intraperitoneal air is seen. There is minimal ascites. This is new since the prior examination.    Pelvis:  Bladder: The bladder is nondistended and cannot be definitively evaluated.  Male:  Prostate gland: The prostate gland appears unremarkable.    Bony structures: The bones are osteopenic.  Dorsal  Spine: There are stable appearing chronic insufficiency fracture of the thoracolumbar vertebrae.  Bony Pelvis: Again noted is a chronic deformity of the right superior pubic ramus.      Impression:  1. Moderate fatty infiltration of the liver is present. This has become more pronounced since the prior examination.  2. There is mucosal enhancement of the duodenum with mild periduodenal fat stranding (series 2; images 30-44). An element of duodenitis cannot be excluded. Correlate with clinical and laboratory findings and followup to full resolution.  3. There is thickening versus underdistention of the cecum through hepatic flexure of the colon with significant appearing fatty infiltration of the walls of the colon. This suggests an element of chronic colitis with an acute component not excluded. Correlate with clinical and laboratory findings and followup to full resolution.  4. There is minimal ascites. This is new since the prior examination.  5. Details and other findings as discussed above.                                         XR ABDOMEN  ACUTE 2 OR MORE VIEWS WITH CHEST (Final result)  Result time 04/30/23 08:44:59      Final result by Shiv Leung MD (04/30/23 08:44:59)                   Impression:      Nonobstructive bowel gas pattern.      Electronically signed by: Shiv Leung MD  Date:    04/30/2023  Time:    08:44               Narrative:    EXAMINATION:  XR ABDOMEN ACUTE 2 OR MORE VIEWS WITH CHEST    CLINICAL HISTORY:  constipation;    TECHNIQUE:  PA view of the chest obtained with supine and upright AP views of the abdomen.    COMPARISON:  Chest radiograph from 10/25/2021    FINDINGS:  Heart size is normal.  There is slightly increased interstitial markings throughout both lungs.  No free air.  Bowel gas pattern is nonobstructive.  Cholecystectomy clips are present right upper quadrant.                        Wet Read by Briana Miller MD (04/29/23 23:00:00, [facility]  Dept, Emergency Medicine)    No acute cardiopulmonary process. Non obstructive gas bowel pattern                                     Current Medications:     Infusions:       Scheduled:   folic acid  1 mg Oral Daily    lactulose  10 g Oral TID    levETIRAcetam  750 mg Oral BID    magnesium sulfate IVPB  4 g Intravenous Once    multivitamin  1 tablet Oral Daily    potassium chloride  10 mEq Intravenous Q1H    thiamine (VITAMIN B1) IVPB  500 mg Intravenous Daily    Followed by    [START ON 5/3/2023] thiamine  300 mg Oral Daily        PRN:  dextrose 10%, dextrose 10%, dextrose, dextrose, LORazepam    Antibiotics and Day Number of Therapy:  Rocephin and flagyl day 2     Lines and Day Number of Therapy:  Piv     Assessment & Plan:        Alcoholic steatohepatitis  Acute intoxication  - , ast 126, lipase 127, alt wnl.   - CIWA protocol with prn ativan for withdrawal sx.   - ethanol level  369. Reports last drink was 1.5 days ago. Cannabis positive on UDS.   - Will continue to replete vitamins and fluids.  - RUQ US revealed mildly enlarged liver with increased echogenicity reflective of fatty infiltration    - negative hepatitis, HIV panel.  - ASA, tylenol level negative.   - PT/INR = .86. CRP  39.3.  - XR abd and chest - no acute intrapulmonary/abd findings. Non-obstructive gas pattern.   -  Ct done of abd, suggest possible duodenitis, steatohepatitis.        Leukocytosis  Diarrhea  - WBC 14.8 on admission, improved to 12.4 this AM   - No guarding or tenderness on abdominal exam today   - Started on IV abx with Rocephin and Flagyl on admission given concern for potential intra-abdominal infection    - Cdiff PCR negative   - stool studies pending    - Discontinued abx today given low suspicion for infection     Macrocytic anemia  - B12, folate, iron, tibc, retic, ferritin ordered.    b12, folate not deficient. Iron, TIBC low suggesting chronic disease. Retic elevated showing compensation.      Seizure disorder  -  Continue home keppra  - Seizure precautions.      Hypokalemia  Hypophosphatemia   Hypomagnesemia   - Repleted electrolytes this morning   - CTM and replete as necessary      CODE STATUS: full   Access: piv  Antibiotics: none  Diet: regular  DVT Prophylaxis: SCD  GI Prophylaxis: none  Fluids: none   Consults: PT   Fall, seizure precautions.        Disposition: Continue ciwa protocol with prn ativan,. Continue to monitor electrolytes and replete as necessary.       Katina Steiner MD  [facility]

## 2023-05-02 NOTE — PROGRESS NOTES
Inpatient Nutrition Assessment    Admit Date: 4/29/2023   Total duration of encounter: 3 days     Nutrition Recommendation/Prescription     Continue regular diet/encourage oral intake   Will order chocolate boost tid; Boost (provides 240 kcal, 10 g protein per serving)   MVI, folic acid , thiamine-hx etoh abuse  Consider probiotic use /+ diarrhea   Will monitor nutrition status     Communication of Recommendations: reviewed with nurse, reviewed with patient, and reviewed with caregiver    Nutrition Assessment     Malnutrition Assessment/Nutrition-Focused Physical Exam    Malnutrition in the context of chronic illness  Degree of Malnutrition: non-severe (moderate) malnutrition  Energy Intake: < 75% of estimated energy requirement for >/= 1 month  Interpretation of Weight Loss: unable to obtain  Body Fat: does not meet criteria  Area of Body Fat Loss: does not meet criteria  Muscle Mass Loss: mild depletion  Area of Muscle Mass Loss: temple region - temporalis muscle, clavicle bone region - pectoralis major, deltoid, trapezius muscles, and clavicle and acromion bone region - deltoid muscle  Fluid Accumulation: does not meet criteria  Edema: does not meet criteria  Reduced  Strength: unable to obtain  A minimum of two characteristics is recommended for diagnosis of either severe or non-severe malnutrition.    Chart Review    Reason Seen: continuous nutrition monitoring    Malnutrition Screening Tool Results                Diagnosis:  Etoh steatohepatitis, intoxication, diarrhea, anemia, seizure, disorder, Low K/P/Mg     Relevant Medical History: seiZure, etoh disorder     Nutrition-Related Medications: folic acid, MVI, thiamine   Calorie Containing IV Medications: no significant kcals from medications at this time    Nutrition-Related Labs:  (5/2) H/H 6.9/21.2(L) Gluc 105 Bun < 3.0 cr 0.6 K 3.4(L) Alk Phos 417(H) Tbili 2.3(H) P 2.2(L) Mg 1.9  (4-30) NH4 57.2(H)     Diet/PN Order: Diet Adult Regular  Oral Supplement  Order: none  Tube Feeding Order: none  Appetite/Oral Intake: fair/50-75% of meals  Factors Affecting Nutritional Intake: decreased appetite, diarrhea, nausea, and vomiting  Food/Christian/Cultural Preferences: none reported  Food Allergies: none reported    Skin Integrity: bruised (ecchymotic)  Wound(s):   pigmented area abdomen/gluteral area--WCN evaluated     Comments    () Pt laying in bed; significant other at bedside--they reorted pt with fair po intake--eats small amount at a time; having N/V/D approx 2 weeks; N/V resolved but still with diarrhea; unsure UBW--thinks lost some wt ; bedwt today 160#; admit wt 120#---unsure true wt; will track trends. Pt willing to try oral supplement. Hx ETOH abuse; on thiamine/MVI/folic acid. Labs acknowledged--LFTs elevated.     Anthropometrics    Height: 6' (182.9 cm) Height Method: Stated  Last Weight: 72.9 kg (160 lb 11.5 oz) (bedwt today 72.9kg vs admit wt 54.4kg--pt unsure wt) (23 1403)    BMI (Calculated): 21.8  BMI Classification: normal (BMI 18.5-24.9)        Ideal Body Weight (IBW), Male: 178 lb     % Ideal Body Weight, Male (lb): 67.42 %                 Usual Body Weight (UBW), kg:  (pt unsure UBW)        Usual Weight Provided By: patient, family/caregiver, and EMR weight history    Wt Readings from Last 5 Encounters:   23 72.9 kg (160 lb 11.5 oz)   23 68.2 kg (150 lb 6.4 oz)     Weight Change(s) Since Admission:  Admit Weight: 54.4 kg (120 lb) (23)  Unsure actual true wt--admit wt 120#; bedwt 160#; will track; pt appears thin --but > 120#    Estimated Needs    Weight Used For Calorie Calculations: 72.9 kg (160 lb 11.5 oz)  Energy Calorie Requirements (kcal): 2187 kcal/d; 30 stuart/kg  Energy Need Method: Kcal/kg  Weight Used For Protein Calculations: 72.9 kg (160 lb 11.5 oz)  Protein Requirements: 94 gm protein/d 1.3 gm/kg  Fluid Requirements (mL): 2187 ml/d;1ml/stuart  Temp (24hrs), Av.2 °F (36.8 °C), Min:97.9 °F (36.6 °C), Max:98.6 °F  (37 °C)         Enteral Nutrition    Patient not receiving enteral nutrition at this time.    Parenteral Nutrition    Patient not receiving parenteral nutrition support at this time.    Evaluation of Received Nutrient Intake    Calories: not meeting estimated needs  Protein: not meeting estimated needs    Patient Education    Not applicable.    Nutrition Diagnosis     PES: Malnutrition related to altered GI function as evidenced by N/V/D; decreased po intake . (new)    Interventions/Goals     Intervention(s): general/healthful diet, commercial beverage, multivitamin/mineral supplement therapy, and collaboration with other providers  Goal: Meet greater than 75% of nutritional needs by follow-up. (new)    Monitoring & Evaluation     Dietitian will monitor food and beverage intake, weight, and glucose/endocrine profile.  Nutrition Risk/Follow-Up: moderate (follow-up in 3-5 days)   Please consult if re-assessment needed sooner.

## 2023-05-02 NOTE — PT/OT/SLP PROGRESS
Physical Therapy    Missed Treatment Session    Patient Name:  [place]   MRN:  [id number]      Patient not seen at this time secondary to Nurse/ JOSE RAUL hold. Nurse hogan reported pt was not appropriate at this time. Pt was weak and  with  critical low H&H awaiting blood transfusion and return FOB.     Will follow-up as patient is available to participate and as therapists' schedule allows.

## 2023-05-02 NOTE — PLAN OF CARE
Problem: Adult Inpatient Plan of Care  Goal: Plan of Care Review  Outcome: Ongoing, Progressing  Goal: Patient-Specific Goal (Individualized)  Outcome: Ongoing, Progressing  Goal: Absence of Hospital-Acquired Illness or Injury  Outcome: Ongoing, Progressing  Goal: Optimal Comfort and Wellbeing  Outcome: Ongoing, Progressing  Goal: Readiness for Transition of Care  Outcome: Ongoing, Progressing     Problem: Skin Injury Risk Increased  Goal: Skin Health and Integrity  Outcome: Ongoing, Progressing  Intervention: Optimize Skin Protection  Flowsheets (Taken 5/2/2023 9247)  Pressure Reduction Techniques: frequent weight shift encouraged  Pressure Reduction Devices: positioning supports utilized  Skin Protection: adhesive use limited  Head of Bed (HOB) Positioning: HOB at 30 degrees

## 2023-05-02 NOTE — CONSULTS
Patient is seen at bedside to eval and treat reported skin changes present on admission. Pt is noted with hyperpigmented areas to lateral abdominal and gluteal folds. Per significant other at bedside it has been there over 15 years. While not concerning due to, intact and chronic nature, we can start some topical antifungal cream due to location. Reported erythematous papules to abdomen appear dry today presenting more like a contact dermatitis that could be treated with topical steroids if needed. Pt has no complaints at this time so I will defer this to IM team. No open wounds noted at this time so no need for wound care to follow at this time.

## 2023-05-02 NOTE — PROGRESS NOTES
[facility] Medicine Wards Progress Note     Resident Team: [facility] Medicine List 3  Attending Physician: Dyana Grimes MD   Resident: Isaiah   Intern: Obdulia        Subjective:      Brief HPI:  Los Alatorre is a 57 y.o. male who has a past medical history of Seizures, alcohol use disorder.  He presented to [facility] on 2023  with a primary complaint of generalized weakness the past 3 weeks. History attained by patient who is a poor historian and significant other in the room. Reports 12 lbs weight loss the past month, decreased appetite. Has had some N/V and loose stools during this timeframe but denies any hematemesis/hematochezia/melena. Reported to have altered gait/balance but etoh level is >300. SO reports patient has had difficulty ambulating at home due to his weakness but patient reports this is because of poor vision 2/2 cataracts. He was noted in the ED to be able to transfer to the wheelchair for imaging without difficulty.  Denies any changes in vision recently. Denies any fever, CP, SOB, abd pain, falls/injury, back pain, HA, focal weakness/numbness, blood in stool, hallucinations, tremors. Pt smokes 1ppd. Drinks 1 pint daily. Uses marijuana occasionally and denies any other use of illicit substances. Pt states his last seizure was 6 months ago, although may have been about a year ago.        Interval History: NAEON. Did not require any prn ativan overnight. Improved orientation this morning, near baseline. Continues to have loose brown bowel movements (x5 per nursing), however still on lactulose.  Repleting electrolytes this morning and transfusing 2 units pRBCs.       Review of Systems:  ROS completed and negative except as indicated above.     Objective:     Last 24 Hour Vital Signs:  BP  Min: 114/75  Max: 124/81  Temp  Av.2 °F (36.8 °C)  Min: 97.9 °F (36.6 °C)  Max: 98.6 °F (37 °C)  Pulse  Av.2  Min: 98  Max: 104  Resp  Av  Min: 18  Max: 18  SpO2  Av.4 %  Min: 98 %  Max: 99 %  I/O last  3 completed shifts:  In: 1710 [P.O.:1110; I.V.:600]  Out: -     Physical Examination:  General:  thin, chronically ill appearing. no acute distress. Disheveled.   Head: Normocephalic, atraumatic  ENT: PERRL, MMM.   Neck: supple, normal ROM, no JVD  CVS:  RRR. S1 and S2 normal. No murmurs, rubs, or gallops. Regular peripheral pulses. No peripheral edema  Resp:  Lungs clear to auscultation bilaterally, no wheezes, rales, or rhonchi. Normal respiratory effort.  GI:  Abdomen soft, non-tender, non-distended, normoactive bowel sounds  MSK:  Full range of motion, no obvious deformities  Skin: jaundice, chronic hyperpigmented patches on buttock, diffuse erythematous papules lower abdomen  Neuro:  alert and oriented to person and place, No focal neuro deficits, CNII-XII grossly intact. Good strength of dorsiflexion/plantarflexion bilaterally, good  strength. No tremor noted.      Laboratory:  Most Recent Data:  CBC:   Lab Results   Component Value Date    WBC 12.29 (H) 05/02/2023    HGB 6.9 (L) 05/02/2023    HCT 21.1 (L) 05/02/2023     05/02/2023    .2 (H) 05/02/2023    RDW 14.0 05/02/2023     WBC Differential:   Recent Labs   Lab 04/29/23  2101 05/01/23  0852 05/02/23  0754   WBC 14.8* 12.4* 12.29*   HGB 8.0* 7.4* 6.9*   HCT 23.9* 24.1* 21.1*    256 255   .2* 111.1* 108.2*     BMP:   Lab Results   Component Value Date     05/02/2023    K 3.4 (L) 05/02/2023    CO2 25 05/02/2023    BUN <3.0 (L) 05/02/2023    CREATININE 0.66 (L) 05/02/2023    CALCIUM 7.5 (L) 05/02/2023    MG 1.90 05/02/2023    PHOS 2.2 (L) 05/02/2023     LFTs:   Lab Results   Component Value Date    ALBUMIN 1.8 (L) 05/02/2023    BILITOT 2.3 (H) 05/02/2023    AST 70 (H) 05/02/2023    ALKPHOS 417 (H) 05/02/2023    ALT 16 05/02/2023     (H) 06/21/2020     Coags:   Lab Results   Component Value Date    INR 1.17 04/30/2023    PROTIME 14.7 04/30/2023    PTT 28.5 06/04/2021     FLP:   Lab Results   Component Value Date     CHOL 295 (H) 06/17/2022    HDL 83 (H) 06/17/2022    TRIG 87 06/17/2022     DM:   Lab Results   Component Value Date    HGBA1C 4.9 06/04/2021    HGBA1C 5.5 06/24/2020    HGBA1C 5.3 07/03/2018    CREATININE 0.66 (L) 05/02/2023     Thyroid:   Lab Results   Component Value Date    TSH 2.4391 06/17/2022      Anemia:   Lab Results   Component Value Date    IRON 17 (L) 04/29/2023    TIBC 197 (L) 04/29/2023    FERRITIN 220.52 04/29/2023       Lab Results   Component Value Date    IMJMNKME55 >2,000 (H) 04/29/2023       Lab Results   Component Value Date    FOLATE >80.0 (H) 04/30/2023        Cardiac:   Lab Results   Component Value Date    TROPONINI <0.010 04/29/2023         Microbiology Data:  Microbiology Results (last 7 days)       Procedure Component Value Units Date/Time    Stool Culture [[id number]]  (Normal) Collected: 04/30/23 0637    Order Status: Completed Specimen: Stool Updated: 05/02/23 0856     Stool Culture Negative for Salmonella, Shigella, Campylobacter, Vibrio, Aeromonas, Pleisiomonas,Yersinia, or Shiga Toxin 1 and 2.    C Diff Toxin by PCR [[id number]]  (Normal) Collected: 04/30/23 0637    Order Status: Completed Specimen: Stool Updated: 05/01/23 0110     Clostridium difficile toxin PCR Not Detected    Narrative:      The Xpert C.difficile is a qualitative in vitro diagnostic test for rapid detection of the toxin B gene sequences of toxigenic Clostridium difficile from unformed (liquid or soft) stool specimens collected from patients suspected of having C. difficile infection (CDI).    Clostridium Diff Toxin, A & B, EIA [[id number]]  (Abnormal) Collected: 04/30/23 0637    Order Status: Completed Specimen: Stool Updated: 04/30/23 0903     Clostridium Difficile GDH Antigen Positive     Clostridium Difficile Toxin A/B Negative    C. Difficile By Rapid Pcr [[id number]] Collected: 04/30/23 0637    Order Status: Canceled Specimen: Stool              Other Results:    Radiology:  Imaging Results              US  Abdomen Limited_Liver (Final result)  Result time 04/30/23 08:37:08      Final result by Shiv Leung MD (04/30/23 08:37:08)                   Impression:      1. The liver is mildly enlarged and measures 19 cm in greatest dimension and demonstrates moderate increased echogenicity which may reflect fatty infiltration. Correlate with clinical and laboratory findings.    2.  Trace volume ascites      Electronically signed by: Shiv Leung MD  Date:    04/30/2023  Time:    08:37               Narrative:    EXAMINATION:  US ABDOMEN LIMITED_LIVER    CLINICAL HISTORY:  transaminitis;    TECHNIQUE:  Limited ultrasound of the right upper quadrant of the abdomen (including pancreas, liver, gallbladder, common bile duct, and right kidney) was performed.    COMPARISON:  CT abdomen and pelvis from 04/29/2023    FINDINGS:  Liver: The liver is mildly enlarged and measures 19 centimeters in greatest dimension and demonstrates moderate increased echogenicity which may reflect fatty infiltration. A 1.2 x 1.2 x 1.1 cm simple cyst is seen in the right lobe of the liver. Follow-up as clinically indicated.    Biliary ducts: The common bile duct is within normal limits at 4 mm in diameter.    Gallbladder: The gallbladder is surgically absent.    Pancreas: The pancreas cannot be definitively evaluated to to obscuration by bowel gas.    Right kidney:    Dimensions: The right kidney measures 9.6 x 5.6 x 4.5 cm and appears unremarkable.    Vascular:    IVC: The inferior vena cava is suboptimally visualized due to bowel gas.                        Preliminary result by Shiv Leung MD (04/30/23 02:23:08)                   Narrative:    START OF REPORT:  Technique: Limited right upper quadrant abdominal ultrasound was performed.    Comparison: Comparison is with CT dated2023-04-29 22:57:29.    Clinical History: Transaminitis.    Findings:  Liver: The liver is mildly enlarged and measures 19 centimeters in greatest dimension  and demonstrates moderate increased echogenicity which may reflect fatty infiltration. A 1.2 x 1.2 x 1.1 cm simple cyst is seen in the right lobe of the liver. Follow-up as clinically indicated.  Biliary ducts: The common bile duct is within normal limits at 4 mm in diameter.  Gallbladder: The gallbladder is surgically absent.  Pancreas: The pancreas cannot be definitively evaluated to to obscuration by bowel gas.  Right kidney:  Dimensions: The right kidney measures 9.6 x 5.6 x 4.5 cm and appears unremarkable.  Vascular:  IVC: The inferior vena cava is suboptimally visualized due to bowel gas.      Impression:  1. The liver is mildly enlarged and measures 19 centimeters in greatest dimension and demonstrates moderate increased echogenicity which may reflect fatty infiltration. Correlate with clinical and laboratory findings.  2. No acute intraabdominal process identified. Details and other findings as noted above.                                         CT Head Without Contrast (Final result)  Result time 04/30/23 08:38:27      Final result by Shiv Leung MD (04/30/23 08:38:27)                   Impression:      1. No acute intracranial process identified. Details and other findings as noted above.      Electronically signed by: Shiv Leung MD  Date:    04/30/2023  Time:    08:38               Narrative:    EXAMINATION:  CT HEAD WITHOUT CONTRAST    INDICATION:  weakness;    TECHNIQUE:  Contiguous axial images are acquired through the head without IV contrast.  These images were reconstructed into the coronal and sagittal plane.  Automated exposure control, dose radiation lowering technique was utilized.    COMPARISON:  Head CT from 06/04/2021    FINDINGS:  Hemorrhage: No acute intracranial hemorrhage is seen.    CSF spaces: The ventricles, sulci and basal cisterns all appear moderately prominent consistent with global cerebral atrophy.    Brain parenchyma: There is preservation of the grey white  junction throughout. Mild microvascular change is seen in portions of the periventricular and deep white matter tracts.    Cerebellum: Unremarkable.    Sella and skull base: The sella appears to be within normal limits for age.    Herniation: None.    Intracranial calcifications: Incidental note is made of bilateral choroid plexus calcification. Incidental note is made of some pineal region calcification. Incidental note is made of some calcification of the falx.    Calvarium: No acute linear or depressed skull fracture is seen.    Maxillofacial Structures:    Paranasal sinuses: There is some mucoperiosteal thickening in the bilateral maxillary sinuses. There is interval resolution of mucosal thickening and fluid within the right sphenoid sinus.    Orbits: The orbits appear unremarkable.    Zygomatic arches: The zygomatic arches are intact and unremarkable.    Temporal bones and mastoids: The temporal bones and mastoids appear unremarkable.    TMJ: The mandibular condyles appear normally placed with respect to the mandibular fossa.                        Preliminary result by Shiv Leung MD (04/29/23 23:18:46)                   Narrative:    START OF REPORT:  Technique: CT of the head was performed without intravenous contrast with axial as well as coronal and sagittal images.    Comparison: Comparison is with study dated2021-06-04 11:26:18.    Dosage Information: Automated exposure control was utilized.    Clinical history: UndefinedtWeakness undefinedtFatigue (States malaise, anorexia, incontinence, constipation for 3 weeks. Unable to walk at this time. Etoh abuse.    Findings:  Hemorrhage: No acute intracranial hemorrhage is seen.  CSF spaces: The ventricles, sulci and basal cisterns all appear moderately prominent consistent with global cerebral atrophy.  Brain parenchyma: There is preservation of the grey white junction throughout. Mild microvascular change is seen in portions of the periventricular and  deep white matter tracts.  Cerebellum: Unremarkable.  Sella and skull base: The sella appears to be within normal limits for age.  Herniation: None.  Intracranial calcifications: Incidental note is made of bilateral choroid plexus calcification. Incidental note is made of some pineal region calcification. Incidental note is made of some calcification of the falx.  Calvarium: No acute linear or depressed skull fracture is seen.    Maxillofacial Structures:  Paranasal sinuses: There is some mucoperiosteal thickening in the bilateral maxillary sinuses. There is interval resolution of mucosal thickening and fluid within the right sphenoid sinus.  Orbits: The orbits appear unremarkable.  Zygomatic arches: The zygomatic arches are intact and unremarkable.  Temporal bones and mastoids: The temporal bones and mastoids appear unremarkable.  TMJ: The mandibular condyles appear normally placed with respect to the mandibular fossa.      Impression:  1. No acute intracranial process identified. Details and other findings as noted above.                                         CT Cervical Spine Without Contrast (Final result)  Result time 04/30/23 08:41:28      Final result by Shiv Leung MD (04/30/23 08:41:28)                   Impression:      1. No acute cervical spine fracture dislocation or subluxation is seen.    2. Degenerative changes and other details as above.    Concurrence      Electronically signed by: Shiv Leung MD  Date:    04/30/2023  Time:    08:41               Narrative:    EXAMINATION:  CT CERVICAL SPINE WITHOUT CONTRAST    CLINICAL HISTORY:  Neck pain, acute, no red flags;    TECHNIQUE:  Helical axial images are acquired through the cervical spine without IV contrast.  Sagittal and coronal reformations were performed.  Automated exposure control, dose radiation lowering technique was utilized.    COMPARISON:  Cervical spine CT from 08/20/2017    FINDINGS:  Lung apices: The visualized lung  apices appear unremarkable.    Spine:    Spinal canal: Moderate spinal canal narrowing is seen at C5-C6, secondary to a large disc osteophyte complex. Similar findings are also seen on the prior examination.    Spinal cord: The spinal cord appears unremarkable.    Mineralization: Within normal limits.    Scoliosis: No significant scoliosis is seen.    Vertebral Fusion: No vertebral fusion is identified.    Listhesis: No significant listhesis is identified.    Lordosis: The cervical lordosis is maintained.    Intervertebral disc spaces: Mildly decreased disc height is seen at C5-C6 and C6-C7.    Osteophytes: Anterior marginal multilevel endplate osteophytes are seen.    Endplate Sclerosis: Subtle endplate sclerosis is seen off the opposing endplates at C5-C6 and C6-C7.    Uncovertebral degenerative changes: Mild multilevel uncovertebral joint arthrosis is seen.    Facet degenerative changes: Mild multilevel facet degenerative changes are seen. This is new since the prior examination.    Fractures: No acute cervical spine fracture dislocation or subluxation is seen.    Miscellaneous:    Soft Tissues: Unremarkable.                        Preliminary result by Shiv Leung MD (04/29/23 23:14:38)                   Narrative:    START OF REPORT:  Technique: CT of the cervical spine was performed without intravenous contrast with axial as well as sagittal and coronal images.    Comparison: Comparison is with study [id number] 06:33:47.    Dosage Information: Automated exposure control was utilized.    Clinical history: UndefinedtWeakness undefinedtFatigue (States malaise, anorexia, incontinence, constipation for 3 weeks. Unable to walk at this time. Etoh abuse.    Findings:  Lung apices: The visualized lung apices appear unremarkable.  Spine:  Spinal canal: Moderate spinal canal narrowing is seen at C5-C6, secondary to a large disc osteophyte complex. Similar findings are also seen on the prior  examination.  Spinal cord: The spinal cord appears unremarkable.  Mineralization: Within normal limits.  Scoliosis: No significant scoliosis is seen.  Vertebral Fusion: No vertebral fusion is identified.  Listhesis: No significant listhesis is identified.  Lordosis: The cervical lordosis is maintained.  Intervertebral disc spaces: Mildly decreased disc height is seen at C5-C6 and C6-C7.  Osteophytes: Anterior marginal multilevel endplate osteophytes are seen.  Endplate Sclerosis: Subtle endplate sclerosis is seen off the opposing endplates at C5-C6 and C6-C7.  Uncovertebral degenerative changes: Mild multilevel uncovertebral joint arthrosis is seen.  Facet degenerative changes: Mild multilevel facet degenerative changes are seen. This is new since the prior examination.  Fractures: No acute cervical spine fracture dislocation or subluxation is seen.    Miscellaneous:  Soft Tissues: Unremarkable.      Impression:  1. No acute cervical spine fracture dislocation or subluxation is seen.  2. Degenerative changes and other details as above.                                         CT Abdomen Pelvis With Contrast (Final result)  Result time 04/30/23 08:44:14      Final result by Shiv Leung MD (04/30/23 08:44:14)                   Impression:      1. Moderate fatty infiltration of the liver is present. This has become more pronounced since the prior examination.    2. There is mucosal enhancement of the duodenum with mild periduodenal fat stranding (series 2; images 30-44). An element of duodenitis cannot be excluded. Correlate with clinical and laboratory findings and followup to full resolution.    3. There is thickening versus underdistention of the cecum through hepatic flexure of the colon with significant appearing fatty infiltration of the walls of the colon. This suggests an element of chronic colitis with an acute component not excluded. Correlate with clinical and laboratory findings and followup to full  resolution.    4. There is minimal ascites. This is new since the prior examination.    5. Details and other findings as discussed above.    [facility] concurrence      Electronically signed by: Shiv Leung MD  Date:    04/30/2023  Time:    08:44               Narrative:    EXAMINATION:  CT ABDOMEN PELVIS WITH CONTRAST    CLINICAL HISTORY:  Alcohol abuse, elevated alkaline phosphatase, elevated bilirubin    TECHNIQUE:  Axial CT images were obtained through the abdomen and pelvis following IV administration of contrast.  100 mL Isovue 370.  Coronal and sagittal reconstructions submitted and interpreted.  Automated exposure control, dose radiation lowering technique, was utilized.    COMPARISON:  06/21/2020    FINDINGS:  Thorax:    Lungs: The visualized lung bases appear unremarkable.    Pleura: No pleural effusion is seen.    Heart: The heart size is within normal limits.    Abdomen:    Abdominal Wall: No abdominal wall pathology is seen.    Liver: Moderate fatty infiltration of the liver is present. There is interval development of minimal perihepatic fluid. The liver otherwise appears unremarkable.    Biliary System: No intrahepatic or extrahepatic biliary duct dilatation is seen.    Gallbladder: Surgical clips are seen in the gallbladder fossa consistent with prior cholecystectomy.    Pancreas: The pancreas appears unremarkable.    Spleen: The spleen appears unremarkable.    Adrenals: The adrenal glands appear unremarkable.    Kidneys: The kidneys appear unremarkable with no stones cysts masses or hydronephrosis.    Aorta: There is mild calcification of the abdominal aorta.    IVC: Unremarkable.    Bowel:    Esophagus: The visualized esophagus appears unremarkable.    Stomach: The stomach appears unremarkable.    Duodenum: There is mucosal enhancement of the duodenum with mild periduodenal fat stranding (series 2; images 30-44). An element of duodenitis cannot be excluded.    Small Bowel: A small transient  jejunojejunal intussusception is seen in the left mid abdomen (series 2; image 26). No bowel obstruction is identified.    Colon: Multiple diverticula are seen in the hepatic flexure through to the sigmoid colon. No associated inflammatory stranding or pericolonic fluid is seen to suggest diverticulitis. There is thickening versus underdistention of the cecum through hepatic flexure of the colon with significant appearing fatty infiltration of the walls of the colon.    Appendix: No appendix is identified and a suture line is seen at the base of the cecum consistent with appendectomy.    Peritoneum: No free intraperitoneal air is seen. There is minimal ascites. This is new since the prior examination.    Pelvis:    Bladder: The bladder is nondistended and cannot be definitively evaluated.    Male:    Prostate gland: The prostate gland appears unremarkable.    Bony structures: The bones are osteopenic.    Dorsal Spine: There are stable appearing chronic insufficiency fracture of the thoracolumbar vertebrae.    Bony Pelvis: Again noted is a chronic deformity of the right superior pubic ramus.                        Preliminary result by Shiv Leung MD (04/29/23 23:08:09)                   Narrative:    START OF REPORT:  Technique: CT of the abdomen and pelvis was performed with axial images as well as sagittal and coronal reconstruction images with intravenous contrast.    Comparison: Comparison is with study [id number] 15:38:25.    Clinical History: UndefinedtWeakness undefinedtFatigue (States malaise, anorexia, incontinence, constipation for 3 weeks. Unable to walk at this time. Etoh abuse.    Dosage Information: Automated Exposure Control was utilized.    Findings:  Thorax:  Lungs: The visualized lung bases appear unremarkable.  Pleura: No pleural effusion is seen.  Heart: The heart size is within normal limits.  Abdomen:  Abdominal Wall: No abdominal wall pathology is seen.  Liver: Moderate fatty  infiltration of the liver is present. There is interval development of minimal perihepatic fluid. The liver otherwise appears unremarkable.  Biliary System: No intrahepatic or extrahepatic biliary duct dilatation is seen.  Gallbladder: Surgical clips are seen in the gallbladder fossa consistent with prior cholecystectomy.  Pancreas: The pancreas appears unremarkable.  Spleen: The spleen appears unremarkable.  Adrenals: The adrenal glands appear unremarkable.  Kidneys: The kidneys appear unremarkable with no stones cysts masses or hydronephrosis.  Aorta: There is mild calcification of the abdominal aorta.  IVC: Unremarkable.  Bowel:  Esophagus: The visualized esophagus appears unremarkable.  Stomach: The stomach appears unremarkable.  Duodenum: There is mucosal enhancement of the duodenum with mild periduodenal fat stranding (series 2; images 30-44). An element of duodenitis cannot be excluded.  Small Bowel: A small transient jejunojejunal intussusception is seen in the left mid abdomen (series 2; image 26). No bowel obstruction is identified.  Colon: Multiple diverticula are seen in the hepatic flexure through to the sigmoid colon. No associated inflammatory stranding or pericolonic fluid is seen to suggest diverticulitis. There is thickening versus underdistention of the cecum through hepatic flexure of the colon with significant appearing fatty infiltration of the walls of the colon.  Appendix: No appendix is identified and a suture line is seen at the base of the cecum consistent with appendectomy.  Peritoneum: No free intraperitoneal air is seen. There is minimal ascites. This is new since the prior examination.    Pelvis:  Bladder: The bladder is nondistended and cannot be definitively evaluated.  Male:  Prostate gland: The prostate gland appears unremarkable.    Bony structures: The bones are osteopenic.  Dorsal Spine: There are stable appearing chronic insufficiency fracture of the thoracolumbar  vertebrae.  Bony Pelvis: Again noted is a chronic deformity of the right superior pubic ramus.      Impression:  1. Moderate fatty infiltration of the liver is present. This has become more pronounced since the prior examination.  2. There is mucosal enhancement of the duodenum with mild periduodenal fat stranding (series 2; images 30-44). An element of duodenitis cannot be excluded. Correlate with clinical and laboratory findings and followup to full resolution.  3. There is thickening versus underdistention of the cecum through hepatic flexure of the colon with significant appearing fatty infiltration of the walls of the colon. This suggests an element of chronic colitis with an acute component not excluded. Correlate with clinical and laboratory findings and followup to full resolution.  4. There is minimal ascites. This is new since the prior examination.  5. Details and other findings as discussed above.                                         XR ABDOMEN  ACUTE 2 OR MORE VIEWS WITH CHEST (Final result)  Result time 04/30/23 08:44:59      Final result by Shiv Leung MD (04/30/23 08:44:59)                   Impression:      Nonobstructive bowel gas pattern.      Electronically signed by: Shiv Leung MD  Date:    04/30/2023  Time:    08:44               Narrative:    EXAMINATION:  XR ABDOMEN ACUTE 2 OR MORE VIEWS WITH CHEST    CLINICAL HISTORY:  constipation;    TECHNIQUE:  PA view of the chest obtained with supine and upright AP views of the abdomen.    COMPARISON:  Chest radiograph from 10/25/2021    FINDINGS:  Heart size is normal.  There is slightly increased interstitial markings throughout both lungs.  No free air.  Bowel gas pattern is nonobstructive.  Cholecystectomy clips are present right upper quadrant.                        Wet Read by Briana Miller MD (04/29/23 23:00:00, [facility], Emergency Medicine)    No acute cardiopulmonary process. Non obstructive gas bowel  pattern                                     Current Medications:     Infusions:       Scheduled:   diphenoxylate-atropine 2.5-0.025 mg  1 tablet Oral Once    levETIRAcetam  750 mg Oral BID    miconazole   Topical (Top) BID    multivitamin  1 tablet Oral Daily    potassium phosphate IVPB  30 mmol Intravenous Once    [START ON 5/3/2023] thiamine  300 mg Oral Daily        PRN:  sodium chloride, dextrose 10%, dextrose 10%, dextrose, dextrose, LORazepam    Antibiotics and Day Number of Therapy:  Rocephin and flagyl day 2     Lines and Day Number of Therapy:  Piv     Assessment & Plan:        Alcoholic steatohepatitis  Acute intoxication  - , ast 126, lipase 127, alt wnl.   - CIWA protocol with prn ativan for withdrawal sx.   - ethanol level  369. Reports last drink was 1.5 days ago. Cannabis positive on UDS.   - Will continue to replete vitamins and fluids.  - RUQ US revealed mildly enlarged liver with increased echogenicity reflective of fatty infiltration    - negative hepatitis, HIV panel.  - ASA, tylenol level negative.   - PT/INR = .86. CRP  39.3.  - XR abd and chest - no acute intrapulmonary/abd findings. Non-obstructive gas pattern.   -  Ct done of abd, suggest possible duodenitis, steatohepatitis.        Leukocytosis  Diarrhea  - WBC 14.8 on admission, improved to 12.4 this AM   - No guarding or tenderness on abdominal exam today   - Started on IV abx with Rocephin and Flagyl on admission given concern for potential intra-abdominal infection. Discontinued abx on 5/1 given low suspicion for infection.   - Cdiff PCR negative   - stool studies  negative  - ordering FOBT   - lomitol 2.5-0.025 mg x 1 dose   - discontinuing lactulose     Macrocytic anemia  - H/H 6.9/21.2 from 8/23.9 on admission   - Typed and screened, will transfuse 2 units pRBCs   - B12 elevated greater than 2000, folate >80  - iron low, ferritin wnl     Seizure disorder  - Continue home keppra  - Seizure precautions.       Hypokalemia  Hypophosphatemia   Hypomagnesemia   - Repleted electrolytes this morning   - CTM and replete as necessary      CODE STATUS: full   Access: piv  Antibiotics: none  Diet: regular  DVT Prophylaxis: SCD  GI Prophylaxis: none  Fluids: none   Consults: PT   Fall, seizure precautions.        Disposition: Continue ciwa protocol with prn ativan,. Continue to monitor electrolytes and replete as necessary. Transfuse 2u pRBCs.       Katina Steiner MD  [facility] HO-1

## 2023-05-03 PROBLEM — F10.10 ALCOHOL ABUSE: Status: ACTIVE | Noted: 2023-01-01

## 2023-05-03 NOTE — PT/OT/SLP EVAL
Physical Therapy Evaluation    Patient Name:  Los Alatorre   MRN:  [id number]    Recommendations:     Discharge Recommendations:  nursing facility, skilled, rehabilitation facility   Discharge Equipment Recommendations: walker, rolling, bath bench   Barriers to discharge: fall risk, severity of deficits, level of skilled assistance required, decreased endurance, impaired cognitive status, decreased safety awareness, and insight into deficits    Assessment:     Los Alatorre is a 57 y.o. male admitted with a medical diagnosis of   1. Alcohol abuse    2. General weakness    3. Weakness    4. Macrocytic anemia    Per medical record  Los Alatorre is a 57 y.o. male who has a past medical history of Seizures, alcohol use disorder.  He presented to [facility] on 4/29/2023  with a primary complaint of generalized weakness the past 3 weeks. History attained by patient who is a poor historian and significant other in the room. Reports 12 lbs weight loss the past month, decreased appetite. Has had some N/V and loose stools during this timeframe but denies any hematemesis/hematochezia/melena. Reported to have altered gait/balance but etoh level is >300. SO reports patient has had difficulty ambulating at home due to his weakness but patient reports this is because of poor vision 2/2 cataracts Pt smokes 1ppd. Drinks 1 pint daily. Uses marijuana occasionally and denies any other use of illicit substances. Pt states his last seizure was 6 months ago, although may have been about a year ago.     Pt is alert and willing to participate. He does not provide a clear history of his presentation, is shaky at rest and with mobility, and requires increased cues. He does report impaired vision    He presents with the following impairments/functional limitations:  weakness, visual deficits, impaired endurance, impaired cognition, decreased coordination, impaired self care skills, decreased upper extremity function, impaired functional mobility,  decreased lower extremity function, gait instability, decreased safety awareness, impaired balance, impaired cardiopulmonary response to activity.    Rehab Prognosis: Good.    Patient would benefit from continued skilled acute PT services to: address above listed impairments/functional limitations; receive patient/caregiver education; reduce fall risk; and maximize independency/safety with functional mobility.    Recent Surgery: * No surgery found *      Plan:     During this hospitalization, patient to be seen  (3-5 x week) to address the identified impairments/functional limitations via gait training, therapeutic exercises, therapeutic activities and progress toward the established goals.    Plan of Care Expires:  06/02/23    Subjective     Communicated with patient's nurse tiago prior to session.    Patient agreeable to participate in evaluation.     Chief Complaint: none  Patient/Family Comments/goals: none stated  Pain/Comfort:  Pain Rating 1: 0/10  Pain Rating Post-Intervention 1: 0/10    Patients cultural, spiritual, Uatsdin conflicts given the current situation: no    Social History:  Living Environment: Patient lives with their significant other and 2 brother brother in a single level home, with no steps, with tub-shower combo.  Functional Level: Prior to admission patient was employed, was not driving due to seizures, ambulated without assistive device, was independent in ADL's. Pt reports recently inability to do these things but timeline and severity are not clear  Equipment at Home :none  Assistance Upon Discharge: significant other and family.    Objective:     Patient found supine in bed, with HOB elevated, and bed rails up } with telemetry, peripheral IV, SCD, bed alarm  upon PT entry to room.    General Precautions: Standard, fall   Orthopedic Precautions:N/A   Braces: N/A  Respiratory Status: room air    Vitals   At Rest (pre-session)  BP  120/80   HR  99   O2 Sat %  99      With Activity  (post-session)  BP  115/86   HR  130-180s   O2 Sat %  100     Exams:  Orientation: Patient is oriented to Person, Place  Commands: Patient follows 1-step verbal commands  Fine Motor Coordination:     -     Impaired: LLE heel shin  , Left hand, finger to nose  , and Left hand thumb/finger opposition skills    Postural Exam:  Patient presented with the following abnormalities:    -       posterior  lean in sitting with feet off of the floor  Sensation:    -     Intact: light/touch bilat lower extremity and proprioception BLE  RUE ROM: WFL  RUE Strength: WFL  LUE ROM: WFL  LUE Strength: WFL  RLE ROM: WFL except decreased hip flexion  RLE Strength: WFL except hip flexion 4/5  LLE ROM: WFL except decreased hip flexion  LLE Strength: WFL except hip flexion 4/5    Functional Mobility:    Bed Mobility:  Supine to Sit: stand by assistance  Sit to Supine: stand by assistance    Transfers:  Sit to Stand: minimum assistance with rolling walker and bed elevation and vc for UE placement    Gait:  Patient ambulated 6 ft forward / backward and side steps left with rolling walker and minimum assistance.  Patient demonstrates unsteady gait, decreased ludwin, decreased step length, and ambulates outside HIREN of RW. Pt with increased HR w/ activity thus limiting gait distance      Balance:  Sit  Static: FAIR: Maintains without assist, but unable to take any challenges   Dynamic: FAIR+: Maintains balance through MINIMAL excursions of active trunk motion  Stand  Static: FAIR+: Takes MINIMAL challenges from all directions  Dynamic: POOR+: Needs MIN (minimal ) assist during gait    Patient left supine in bed and with HOB elevated with all lines intact, call button in reach, bed alarm on, and nurse notified.    Education     Patient was instructed to utilize staff assistance for mobility/transfers.  White board updated regarding patient's safest level of mobility with staff assistance.    Goals     Multidisciplinary Problems       Physical  Therapy Goals          Problem: Physical Therapy    Goal Priority Disciplines Outcome Goal Variances Interventions   Physical Therapy Goal     PT, PT/OT      Description: Goals to be met by: discharge     Patient will increase functional independence with mobility by performin. Supine to sit with Modified [place]  2. Sit to supine with Modified [place]  3. Sit to stand transfer with Modified [place]  4. Gait  x 260 feet with Stand-by Assistance using Rolling Walker.                        History:     Past Medical History:   Diagnosis Date    Seizures      Past Surgical History:   Procedure Laterality Date    APPENDECTOMY      CHOLECYSTECTOMY       Time Tracking:     PT Received On: 23  PT Start Time: 930     PT Stop Time: 954  PT Total Time (min): 24 min     Billable Minutes: Evaluation 24    2023

## 2023-05-03 NOTE — PT/OT/SLP EVAL
Occupational Therapy   Evaluation and Discharge Note    Name: Los Alatorre  MRN: [id number]  Admitting Diagnosis: Alcohol abuse  Patient Active Problem List   Diagnosis    History of appendectomy    History of cholecystectomy    Atrial fibrillation    Primary hypertension    Seizure disorder    Tobacco user    Alcohol abuse       Recent Surgery: * No surgery found *      Recommendations:     Discharge Recommendations: home (home with caregiver)  Discharge Equipment Recommendations: other (see comments) (OT gave pt and pt's girlfriend information with reference to tub transfer bench for safety with showers/baths.)  Barriers to discharge:       Assessment:     Los Alatorre is a 57 y.o. male with a medical diagnosis of Alcohol abuse.  Patient Active Problem List   Diagnosis    History of appendectomy    History of cholecystectomy    Atrial fibrillation    Primary hypertension    Seizure disorder    Tobacco user    Alcohol abuse      At this time, patient is functioning at their prior level of function and does not require further acute OT services.     Plan:     During this hospitalization, patient does not require further acute OT services.  Please re-consult if situation changes.    Plan of Care Reviewed with: patient, spouse    Subjective     Chief Complaint: none  Patient/Family Comments/goals: none stated    Occupational Profile:  Living Environment: Pt reported he lives in a 1 story home with his girlfriend and his 2 brothers, no steps to enter, tub/shower combo.  Previous level of function: Pt stated he was indep with all self care skills, mobility skills, home making tasks, meal prep and grocery shopping.  Roles and Routines: Pt stated he does not drive and was working as s .  Equipment Used at home: other (see comments) (Pt reported he does have access to a w/c, shower chair, RW, rollator walker)  Assistance upon Discharge: family/friend care as needed.    Pain/Comfort:  Pain Rating 1: 0/10  Pain  Rating Post-Intervention 1: 0/10    Patients cultural, spiritual, Zoroastrianism conflicts given the current situation: no    Objective:     Communicated with: nurse Larson prior to session.  Patient found supine with telemetry, peripheral IV upon OT entry to room.    General Precautions: Standard, fall  Orthopedic Precautions: N/A  Braces: N/A  Respiratory Status: Room air     Occupational Performance:    Bed Mobility:    Patient completed Supine to Sit with modified independence  Patient completed Sit to Supine with modified independence    Functional Mobility/Transfers:  Patient completed Sit <> Stand Transfer with minimum assistance  with  hand-held assist , pt with decreased standing balance.  Functional Mobility: safety a factor, pt with decreased standing balance    Activities of Daily Living:  Pt with decreased standing balance, B hand tremors.  Pt will require mod/min A for self care skills.    Cognitive/Visual Perceptual:  Cognitive/Psychosocial Skills:     -       Oriented to: Person, Place, Time, and Situation   -       Safety awareness/insight to disability: impaired   Visual/Perceptual:      -pt stated he has cataracts and needs to have surgery      Physical Exam:  Balance:  MCTSIB:  3, 0 first two subtests only.  Pt displays decreased standing balance, safety a factor with mobility skills.  Sensation:  intact to BUE  Dominant hand:  R  Upper Extremity Strength:  WNL BUE  Fine Motor Coordination:    -       Intact    Pt displays decreased standing balance.  Safety a factor.  OT gave pt/girlfriend information with reference to tub transfer bench to purchase.    Treatment & Education:  Pt. educated on OT goals, POC, orientation to environment, use of call bell for assist with transfers OOB or for any other needs due to fall risk.      Patient left supine with all lines intact, call button in reach, nurse Larson notified, pt's girlfiend present, and HOB elevated for pt's comfort.    GOALS:    Multidisciplinary Problems       Occupational Therapy Goals       OT eval completed.  No goals set.  Pt to be d/c today.  OT recommending outpt OT services as CM stated HH services are not covered.                    History:     Past Medical History:   Diagnosis Date    Seizures          Past Surgical History:   Procedure Laterality Date    APPENDECTOMY      CHOLECYSTECTOMY         Time Tracking:     OT Date of Treatment: 05/03/23  OT Start Time: 1311  OT Stop Time: 1337  OT Total Time (min): 26 min    Billable Minutes:Evaluation 26 min    5/3/2023

## 2023-05-03 NOTE — DISCHARGE SUMMARY
[facility] Internal Medicine Discharge Summary    Admitting Physician: Chico Malave MD  Attending Physician: Chico Malave MD  Date of Admit: 4/29/2023  Date of Discharge: 5/3/2023    Condition: Stable  Outcome: Condition has improved and patient is now ready for discharge.  DISPOSITION: Home or Self Care        Discharge Diagnoses:     Patient Active Problem List   Diagnosis    History of appendectomy    History of cholecystectomy    Atrial fibrillation    Primary hypertension    Seizure disorder    Tobacco user       Principal Problem:  <principal problem not specified>    Consultants and Procedures:     Consultants:  IP CONSULT TO HOSPITAL MEDICINE  WOUND CARE CONSULT    Procedures:   * No surgery found *      Brief Admission History:      Los Alatorre is a 57 y.o. male who  has a past medical history of Seizures, alcohol use disorder. He presented to [facility] on 4/29/2023  with a primary complaint of generalized weakness the past 3 weeks. History attained by patient who is a poor historian and significant other in the room. Reports 12 lbs weight loss the past month, decreased appetite. Has had some N/V and loose stools during this timeframe but denies any hematemesis/hematochezia/melena. Reported to have altered gait/balance but etoh level is >300. SO reports patient has had difficulty ambulating at home due to his weakness but patient reports this is because of poor vision 2/2 cataracts. He was noted in the ED to be able to transfer to the wheelchair for imaging without difficulty.  Denies any changes in vision recently. Denies any fever, CP, SOB, abd pain, falls/injury, back pain, HA, focal weakness/numbness, blood in stool, hallucinations, tremors. Pt smokes 1ppd. Drinks 1 pint daily. Uses marijuana occasionally and denies any other use of illicit substances. Pt states his last seizure was 6 months ago, although may have been about a year ago.     Hospital Course with Pertinent Findings:      Patient admitted to  internal medicine for her alcoholic hepatitis and acute intoxication.  Ethanol level was greater than 300, UDS positive for cannabis.  Hepatitis panel negative, aspirin and Tylenol level within normal limits.  CT head unremarkable.  Right upper quadrant ultrasound was obtained and showed mildly enlarged liver with increased echogenicity reflective of fatty infiltration. He was started on CIWA protocol with p.r.n. Ativan for withdrawal symptoms.  Electrolytes were appropriately repleted.  He was also started on lactulose with gradual improvement in orientation and mentation.  Throughout hospital stay, patient had multiple episodes of diarrhea which ultimately improved.  C diff PCR and stool studies negative.  Patient was transfused with 2 units of packed red blood cells given hemoglobin drop of 6.9 from 8 with appropriate response in H/H.  Patient's condition has improved, he is now oriented x3, and stable for discharge. PT and OT consulted and recommends outpatient therapy, however patient states he has not have transportation at this time.  Will arrange home health outpatient.  He will follow up in Internal Medicine post wards clinic.  He will be discharged with naltrexone 50 mg daily for alcohol use disorder.     Discharge physical exam:  Vitals  BP: 123/84  Temp: 98.5 °F (36.9 °C)  Temp Source: Oral  Pulse: 98  Resp: 18  SpO2: 98 %  Height: 6' (182.9 cm)  Weight: 72.9 kg (160 lb 11.5 oz) (bedwt today 72.9kg vs admit wt 54.4kg--pt unsure wt)    General:  thin, chronically ill appearing. no acute distress.   Head: Normocephalic, atraumatic  ENT: PERRL, MMM.   Neck: supple, normal ROM, no JVD  CVS:  RRR. S1 and S2 normal. No murmurs, rubs, or gallops. Regular peripheral pulses. No peripheral edema  Resp:  Lungs clear to auscultation bilaterally, no wheezes, rales, or rhonchi. Normal respiratory effort.  GI:  Abdomen soft, non-tender, non-distended, normoactive bowel sounds  MSK:  Full range of motion, no obvious  deformities  Skin: jaundice, chronic hyperpigmented patches on buttock, diffuse erythematous papules lower abdomen  Neuro:  alert and oriented to person and place, No focal neuro deficits, CNII-XII grossly intact. Good strength of dorsiflexion/plantarflexion bilaterally, good  strength. No tremor noted.     TIME SPENT ON DISCHARGE: 35 minutes    Discharge Medications:         Medication List        ASK your doctor about these medications      cetirizine 10 MG tablet  Commonly known as: ZYRTEC  Take 1 tablet (10 mg total) by mouth once daily.     levETIRAcetam 750 MG Tab  Commonly known as: KEPPRA  Take 1 tablet (750 mg total) by mouth 2 (two) times daily.     polyethylene glycol 17 gram/dose powder  Commonly known as: GLYCOLAX  Take 17 g by mouth daily as needed (constipation).              Discharge Instructions:         Los Alatorre is being discharged .    No discharge procedures on file.     Follow-Up Appointments:        To address at follow-up:  -The following labs are to be drawn at the [facility] visit: cmp  -The following imaging studies are to be ordered at the post braden visit:     At this time, Los Alatorre is determined to have maximized benefits of IP hospitalization. he is discharged in stable condition with OP f/u recommendations and instructions. All questions answered, and patient and family verbalized agreement with the POC. They were given return precautions prior to d/c including symptoms that should prompt return to ED or to call PCP. Total time spent of DC of 35 minutes.       Katina Steiner MD  [facility] Internal Medicine, HO-1           decreased ability to use legs for bridging/pushing/impaired ability to control trunk for mobility

## 2023-05-04 NOTE — PT/OT/SLP DISCHARGE
POST DISCHARGE DOCUMENTATION - 2023 12:34 PM    Physical Therapy Discharge Summary    Name: Los Alatorre  MRN: [id number]   Principal Problem: Alcohol abuse       Recommendations - per last treatment session     Discharge Recommendations:  nursing facility, skilled, rehabilitation facility   Discharge Equipment Recommendations: walker, rolling, bath bench     Assessment:     Refer to prior Physical Therapy note dated 5/3/23 for last known functional status of patient.    Patient was unexpectedly discharged from hospital.  Refer to therapy's initial evaluation or last treatment note for patient's most recent functional status and goal achievement and therapists' recommendations.    Objective     GOALS:  Multidisciplinary Problems       Physical Therapy Goals          Problem: Physical Therapy    Goal Priority Disciplines Outcome Goal Variances Interventions   Physical Therapy Goal     PT, PT/OT Not met due to eval only     Description: Goals to be met by: discharge     Patient will increase functional independence with mobility by performin. Supine to sit with Modified [place]  2. Sit to supine with Modified [place]  3. Sit to stand transfer with Modified [place]  4. Gait  x 260 feet with Stand-by Assistance using Rolling Walker.                          Plan     Patient Discharged to: with outpatient PT per chart. SNF was recommended.     2023

## 2023-05-04 NOTE — PROGRESS NOTES
C3 nurse spoke with Los Alatorre for a TCC post hospital discharge follow up call. The patient does not have a scheduled HOSFU appointment with [facility] within 5-7 days post hospital discharge date 5/3/23. C3 nurse was unable to schedule HOSFU appointment in [facility].    Message sent to PCP staff requesting they contact patient and schedule follow up appointment.

## 2023-06-04 NOTE — ED PROVIDER NOTES
Encounter Date: 6/4/2023       History     Chief Complaint   Patient presents with    Abdominal distention     C/o increasing abd distention and diarrhea x3 weeks. Hx of alcoholism.      Here with his significant other, both are poor historians, neither seems to recall that he was admitted here about 4 weeks ago under similar circumstances.  Chronic alcoholic, drinks vodka daily, last drink about 2 days ago.  Has increasing abdominal distention with diarrhea, stool reported sometimes to be black, increasing weakness, seems not to know that he has alcohol-related liver disease despite a recent hospitalization for exactly this problem.  Denies drug use.  States he is compliant with Keppra for seizures.  No other specific complaints.    The history is provided by the patient and a significant other. No  was used.   Review of patient's allergies indicates:  No Known Allergies  Past Medical History:   Diagnosis Date    Seizures      Past Surgical History:   Procedure Laterality Date    APPENDECTOMY      CHOLECYSTECTOMY       Family History   Problem Relation Age of Onset    Lung cancer Mother     Dementia Father     Alzheimer's disease Father      Social History     Tobacco Use    Smoking status: Every Day     Packs/day: 1.00     Types: Cigarettes    Smokeless tobacco: Never   Substance Use Topics    Alcohol use: Yes     Comment: Drinks daily    Drug use: Not Currently     Types: Marijuana     Comment: 1- 2 times a month     Review of Systems   Constitutional:  Negative for chills and fever.   HENT:  Negative for congestion, facial swelling, nosebleeds and sinus pressure.    Eyes:  Negative for pain and redness.   Respiratory:  Negative for chest tightness, shortness of breath and wheezing.    Cardiovascular:  Negative for chest pain, palpitations and leg swelling.   Gastrointestinal:  Positive for abdominal distention and diarrhea. Negative for abdominal pain, nausea and vomiting.   Endocrine:  Negative for cold intolerance, polydipsia and polyphagia.   Genitourinary:  Negative for difficulty urinating, dysuria, frequency and hematuria.   Musculoskeletal:  Negative for arthralgias, back pain, myalgias and neck pain.   Skin:  Negative for color change and rash.   Neurological:  Positive for weakness. Negative for dizziness, numbness and headaches.   Hematological:  Negative for adenopathy. Does not bruise/bleed easily.   Psychiatric/Behavioral:  Negative for agitation and behavioral problems.    All other systems reviewed and are negative.    Physical Exam     Initial Vitals   BP Pulse Resp Temp SpO2   06/04/23 1241 06/04/23 1241 06/04/23 1241 06/04/23 1241 06/04/23 1303   (!) 135/90 (!) 125 20 99.5 °F (37.5 °C) 98 %      MAP       --                Physical Exam    Nursing note and vitals reviewed.  Constitutional: He appears well-developed. He is not diaphoretic. He appears distressed.   HENT:   Head: Normocephalic and atraumatic.   Mouth/Throat: Oropharynx is clear and moist. No oropharyngeal exudate.   Eyes: Conjunctivae and EOM are normal. Pupils are equal, round, and reactive to light. Right eye exhibits no discharge. Left eye exhibits no discharge. Scleral icterus is present.   Neck: Neck supple. No thyromegaly present. No tracheal deviation present. No JVD present.   Normal range of motion.  Cardiovascular:  Regular rhythm and normal heart sounds.     Exam reveals no gallop and no friction rub.       No murmur heard.  Tachcyardic   Pulmonary/Chest: No respiratory distress. He has no wheezes. He has rhonchi. He has no rales. He exhibits no tenderness.   Dull/ mild rhonchi at bases   Abdominal: Abdomen is soft. Bowel sounds are normal. He exhibits distension. He exhibits no mass. There is no abdominal tenderness.   Significant distension consistent with ascites There is no rebound and no guarding.   Genitourinary:    Genitourinary Comments: Light brown, strongly heme-positive stool      Musculoskeletal:         General: No tenderness or edema. Normal range of motion.      Cervical back: Normal range of motion and neck supple.      Comments: Muscular wasting     Lymphadenopathy:     He has no cervical adenopathy.   Neurological: He is alert and oriented to person, place, and time. He has normal strength. No cranial nerve deficit.   Skin: Skin is warm and dry. No rash noted. No erythema.   Psychiatric: His behavior is normal. Judgment and thought content normal.   Flat       ED Course   Critical Care    Date/Time: 6/4/2023 1:55 PM  Performed by: Raj Dominique MD  Authorized by: Raj Dominique MD   Direct patient critical care time: 10 minutes  Additional history critical care time: 10 minutes  Ordering / reviewing critical care time: 10 minutes  Documentation critical care time: 10 minutes  Consulting other physicians critical care time: 5 minutes  Total critical care time (exclusive of procedural time) : 45 minutes  Critical care time was exclusive of separately billable procedures and treating other patients and teaching time.  Critical care was necessary to treat or prevent imminent or life-threatening deterioration of the following conditions: circulatory failure.  Critical care was time spent personally by me on the following activities: discussions with consultants, development of treatment plan with patient or surrogate, examination of patient, obtaining history from patient or surrogate, ordering and performing treatments and interventions, ordering and review of laboratory studies, ordering and review of radiographic studies, pulse oximetry, re-evaluation of patient's condition and review of old charts.      Labs Reviewed   COMPREHENSIVE METABOLIC PANEL - Abnormal; Notable for the following components:       Result Value    Chloride 97 (*)     Glucose Level 131 (*)     Blood Urea Nitrogen 6.3 (*)     Albumin Level 2.3 (*)     Globulin 4.2 (*)     Albumin/Globulin Ratio 0.5 (*)      Bilirubin Total 6.3 (*)     Alkaline Phosphatase 776 (*)     Aspartate Aminotransferase 139 (*)     All other components within normal limits   LIPASE - Abnormal; Notable for the following components:    Lipase Level 117 (*)     All other components within normal limits   URINALYSIS, REFLEX TO URINE CULTURE - Abnormal; Notable for the following components:    Color, UA Orange (*)     Protein, UA 1+ (*)     Ketones, UA 1+ (*)     Bilirubin, UA 2+ (*)     Urobilinogen, UA 3+ (*)     Leukocyte Esterase, UA 75 (*)     WBC, UA 6-10 (*)     Squamous Epithelial Cells, UA Occ (*)     Mucous, UA Occ (*)     Non-Squamos Epithelial, UA Trace (*)     Unclassified Cast, UA 3-5 (*)     Hyaline Casts, UA 6-10 (*)     All other components within normal limits   ACETAMINOPHEN LEVEL - Abnormal; Notable for the following components:    Acetaminophen Level <17.4 (*)     All other components within normal limits   DRUG SCREEN, URINE (BEAKER) - Abnormal; Notable for the following components:    Cannabinoids, Urine Positive (*)     All other components within normal limits    Narrative:     Cut off concentrations:    Amphetamines - 1000 ng/ml  Barbiturates - 200 ng/ml  Benzodiazepine - 200 ng/ml  Cannabinoids (THC) - 50 ng/ml  Cocaine - 300 ng/ml  Fentanyl - 1.0 ng/ml  MDMA - 500 ng/ml  Opiates - 300 ng/ml   Phencyclidine (PCP) - 25 ng/ml    Specimen submitted for drug analysis and tested for pH and specific gravity in order to evaluate sample integrity. Suspect tampering if specific gravity is <1.003 and/or pH is not within the range of 4.5 - 8.0  False negatives may result form substances such as bleach added to urine.  False positives may result for the presence of a substance with similar chemical structure to the drug or its metabolite.    This test provides only a PRELIMINARY analytical test result. A more specific alternate chemical method must be used in order to obtain a confirmed analytical result. Gas chromatography/mass  spectrometry (GC/MS) is the preferred confirmatory method. Other chemical confirmation methods are available. Clinical consideration and professional judgement should be applied to any drug of abuse test result, particularly when preliminary positive results are used.    Positive results will be confirmed only at the physicians request. Unconfirmed screening results are to be used only for medical purposes (treatment).        AMMONIA - Abnormal; Notable for the following components:    Ammonia Level 112.9 (*)     All other components within normal limits   CBC WITH DIFFERENTIAL - Abnormal; Notable for the following components:    WBC 21.77 (*)     RBC 3.47 (*)     Hgb 11.4 (*)     Hct 33.4 (*)     MCV 96.3 (*)     MCH 32.9 (*)     RDW 21.3 (*)     MPV 11.0 (*)     Neut # 18.66 (*)     Mono # 1.70 (*)     IG# 0.21 (*)     All other components within normal limits   IRON AND TIBC - Abnormal; Notable for the following components:    Iron Level 58 (*)     Transferrin 163 (*)     Iron Binding Capacity Total 188 (*)     All other components within normal limits   TRANSFERRIN - Abnormal; Notable for the following components:    Transferrin 163 (*)     All other components within normal limits   VITAMIN B12 - Abnormal; Notable for the following components:    Vitamin B12 Level >2,000 (*)     All other components within normal limits   FOLATE - Abnormal; Notable for the following components:    Folate Level 6.8 (*)     All other components within normal limits   HEMOGLOBIN AND HEMATOCRIT, BLOOD - Abnormal; Notable for the following components:    Hgb 10.1 (*)     Hct 28.9 (*)     All other components within normal limits   ALCOHOL,MEDICAL (ETHANOL) - Normal   APTT - Normal   MAGNESIUM - Normal   FERRITIN - Normal   CBC W/ AUTO DIFFERENTIAL    Narrative:     The following orders were created for panel order CBC W/ AUTO DIFFERENTIAL.  Procedure                               Abnormality         Status                      ---------                               -----------         ------                     CBC with Differential[[id number]]        Abnormal            Final result                 Please view results for these tests on the individual orders.   PROTIME-INR   LEVETIRACETAM  (KEPPRA) LEVEL   EXTRA TUBES    Narrative:     The following orders were created for panel order EXTRA TUBES.  Procedure                               Abnormality         Status                     ---------                               -----------         ------                     Light Blue Top Hold[[id number]]                                                         Red Top Hold[[id number]]                                                                Light Green Top Hold[[id number]]                                                        Lavender Top Hold[[id number]]                                                           Gold Top Hold[[id number]]                                    In process                 Blood Bank Hold[[id number]]                                                             Pink Top Hold[[id number]]                                                                 Please view results for these tests on the individual orders.   GOLD TOP HOLD   TYPE & SCREEN     EKG Readings: (Independently Interpreted)   Rhythm: Sinus Tachycardia. Heart Rate: 116. Ectopy: No Ectopy.   Sinus tachycardia with low voltage QRS, likely old anterior and inferior infarctions, prolonged QT with  milliseconds.   ECG Results              EKG 12-lead (Final result)  Result time 06/04/23 17:51:55      Final result by Interface, Lab In [facility] (06/04/23 17:51:55)                   Narrative:    Test Reason : R00.0,    Vent. Rate : 116 BPM     Atrial Rate : 053 BPM     P-R Int : 000 ms          QRS Dur : 070 ms      QT Int : 454 ms       P-R-T Axes : 000 -22 026 degrees     QTc Int : 631 ms    Sinus tachycardia  Low voltage QRS  Cannot rule out Anterior  infarct ,age undetermined  Prolonged QT  Abnormal ECG  No previous ECGs available  Confirmed by Jacques Marte MD ([id number]) on 6/4/2023 5:51:47 PM    Referred By: [email or web address]   SELF           Confirmed By:Jacques Marte MD                                  Imaging Results              CT Abdomen Pelvis W Wo Contrast (Final result)  Result time 06/04/23 16:19:43      Final result by Mono Moya MD (06/04/23 16:19:43)                   Impression:      Status post cholecystectomy.  No evidence of active extravasation from the GI system.  The liver is diffusely hypodense as may be seen with acute hepatitis or acute liver failure.  Moderate ascites noted throughout the abdomen.      Electronically signed by: Mono Moya  Date:    06/04/2023  Time:    16:19               Narrative:    EXAMINATION:  CT ABDOMEN PELVIS W WO CONTRAST    CLINICAL HISTORY:  GI bleed;    TECHNIQUE:  Multidetector without and with IV contrast enhanced axial CT images of the abdomen and pelvis were obtained with coronal and sagittal reconstructions.    Automatic exposure control was utilized to reduce the patient's radiation dose.    DLP= 1324    COMPARISON:  No prior imaging available for comparison.    FINDINGS:  Status post cholecystectomy.  No evidence of active extravasation from the GI system.  The liver is diffusely hypodense as may be seen with acute hepatitis or acute liver failure.  Moderate ascites noted throughout the abdomen.    The bilateral adrenal glands are unremarkable.  The bilateral kidneys are unremarkable.  The bladder is within normal limits.  Degenerative changes with no acute abnormality.                                       X-Ray Chest AP Portable (Final result)  Result time 06/04/23 14:15:57      Final result by Mono Moya MD (06/04/23 14:15:57)                   Impression:      No acute cardiopulmonary process.      Electronically signed by: Mono Moya  Date:    06/04/2023  Time:    14:15               Narrative:     EXAMINATION:  XR CHEST AP PORTABLE    CLINICAL HISTORY:  Chest Pain;    TECHNIQUE:  Single view of the chest    COMPARISON:  10/25/2020    FINDINGS:  No focal opacification, pleural effusion, or pneumothorax.    The cardiomediastinal silhouette is within normal limits.    No acute osseous abnormality.                                       Medications   LORazepam tablet 1 mg (1 mg Oral Given 6/4/23 1418)   sodium chloride 0.9% 1,000 mL with mvi, (ADULT) no.4 with vit K 3,300 unit- 150 mcg/10 mL 10 mL, thiamine 100 mg, folic acid 1 mg infusion ( Intravenous New Bag 6/4/23 1459)   pantoprazole injection 40 mg (40 mg Intravenous Given 6/4/23 1417)   octreotide acetate injection 0.1 mg (0.1 mg Intravenous Given 6/4/23 1459)   levETIRAcetam in NaCl (iso-os) IVPB 500 mg (0 mg Intravenous Stopped 6/4/23 1456)   iohexoL (OMNIPAQUE 350) 350 mg iodine/mL injection (100 mLs Intravenous Given 6/4/23 1415)   ciprofloxacin HCl tablet 500 mg (500 mg Oral Given 6/4/23 1727)       6:52 PM Multiple re-evaluations, clinically stable.  Repeat H&H shows a mild drop, unclear if the initial elevation hemoglobin is partially due to hemoconcentration from fluid shift into the abdominal cavity.  Accepted for transfer to [facility], Dr. Sylvester. Stable for transfer.                           Clinical Impression:   Final diagnoses:  [R00.0] Tachycardia  [D64.9] Anemia, unspecified type  [K92.2] Gastrointestinal hemorrhage, unspecified gastrointestinal hemorrhage type (Primary)  [K70.31] Alcoholic cirrhosis of liver with ascites  [F10.20] Alcoholism  [N39.0] Urinary tract infection without hematuria, site unspecified  [K72.00] Acute liver failure without hepatic coma        ED Disposition Condition    Transfer to Another Facility Stable                Raj Dominique MD  06/04/23 [id number]

## 2023-06-14 NOTE — PROGRESS NOTES
Patient taken to ED per wheelchair per order of PCP Solomon NP  Report given to Jacquie RN ED nurse and Monika Hickman RN ED nurse
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
## 2023-06-14 NOTE — ED PROVIDER NOTES
Encounter Date: 6/14/2023       History     Chief Complaint   Patient presents with    Weakness     Arrive via wc from med clinic reports pt  weak and hypotensive , recently discharged from [facility] after having upper gi scope , history of cirrhosis      Patient reports to the ER with complaints of weakness while in clinic for a post admit followup; pt was recently seen in the ER 6/4 and transferred for a scope at [facility] with a scheduled followup  today; while in clinic the patient was found to have a low blood pressure and was sent here for further eval; pt also reports itchy skin    The history is provided by the patient.   Illness   The problem occurs continuously. The problem has been unchanged. Pertinent negatives include no fever, no decreased vision, no nausea, no vomiting, no congestion, no headaches, no sore throat, no neck stiffness, no shortness of breath, no rash and no pain. Services received include tests performed. Recently, medical care has been given by a specialist, at this facility and at another facility.   Review of patient's allergies indicates:  No Known Allergies  Past Medical History:   Diagnosis Date    Seizures      Past Surgical History:   Procedure Laterality Date    APPENDECTOMY      CHOLECYSTECTOMY       Family History   Problem Relation Age of Onset    Lung cancer Mother     Dementia Father     Alzheimer's disease Father      Social History     Tobacco Use    Smoking status: Every Day     Packs/day: 1.00     Types: Cigarettes    Smokeless tobacco: Never   Substance Use Topics    Alcohol use: Not Currently     Comment: no alcohol x 1 month    Drug use: Not Currently     Types: Marijuana     Comment: No Marijuana x 1 month     Review of Systems   Constitutional:  Negative for fever.   HENT:  Negative for congestion and sore throat.    Eyes:  Negative for pain.   Respiratory:  Negative for shortness of breath.    Cardiovascular:  Negative for chest pain.   Gastrointestinal:   Negative for nausea and vomiting.   Genitourinary:  Negative for dysuria.   Musculoskeletal:  Negative for back pain.   Skin:  Negative for rash.   Neurological:  Positive for weakness. Negative for headaches.   Hematological:  Does not bruise/bleed easily.   Psychiatric/Behavioral: Negative.       Physical Exam     Initial Vitals [06/14/23 0918]   BP Pulse Resp Temp SpO2   106/82 (!) 122 18 98.2 °F (36.8 °C) 98 %      MAP       --         Physical Exam    Vitals reviewed.  Constitutional: He appears well-developed and well-nourished.   HENT:   Head: Normocephalic and atraumatic.   Eyes: Conjunctivae and EOM are normal. Pupils are equal, round, and reactive to light. Scleral icterus is present.   Neck:   Normal range of motion.  Cardiovascular:  Normal rate, regular rhythm, normal heart sounds and intact distal pulses.           Pulmonary/Chest: Breath sounds normal. No respiratory distress. He has no wheezes. He exhibits no tenderness.   Abdominal: Abdomen is soft. Bowel sounds are normal. He exhibits distension. There is no abdominal tenderness.   Musculoskeletal:         General: Normal range of motion.      Cervical back: Normal range of motion.     Neurological: He is alert and oriented to person, place, and time. He displays normal reflexes. No cranial nerve deficit or sensory deficit. GCS score is 15. GCS eye subscore is 4. GCS verbal subscore is 5. GCS motor subscore is 6.   Skin: Skin is warm. No pallor.   Psychiatric: He has a normal mood and affect. His behavior is normal. Judgment and thought content normal.       ED Course   Procedures  Labs Reviewed   TROPONIN I - Normal   AMMONIA - Normal   PROTIME-INR   EXTRA TUBES    Narrative:     The following orders were created for panel order EXTRA TUBES.  Procedure                               Abnormality         Status                     ---------                               -----------         ------                     Lavender Top Hold[[id number]]                                 In process                 Gold Top Hold[[id number]]                                    In process                 Pink Top Hold[[id number]]                                    In process                   Please view results for these tests on the individual orders.   LAVENDER TOP HOLD   GOLD TOP HOLD   PINK TOP HOLD          Imaging Results              X-Ray Chest 1 View (Final result)  Result time 06/14/23 10:57:56      Final result by Godfrey Rodriguez MD (06/14/23 10:57:56)                   Impression:      No acute chest disease is identified.      Electronically signed by: Godfrey Rodriguez  Date:    06/14/2023  Time:    10:57               Narrative:    EXAMINATION:  XR CHEST 1 VIEW    CLINICAL HISTORY:  weakness;, .    COMPARISON:  June 4, 2023    FINDINGS:  No alveolar consolidation, effusion, or pneumothorax is seen.   The thoracic aorta is normal  cardiac silhouette, central pulmonary vessels and mediastinum are normal in size and are grossly unremarkable.   visualized osseous structures are grossly unremarkable.                                       Medications   sodium chloride 0.9% bolus 1,000 mL 1,000 mL (1,000 mLs Intravenous New Bag 6/14/23 1130)     Medical Decision Making:   Clinical Tests:   Lab Tests: Reviewed  The following lab test(s) were unremarkable: CBC and CMP       <> Summary of Lab: Similar values compared to previous draws (labs from this morning)  Other:   I have discussed this case with another health care provider.       <> Summary of the Discussion:   Discussed with Dr Pugh who reviewed labs/imaging and evaluated the patient in the room    Given strict ED return precautions. I have spoken with the patient and/or caregivers. I have explained the patient's condition, diagnoses and treatment plan based on the information available to me at this time. I have answered the patient's and/or caregiver's questions and addressed any concerns. The patient and/or  caregivers have as good an understanding of the patient's diagnosis, condition and treatment plan as can be expected at this point. The vital signs have been stable. The patient's condition is stable and appropriate for discharge from the emergency department.      The patient will pursue further outpatient evaluation with the primary care physician or other designated or consulting physician as outlined in the discharge instructions. The patient and/or caregivers are agreeable to this plan of care and follow-up instructions have been explained in detail. The patient and/or caregivers have received these instructions in written format and have expressed an understanding of the discharge instructions. The patient and/or caregivers are aware that any significant change in condition or worsening of symptoms should prompt an immediate return to this or the closest emergency department or a call to 911.       APC / Resident Notes:   I was not physically present during the history or exam of this patient. I was available at all times for consultation. ([place])      ED Course as of 06/14/23 2210 Wed Jun 14, 2023   1406 BP: 106/82 [AL]   1406 Pulse(!): 122 [AL]   1406 BP: 108/75 [AL]   1406 Pulse: 88 [AL]   1406 BP: 111/75 [AL]   1406 Pulse: 92 [AL]   1406 SpO2: 97 % [AL]      ED Course User Index  [AL] SHAUN Calvin                 Clinical Impression:   Final diagnoses:  [R53.1] Generalized weakness (Primary)  [L29.9] Itching        ED Disposition Condition    Discharge Stable          ED Prescriptions       Medication Sig Dispense Start Date End Date Auth. Provider    hydrOXYzine pamoate (VISTARIL) 25 MG Cap Take 1 capsule (25 mg total) by mouth 4 (four) times daily. for 5 days 20 capsule 6/14/2023 6/19/2023 SHAUN Calvin          Follow-up Information       Follow up With Specialties Details Why Contact Info    DECLAN Small Nurse Practitioner   [address] [facility]  [address]  [phone]      discharge info    Discussed all pertinent ED information, results, diagnosis and treatment plan; All questions and concerns were addressed at this time. Patient voices understanding of information and instructions. Patient is comfortable with plan and discharge             SHAUN Calvin  06/14/23 1402       Jesús Pugh MD  06/14/23 9816
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
## 2023-06-30 NOTE — ED PROVIDER NOTES
Encounter Date: 6/30/2023       History     Chief Complaint   Patient presents with    Abdominal Pain     Abd pain , sent from clinic     Mr. Alatorre is a 58-year-old male with history of atrial fibrillation, hypertension, and seizure disorder, as well appendectomy and cholecystectomy, cigarette smoker, alcohol use, and recent diagnosis of GI bleed, alcoholic cirrhosis, and abdominal ascites who presents to the emergency department for abdominal pain.  He was actually admitted to the internal medicine service here on April 29th of this year, found to have alcoholic cirrhosis and ascites, and ultimately was transferred to our lady of [facility] in [place] due to need for upper endoscopy due to suspected GI bleeding.  He states he was told his upper endoscopy was normal.  He states that since leaving the hospital, he has been having episodes of abdominal pain, weakness, and dry heaves similar to today's that come and go.  He states he went to an appointment with his practitioner, Elis Kay, but at that appointment had an episode of pain, dry heaves, and weakness, and so was advised to come to the emergency department.  He states he currently feels somewhat better, but still having some upper abdominal pain.  Triage O2 sat on room air of 87% with respiratory rate of 27 is noted, however during my entire evaluation, patient has an O2 sat of 97% with a an excellent Rave forearm, normal conversation and no increased work of breathing.  Temperature of 100.2° is also noted.    Review of patient's allergies indicates:  No Known Allergies  Past Medical History:   Diagnosis Date    Seizures      Past Surgical History:   Procedure Laterality Date    APPENDECTOMY      CHOLECYSTECTOMY       Family History   Problem Relation Age of Onset    Lung cancer Mother     Dementia Father     Alzheimer's disease Father      Social History     Tobacco Use    Smoking status: Every Day     Packs/day: 1.50     Types: Cigarettes     Smokeless tobacco: Never   Substance Use Topics    Alcohol use: Not Currently     Comment: no alcohol x 2 month    Drug use: Not Currently     Types: Marijuana     Comment: No Marijuana x 2 month     Review of Systems   Constitutional: Negative.  Negative for chills and fever.   HENT:  Negative for congestion, rhinorrhea and sore throat.    Eyes:  Negative for visual disturbance.   Respiratory:  Negative for cough and shortness of breath.    Cardiovascular:  Negative for chest pain, palpitations and leg swelling.   Gastrointestinal:  Positive for abdominal pain, diarrhea and nausea. Negative for vomiting.   Genitourinary:  Negative for dysuria, flank pain and frequency.   Musculoskeletal:  Negative for myalgias.   Skin:  Negative for rash.   All other systems reviewed and are negative.    Physical Exam     Initial Vitals [06/30/23 1051]   BP Pulse Resp Temp SpO2   137/83 96 (!) 24 100.2 °F (37.9 °C) (!) 87 %      MAP       --         Physical Exam    Nursing note and vitals reviewed.  Constitutional: He appears well-developed and well-nourished.   HENT:   Head: Normocephalic and atraumatic.   Eyes: Conjunctivae are normal. Pupils are equal, round, and reactive to light.   Neck: Neck supple.   Normal range of motion.  Cardiovascular:  Normal rate, regular rhythm, normal heart sounds and intact distal pulses.           Pulmonary/Chest: Breath sounds normal.   Abdominal: Abdomen is soft. Bowel sounds are normal. He exhibits distension. There is abdominal tenderness.   Musculoskeletal:         General: No tenderness or edema. Normal range of motion.      Cervical back: Normal range of motion and neck supple.      Comments: Bilateral calves are symmetric, normal in appearance, no tenderness to palpation.     Neurological: He is alert and oriented to person, place, and time.   Skin: Skin is warm and dry.   Psychiatric: He has a normal mood and affect.       ED Course   Paracentesis    Date/Time: 6/30/2023 4:40  "PM  Location procedure was performed: [facility] EMERGENCY DEPARTMENT  Performed by: Raj Davis MD  Authorized by: Raj Davis MD   Pre-operative diagnosis: ascites  Post-operative diagnosis: ascites  Consent Done: Yes  Consent: Verbal consent obtained. Written consent obtained.  Consent given by: patient  Patient understanding: patient states understanding of the procedure being performed  Patient consent: the patient's understanding of the procedure matches consent given  Procedure consent: procedure consent matches procedure scheduled  Relevant documents: relevant documents present and verified  Test results: test results available and properly labeled  Site marked: the operative site was marked  Imaging studies: imaging studies available  Patient identity confirmed: , name and verbally with patient  Time out: Immediately prior to procedure a "time out" was called to verify the correct patient, procedure, equipment, support staff and site/side marked as required.  Initial or subsequent exam: initial  Procedure purpose: diagnostic  Indications: abdominal discomfort secondary to ascites    Anesthesia:  Local Anesthetic: lidocaine 1% without epinephrine  Anesthetic total: 4 mL    Patient sedated: no  Preparation: Patient was prepped and draped in the usual sterile fashion.  Needle gauge: 18  Ultrasound guidance: yes  Puncture site: left lower quadrant  Fluid appearance: serous  Complications: No  Estimated blood loss (mL): 0  Specimens: Yes  Implants: No  Patient tolerance: Patient tolerated the procedure well with no immediate complications      Labs Reviewed   COMPREHENSIVE METABOLIC PANEL - Abnormal; Notable for the following components:       Result Value    Sodium Level 132 (*)     Carbon Dioxide 17 (*)     Glucose Level 129 (*)     Blood Urea Nitrogen 26.5 (*)     Creatinine 3.88 (*)     Albumin Level 2.1 (*)     Globulin 4.6 (*)     Albumin/Globulin Ratio 0.5 (*)     Bilirubin Total 3.0 (*)     Alkaline " Phosphatase 337 (*)     Aspartate Aminotransferase 56 (*)     All other components within normal limits   LACTIC ACID, PLASMA - Abnormal; Notable for the following components:    Lactic Acid Level 3.2 (*)     All other components within normal limits   PROTIME-INR - Abnormal; Notable for the following components:    INR 1.38 (*)     All other components within normal limits   CBC WITH DIFFERENTIAL - Abnormal; Notable for the following components:    WBC 23.40 (*)     RBC 3.59 (*)     Hgb 11.7 (*)     Hct 35.7 (*)     MCV 99.4 (*)     MCH 32.6 (*)     MCHC 32.8 (*)     MPV 11.2 (*)     Neut # 20.17 (*)     Mono # 1.50 (*)     IG# 0.24 (*)     All other components within normal limits   LIPASE - Normal   TROPONIN I - Normal   AMMONIA - Normal   APTT - Normal   COVID/FLU A&B PCR - Normal    Narrative:     The Xpert Xpress SARS-CoV-2/FLU/RSV plus is a rapid, multiplexed real-time PCR test intended for the simultaneous qualitative detection and differentiation of SARS-CoV-2, Influenza A, Influenza B, and respiratory syncytial virus (RSV) viral RNA in either nasopharyngeal swab or nasal swab specimens.         LACTIC ACID, PLASMA - Normal   HEPATITIS PANEL, ACUTE - Normal   CBC W/ AUTO DIFFERENTIAL    Narrative:     The following orders were created for panel order CBC Auto Differential.  Procedure                               Abnormality         Status                     ---------                               -----------         ------                     CBC with Differential[[id number]]        Abnormal            Final result                 Please view results for these tests on the individual orders.   CELL COUNT, BODY FLUID   DIFFERENTIAL, BODY FLUID   URINALYSIS, REFLEX TO URINE CULTURE   EXTRA TUBES    Narrative:     The following orders were created for panel order EXTRA TUBES.  Procedure                               Abnormality         Status                     ---------                                -----------         ------                     Red Top Hold[[id number]]                                     In process                 Lavender Top Hold[[id number]]                                In process                 Gold Top Hold[[id number]]                                    In process                 Pink Top Hold[[id number]]                                    In process                   Please view results for these tests on the individual orders.   RED TOP HOLD   LAVENDER TOP HOLD   GOLD TOP HOLD   PINK TOP HOLD        ECG Results              EKG 12-lead (Final result)  Result time 06/30/23 18:12:14      Final result by Interface, Lab In [facility] (06/30/23 18:12:14)                   Narrative:    Test Reason : R00.0,    Vent. Rate : 111 BPM     Atrial Rate : 111 BPM     P-R Int : 182 ms          QRS Dur : 078 ms      QT Int : 336 ms       P-R-T Axes : 074 -04 024 degrees     QTc Int : 456 ms    Sinus tachycardia  Low voltage QRS  Cannot rule out Anterior infarct ,age undetermined  Abnormal ECG  When compared with ECG of 14-JUN-2023 09:23,  Fusion complexes are no longer Present  Premature supraventricular complexes are no longer Present  Nonspecific T wave abnormality now evident in Anterior leads  Confirmed by Judy LIZ, Sami ([id number]) on 6/30/2023 6:11:23 PM    Referred By: [email or web address]   SELF           Confirmed By:Sami Kim MD                                  Imaging Results              CT Abdomen Pelvis  Without Contrast (Final result)  Result time 06/30/23 19:20:57      Final result by Raman Tiwari MD (06/30/23 19:20:57)                   Impression:      1. Trace of left pleural effusion.    2. Previous cholecystectomy.    3. Cirrhosis and ascites.    4.  Bilateral kidneys non occluding calculi.    5.  Noninflamed diverticulosis coli.      Electronically signed by: Raman Tiwari  Date:    06/30/2023  Time:    19:20               Narrative:    EXAMINATION:  CT ABDOMEN PELVIS WITHOUT  CONTRAST    CLINICAL HISTORY:  Abdominal pain, acute, nonlocalized;    TECHNIQUE:  Multidetector axial images were obtained from the  diaphragms to below symphysis pubis without the administration of IV contrast. Oral contrast was not administered.    Dose length product of 309 mGycm. Automated exposure control was utilized to minimize radiation dose.    COMPARISON:  June 4, 2023    FINDINGS:  There is trace of left dependent pleural effusion and left basilar atelectasis.    Liver is small in size consistent with cirrhosis.  There is moderate to large volume intraperitoneal free fluid consistent with ascites.  There are mesenteric inflammatory ground-glass opacities.  Within limitations of noncontrast technique, no acute findings liver, pancreas or the spleen identified.  Gallbladder is surgically absent..    The adrenal glands noncontrast evaluation is unremarkable. The kidneys are unremarkable in size and contour.  Bilateral kidneys non occluding calculi. The ureters appear normal in course and diameter without intra ureteral stone.  Calcified plaques of the aorta and iliac arteries without aneurysmal dilatation.  There are no retroperitoneal periaortic enlarged lymph nodes.    Stomach is decompressed and difficult to assess.  No abnormal dilatation of loops of small bowel.  Pericecal metallic clips suggest prior appendectomy.  There is noninflamed diverticulosis coli.  No bowel obstruction.  No pneumoperitoneum.    Urinary bladder is mostly decompressed.  No intravesical stone identified. There is no pelvic free fluid.    Chronic compression deformities of the thoracolumbar vertebrae.  Demineralization of the bones.  No acute or aggressive skeletal abnormality.                                       X-Ray Chest AP Portable (Final result)  Result time 06/30/23 12:28:05      Final result by Raj Gibbs MD (06/30/23 12:28:05)                   Impression:      No acute findings.      Electronically signed  by: Raj Gibbs  Date:    06/30/2023  Time:    12:28               Narrative:    EXAMINATION:  XR CHEST AP PORTABLE    CLINICAL HISTORY:  Unspecified abdominal pain    COMPARISON:  14 June 2023    FINDINGS:  Portable frontal view of the chest was obtained. The heart is not significantly enlarged.  There are chronic changes towards the left lung base.  No new focal consolidation or pneumothorax.                                       Medications   sodium chloride 0.9% flush 10 mL (has no administration in time range)   naloxone 0.4 mg/mL injection 0.02 mg (has no administration in time range)   glucose chewable tablet 16 g (has no administration in time range)   glucose chewable tablet 24 g (has no administration in time range)   glucagon (human recombinant) injection 1 mg (has no administration in time range)   dextrose 10% bolus 125 mL 125 mL (has no administration in time range)   dextrose 10% bolus 250 mL 250 mL (has no administration in time range)   ondansetron injection 4 mg (4 mg Intravenous Given 7/5/23 2024)   lactated ringers infusion ( Intravenous New Bag 6/30/23 2341)   hydrocortisone 1 % cream ( Topical (Top) Given 7/18/23 0901)   diphenhydrAMINE capsule 25 mg (25 mg Oral Given 7/16/23 1614)   LIDOcaine (PF) 10 mg/ml (1%) injection 100 mg (has no administration in time range)   acetaminophen tablet 650 mg (650 mg Oral Given 7/7/23 1225)   octreotide (SANDOSTATIN) 500 mcg in sodium chloride 0.9% 100 mL infusion (50 mcg/hr Intravenous Verify Only 7/18/23 1500)   midodrine tablet 10 mg (10 mg Oral Given 7/18/23 1727)   rifAXIMin tablet 550 mg (550 mg Oral Given 7/18/23 0853)   sodium chloride 0.9% flush 10 mL (has no administration in time range)   lactulose 20 gram/30 mL solution Soln 20 g (20 g Per NG tube Given 7/18/23 1516)   levETIRAcetam tablet 500 mg (500 mg Oral Given 7/18/23 0853)   miconazole 2 % cream ( Topical (Top) Given 7/18/23 0901)   pantoprazole injection 40 mg (40 mg Intravenous Given  7/18/23 0856)   0.9%  NaCl infusion (for blood administration) (has no administration in time range)   LORazepam injection 1 mg (1 mg Intravenous Given 7/18/23 1131)   fentaNYL injection 25 mcg (has no administration in time range)   HYDROmorphone injection 0.5 mg (has no administration in time range)   lactated ringers infusion ( Intravenous Verify Only 7/18/23 1500)   LIDOcaine (PF) 10 mg/ml (1%) injection 50 mg (50 mg Infiltration Given by Provider 6/30/23 1645)   potassium chloride SA CR tablet 40 mEq (40 mEq Oral Given 7/1/23 0816)   famotidine tablet 20 mg (20 mg Oral Given 7/2/23 0819)   magnesium sulfate 2g in water 50mL IVPB (premix) (0 g Intravenous Stopped 7/2/23 1131)   potassium chloride 10 mEq in 100 mL IVPB (10 mEq Intravenous New Bag 7/2/23 1848)   potassium bicarbonate disintegrating tablet 20 mEq (20 mEq Oral Given 7/2/23 1305)   potassium phosphate 30 mmol in dextrose 5 % (D5W) 500 mL infusion (0 mmol Intravenous Stopped 7/3/23 1253)   potassium chloride SA CR tablet 40 mEq (40 mEq Oral Given 7/3/23 0859)   potassium chloride SA CR tablet 40 mEq (40 mEq Oral Given 7/4/23 2122)   albumin human 25% bottle 50 g (0 g Intravenous Stopped 7/4/23 1831)   potassium phosphate 20 mmol in dextrose 5 % (D5W) 500 mL infusion (0 mmol Intravenous Stopped 7/5/23 1341)   potassium bicarbonate disintegrating tablet 25 mEq (25 mEq Oral Given 7/5/23 0941)   magnesium sulfate 2g in water 50mL IVPB (premix) (0 g Intravenous Stopped 7/6/23 0939)   potassium chloride SA CR tablet 40 mEq (40 mEq Oral Given 7/6/23 0739)   potassium chloride SA CR tablet 40 mEq (40 mEq Oral Given 7/8/23 0837)   vancomycin (VANCOCIN) 1,250 mg in dextrose 5 % (D5W) 250 mL IVPB (0 mg Intravenous Stopped 7/9/23 0055)   levoFLOXacin 750 mg/150 mL IVPB 750 mg (0 mg Intravenous Stopped 7/9/23 0219)   bumetanide injection 2 mg (2 mg Intravenous Given 7/9/23 1106)   mupirocin 2 % ointment ( Nasal Given 7/14/23 6896)   magnesium sulfate 2g in water  50mL IVPB (premix) (0 g Intravenous Stopped 7/11/23 0828)   potassium chloride 10 mEq in 100 mL IVPB (40 mEq Intravenous New Bag 7/11/23 1602)   magnesium sulfate in dextrose IVPB (premix) 1 g (0 g Intravenous Stopped 7/11/23 1702)   potassium chloride 10 mEq in 100 mL IVPB (10 mEq Intravenous New Bag 7/12/23 1147)   potassium chloride 10 mEq in 100 mL IVPB (10 mEq Intravenous New Bag 7/13/23 0804)   potassium chloride 10 mEq in 100 mL IVPB (10 mEq Intravenous New Bag 7/13/23 1128)   haloperidoL tablet 1 mg (1 mg Per NG tube Given 7/13/23 2340)   potassium chloride 10 mEq in 100 mL IVPB (10 mEq Intravenous New Bag 7/14/23 0845)   albumin human 25% bottle 25 g (0 g Intravenous Stopped 7/16/23 0101)   potassium chloride 10 mEq in 100 mL IVPB (10 mEq Intravenous New Bag 7/16/23 1054)   HYDROmorphone injection 0.5 mg (0.5 mg Intravenous Given 7/16/23 1230)     Medical Decision Making:   Differential Diagnosis:   includes but is not limited to toxic megacolon, perforated bowel, abdominal abscess  pyelonephritis, gastroenteritis, gastritis, peptic ulcer disease, cholecystitis, pancreatitis, bowel obstruction gastroenteritis, ischemic colitis, aortic aneurysm, aortic dissection.     Majority of potential diagnoses in patient's differential are effectively ruled out based on detailed history and physical exam.  Remaining diagnoses are further evaluated with diagnostic studies as follows:    ED Management:    Critical Care:     Aggregate critical care time was 45 minutes which includes only time during which I was engaged in work directly related to patients care, as described above, whether at bedside or elsewhere in the ED. It did not include time spent performing other reported procedures or the services of physician assistants, residents, students, or nurses.    The high probability of sudden and clinically significant deterioration of patient condition required highest level of my preparedness to intervene  urgently.  The services provided to this patient were to treat and/or prevent clinically significant deterioration that could result in permanent disability or death due to acute renal failure .    Services included the following: chart data review, reviewing nurses notes, gathering information form relatives, gathering information for EMS personal, reviewing old charts, documentation time, consultant collaboration regarding findings and treatment options, medication orders/management, direct patient care, re-evaluation of patient response to treatment, vital signs assessments and calculations, interpreting and reviewing diagnostic studies/labs.                 ED Course as of 07/18/23 1845 Fri Jun 30, 2023 1656 I discussed patient's history, presentation, workup, results, impression, and suggested plan of care with hospitalist Dr Lui . They will see the patient in the ED to determine eligibility for admission to the hospital.   [JG]      ED Course User Index  [JG] Raj Davis MD                 Clinical Impression:   Final diagnoses:  [R00.0] Tachycardia  [R10.9] Abdominal pain  [N17.9] Acute renal failure, unspecified acute renal failure type (Primary)  [R18.8] Tense ascites  [D72.829] Leukocytosis, unspecified type        ED Disposition Condition    Admit Stable                Raj Davis MD  06/30/23 1758       Raj Davis MD  07/18/23 2225

## 2023-07-01 NOTE — PROGRESS NOTES
Inpatient Nutrition Assessment    Admit Date: 6/30/2023   Total duration of encounter: 1 day     Nutrition Recommendation/Prescription     Continue Low Na , 1500 ml fluid diet; encourage oral intake  Will order boost plus tid; Boost Plus (provides 360 kcal, 14 g protein per serving)   Consider megace to stimulate appetite  Suggest MVI,/fe,  thiamine, folic acid--hx etoh use   Biweekly wt  Will monitor nutrition status     Communication of Recommendations: reviewed with nurse    Nutrition Assessment     Malnutrition Assessment/Nutrition-Focused Physical Exam    Malnutrition Context: acute illness or injury  Malnutrition Level: other (see comments) (unable to complete malnutriiton dx ; per emr--poor appetite past week; acute dx --sepsis)  Energy Intake (Malnutrition): less than or equal to 50% for greater than or equal to 5 days            A minimum of two characteristics is recommended for diagnosis of either severe or non-severe malnutrition.    Chart Review    Reason Seen: continuous nutrition monitoring and malnutrition screening tool (MST)    Malnutrition Screening Tool Results   Have you recently lost weight without trying?: Yes: 34 lbs or more  Have you been eating poorly because of a decreased appetite?: Yes   MST Score: 5     Diagnosis:  SIRS/sepsis, etoh /cirrhosis, ascites, CHRISTEN, hx seizure    Relevant Medical History: ETOH cirrhosis, ETOH disorder, seizure     Nutrition-Related Medications: maxipime, famotidine, lactulose   Calorie Containing IV Medications: no significant kcals from medications at this time    Nutrition-Related Labs:  (7-1) H/H 8.2/24.5(L) Gluc 84 Bun 31 Cr 3.6(H) GFR 19(L) P 4.9(H)   (6-30) NHT 45     Diet/PN Order: Diet Low Sodium, 2gm Fluid - 1500mL  Oral Supplement Order: none  Tube Feeding Order: none  Appetite/Oral Intake: poor/25-50% of meals  Factors Affecting Nutritional Intake: abdominal distension, abdominal pain, decreased appetite, and nausea  Food/Yarsanism/Cultural  Preferences: unable to obtain  Food Allergies: unable to obtain       Wound(s):   none reported     Comments    () Received MST trigger wt loss/decreased appetite. Unable to speak to pt; spoke to nurse caring for pt--pt remains with decreased appetite; ate 25% of meal earlier; + abd pain/distension; some nausea; hx ETOH cirrhosis/ascites. Will order oral supplement; consider megace to stimulate appetite; Pt on lactulose; NH4 level 45. Wt fluctuates with ascites/fluid--per EMR between 120-154#; will track. Unable to complete PA at this time . Noted elevated Bun/Cr; low GFR--CHRISTEN; nephrology consulted     Anthropometrics    Height: 6' (182.9 cm) Height Method: Stated  Last Weight: 63.7 kg (140 lb 6.9 oz) (23 2215) Weight Method: Bed Scale  BMI (Calculated): 19  BMI Classification: normal (BMI 18.5-24.9)        Ideal Body Weight (IBW), Male: 178 lb     % Ideal Body Weight, Male (lb): 84.27 %                 Usual Body Weight (UBW), kg:  (unable to speak to pt; per EMR wt fluctuates /fluid--120-156#)        Usual Weight Provided By: EMR weight history    Wt Readings from Last 5 Encounters:   23 63.7 kg (140 lb 6.9 oz)   23 71 kg (156 lb 8.4 oz)   23 67.2 kg (148 lb 3.2 oz)   23 72.6 kg (160 lb)   23 72.6 kg (160 lb)     Weight Change(s) Since Admission:  Admit Weight: 68 kg (150 lb) (23 1051)  EMR wt hx between 120-154#; ascites     Estimated Needs    Weight Used For Calorie Calculations: 63.7 kg (140 lb 6.9 oz)  Energy Calorie Requirements (kcal): 1911 kcal/d; 30 stuart/kg  Energy Need Method: Kcal/kg  Weight Used For Protein Calculations: 63.7 kg (140 lb 6.9 oz)  Protein Requirements: 76 gm protein/d; 1.2 gm/kg monitor protein; CHRISTEN  Fluid Requirements (mL): 1592 ml/d; 25 ml/kg; ascites  Temp (24hrs), Av.9 °F (36.6 °C), Min:97.7 °F (36.5 °C), Max:98.1 °F (36.7 °C)       Enteral Nutrition    Patient not receiving enteral nutrition at this time.    Parenteral  Nutrition    Patient not receiving parenteral nutrition support at this time.    Evaluation of Received Nutrient Intake    Calories: not meeting estimated needs  Protein: not meeting estimated needs    Patient Education    Not applicable.    Nutrition Diagnosis     PES: Inadequate oral intake related to acute illness as evidenced by eating < 50% meals past week; Dx sepsis . (new)    Interventions/Goals     Intervention(s): modified composition of meals/snacks, commercial beverage, multivitamin/mineral supplement therapy, and collaboration with other providers  Goal: Meet greater than 75% of nutritional needs by follow-up. (new)    Monitoring & Evaluation     Dietitian will monitor food and beverage intake, weight, and electrolyte/renal panel.  Nutrition Risk/Follow-Up: moderate (follow-up in 3-5 days)   Please consult if re-assessment needed sooner.

## 2023-07-01 NOTE — H&P
[facility] Medicine Wards History & Physical Note     Date of Admit: 6/30/2023    Chief Complaint     Generalized weakness, abdominal pain    Subjective:      History of Present Illness:  Los Alatorre is a 58 y.o. male who with a history of alcoholic cirrhosis, alcohol use disorder, and seizure disorder who presented to [facility] ED on 6/30/2023 with a primary complaint of abdominal pain. He reports poor PO intake and has felt his abdomen become more distended. Does not report any fevers or chills. Denies any melena, bright red blood in the stool, or any vomiting. Was recently transferred to [facility] earlier this month for an EGD, which ruled out varices and gastropathy. Unknown if he has been taking his home medications reliably.      In the ED patient was afebrile 100.2 F, pulse 96, respiratory rate 4, blood pressure 137/83, 87% on room air.  CBC revealed leukocytosis at 23, H and H 11.7/35.7, platelets 286.  CMP revealed mild hyponatremia 132, acidosis 17, BUN/CR 0.65/0.88.  Alkaline phosphatase elevated at 337, slight AST elevation at 56, INR 1.3.  Initial lactic acid was 3.2, troponin is negative. CT abdomen pelvis revealed trace left pleural effusion, cirrhosis ascites, previous cholecystectomy, bilateral kidneys with non occluding calculi, and noninflamed diverticulosis coli.  Paracentesis was performed and 5 L of fluid was drained.      Past Medical History:  Past Medical History:   Diagnosis Date    Seizures        Past Surgical History:  Past Surgical History:   Procedure Laterality Date    APPENDECTOMY      CHOLECYSTECTOMY         Family History:  Family History   Problem Relation Age of Onset    Lung cancer Mother     Dementia Father     Alzheimer's disease Father        Social History:  Social History     Tobacco Use    Smoking status: Every Day     Packs/day: 1.50     Types: Cigarettes    Smokeless tobacco: Never   Substance Use Topics    Alcohol use: Not Currently     Comment: no alcohol x 2 month    Drug use: Not  Currently     Types: Marijuana     Comment: No Marijuana x 2 month       Allergies:  Review of patient's allergies indicates:  No Known Allergies    Home Medications:  Prior to Admission medications    Medication Sig Start Date End Date Taking? Authorizing Provider   LACTULOSE ORAL Take by mouth 2 (two) times a day.    Historical Provider   levETIRAcetam (KEPPRA) 750 MG Tab Take 1 tablet (750 mg total) by mouth 2 (two) times daily. 5/3/23 6/2/23  Pedro Peters MD   polyethylene glycol (GLYCOLAX) 17 gram/dose powder Take 17 g by mouth daily as needed (constipation).  Patient not taking: Reported on 5/4/2023 5/3/23   Pedro Peters MD         Review of Systems:  Gen: No fever, chills, or weight changes  Eye: No blindness or vision changes  Heart: No chest pain, palpitations, or diaphoresis  Lungs: +shortness of breath, no cough or wheezing  GI: + abdominal pain, nausea, vomiting, no diarrhea  : No hematuria, No dysuria  Musk: No lower extremity swelling  Integumentary: No rash or itching  Neuro: Normal speech, no focal weakness or headache       Objective:   Last 24 Hour Vital Signs:  Blood pressure 104/71, pulse 110, temperature 97.7 °F (36.5 °C), temperature source Oral, resp. rate (!) 24, height 6' (1.829 m), weight 63.7 kg (140 lb 6.9 oz), SpO2 97 %.  Body mass index is 19.05 kg/m².    Physical Examination:    General:  No acute distress, lying comfortably in bed  Eye: PERRL, EOMI, scleral icterus  HENT: Normocephalic, atraumatic, moist mucous membranes  Neck: Supple, nontender, no JVD  Respiratory:  Clear to auscultation bilaterally  Cardiovascular:  Regular rate and rhythm no murmurs rubs or gallops  Gastrointestinal:  Protuberant with fluid wave present.  No tenderness to palpation  Musculoskeletal:  Full range of motion  Integumentary:  Warm, dry  Neurologic:  Grossly intact  Psychiatric: Appropriate mood and affect    Laboratory:  Most Recent Data:  Admission on 06/30/2023   Component Date Value    Lipase  Level 06/30/2023 52     Troponin-I 06/30/2023 <0.010     Sodium Level 06/30/2023 132 (L)     Potassium Level 06/30/2023 3.6     Chloride 06/30/2023 99     Carbon Dioxide 06/30/2023 17 (L)     Glucose Level 06/30/2023 129 (H)     Blood Urea Nitrogen 06/30/2023 26.5 (H)     Creatinine 06/30/2023 3.88 (H)     Calcium Level Total 06/30/2023 8.9     Protein Total 06/30/2023 6.7     Albumin Level 06/30/2023 2.1 (L)     Globulin 06/30/2023 4.6 (H)     Albumin/Globulin Ratio 06/30/2023 0.5 (L)     Bilirubin Total 06/30/2023 3.0 (H)     Alkaline Phosphatase 06/30/2023 337 (H)     Alanine Aminotransferase 06/30/2023 14     Aspartate Aminotransfera* 06/30/2023 56 (H)     eGFR 06/30/2023 17     Lactic Acid Level 06/30/2023 3.2 (H)     Ammonia Level 06/30/2023 45.2     PTT 06/30/2023 35.8     PT 06/30/2023 16.7     INR 06/30/2023 1.38 (H)     WBC 06/30/2023 23.40 (H)     RBC 06/30/2023 3.59 (L)     Hgb 06/30/2023 11.7 (L)     Hct 06/30/2023 35.7 (L)     MCV 06/30/2023 99.4 (H)     MCH 06/30/2023 32.6 (H)     MCHC 06/30/2023 32.8 (L)     RDW 06/30/2023 15.4     Platelet 06/30/2023 286     MPV 06/30/2023 11.2 (H)     Neut % 06/30/2023 86.2     Lymph % 06/30/2023 4.9     Mono % 06/30/2023 6.4     Eos % 06/30/2023 1.0     Basophil % 06/30/2023 0.5     Lymph # 06/30/2023 1.15     Neut # 06/30/2023 20.17 (H)     Mono # 06/30/2023 1.50 (H)     Eos # 06/30/2023 0.23     Baso # 06/30/2023 0.11     IG# 06/30/2023 0.24 (H)     IG% 06/30/2023 1.0     NRBC% 06/30/2023 0.0     Influenza A PCR 06/30/2023 Not Detected     Influenza B PCR 06/30/2023 Not Detected     SARS-CoV-2 PCR 06/30/2023 Not Detected     Lactic Acid Level 06/30/2023 1.6     WBC BF 06/30/2023 20     %Polymorp BF 06/30/2023 45.0     %Mononucl BF 06/30/2023 55.0     Color BF 06/30/2023 Yellow     Clarity BF 06/30/2023 Clear     RBC BF 06/30/2023 0     GRAM STAIN 06/30/2023 No WBCs, No bacteria seen     Neutrophils % 06/30/2023 37     Lymphocyte % 06/30/2023 24     Monocyte %  06/30/2023 39     Macrophage count 06/30/2023 23     Mesothelial count 06/30/2023 2     Hepatitis A IgM 06/30/2023 Nonreactive     Hepatitis B Core IgM 06/30/2023 Nonreactive     Hepatitis B Surface Anti* 06/30/2023 Nonreactive     Hep C Ab Interp 06/30/2023 Nonreactive    Admission on 06/14/2023, Discharged on 06/14/2023   Component Date Value    PT 06/14/2023 15.9     INR 06/14/2023 1.30     Troponin-I 06/14/2023 <0.010     Ammonia Level 06/14/2023 43.6    Lab Visit on 06/14/2023   Component Date Value    Sodium Level 06/14/2023 134 (L)     Potassium Level 06/14/2023 3.4 (L)     Chloride 06/14/2023 101     Carbon Dioxide 06/14/2023 19 (L)     Glucose Level 06/14/2023 126 (H)     Blood Urea Nitrogen 06/14/2023 6.9 (L)     Creatinine 06/14/2023 1.08     Calcium Level Total 06/14/2023 8.7     Protein Total 06/14/2023 6.6     Albumin Level 06/14/2023 2.2 (L)     Globulin 06/14/2023 4.4 (H)     Albumin/Globulin Ratio 06/14/2023 0.5 (L)     Bilirubin Total 06/14/2023 5.5 (H)     Alkaline Phosphatase 06/14/2023 425 (H)     Alanine Aminotransferase 06/14/2023 25     Aspartate Aminotransfera* 06/14/2023 93 (H)     eGFR 06/14/2023 >60     Cholesterol Total 06/14/2023 225 (H)     HDL Cholesterol 06/14/2023 9 (L)     Triglyceride 06/14/2023 127     Cholesterol/HDL Ratio 06/14/2023 25 (H)     Very Low Density Lipopro* 06/14/2023 25     LDL Cholesterol 06/14/2023 191.00 (H)     Thyroid Stimulating Horm* 06/14/2023 18.553 (H)     Hemoglobin A1c 06/14/2023 <4.0     Estimated Average Glucose 06/14/2023      Vit D 25 OH 06/14/2023 56.6     Hep C Ab Interp 06/14/2023 Nonreactive     HIV 06/14/2023 Nonreactive     Syphilis Antibody 06/14/2023 Nonreactive     WBC 06/14/2023 24.73 (H)     RBC 06/14/2023 3.30 (L)     Hgb 06/14/2023 11.2 (L)     Hct 06/14/2023 33.3 (L)     MCV 06/14/2023 100.9 (H)     MCH 06/14/2023 33.9 (H)     MCHC 06/14/2023 33.6     RDW 06/14/2023 20.4 (H)     Platelet 06/14/2023 499 (H)     MPV 06/14/2023 10.8 (H)      Neut % 06/14/2023 77.6     Lymph % 06/14/2023 9.1     Mono % 06/14/2023 10.2     Eos % 06/14/2023 1.6     Basophil % 06/14/2023 0.9     Lymph # 06/14/2023 2.24     Neut # 06/14/2023 19.19 (H)     Mono # 06/14/2023 2.52 (H)     Eos # 06/14/2023 0.40     Baso # 06/14/2023 0.22 (H)     IG# 06/14/2023 0.16 (H)     IG% 06/14/2023 0.6     NRBC% 06/14/2023 0.0     Thyroxine Free 06/14/2023 1.90 (H)        Radiology:  Imaging Results              CT Abdomen Pelvis  Without Contrast (Final result)  Result time 06/30/23 19:20:57      Final result by Raman Tiwari MD (06/30/23 19:20:57)                   Impression:      1. Trace of left pleural effusion.    2. Previous cholecystectomy.    3. Cirrhosis and ascites.    4.  Bilateral kidneys non occluding calculi.    5.  Noninflamed diverticulosis coli.      Electronically signed by: Raman Tiwari  Date:    06/30/2023  Time:    19:20               Narrative:    EXAMINATION:  CT ABDOMEN PELVIS WITHOUT CONTRAST    CLINICAL HISTORY:  Abdominal pain, acute, nonlocalized;    TECHNIQUE:  Multidetector axial images were obtained from the  diaphragms to below symphysis pubis without the administration of IV contrast. Oral contrast was not administered.    Dose length product of 309 mGycm. Automated exposure control was utilized to minimize radiation dose.    COMPARISON:  June 4, 2023    FINDINGS:  There is trace of left dependent pleural effusion and left basilar atelectasis.    Liver is small in size consistent with cirrhosis.  There is moderate to large volume intraperitoneal free fluid consistent with ascites.  There are mesenteric inflammatory ground-glass opacities.  Within limitations of noncontrast technique, no acute findings liver, pancreas or the spleen identified.  Gallbladder is surgically absent..    The adrenal glands noncontrast evaluation is unremarkable. The kidneys are unremarkable in size and contour.  Bilateral kidneys non occluding calculi. The ureters appear  normal in course and diameter without intra ureteral stone.  Calcified plaques of the aorta and iliac arteries without aneurysmal dilatation.  There are no retroperitoneal periaortic enlarged lymph nodes.    Stomach is decompressed and difficult to assess.  No abnormal dilatation of loops of small bowel.  Pericecal metallic clips suggest prior appendectomy.  There is noninflamed diverticulosis coli.  No bowel obstruction.  No pneumoperitoneum.    Urinary bladder is mostly decompressed.  No intravesical stone identified. There is no pelvic free fluid.    Chronic compression deformities of the thoracolumbar vertebrae.  Demineralization of the bones.  No acute or aggressive skeletal abnormality.                                       X-Ray Chest AP Portable (Final result)  Result time 06/30/23 12:28:05      Final result by Raj Gibbs MD (06/30/23 12:28:05)                   Impression:      No acute findings.      Electronically signed by: Raj Gibbs  Date:    06/30/2023  Time:    12:28               Narrative:    EXAMINATION:  XR CHEST AP PORTABLE    CLINICAL HISTORY:  Unspecified abdominal pain    COMPARISON:  14 June 2023    FINDINGS:  Portable frontal view of the chest was obtained. The heart is not significantly enlarged.  There are chronic changes towards the left lung base.  No new focal consolidation or pneumothorax.                                         Assessment & Plan:     1) SIRS 3/4 (tachycardia, tachypnea, leukocytosis)  Sepsis, likely GI source  Alcoholic Liver Cirrhosis with ascites  -increasing abdominal width over the past few weeks, with increasing shortness of breath and decreased PO intake  -recent transfer to [facility], where EGD was performed.  Negative for varices, or portal gastropathy.  FOBT performed in the ED was negative  -ED drained 5 L of fluid, sent for studies  -will start fluid resuscitating  -will start 25% albumin, 50 g b.i.d.  -start cefepime and metronidazole  -start lactulose  20 g b.i.d., with goal bowel movements 3-4 bowel movements per day    2) CHRISTEN  -Poor PO intake for the past week, nausea and vomiting present  -Creatinine 3.88 on admission, baseline around 1  -Will fluid resuscitate lightly with LR @ 100 cc/hr  -will continue to monitor, consider a Nephrology consult in the a.m.    3) reported history of seizures from alcohol  -lorazepam 2 mg IV q.2.h  -will place patient on CIWA      Antibiotics: Cefepime, Metronidazole  Diet: Low sodium  DVT Prophylaxis: Heparin 5000U BID  GI Prophylaxis: Protonix  Fluids:  cc/hr      Disposition:  Admitted to Internal Medicine for further management sepsis likely secondary to alcoholic liver cirrhosis with ascites.  Dispo pending treatment      Palomo Helton DO  [facility] HO-1

## 2023-07-01 NOTE — PROGRESS NOTES
[facility] Medicine Wards   Progress Note     Resident Team: [facility] Medicine List 4  Attending Physician: Dyana Grimes MD  Resident: Danie Lui  Intern: [facility] Length of Stay: 1 days    Subjective:      Brief HPI:  Los Alatorre is a 58 y.o. male who with a history of alcoholic cirrhosis, alcohol use disorder, and seizure disorder who presented to [facility] ED on 6/30/2023 with a primary complaint of abdominal pain. He reports poor PO intake and has felt his abdomen become more distended. Does not report any fevers or chills. Denies any melena, bright red blood in the stool, or any vomiting. Was recently transferred to [facility] earlier this month for an EGD, which ruled out varices and gastropathy. Unknown if he has been taking his home medications reliably.       In the ED patient was afebrile 100.2 F, pulse 96, respiratory rate 4, blood pressure 137/83, 87% on room air.  CBC revealed leukocytosis at 23, H and H 11.7/35.7, platelets 286.  CMP revealed mild hyponatremia 132, acidosis 17, BUN/CR 0.65/0.88.  Alkaline phosphatase elevated at 337, slight AST elevation at 56, INR 1.3.  Initial lactic acid was 3.2, troponin is negative. CT abdomen pelvis revealed trace left pleural effusion, cirrhosis ascites, previous cholecystectomy, bilateral kidneys with non occluding calculi, and noninflamed diverticulosis coli.  Paracentesis was performed and 5 L of fluid was drained.      Interval History: NAEON. Feels better today. Tolerating PO intake. Afebrile, VSS. Was able to produce 200 cc urine. Abdomen is still distended, no pain in abdomen. No other complaints other wise.       Review of Systems:  Pertinent items are noted in HPI.     Objective:     Vital Signs (Most Recent):  Temp: 98.1 °F (36.7 °C) (07/01/23 0729)  Pulse: 92 (07/01/23 0730)  Resp: 18 (07/01/23 0310)  BP: 98/65 (07/01/23 0730)  SpO2: 98 % (07/01/23 0730) Vital Signs (24h Range):  Temp:  [97.7 °F (36.5 °C)-100.2 °F (37.9 °C)] 98.1 °F (36.7 °C)  Pulse:  []  92  Resp:  [18-24] 18  SpO2:  [87 %-99 %] 98 %  BP: ()/(61-85) 98/65       Physical Examination:  Physical Exam  Vitals reviewed.   Constitutional:       Appearance: Normal appearance.   HENT:      Mouth/Throat:      Mouth: Mucous membranes are moist.   Eyes:      Extraocular Movements: Extraocular movements intact.   Cardiovascular:      Rate and Rhythm: Normal rate and regular rhythm.      Pulses: Normal pulses.      Heart sounds: Normal heart sounds. No murmur heard.    No friction rub. No gallop.   Pulmonary:      Effort: Pulmonary effort is normal. No respiratory distress.      Breath sounds: Normal breath sounds. No wheezing.   Abdominal:      General: Bowel sounds are normal. There is distension.      Palpations: Abdomen is soft.      Tenderness: There is no abdominal tenderness. There is no guarding.   Musculoskeletal:      Cervical back: Normal range of motion.   Skin:     General: Skin is warm and dry.      Capillary Refill: Capillary refill takes less than 2 seconds.   Neurological:      General: No focal deficit present.      Mental Status: He is alert.         Laboratory:  Most Recent Data:  CBC:   Recent Labs   Lab 06/30/23  1152 07/01/23  0452   WBC 23.40* 14.64*   HGB 11.7* 8.2*   HCT 35.7* 24.5*    176     CMP:   Recent Labs   Lab 07/01/23  0452   CALCIUM 8.0*   ALBUMIN 2.4*   *   K 3.1*   CO2 19*   BUN 31.2*   CREATININE 3.64*   ALKPHOS 211*   ALT 8   AST 39*   BILITOT 2.1*         Microbiology Data Reviewed: yes  Pertinent Findings:  6/30/23 Blood culture x 2 pending  6/30/23 Body Fluid Culture pending    Other Results:      Radiology:  Imaging Results              CT Abdomen Pelvis  Without Contrast (Final result)  Result time 06/30/23 19:20:57      Final result by Raman Tiwari MD (06/30/23 19:20:57)                   Impression:      1. Trace of left pleural effusion.    2. Previous cholecystectomy.    3. Cirrhosis and ascites.    4.  Bilateral kidneys non occluding  calculi.    5.  Noninflamed diverticulosis coli.      Electronically signed by: Raman Tiwari  Date:    06/30/2023  Time:    19:20               Narrative:    EXAMINATION:  CT ABDOMEN PELVIS WITHOUT CONTRAST    CLINICAL HISTORY:  Abdominal pain, acute, nonlocalized;    TECHNIQUE:  Multidetector axial images were obtained from the  diaphragms to below symphysis pubis without the administration of IV contrast. Oral contrast was not administered.    Dose length product of 309 mGycm. Automated exposure control was utilized to minimize radiation dose.    COMPARISON:  June 4, 2023    FINDINGS:  There is trace of left dependent pleural effusion and left basilar atelectasis.    Liver is small in size consistent with cirrhosis.  There is moderate to large volume intraperitoneal free fluid consistent with ascites.  There are mesenteric inflammatory ground-glass opacities.  Within limitations of noncontrast technique, no acute findings liver, pancreas or the spleen identified.  Gallbladder is surgically absent..    The adrenal glands noncontrast evaluation is unremarkable. The kidneys are unremarkable in size and contour.  Bilateral kidneys non occluding calculi. The ureters appear normal in course and diameter without intra ureteral stone.  Calcified plaques of the aorta and iliac arteries without aneurysmal dilatation.  There are no retroperitoneal periaortic enlarged lymph nodes.    Stomach is decompressed and difficult to assess.  No abnormal dilatation of loops of small bowel.  Pericecal metallic clips suggest prior appendectomy.  There is noninflamed diverticulosis coli.  No bowel obstruction.  No pneumoperitoneum.    Urinary bladder is mostly decompressed.  No intravesical stone identified. There is no pelvic free fluid.    Chronic compression deformities of the thoracolumbar vertebrae.  Demineralization of the bones.  No acute or aggressive skeletal abnormality.                                       X-Ray Chest AP  Portable (Final result)  Result time 06/30/23 12:28:05      Final result by Raj Gibbs MD (06/30/23 12:28:05)                   Impression:      No acute findings.      Electronically signed by: Raj Gibbs  Date:    06/30/2023  Time:    12:28               Narrative:    EXAMINATION:  XR CHEST AP PORTABLE    CLINICAL HISTORY:  Unspecified abdominal pain    COMPARISON:  14 June 2023    FINDINGS:  Portable frontal view of the chest was obtained. The heart is not significantly enlarged.  There are chronic changes towards the left lung base.  No new focal consolidation or pneumothorax.                                      Current Medications:     Infusions:       Scheduled:   albumin human 25%  50 g Intravenous BID    ceFEPime (MAXIPIME) IVPB  2 g Intravenous Q24H    heparin (porcine)  5,000 Units Subcutaneous Q12H    lactulose  15 g Oral BID    metronidazole  500 mg Intravenous Q8H    pantoprazole  40 mg Oral Daily        PRN:  dextrose 10%, dextrose 10%, glucagon (human recombinant), glucose, glucose, lorazepam, naloxone, ondansetron, sodium chloride 0.9%    Antibiotics and Day Number of Therapy:  Cefepime, Flagyl, 6/30/2023-present      Intake/Output Summary (Last 24 hours) at 7/1/2023 1014  Last data filed at 7/1/2023 0726  Gross per 24 hour   Intake 1175 ml   Output 200 ml   Net 975 ml       Lines/Drains/Airways       Peripheral Intravenous Line  Duration                  Peripheral IV - Single Lumen 06/30/23 1859 20 G Posterior;Right Wrist <1 day                      Assessment & Plan:     1) SIRS 3/4 (tachycardia, tachypnea, leukocytosis)  Sepsis, likely GI source  Alcoholic Liver Cirrhosis with ascites  -increasing abdominal width over the past few weeks, with increasing shortness of breath and decreased PO intake  -recent hospitalization at [facility], where EGD was performed.  Negative for varices, or portal gastropathy.  FOBT performed in the ED was negative  -ED drained 5 L of fluid, sent for  studies  -Continue fluid resuscitation  -will start 25% albumin, 50 g b.i.d.  -continue cefepime and metronidazole  -continue lactulose 20 g b.i.d., with goal bowel movements 3-4 bowel movements per day     2) CHRISTEN  -Poor PO intake for the past week, nausea and vomiting present  -Creatinine 3.88 on admission, baseline around 1  -Cr Improved marginally to 3.64  -Will fluid resuscitate lightly with LR @ 100 cc/hr  -will continue to monitor, consider a Nephrology consult in the a.m.     3) reported history of seizures from alcohol  -lorazepam 2 mg IV q.2.h  -will place patient on CIWA      CODE STATUS: Full Code  Access: Peripheral  Antibiotics: Cefepime, Metronidazole  Diet:  low sodium   DVT Prophylaxis: Heparin 5000U BID  GI Prophylaxis: proton pump inhibitor per orders  Fluids: lactated Ringer's 100 ml/hr.     Disposition: day 1 of admission for sepsis, with CHRISTEN. Will consult nephrology tomorrow for further management of christen.       Palomo Helton DO  [facility], PGY-II

## 2023-07-01 NOTE — PLAN OF CARE
07/01/23 1057   Discharge Assessment   Assessment Type Discharge Planning Assessment   Confirmed/corrected address, phone number and insurance Yes   Confirmed Demographics Correct on Facesheet   Source of Information health record   When was your last doctors appointment?   (PCP: Elis Kay)   Does patient/caregiver understand observation status   (Inpatient)   Communicated [place]WA with patient/caregiver Date not available/Unable to determine   Reason For Admission Tachycardia, abd pain, CP, acute renal failure, leukocytosis, tense ascites   People in Home significant other   Facility Arrived From: Home   Do you expect to return to your current living situation? Yes   Do you have help at home or someone to help you manage your care at home? Yes   Who are your caregiver(s) and their phone number(s)? SANDRA Lassiter, P; [phone]   Prior to hospitilization cognitive status: Unable to Assess   Current cognitive status: Unable to Assess   Equipment Currently Used at Home other (see comments)  (Has access to: WC, SC, RW, rollator)   Readmission within 30 days? No   Patient currently being followed by outpatient case management? No   Do you currently have service(s) that help you manage your care at home? No   Do you take prescription medications? Yes  ([facility])   Do you have prescription coverage? Yes   Coverage [employer]   Do you have any problems affording any of your prescribed medications? No   Is the patient taking medications as prescribed? yes   Who is going to help you get home at discharge? Medicaid transportation   How do you get to doctors appointments? health plan transportation   Are you on dialysis? No   Do you take coumadin? No   Discharge Plan A Home with family   DME Needed Upon Discharge    (TBD)   Transition of Care Barriers Substance Abuse   OTHER   Name(s) of People in Home SANDRA Lassiter, P: [phone]

## 2023-07-01 NOTE — PT/OT/SLP PROGRESS
Physical Therapy    Missed Treatment Session    Patient Name:  Los Alatorre   MRN:  [id number]      Patient not seen at this time secondary to Patient unwilling to participate. States he just ate and wanted to wait due to nausea    Will follow-up as patient is available to participate and as therapists' schedule allows.

## 2023-07-02 NOTE — CONSULTS
Nephrology Consultation    Date of Consultation: July 2. 2023    Consultation Requested By: Dr. Te Lui    Reason for Consultation: Acute renal failure    History of Present Illness:  This is a 58 y.o. male with alcoholic cirrhosis. Patient presented to the ER complaining of 2 day history of nausea, vomiting , increasing abdominal swelling and shortness of breath. Exertion worsened his symptoms and nothing improved his symptoms thus prompting patient to present to ER.Bun and creatinine were elevated upon admission Bun 26.5 and creatinine 3.8. Renal service consulted for evaluation of acute renal failure.    Review of patient's allergies indicates:  No Known Allergies    Review of systems: 12 point review of systems conducted, negative except as stated in the HPI.     Past Medical History: Cirrhosis  Past Medical History:   Diagnosis Date    Seizures        Procedure History:   Past Surgical History:   Procedure Laterality Date    APPENDECTOMY      CHOLECYSTECTOMY         Family History: family history includes Alzheimer's disease in his father; Dementia in his father; Lung cancer in his mother.    Social History:  reports that he has been smoking cigarettes. He has been smoking an average of 1.5 packs per day. He has never used smokeless tobacco. He reports that he does not currently use alcohol. He reports that he does not currently use drugs after having used the following drugs: Marijuana.    Home Medications:   Medications Prior to Admission   Medication Sig Dispense Refill Last Dose    LACTULOSE ORAL Take by mouth 2 (two) times a day.       levETIRAcetam (KEPPRA) 750 MG Tab Take 1 tablet (750 mg total) by mouth 2 (two) times daily. 60 tablet 0     polyethylene glycol (GLYCOLAX) 17 gram/dose powder Take 17 g by mouth daily as needed (constipation). (Patient not taking: Reported on 5/4/2023) 289 g 0        Inpatient Medications:     Current Facility-Administered Medications:     albumin human 25% bottle  50 g, 50 g, Intravenous, BID, Palomo Yeh, DO, Stopped at 07/01/23 2154    ceFEPIme (MAXIPIME) 2 g in dextrose 5 % in water (D5W) 5 % 100 mL IVPB (MB+), 2 g, Intravenous, Q24H, Palomo Braydenh, DO, Stopped at 07/01/23 2124    dextrose 10% bolus 125 mL 125 mL, 12.5 g, Intravenous, PRN, Palomo Yeh, DO    dextrose 10% bolus 250 mL 250 mL, 25 g, Intravenous, PRN, Palomo Yeh, DO    famotidine tablet 20 mg, 20 mg, Oral, Daily, Palomo Yeh, DO, 20 mg at 07/01/23 1205    glucagon (human recombinant) injection 1 mg, 1 mg, Intramuscular, PRN, Palomo Yeh, DO    glucose chewable tablet 16 g, 16 g, Oral, PRN, Palomo Yeh, DO    glucose chewable tablet 24 g, 24 g, Oral, PRN, Palomo Yeh, DO    heparin (porcine) injection 5,000 Units, 5,000 Units, Subcutaneous, Q12H, Palomo Yeh, DO, 5,000 Units at 07/01/23 2054    lactulose 20 gram/30 mL solution Soln 15 g, 15 g, Oral, BID, Palomo Yeh, DO, 15 g at 07/01/23 2055    LORazepam injection 2 mg, 2 mg, Intravenous, Q2H PRN, Zena Chen MD    magnesium sulfate 2g in water 50mL IVPB (premix), 2 g, Intravenous, Q1H, Te Lui MD    metronidazole IVPB 500 mg, 500 mg, Intravenous, Q8H, Palomo Yeh, DO, Stopped at 07/02/23 0622    naloxone 0.4 mg/mL injection 0.02 mg, 0.02 mg, Intravenous, PRN, Palomo Yeh, DO    ondansetron injection 4 mg, 4 mg, Intravenous, Q8H PRN, Palomo Yeh, DO    potassium bicarbonate disintegrating tablet 20 mEq, 20 mEq, Oral, Once, Te Lui MD    potassium chloride 10 mEq in 100 mL IVPB, 10 mEq, Intravenous, Q1H, Te Lui MD    sodium bicarbonate 100 mEq in sodium chloride 0.45% 1,100 mL infusion, , Intravenous, Continuous, Mary Kay Dee MD, Last Rate: 100 mL/hr at 07/02/23 0655, New Bag at 07/02/23 0655    sodium chloride 0.9% flush 10 mL, 10 mL, Intravenous, Q12H PRN, Palomo Helton,      Laboratory Data:   Recent Results (from the past 24 hour(s))   Urinalysis, Reflex to Urine Culture    Collection Time: 07/01/23  8:14 PM     Specimen: Urine   Result Value Ref Range    Color, UA Yellow Yellow, Light-Yellow, Dark Yellow, Jamila, Straw    Appearance, UA Clear Clear    Specific Gravity, UA 1.019     pH, UA 5.5 5.0 - 8.5    Protein, UA 1+ (A) Negative mg/dL    Glucose, UA Normal Negative, Normal mg/dL    Ketones, UA Trace (A) Negative mg/dL    Blood, UA Trace (A) Negative unit/L    Bilirubin, UA Negative Negative mg/dL    Urobilinogen, UA Normal 0.2, 1.0, Normal mg/dL    Nitrites, UA Negative Negative    Leukocyte Esterase, UA Negative Negative unit/L    WBC, UA 0-5 None Seen, 0-2, 3-5, 0-5 /HPF    Bacteria, UA Trace (A) None Seen /HPF    Squamous Epithelial Cells, UA Trace (A) None Seen /HPF    Mucous, UA Trace (A) None Seen /LPF    Hyaline Casts, UA 3-5 (A) None Seen /lpf    RBC, UA 0-5 None Seen, 0-2, 3-5, 0-5 /HPF   OCCULT BLOOD STOOL, CA SCREEN 2ND SPEC    Collection Time: 07/01/23  8:39 PM   Result Value Ref Range    Occult Blood Stool 2 Negative Negative, N/A   Occult Blood Stool, CA Screen    Collection Time: 07/02/23  1:06 AM   Result Value Ref Range    Stool Color 1 Green     Stool Consistancy 1 Mucoid     Occult Blood Stool 1 Negative Negative   Comprehensive Metabolic Panel (CMP)    Collection Time: 07/02/23  3:42 AM   Result Value Ref Range    Sodium Level 135 (L) 136 - 145 mmol/L    Potassium Level 3.0 (L) 3.5 - 5.1 mmol/L    Chloride 104 98 - 107 mmol/L    Carbon Dioxide 18 (L) 22 - 29 mmol/L    Glucose Level 97 74 - 100 mg/dL    Blood Urea Nitrogen 27.8 (H) 8.4 - 25.7 mg/dL    Creatinine 3.30 (H) 0.73 - 1.18 mg/dL    Calcium Level Total 8.1 (L) 8.4 - 10.2 mg/dL    Protein Total 5.3 (L) 6.4 - 8.3 gm/dL    Albumin Level 3.2 (L) 3.5 - 5.0 g/dL    Globulin 2.1 (L) 2.4 - 3.5 gm/dL    Albumin/Globulin Ratio 1.5 1.1 - 2.0 ratio    Bilirubin Total 1.8 (H) <=1.5 mg/dL    Alkaline Phosphatase 171 (H) 40 - 150 unit/L    Alanine Aminotransferase 6 0 - 55 unit/L    Aspartate Aminotransferase 29 5 - 34 unit/L    eGFR 21 mls/min/1.73/m2    Magnesium    Collection Time: 07/02/23  3:42 AM   Result Value Ref Range    Magnesium Level 1.40 (L) 1.60 - 2.60 mg/dL   Phosphorus    Collection Time: 07/02/23  3:42 AM   Result Value Ref Range    Phosphorus Level 3.0 2.3 - 4.7 mg/dL   CBC with Differential    Collection Time: 07/02/23  3:42 AM   Result Value Ref Range    WBC 11.84 (H) 4.50 - 11.50 x10(3)/mcL    RBC 2.62 (L) 4.70 - 6.10 x10(6)/mcL    Hgb 8.4 (L) 14.0 - 18.0 g/dL    Hct 24.8 (L) 42.0 - 52.0 %    MCV 94.7 (H) 80.0 - 94.0 fL    MCH 32.1 (H) 27.0 - 31.0 pg    MCHC 33.9 33.0 - 36.0 g/dL    RDW 15.5 11.5 - 17.0 %    Platelet 156 130 - 400 x10(3)/mcL    MPV 11.2 (H) 7.4 - 10.4 fL    Neut % 78.7 %    Lymph % 9.9 %    Mono % 8.4 %    Eos % 2.1 %    Basophil % 0.6 %    Lymph # 1.17 0.6 - 4.6 x10(3)/mcL    Neut # 9.31 (H) 2.1 - 9.2 x10(3)/mcL    Mono # 1.00 0.1 - 1.3 x10(3)/mcL    Eos # 0.25 0 - 0.9 x10(3)/mcL    Baso # 0.07 <=0.2 x10(3)/mcL    IG# 0.04 0 - 0.04 x10(3)/mcL    IG% 0.3 %    NRBC% 0.0 %       Imaging:  CT Abdomen Pelvis  Without Contrast   Final Result      1. Trace of left pleural effusion.      2. Previous cholecystectomy.      3. Cirrhosis and ascites.      4.  Bilateral kidneys non occluding calculi.      5.  Noninflamed diverticulosis coli.         Electronically signed by: Raman Tiwari   Date:    06/30/2023   Time:    19:20      X-Ray Chest AP Portable   Final Result      No acute findings.         Electronically signed by: Raj Gibbs   Date:    06/30/2023   Time:    12:28          Physical Exam:   /71   Pulse 91   Temp 97.8 °F (36.6 °C) (Oral)   Resp 18   Ht 6' (1.829 m)   Wt 63.7 kg (140 lb 6.9 oz)   SpO2 99%   BMI 19.05 kg/m²  Body mass index is 19.05 kg/m².  General Alert and oriented x 3. NAD  HEENT: Normocephalic atraumatic. PERRLA, EOMI (OU)  Neck: no JVD bruits  Lungs: CTA bilaterally , all fields  CVS: RRR, no m/r/g  Abdomen: distended,Positive bowel sounds all 4 quadrants.  Extremities; no clubbing or cyanosis.  Trace edema lower extrmities bilaterally.  Neurologic: CN 2-12 intact    Impression/Plan:  Acute renal failure: unclear whether oliguric vs non oliguric due to inaccurate intake and output. Suspect hat acute renal failure due to volume contraction and dehydration due to decreased oral intake and vomiting prior to admission. May have some ischemic acute tubular necrosis due to realtively low blood pressure. I agree with volume expansion with albumin and IV fluids. Consider midodrine to keep sbp >110 and MAP greater than 70 if issues with maintaining those parameters.  2. Metabolic acidosis: place patient on 1/2 NS with 100 meq sodium bicarbonate 100 meq at 100 ml/hr. Recheck CMP later today as do not want to overcorrect acidosis in cirrhosis.  3. Cirrhosis:s/p 5 liter paracentesis in ER. Agree with volume expansion with albumin, IV fluids and consider Midodrine to maintain sbp>110 or MAP >70 if needed.  4. Hypomagnesemia: agree with repletion. Monitor.    Thank you very much for your consultation    Mary Kay Dee M.D.  Nephrology

## 2023-07-02 NOTE — PT/OT/SLP PROGRESS
Physical Therapy    Missed Treatment Session    Patient Name:  Los Alatorre   MRN:  [id number]      Patient not seen at this time secondary to Patient unwilling to participate.    Will follow-up as patient is available to participate and as therapists' schedule allows.

## 2023-07-02 NOTE — PROGRESS NOTES
[facility] Medicine Wards   Progress Note     Resident Team: [facility] Medicine List 4  Attending Physician: Dyana Grimes MD  Resident: Danie Lui  Intern: [facility] Length of Stay: 2 days    Subjective:      Brief HPI:  Los Alatorre is a 58 y.o. male who with a history of alcoholic cirrhosis, alcohol use disorder, and seizure disorder who presented to [facility] ED on 6/30/2023 with a primary complaint of abdominal pain. He reports poor PO intake and has felt his abdomen become more distended. Does not report any fevers or chills. Denies any melena, bright red blood in the stool, or any vomiting. Was recently transferred to [facility] earlier this month for an EGD, which ruled out varices and gastropathy. Unknown if he has been taking his home medications reliably.       In the ED patient was afebrile 100.2 F, pulse 96, respiratory rate 4, blood pressure 137/83, 87% on room air.  CBC revealed leukocytosis at 23, H and H 11.7/35.7, platelets 286.  CMP revealed mild hyponatremia 132, acidosis 17, BUN/CR 0.65/0.88.  Alkaline phosphatase elevated at 337, slight AST elevation at 56, INR 1.3.  Initial lactic acid was 3.2, troponin is negative. CT abdomen pelvis revealed trace left pleural effusion, cirrhosis ascites, previous cholecystectomy, bilateral kidneys with non occluding calculi, and noninflamed diverticulosis coli.  Paracentesis was performed and 5 L of fluid was drained.      Interval History: NAEON. Feels better today. Tolerating PO intake. Afebrile, VSS.  Inaccurate urine charting as patient has had accidents in the bed.  However mentation is bit better.  However patient is having issues with itching.  Patient says that he had 6 or so bowel movements in 24.  Will deescalate lactulose      Review of Systems:  Pertinent items are noted in HPI.     Objective:     Vital Signs (Most Recent):  Temp: 97.8 °F (36.6 °C) (07/02/23 0802)  Pulse: 92 (07/02/23 0802)  Resp: 18 (07/02/23 0319)  BP: 104/69 (07/02/23 0802)  SpO2: 98 %  (07/02/23 0802) Vital Signs (24h Range):  Temp:  [97.8 °F (36.6 °C)-98.6 °F (37 °C)] 97.8 °F (36.6 °C)  Pulse:  [86-92] 92  Resp:  [18] 18  SpO2:  [97 %-99 %] 98 %  BP: ()/(66-71) 104/69       Physical Examination:  Physical Exam  Vitals reviewed.   Constitutional:       Appearance: Normal appearance.   HENT:      Mouth/Throat:      Mouth: Mucous membranes are moist.   Eyes:      Extraocular Movements: Extraocular movements intact.   Cardiovascular:      Rate and Rhythm: Normal rate and regular rhythm.      Pulses: Normal pulses.      Heart sounds: Normal heart sounds. No murmur heard.    No friction rub. No gallop.   Pulmonary:      Effort: Pulmonary effort is normal. No respiratory distress.      Breath sounds: Normal breath sounds. No wheezing.   Abdominal:      General: Bowel sounds are normal. There is distension.      Palpations: Abdomen is soft.      Tenderness: There is no abdominal tenderness. There is no guarding.   Musculoskeletal:      Cervical back: Normal range of motion.   Skin:     General: Skin is warm and dry.      Capillary Refill: Capillary refill takes less than 2 seconds.   Neurological:      General: No focal deficit present.      Mental Status: He is alert.     Abdomen has excoriations versus rash    Laboratory:  Most Recent Data:  CBC:   Recent Labs   Lab 06/30/23  1152 07/01/23  0452 07/02/23  0342   WBC 23.40* 14.64* 11.84*   HGB 11.7* 8.2* 8.4*   HCT 35.7* 24.5* 24.8*    176 156       CMP:   Recent Labs   Lab 07/02/23  0342   CALCIUM 8.1*   ALBUMIN 3.2*   *   K 3.0*   CO2 18*   BUN 27.8*   CREATININE 3.30*   ALKPHOS 171*   ALT 6   AST 29   BILITOT 1.8*           Microbiology Data Reviewed: yes  Pertinent Findings:  6/30/23 Blood culture x 2 pending  6/30/23 Body Fluid Culture pending    Other Results:      Radiology:  Imaging Results              CT Abdomen Pelvis  Without Contrast (Final result)  Result time 06/30/23 19:20:57      Final result by Raman Tiwari MD  (06/30/23 19:20:57)                   Impression:      1. Trace of left pleural effusion.    2. Previous cholecystectomy.    3. Cirrhosis and ascites.    4.  Bilateral kidneys non occluding calculi.    5.  Noninflamed diverticulosis coli.      Electronically signed by: Raman Tiwari  Date:    06/30/2023  Time:    19:20               Narrative:    EXAMINATION:  CT ABDOMEN PELVIS WITHOUT CONTRAST    CLINICAL HISTORY:  Abdominal pain, acute, nonlocalized;    TECHNIQUE:  Multidetector axial images were obtained from the  diaphragms to below symphysis pubis without the administration of IV contrast. Oral contrast was not administered.    Dose length product of 309 mGycm. Automated exposure control was utilized to minimize radiation dose.    COMPARISON:  June 4, 2023    FINDINGS:  There is trace of left dependent pleural effusion and left basilar atelectasis.    Liver is small in size consistent with cirrhosis.  There is moderate to large volume intraperitoneal free fluid consistent with ascites.  There are mesenteric inflammatory ground-glass opacities.  Within limitations of noncontrast technique, no acute findings liver, pancreas or the spleen identified.  Gallbladder is surgically absent..    The adrenal glands noncontrast evaluation is unremarkable. The kidneys are unremarkable in size and contour.  Bilateral kidneys non occluding calculi. The ureters appear normal in course and diameter without intra ureteral stone.  Calcified plaques of the aorta and iliac arteries without aneurysmal dilatation.  There are no retroperitoneal periaortic enlarged lymph nodes.    Stomach is decompressed and difficult to assess.  No abnormal dilatation of loops of small bowel.  Pericecal metallic clips suggest prior appendectomy.  There is noninflamed diverticulosis coli.  No bowel obstruction.  No pneumoperitoneum.    Urinary bladder is mostly decompressed.  No intravesical stone identified. There is no pelvic free fluid.    Chronic  compression deformities of the thoracolumbar vertebrae.  Demineralization of the bones.  No acute or aggressive skeletal abnormality.                                       X-Ray Chest AP Portable (Final result)  Result time 06/30/23 12:28:05      Final result by Raj Gibbs MD (06/30/23 12:28:05)                   Impression:      No acute findings.      Electronically signed by: Raj Gibbs  Date:    06/30/2023  Time:    12:28               Narrative:    EXAMINATION:  XR CHEST AP PORTABLE    CLINICAL HISTORY:  Unspecified abdominal pain    COMPARISON:  14 June 2023    FINDINGS:  Portable frontal view of the chest was obtained. The heart is not significantly enlarged.  There are chronic changes towards the left lung base.  No new focal consolidation or pneumothorax.                                      Current Medications:     Infusions:   sodium bicarbonate drip 100 mL/hr at 07/02/23 0655        Scheduled:   albumin human 25%  50 g Intravenous BID    ceFEPime (MAXIPIME) IVPB  2 g Intravenous Q24H    heparin (porcine)  5,000 Units Subcutaneous Q12H    lactulose  15 g Oral BID    magnesium sulfate IVPB  2 g Intravenous Q1H    metronidazole  500 mg Intravenous Q8H    potassium bicarbonate  20 mEq Oral Once    potassium chloride  10 mEq Intravenous Q1H        PRN:  dextrose 10%, dextrose 10%, glucagon (human recombinant), glucose, glucose, lorazepam, naloxone, ondansetron, sodium chloride 0.9%    Antibiotics and Day Number of Therapy:  Cefepime, Flagyl, 6/30/2023-present      Intake/Output Summary (Last 24 hours) at 7/2/2023 0848  Last data filed at 7/1/2023 1846  Gross per 24 hour   Intake 1760 ml   Output --   Net 1760 ml         Lines/Drains/Airways       Peripheral Intravenous Line  Duration                  Peripheral IV - Single Lumen 06/30/23 1859 20 G Posterior;Right Wrist 1 day         Peripheral IV - Single Lumen 07/02/23 0826 20 G Anterior;Left Forearm <1 day                      Assessment &  Plan:     1) SIRS 3/4 (tachycardia, tachypnea, leukocytosis)  Sepsis, likely GI source  Alcoholic Liver Cirrhosis with ascites  -recent hospitalization at [facility], where EGD was performed.  Negative for varices, or portal gastropathy.  FOBT performed in the ED was negative  -ED drained 5 L of fluid, studies negative for SBP  -Continue fluid resuscitation  -will start 25% albumin, 50 g b.i.d.  -continue cefepime and metronidazole  -continue lactulose 20 g b.i.d., with goal bowel movements 3-4 bowel movements per day     2) CHRISTEN  -Poor PO intake for the past week, nausea and vomiting present  -nephrology consulted appreciate recommendations   -continue albumin and IV fluid resuscitation for intravascular repletion.    -track ins and outs     3) reported history of seizures from alcohol  -lorazepam 2 mg IV q.2.h  -will place patient on CIWA    Itching:  -uremia versus increased bilirubin.  Hydrocortisone topical for patient's abdomen.  Benadryl p.o. q.6 p.r.n.    CODE STATUS: Full Code  Access: Peripheral  Antibiotics: Cefepime, Metronidazole  Diet:  low sodium   DVT Prophylaxis: Heparin 5000U BID  GI Prophylaxis: pepcid  Fluids:  Half-normal 2 amps of bicarb 100 cc/hour    Disposition: day 2 of admission for sepsis, with CHRISTEN.  Continue IV antibiotics.  Creatinine is improving.  Appreciate Nephrology's help    Te Lui MD  Internal Medicine Resident PGY-II  07/02/2023

## 2023-07-03 NOTE — PT/OT/SLP EVAL
Physical Therapy Evaluation    Patient Name:  Los Alatorre   MRN:  [id number]    Recommendations:     Discharge Recommendations:  home health PT (24/7 assist from family)   Discharge Equipment Recommendations: walker, rolling   Equipment to be obtained for discharge: none.  Barriers to discharge: fall risk    Assessment:     Los Alatorre is a 58 y.o. male admitted with a medical diagnosis of:   Patient Active Problem List   Diagnosis    History of appendectomy    History of cholecystectomy    Atrial fibrillation    Primary hypertension    Seizure disorder    Tobacco user    Alcohol abuse     He presents with the following impairments/functional limitations:  weakness, impaired endurance, impaired functional mobility, gait instability, impaired balance, decreased lower extremity function, pain.    Rehab Prognosis: Good.    Patient would benefit from continued skilled acute PT services to: address above listed impairments/functional limitations; receive patient/caregiver education; reduce fall risk; and maximize independency/safety with functional mobility.    Recent Surgery: * No surgery found *      Plan:     During this hospitalization, patient to be seen 3 x/week to address the identified impairments/functional limitations via gait training, therapeutic activities, therapeutic exercises, neuromuscular re-education and progress toward the established goals.    Plan of Care Expires:  08/02/23    Subjective     Communicated with patient's nurse Tia prior to session.    Patient agreeable to participate in evaluation.     Chief Complaint: pain  Patient/Family Comments/goals: to get stronger  Pain/Comfort:  Pain Rating 1: 8/10  Location 1: back  Pain Addressed 1: Nurse notified    Patients cultural, spiritual, Zoroastrian conflicts given the current situation: no    Social History  Living Environment: Patient lives with their significant other in a single level home, with no steps.  Functional Level: Prior to admission  patient ambulated with assistive device.  Equipment Used at Home: walker, rolling  Equipment owned (not currently used): none.  Assistance Upon Discharge: significant other.    Objective:     Patient found supine in bed and with HOB elevated with peripheral IV  upon PT entry to room.    General Precautions: Standard, fall   Orthopedic Precautions:N/A   Braces: N/A  Respiratory Status: room air        Exams:  Orientation: Patient is oriented to Person, Place, Time, Situation  Commands: Patient follows commands consistently  Sensation: -     Intact: light/touch bilat lower extremity  RLE ROM: WFL  RLE Strength:  4/5  LLE ROM: WFL  LLE Strength:  4/5    Functional Mobility:    Bed Mobility:  Supine to Sit: stand by assistance  Sit to Supine: stand by assistance    Transfers:  Sit to Stand: minimum assistance with rolling walker    Gait:  Patient ambulated 130ft with rolling walker and contact guard assistance.  Patient demonstrates occasional unsteady gait, decreased [place]win, and wide base of support.    Other Mobility:  not assessed    Balance:  Sit  Static: NORMAL: No deviations seen in posture held statically  Dynamic: NORMAL: No deviations seen in posture held dynamically  Stand  Static: FAIR: Maintains without assist but unable to take challenges  Dynamic: FAIR: Needs CONTACT GUARD during gait    Patient left supine in bed and with HOB elevated with all lines intact, call button in reach, and RN notified.    Education     Patient was instructed to utilize staff assistance for mobility/transfers.  White board updated regarding patient's safest level of mobility with staff assistance.    Goals     Multidisciplinary Problems       Physical Therapy Goals          Problem: Physical Therapy    Goal Priority Disciplines Outcome Goal Variances Interventions   Physical Therapy Goal     PT, PT/OT Ongoing, Progressing     Description: Goals to be met by: 08/02/2023     Patient will increase functional independence with  mobility by performin. Sit to stand transfer with Modified [place]  2. Gait  x 260 feet with Modified [place] using Rolling Walker.                        History:     Past Medical History:   Diagnosis Date    Seizures      Past Surgical History:   Procedure Laterality Date    APPENDECTOMY      CHOLECYSTECTOMY       Time Tracking:     PT Received On: 23  PT Start Time: 929     PT Stop Time: 943  PT Total Time (min): 14 min     Billable Minutes: Evaluation , moderate complexity    2023

## 2023-07-03 NOTE — PLAN OF CARE
Problem: Physical Therapy  Goal: Physical Therapy Goal  Description: Goals to be met by: 2023     Patient will increase functional independence with mobility by performin. Sit to stand transfer with Modified [place]  2. Gait  x 260 feet with Modified [place] using Rolling Walker.     Outcome: Ongoing, Progressing

## 2023-07-03 NOTE — PLAN OF CARE
Problem: Occupational Therapy  Goal: Occupational Therapy Goal  Description: Patient will perform feeding independently seated EOB or up to recliner chair.     Patient will perform grooming with mod I while standing at sink.    Patient will perform toileting with mod I using BSC over toilet.    Patient will perform toilet transfer with mod I with use of grab bars and RW.    Patient will perform lower body dressing with mod I while seated EOB.     Outcome: Ongoing, Progressing

## 2023-07-03 NOTE — NURSING
Pt ambulated around nurses station with therapy today.  Pt called to go to the bathroom.  Pt ambulated to the bathroom twice today. Patient was placed in mesh underwear the first time he went to the rest room.  Girlfriend returned to hospital and placed patient back in a diaper.  Pt returned to calling to have his diaper changed.  Pt angry because he wants a bedside commode beside the bed - stating he can't walk.

## 2023-07-03 NOTE — PT/OT/SLP EVAL
Occupational Therapy   Evaluation    Name: Los Alatorre  MRN: [id number]  Admitting Diagnosis: cirrhosis ascites, CHRISTEN, tachycardia, abdominal pain, chest pain, leukocytosis  Recent Surgery: * No surgery found *      Recommendations:     Discharge Recommendations: home with home health  Discharge Equipment Recommendations:  walker, rolling, shower chair  Barriers to discharge:  None    Assessment:     Los Alatorre is a 58 y.o. male with a medical diagnosis of   Patient Active Problem List   Diagnosis    History of appendectomy    History of cholecystectomy    Atrial fibrillation    Primary hypertension    Seizure disorder    Tobacco user    Alcohol abuse       He presents with functional decline, deconditioning, general weakness, and abdominal bloating/edema. Performance deficits affecting function: weakness, impaired endurance, impaired self care skills, impaired functional mobility, pain, edema.      Rehab Prognosis: Good; patient would benefit from acute skilled OT services to address these deficits and reach maximum level of function.       Plan:     Patient to be seen 3 x/week to address the above listed problems via    Plan of Care Expires:  (DC)  Plan of Care Reviewed with: patient    Subjective     Chief Complaint: abdominal swelling/bloating and discomfort  Patient/Family Comments/goals: return home at mod I level    Occupational Profile:  Living Environment: [facility] with 1 step threshold, with his girlfriend and 2 brothers, tub/shower combo with shower chair which he received from his mother  Previous level of function: I with ADLs and mobility, using RW since decline roughly 6 weeks ago  Roles and Routines: was working until roughly 6 weeks ago, when he became limited by fatigue, weakness, and medical issues; pt and brothers share housekeeping and cooking responsiblities; pt does not drive d/t hx of seizures and uses either cab or friends assist with transportation  Equipment Used at Home: walker, rolling,  shower chair  Assistance upon Discharge: pt's girlfriend assists at home and is home during the day with pt    Pain/Comfort:  Pain Rating 1: 3/10  Location 1: abdomen  Pain Addressed 1: Reposition, Distraction, Cessation of Activity  Pain Rating Post-Intervention 1: 3/10    Patients cultural, spiritual, Mosque conflicts given the current situation: no    Objective:     Communicated with: nurse Deepthi prior to session.  Patient found HOB elevated with peripheral IV upon OT entry to room.    General Precautions: Standard, fall, seizure  Orthopedic Precautions: N/A  Braces: N/A  Respiratory Status: Room air    Occupational Performance:    Bed Mobility:    Patient completed Supine to Sit with stand by assistance  Patient completed Sit to Supine with stand by assistance    Functional Mobility/Transfers:  Patient completed Sit <> Stand Transfer with moderate assistance  with  rolling walker   Functional Mobility: CGA to ambulate in room to sink and to restroom using RW    Activities of Daily Living:  Grooming: stand by assistance standing at sink to brush teeth and wash face  Upper Body Dressing: stand by assistance seated EOB  Lower Body Dressing: minimum assistance for assist reaching to foot/ankle level  Toileting: mod A; pt required contact guard assistance in standing; pt able to manage his own hygiene following bowel movement but unable to reach to floor to retrieve undergarment and required mod A clothing management    Cognitive/Visual Perceptual:  Cognitive/Psychosocial Skills:     -       Oriented to: Person, Place, Situation, and pt unable to accurately name day/date or year   -       Follows Commands/attention:Follows one-step commands  -       Safety awareness/insight to disability: intact     Physical Exam:  BUE AROM WFL, strength 3+/5 with noted deconditioning    Treatment & Education:  Pt. educated on OT goals, POC, orientation to environment, use of call bell for assist with transfers OOB or for any other  needs due to fall risk.  Pt verbalized understanding.      Patient left HOB elevated with all lines intact, call button in reach, and denied offer to remain sitting EoB or up to chair d/t c/o back pain with unsupported sitting/standing.    GOALS:   Multidisciplinary Problems       Occupational Therapy Goals          Problem: Occupational Therapy    Goal Priority Disciplines Outcome Interventions   Occupational Therapy Goal     OT, PT/OT Ongoing, Progressing    Description: Patient will perform feeding independently seated EOB or up to recliner chair.     Patient will perform grooming with mod I while standing at sink.    Patient will perform toileting with mod I using BSC over toilet.    Patient will perform toilet transfer with mod I with use of grab bars and RW.    Patient will perform lower body dressing with mod I while seated EOB.                          History:     Past Medical History:   Diagnosis Date    Seizures          Past Surgical History:   Procedure Laterality Date    APPENDECTOMY      CHOLECYSTECTOMY         Time Tracking:     OT Date of Treatment: 07/03/23  OT Start Time: 0913  OT Stop Time: 0946  OT Total Time (min): 33 min    Billable Minutes:Evaluation 33    7/3/2023

## 2023-07-03 NOTE — PROGRESS NOTES
Nephrology Progress Note    Hospital course:   59 yo M w/ alcoholic liver cirrhosis presented to ED for exertional dyspnea and abdominal distention. Admitted for elevated Cr, acute renal failure    Subjective:   NAD, 12 point ROS negative, no complaints    Objective:   /66   Pulse 90   Temp 97.9 °F (36.6 °C) (Oral)   Resp 18   Ht 6' (1.829 m)   Wt 63.7 kg (140 lb 6.9 oz)   SpO2 98%   BMI 19.05 kg/m²     Intake/Output Summary (Last 24 hours) at 7/3/2023 1028  Last data filed at 7/3/2023 0609  Gross per 24 hour   Intake 2760 ml   Output --   Net 2760 ml        Physical exam:   General: NAD, resting comfortably  HEENT: Normocephalic, EOMI, PERRL  Neck: No JVD, no bruits  Lungs: CTA bilaterally all fields  CVS: RRR, no murmurs  Abdomen:mild distention, non-tender, + bowel sounds all 4 quadrants  Extremities; no clubbing, cyanosis or edema  Neurologic: no clonus or asterixis. No focal deficits      Laboratory data:   Recent Results (from the past 24 hour(s))   Protein/Creatinine Ratio, Urine    Collection Time: 07/02/23 12:20 PM   Result Value Ref Range    Urine Protein Level 45.6 mg/dL    Urine Creatinine 180.2 (H) 63.0 - 166.0 mg/dL    Urine Protein/Creatinine Ratio 0.3    Chloride, Random Urine    Collection Time: 07/02/23 12:21 PM   Result Value Ref Range    Urine Chloride <20.0 mmol/L   Potassium, Random Urine    Collection Time: 07/02/23 12:21 PM   Result Value Ref Range    Urine Potassium 42 mmol/L   Sodium, Random Urine    Collection Time: 07/02/23 12:21 PM   Result Value Ref Range    Urine Sodium <20.0 mmol/L   Comprehensive Metabolic Panel (CMP)    Collection Time: 07/03/23  3:26 AM   Result Value Ref Range    Sodium Level 136 136 - 145 mmol/L    Potassium Level 2.8 (L) 3.5 - 5.1 mmol/L    Chloride 105 98 - 107 mmol/L    Carbon Dioxide 18 (L) 22 - 29 mmol/L    Glucose Level 132 (H) 74 - 100 mg/dL    Blood Urea Nitrogen 23.7 8.4 - 25.7 mg/dL    Creatinine 2.48 (H) 0.73 - 1.18 mg/dL    Calcium Level  Total 8.1 (L) 8.4 - 10.2 mg/dL    Protein Total 5.8 (L) 6.4 - 8.3 gm/dL    Albumin Level 3.9 3.5 - 5.0 g/dL    Globulin 1.9 (L) 2.4 - 3.5 gm/dL    Albumin/Globulin Ratio 2.1 (H) 1.1 - 2.0 ratio    Bilirubin Total 1.8 (H) <=1.5 mg/dL    Alkaline Phosphatase 160 (H) 40 - 150 unit/L    Alanine Aminotransferase 5 0 - 55 unit/L    Aspartate Aminotransferase 25 5 - 34 unit/L    eGFR 30 mls/min/1.73/m2   Magnesium    Collection Time: 07/03/23  3:26 AM   Result Value Ref Range    Magnesium Level 2.40 1.60 - 2.60 mg/dL   Phosphorus    Collection Time: 07/03/23  3:26 AM   Result Value Ref Range    Phosphorus Level 1.4 (L) 2.3 - 4.7 mg/dL   CBC with Differential    Collection Time: 07/03/23  3:26 AM   Result Value Ref Range    WBC 13.64 (H) 4.50 - 11.50 x10(3)/mcL    RBC 2.68 (L) 4.70 - 6.10 x10(6)/mcL    Hgb 8.5 (L) 14.0 - 18.0 g/dL    Hct 25.2 (L) 42.0 - 52.0 %    MCV 94.0 80.0 - 94.0 fL    MCH 31.7 (H) 27.0 - 31.0 pg    MCHC 33.7 33.0 - 36.0 g/dL    RDW 15.3 11.5 - 17.0 %    Platelet 148 130 - 400 x10(3)/mcL    MPV 11.1 (H) 7.4 - 10.4 fL    Neut % 80.8 %    Lymph % 8.7 %    Mono % 7.7 %    Eos % 1.8 %    Basophil % 0.6 %    Lymph # 1.18 0.6 - 4.6 x10(3)/mcL    Neut # 11.02 (H) 2.1 - 9.2 x10(3)/mcL    Mono # 1.05 0.1 - 1.3 x10(3)/mcL    Eos # 0.25 0 - 0.9 x10(3)/mcL    Baso # 0.08 <=0.2 x10(3)/mcL    IG# 0.06 (H) 0 - 0.04 x10(3)/mcL    IG% 0.4 %    NRBC% 0.0 %       Imaging:   Imaging Results              CT Abdomen Pelvis  Without Contrast (Final result)  Result time 06/30/23 19:20:57      Final result by Raman Tiwari MD (06/30/23 19:20:57)                   Impression:      1. Trace of left pleural effusion.    2. Previous cholecystectomy.    3. Cirrhosis and ascites.    4.  Bilateral kidneys non occluding calculi.    5.  Noninflamed diverticulosis coli.      Electronically signed by: Raman Tiwari  Date:    06/30/2023  Time:    19:20               Narrative:    EXAMINATION:  CT ABDOMEN PELVIS WITHOUT  CONTRAST    CLINICAL HISTORY:  Abdominal pain, acute, nonlocalized;    TECHNIQUE:  Multidetector axial images were obtained from the  diaphragms to below symphysis pubis without the administration of IV contrast. Oral contrast was not administered.    Dose length product of 309 mGycm. Automated exposure control was utilized to minimize radiation dose.    COMPARISON:  June 4, 2023    FINDINGS:  There is trace of left dependent pleural effusion and left basilar atelectasis.    Liver is small in size consistent with cirrhosis.  There is moderate to large volume intraperitoneal free fluid consistent with ascites.  There are mesenteric inflammatory ground-glass opacities.  Within limitations of noncontrast technique, no acute findings liver, pancreas or the spleen identified.  Gallbladder is surgically absent..    The adrenal glands noncontrast evaluation is unremarkable. The kidneys are unremarkable in size and contour.  Bilateral kidneys non occluding calculi. The ureters appear normal in course and diameter without intra ureteral stone.  Calcified plaques of the aorta and iliac arteries without aneurysmal dilatation.  There are no retroperitoneal periaortic enlarged lymph nodes.    Stomach is decompressed and difficult to assess.  No abnormal dilatation of loops of small bowel.  Pericecal metallic clips suggest prior appendectomy.  There is noninflamed diverticulosis coli.  No bowel obstruction.  No pneumoperitoneum.    Urinary bladder is mostly decompressed.  No intravesical stone identified. There is no pelvic free fluid.    Chronic compression deformities of the thoracolumbar vertebrae.  Demineralization of the bones.  No acute or aggressive skeletal abnormality.                                       X-Ray Chest AP Portable (Final result)  Result time 06/30/23 12:28:05      Final result by Raj Gibbs MD (06/30/23 12:28:05)                   Impression:      No acute findings.      Electronically signed  by: Raj Gibbs  Date:    06/30/2023  Time:    12:28               Narrative:    EXAMINATION:  XR CHEST AP PORTABLE    CLINICAL HISTORY:  Unspecified abdominal pain    COMPARISON:  14 June 2023    FINDINGS:  Portable frontal view of the chest was obtained. The heart is not significantly enlarged.  There are chronic changes towards the left lung base.  No new focal consolidation or pneumothorax.                                       Medications:     Current Facility-Administered Medications:     albumin human 25% bottle 50 g, 50 g, Intravenous, BID, Palomo Helton DO, Last Rate: 200 mL/hr at 07/03/23 0859, 50 g at 07/03/23 0859    ceFEPIme (MAXIPIME) 2 g in dextrose 5 % in water (D5W) 5 % 100 mL IVPB (MB+), 2 g, Intravenous, Q24H, Palomo Helton DO, Stopped at 07/02/23 2127    dextrose 10% bolus 125 mL 125 mL, 12.5 g, Intravenous, PRN, Palomo Helton, DO    dextrose 10% bolus 250 mL 250 mL, 25 g, Intravenous, PRN, Palomo Helton, DO    diphenhydrAMINE capsule 25 mg, 25 mg, Oral, Q6H PRN, Te Lui MD, 25 mg at 07/02/23 2057    glucagon (human recombinant) injection 1 mg, 1 mg, Intramuscular, PRN, Palomo Helton DO    glucose chewable tablet 16 g, 16 g, Oral, PRN, Palomo Owenh, DO    glucose chewable tablet 24 g, 24 g, Oral, PRN, Palomo Helton, DO    heparin (porcine) injection 5,000 Units, 5,000 Units, Subcutaneous, Q12H, Palomo Helton DO, 5,000 Units at 07/03/23 0859    hydrocortisone 1 % cream, , Topical (Top), BID, Te Lui MD, Given at 07/02/23 2012    lactulose 20 gram/30 mL solution Soln 10 g, 10 g, Oral, BID, Te Lui MD, 10 g at 07/03/23 0859    LORazepam injection 2 mg, 2 mg, Intravenous, Q2H PRN, Zena Chen MD    metronidazole IVPB 500 mg, 500 mg, Intravenous, Q8H, Palomo Helton DO, Stopped at 07/03/23 0631    naloxone 0.4 mg/mL injection 0.02 mg, 0.02 mg, Intravenous, PRN, Palomo Yeh, DO    ondansetron injection 4 mg, 4 mg, Intravenous, Q8H PRN, Palomo Helton, DO    potassium bicarbonate  disintegrating tablet 20 mEq, 20 mEq, Oral, Once, Sharri Fisher MD    potassium phosphate 30 mmol in dextrose 5 % (D5W) 500 mL infusion, 30 mmol, Intravenous, Once, Mary Kay Dee MD, Last Rate: 83.3 mL/hr at 07/03/23 0653, 30 mmol at 07/03/23 0653    sodium bicarbonate 100 mEq in sodium chloride 0.45% 1,100 mL infusion, , Intravenous, Continuous, Mary Kay Dee MD, Last Rate: 100 mL/hr at 07/02/23 0655, New Bag at 07/02/23 0655    sodium chloride 0.9% flush 10 mL, 10 mL, Intravenous, Q12H PRN, Palomo Helton,      Impression/Plan:   Acute renal failure-kidney function improving. Suspect that it is due to volume contraction and dehydration due to decreased PO intake and vomiting prior to admission. Now s/p volume expansion with IVF resuscitation and albumin  Metabolic acidosis-bicarb unchanged at 18 this morning continue with 1/2 NS with 100 meq sodium bicarbonate  at 100 ml/hr  Cirrhosis- s/p 5 liter paracentesis in ER. Agree with volume expansion with albumin, IV fluids and consider Midodrine to maintain sbp>110 or MAP >70 if needed.  Hypomagnesemia-corrected, 2.4 this morning  Hypophosphatemia- needs replete, ordered potassium phosphate 30mmol   Hypokalemia-needs replete, potassium bicarb tablet 20 mEq and KCl  tablet 40 mEq. Continue to monitor CMP for electrolyte derangements    Sharri Fisher M.D.  [employer] [facility] PGY-3

## 2023-07-03 NOTE — PROGRESS NOTES
[facility] Medicine Wards   Progress Note     Resident Team: [facility] List 4  Attending Physician: Dyana Grimes MD  Resident: Danie Lui  Intern: [facility] Length of Stay: 3 days    Subjective:      Brief HPI:  Los Alatorre is a 58 y.o. male who with a history of alcoholic cirrhosis, alcohol use disorder, and seizure disorder who presented to [facility] ED on 6/30/2023 with a primary complaint of abdominal pain. He reports poor PO intake and has felt his abdomen become more distended. Does not report any fevers or chills. Denies any melena, bright red blood in the stool, or any vomiting. Was recently transferred to [facility] earlier this month for an EGD, which ruled out varices and gastropathy. Unknown if he has been taking his home medications reliably.       In the ED patient was afebrile 100.2 F, pulse 96, respiratory rate 4, blood pressure 137/83, 87% on room air.  CBC revealed leukocytosis at 23, H and H 11.7/35.7, platelets 286.  CMP revealed mild hyponatremia 132, acidosis 17, BUN/CR 0.65/0.88.  Alkaline phosphatase elevated at 337, slight AST elevation at 56, INR 1.3.  Initial lactic acid was 3.2, troponin is negative. CT abdomen pelvis revealed trace left pleural effusion, cirrhosis ascites, previous cholecystectomy, bilateral kidneys with non occluding calculi, and noninflamed diverticulosis coli.  Paracentesis was performed and 5 L of fluid was drained.      Interval History: NAEON. Reports some abdominal bloating and distention today however states that he just ate.  Afebrile, VSS. No other acute complaints at this time. Did admit to ibuprofen use as outpatient; cirrhosis counseling at bedside during rounds today and advised to avoid offending agents      Review of Systems:  Pertinent items are noted in HPI.     Objective:     Vital Signs (Most Recent):  Temp: 97.9 °F (36.6 °C) (07/03/23 1015)  Pulse: 90 (07/03/23 1015)  Resp: 18 (07/03/23 1015)  BP: 110/66 (07/03/23 1015)  SpO2: 98 % (07/03/23 1015)  Vital Signs (24h Range):  Temp:  [97.9 °F (36.6 °C)-98.1 °F (36.7 °C)] 97.9 °F (36.6 °C)  Pulse:  [87-92] 90  Resp:  [18] 18  SpO2:  [98 %-99 %] 98 %  BP: (110-120)/(66-76) 110/66       Physical Examination:  Physical Exam  Vitals reviewed.   Constitutional:       Appearance: Normal appearance.   HENT:      Mouth/Throat:      Mouth: Mucous membranes are moist.   Eyes:      Extraocular Movements: Extraocular movements intact.   Cardiovascular:      Rate and Rhythm: Normal rate and regular rhythm.      Pulses: Normal pulses.      Heart sounds: Normal heart sounds. No murmur heard.    No friction rub. No gallop.   Pulmonary:      Effort: Pulmonary effort is normal. No respiratory distress.      Breath sounds: Normal breath sounds. No wheezing.   Abdominal:      General: Bowel sounds are normal. There is distension.      Palpations: Abdomen is soft.      Tenderness: There is no abdominal tenderness. There is no guarding.   Musculoskeletal:      Cervical back: Normal range of motion.   Skin:     General: Skin is warm and dry.      Capillary Refill: Capillary refill takes less than 2 seconds.   Neurological:      General: No focal deficit present.      Mental Status: He is alert.     Abdomen has excoriations versus rash    Laboratory:  Most Recent Data:  CBC:   Recent Labs   Lab 07/02/23  0342 07/03/23  0326   WBC 11.84* 13.64*   HGB 8.4* 8.5*   HCT 24.8* 25.2*    148       CMP:   Recent Labs   Lab 07/03/23  0326   CALCIUM 8.1*   ALBUMIN 3.9      K 2.8*   CO2 18*   BUN 23.7   CREATININE 2.48*   ALKPHOS 160*   ALT 5   AST 25   BILITOT 1.8*           Microbiology Data Reviewed: yes  Pertinent Findings:  6/30/23 Blood culture x 2 pending  6/30/23 Body Fluid Culture pending    Other Results:      Radiology:  Imaging Results              CT Abdomen Pelvis  Without Contrast (Final result)  Result time 06/30/23 19:20:57      Final result by Raman Tiwari MD (06/30/23 19:20:57)                   Impression:      1.  Trace of left pleural effusion.    2. Previous cholecystectomy.    3. Cirrhosis and ascites.    4.  Bilateral kidneys non occluding calculi.    5.  Noninflamed diverticulosis coli.      Electronically signed by: Raman Tiwari  Date:    06/30/2023  Time:    19:20               Narrative:    EXAMINATION:  CT ABDOMEN PELVIS WITHOUT CONTRAST    CLINICAL HISTORY:  Abdominal pain, acute, nonlocalized;    TECHNIQUE:  Multidetector axial images were obtained from the  diaphragms to below symphysis pubis without the administration of IV contrast. Oral contrast was not administered.    Dose length product of 309 mGycm. Automated exposure control was utilized to minimize radiation dose.    COMPARISON:  June 4, 2023    FINDINGS:  There is trace of left dependent pleural effusion and left basilar atelectasis.    Liver is small in size consistent with cirrhosis.  There is moderate to large volume intraperitoneal free fluid consistent with ascites.  There are mesenteric inflammatory ground-glass opacities.  Within limitations of noncontrast technique, no acute findings liver, pancreas or the spleen identified.  Gallbladder is surgically absent..    The adrenal glands noncontrast evaluation is unremarkable. The kidneys are unremarkable in size and contour.  Bilateral kidneys non occluding calculi. The ureters appear normal in course and diameter without intra ureteral stone.  Calcified plaques of the aorta and iliac arteries without aneurysmal dilatation.  There are no retroperitoneal periaortic enlarged lymph nodes.    Stomach is decompressed and difficult to assess.  No abnormal dilatation of loops of small bowel.  Pericecal metallic clips suggest prior appendectomy.  There is noninflamed diverticulosis coli.  No bowel obstruction.  No pneumoperitoneum.    Urinary bladder is mostly decompressed.  No intravesical stone identified. There is no pelvic free fluid.    Chronic compression deformities of the thoracolumbar vertebrae.   Demineralization of the bones.  No acute or aggressive skeletal abnormality.                                       X-Ray Chest AP Portable (Final result)  Result time 06/30/23 12:28:05      Final result by Raj Gibbs MD (06/30/23 12:28:05)                   Impression:      No acute findings.      Electronically signed by: Raj Gibbs  Date:    06/30/2023  Time:    12:28               Narrative:    EXAMINATION:  XR CHEST AP PORTABLE    CLINICAL HISTORY:  Unspecified abdominal pain    COMPARISON:  14 June 2023    FINDINGS:  Portable frontal view of the chest was obtained. The heart is not significantly enlarged.  There are chronic changes towards the left lung base.  No new focal consolidation or pneumothorax.                                      Current Medications:     Infusions:   sodium bicarbonate drip 100 mL/hr at 07/03/23 1118        Scheduled:   albumin human 25%  50 g Intravenous BID    ceFEPime (MAXIPIME) IVPB  2 g Intravenous Q24H    heparin (porcine)  5,000 Units Subcutaneous Q12H    hydrocortisone   Topical (Top) BID    lactulose  10 g Oral BID    metronidazole  500 mg Intravenous Q8H        PRN:  dextrose 10%, dextrose 10%, diphenhydrAMINE, glucagon (human recombinant), glucose, glucose, lorazepam, naloxone, ondansetron, sodium chloride 0.9%    Antibiotics and Day Number of Therapy:  Cefepime, Flagyl, 6/30/2023-present      Intake/Output Summary (Last 24 hours) at 7/3/2023 1454  Last data filed at 7/3/2023 1300  Gross per 24 hour   Intake 2520 ml   Output --   Net 2520 ml         Lines/Drains/Airways       Peripheral Intravenous Line  Duration                  Peripheral IV - Single Lumen 06/30/23 1859 20 G Posterior;Right Wrist 2 days         Peripheral IV - Single Lumen 07/02/23 0826 20 G Anterior;Left Forearm 1 day                      Assessment & Plan:     Alcoholic Liver Cirrhosis with ascites  -recent hospitalization at [facility] , where EGD was performed.  Negative for  varices, or portal gastropathy.  FOBT performed in the ED was negative  -Underwent paracentesis in ED on  drained 5 L of fluid, studies negative for SBP  -Abdomen feels slightly distended on exam today, may need to evaluate for repeat paracentesis prior to discharge  -Continue 25% albumin, 50 g b.i.d.  -continue lactulose 20 g b.i.d., with goal bowel movements 3-4 bowel movements per day    Sepsis, likely GI sourceSIRS 3/4 (tachycardia, tachypnea, leukocytosis)- improved  -On cefepime and flagyl (D4 start date 6/30/23)  -Blood culture x 2 negative at 48 hours; ascitic fluid culture no growth at 24 hours; no WBC or bacteria on gram stain     CHRISTEN  -Poor PO intake for the past week prior to admission, nausea and vomiting present  -nephrology on board,  appreciate recommendations   -continue albumin and IV fluid resuscitation for intravascular repletion with 1/2 NS with 100 meq sodium bicarbonate at 100 cc/hour  -appropriate response to albumin thus not concerned for hepatorenal syndrome at this time  -Monitor Is/Os     History of Alcohol withdrawal with seizure history  -lorazepam 2 mg IV q.2.h prn withdrawal symptoms  -continue with CIWA protocol    Itching:  -uremia versus increased bilirubin.  Hydrocortisone topical for patient's abdomen.  Benadryl p.o. q.6 p.r.n.    CODE STATUS: Full Code  Access: Peripheral  Antibiotics: Cefepime, Metronidazole  Diet:  low sodium   DVT Prophylaxis: Heparin 5000U BID  GI Prophylaxis: pepcid  Fluids:  Half-normal 2 amps of bicarb 100 cc/hour    Disposition: day 3 of admission for CHRISTEN, alcoholic liver cirrhosis. Continue ivf and antibiotics.  Renal indices improving. Appreciate Nephrology's help    Katie Smith MD  Internal Medicine Resident PGY-3   07/03/2023

## 2023-07-04 NOTE — PT/OT/SLP PROGRESS
"Occupational Therapy   Treatment    Name: Los Alatorre  MRN: [id number]  Admitting Diagnosis:  cirrhosis ascites, CHRISTEN, tachycardia, abdominal pain, chest pain, leukocytosis       Recommendations:     Discharge Recommendations: home with home health  Discharge Equipment Recommendations:  walker, rolling, shower chair  Barriers to discharge:  None    Assessment:     Los Alatorre is a 58 y.o. male with a medical diagnosis of   Patient Active Problem List   Diagnosis    History of appendectomy    History of cholecystectomy    Atrial fibrillation    Primary hypertension    Seizure disorder    Tobacco user    Alcohol abuse     .  He presents with c/o discomfort and generalize (especially abdominal) weakness from abdominal edema. Performance deficits affecting function are weakness, impaired endurance, impaired self care skills, impaired functional mobility, pain, edema.     Rehab Prognosis:  Good; patient would benefit from acute skilled OT services to address these deficits and reach maximum level of function.       Plan:     Patient to be seen 3 x/week to address the above listed problems via    Plan of Care Expires:  (DC)  Plan of Care Reviewed with: patient    Subjective     Chief Complaint: "it's so hard to do anything, like I can't even get myself out of bed, with this belly so swollen"  Patient/Family Comments/goals: DC home at [facility]  Pain/Comfort:  Pain Rating 1: 3/10  Location 1: abdomen  Pain Addressed 1: Reposition, Distraction, Cessation of Activity  Pain Rating Post-Intervention 1: 1/10    Objective:     Communicated with: nurse Zhu prior to session.  Patient found supine with peripheral IV upon OT entry to room.    General Precautions: Standard, fall, seizure    Orthopedic Precautions:N/A  Braces: N/A  Respiratory Status: Room air     Occupational Performance:     Bed Mobility:    Patient completed Rolling/Turning to Left with  contact guard assistance  Patient completed Scooting/Bridging with stand by " assistance  Patient completed Supine to Sit with contact guard assistance     Functional Mobility/Transfers:  Patient completed Sit <> Stand Transfer with contact guard assistance  with  rolling walker and from raised bed height   Patient completed Bed <> Chair Transfer using Step Transfer technique with contact guard assistance with rolling walker and noted difficulty navigating obstacles with RW  Patient completed Toilet Transfer Step Transfer technique with stand by assistance with  rolling walker and grab bars and BSC over toilet for raised toilet seat  Functional Mobility: CGA using RW to ambulate short distances in room with PT    Activities of Daily Living:  Feeding:  independence seated at recliner chair  Toileting: stand by assistance at BSC over toilet in restroom        Treatment & Education:  Pt. educated on goals to increase time OOB, pt agreed to compromise for toileting by wearing diaper but stated he will continue to attempt to call for assist to ambulate to toilet, use of call bell for assist with transfers OOB or for any other needs due to fall risk.  Pt verbalized understanding.      Patient left up in chair with all lines intact, call button in reach, and nurse notified    GOALS:   Multidisciplinary Problems       Occupational Therapy Goals          Problem: Occupational Therapy    Goal Priority Disciplines Outcome Interventions   Occupational Therapy Goal     OT, PT/OT Ongoing, Progressing    Description: Patient will perform feeding independently seated EOB or up to recliner chair.     Patient will perform grooming with mod I while standing at sink.    Patient will perform toileting with mod I using BSC over toilet.    Patient will perform toilet transfer with mod I with use of grab bars and RW.    Patient will perform lower body dressing with mod I while seated EOB.                          Time Tracking:     OT Date of Treatment: 07/04/23  OT Start Time: 0835  OT Stop Time: 0900  OT Total  Time (min): 25 min    Billable Minutes:Self Care/Home Management 15  Total Time 25, with 10 min assist to PT    OT/BLANCO: OT          7/4/2023

## 2023-07-04 NOTE — PROCEDURES
[facility] GI Lab - Paracentesis Procedure Note      Patient Name: Los Alatorre    MRN: [id number]    : 1965    Date of Procedure:  2023  Referring Physician: Self, Aaareferral  Primary Physician: DECLAN Small  Procedure Physician: Keely Garcia MD    Procedure - Paracentesis    Diagnosis: alcoholic liver cirrhosis      Date of Procedure: 2023   Time of Procedure: 14:30      Performed by: Keely Garcia MD    Supervised by: Katie Smith    Informed Consent Signed by patient     Indication: Diagnostic aspiration of peritoneal fluid  and Therapeutic aspiration of peritoneal fluid       Preparation & Technique  Medication prior to procedure: none  Confirmed correct patient, procedure, side, site, safety procedures followed   Monitoring during procedure: blood pressure monitoring cardiac monitoring  pulse oximetry  Sterile Preparation: with 2% Chlorhexidine Gluconate (CHG) scrub  and with drapes to expose affected area   Local Anesthesia: 1% lidocaine  Approach: right lateral lower abdominal quadrant     Ultrasound was utilized for this procedure.     A 5 Kiswahili, 10cm guide needle was introduced into peritoneal cavity with negative pressure maintained. Flashback was obtained and catheter was advanced off of the guide needle and into the peritoneal cavity. Catheter was connected to the tubing with pinch clamp and non-vented spike was inserted into the Vacutainer.      Findings: 5000 mL of fluid was obtained from the peritoneal cavity. Fluid was straw colored     Estimated Blood Loss: <2 cc    Complications: None        Studies & Labs  The following studies and labs were obtained at the time of the procedure:   Fluid Cell Count, Fluid Cytology, Fluid Albumin, Fluid Protein , Serum Albumin        Notes  none      Disposition  Currently admitted to internal medicine          Keely Garcia MD

## 2023-07-04 NOTE — PT/OT/SLP PROGRESS
Physical Therapy Treatment    Patient Name:  Los Alatorre   MRN:  [id number]    Recommendations     Discharge Recommendations:  home health PT   Discharge Equipment Recommendations: walker, rolling   Barriers to discharge: fall risk, decreased endurance, and decreased safety awareness    Assessment     Los Alatorer is a 58 y.o. male admitted with a medical diagnosis of <principal problem not specified>.    He presents with the following impairments/functional limitations:  weakness, impaired endurance, impaired self care skills, impaired functional mobility, gait instability, impaired balance, pain, decreased safety awareness, decreased lower extremity function.  1. Acute renal failure, unspecified acute renal failure type    2. Tachycardia    3. Abdominal pain    4. Tense ascites    5. Leukocytosis, unspecified type    6. Chest pain    7. Alcohol abuse        Rehab Prognosis: Good.    Patient would benefit from continued skilled acute PT services to: address above listed impairments/functional limitations; receive patient/caregiver education; reduce fall risk; and maximize independency/safety with functional mobility.    Recent Surgery: * No surgery found *      Plan     During this hospitalization, patient to be seen 3 x/week to address the identified impairments/functional limitations via gait training, therapeutic activities, therapeutic exercises and progress toward the established goals.    Plan of Care Expires:  08/02/23    Subjective     Communicated with patient's nurse Audra prior to session.    Patient agreeable to participate in treatment session.    Chief Complaint: R side pain abdomen  Patient/Family Comments/goals: get better and go home  Pain/Comfort:  Pain Rating 1: 4/10  Location - Side 1: Right  Location 1: abdomen  Pain Rating Post-Intervention 1: 4/10  Pain Addressed 2: Nurse notified    Objective     Patient found with HOB elevated with    upon PT entry to room.    General Precautions:  Standard, fall   Orthopedic Precautions:N/A   Braces:    Respiratory Status: room air    Functional Mobility:    Bed Mobility:  Supine to Sit: supervision    Transfers:  Sit to Stand: stand by assistance with rolling walker    Gait:  Patient ambulated 20 ft with rolling walker and contact guard assistance. OT present to assist.  Patient demonstrates occasional unsteady gait, decreased ludwin, and decreased step length.    Other Mobility:  not assessed    Patient left sitting in chair with all lines intact, call button in reach, and nurse notified.    Goals     Multidisciplinary Problems       Physical Therapy Goals          Problem: Physical Therapy    Goal Priority Disciplines Outcome Goal Variances Interventions   Physical Therapy Goal     PT, PT/OT Ongoing, Progressing     Description: Goals to be met by: 2023     Patient will increase functional independence with mobility by performin. Sit to stand transfer with Modified [place]  2. Gait  x 260 feet with Modified [place] using Rolling Walker.   Reviewed 2023                         Time Tracking     PT Received On: 23  PT Start Time: 0835     PT Stop Time: 0845  PT Total Time (min): 10 min     Billable Minutes: Gait Training 10    2023

## 2023-07-04 NOTE — PROGRESS NOTES
"[facility] Medicine Wards   Progress Note     Resident Team: [facility] Medicine List 4  Attending Physician: Chico Malave MD  Resident: Danie Lui  Intern: [facility] Length of Stay: 4 days    Subjective:      Brief HPI:  Los Alatorre is a 58 y.o. male who with a history of alcoholic cirrhosis, alcohol use disorder, and seizure disorder who presented to [facility] ED on 6/30/2023 with a primary complaint of abdominal pain. He reports poor PO intake and has felt his abdomen become more distended. Does not report any fevers or chills. Denies any melena, bright red blood in the stool, or any vomiting. Was recently transferred to [facility] earlier this month for an EGD, which ruled out varices and gastropathy. Unknown if he has been taking his home medications reliably.       In the ED patient was afebrile 100.2 F, pulse 96, respiratory rate 4, blood pressure 137/83, 87% on room air.  CBC revealed leukocytosis at 23, H and H 11.7/35.7, platelets 286.  CMP revealed mild hyponatremia 132, acidosis 17, BUN/CR 0.65/0.88.  Alkaline phosphatase elevated at 337, slight AST elevation at 56, INR 1.3.  Initial lactic acid 3.2, troponin negative. CT abdomen pelvis revealed trace left pleural effusion, cirrhosis ascites, previous cholecystectomy, bilateral kidneys with non occluding calculi, and noninflamed diverticulosis coli.  Paracentesis was performed and 5 L of fluid was drained.      Interval History: NAEON. VSS. This morning, patient again reports abdominal bloating with occasional, intermittent episodes of dull pain which he cannot specifically localize but just describes as "deep." Has been afebrile. Pending repeat paracentesis today, will send studies to determine need for SBP prophylaxis. WBC trended up today, will repeat cultures    Review of Systems:  Pertinent items are noted in HPI.     Objective:     Vital Signs (Most Recent):  Temp: 98.2 °F (36.8 °C) (07/04/23 1138)  Pulse: 105 (07/04/23 1138)  Resp: 18 (07/04/23 0400)  BP: (!) " 107/59 (07/04/23 1138)  SpO2: 99 % (07/04/23 1138) Vital Signs (24h Range):  Temp:  [97.9 °F (36.6 °C)-98.3 °F (36.8 °C)] 98.2 °F (36.8 °C)  Pulse:  [] 105  Resp:  [18-20] 18  SpO2:  [96 %-99 %] 99 %  BP: (104-116)/(59-73) 107/59       Physical Examination:  Physical Exam  Vitals reviewed.   Constitutional:       Appearance: Normal appearance.   HENT:      Mouth/Throat:      Mouth: Mucous membranes are moist.   Eyes:      Extraocular Movements: Extraocular movements intact.   Cardiovascular:      Rate and Rhythm: Normal rate and regular rhythm.      Pulses: Normal pulses.      Heart sounds: Normal heart sounds. No murmur heard.    No friction rub. No gallop.   Pulmonary:      Effort: Pulmonary effort is normal. No respiratory distress.      Breath sounds: Normal breath sounds. No wheezing.   Abdominal:      General: Bowel sounds are normal. There is distension.      Palpations: Abdomen is soft.      Tenderness: There is no abdominal tenderness. There is no guarding.   Musculoskeletal:      Cervical back: Normal range of motion.   Skin:     General: Skin is warm and dry.      Capillary Refill: Capillary refill takes less than 2 seconds.   Neurological:      General: No focal deficit present.      Mental Status: He is alert.     Abdomen has excoriations versus rash    Laboratory:  Most Recent Data:  CBC:   Recent Labs   Lab 07/03/23  0326 07/04/23  0313   WBC 13.64* 15.02*   HGB 8.5* 8.5*   HCT 25.2* 24.7*    157       CMP:   Recent Labs   Lab 07/04/23  0313   CALCIUM 8.5   ALBUMIN 3.9      K 3.0*   CO2 21*   BUN 19.9   CREATININE 2.46*   ALKPHOS 140   ALT 6   AST 25   BILITOT 2.4*           Microbiology Data Reviewed: yes  Pertinent Findings:  6/30/23 Blood culture x 2 pending  6/30/23 Body Fluid Culture pending    Other Results:      Radiology:  Imaging Results              CT Abdomen Pelvis  Without Contrast (Final result)  Result time 06/30/23 19:20:57      Final result by Raman Tiwari MD  (06/30/23 19:20:57)                   Impression:      1. Trace of left pleural effusion.    2. Previous cholecystectomy.    3. Cirrhosis and ascites.    4.  Bilateral kidneys non occluding calculi.    5.  Noninflamed diverticulosis coli.      Electronically signed by: Raman Tiwari  Date:    06/30/2023  Time:    19:20               Narrative:    EXAMINATION:  CT ABDOMEN PELVIS WITHOUT CONTRAST    CLINICAL HISTORY:  Abdominal pain, acute, nonlocalized;    TECHNIQUE:  Multidetector axial images were obtained from the  diaphragms to below symphysis pubis without the administration of IV contrast. Oral contrast was not administered.    Dose length product of 309 mGycm. Automated exposure control was utilized to minimize radiation dose.    COMPARISON:  June 4, 2023    FINDINGS:  There is trace of left dependent pleural effusion and left basilar atelectasis.    Liver is small in size consistent with cirrhosis.  There is moderate to large volume intraperitoneal free fluid consistent with ascites.  There are mesenteric inflammatory ground-glass opacities.  Within limitations of noncontrast technique, no acute findings liver, pancreas or the spleen identified.  Gallbladder is surgically absent..    The adrenal glands noncontrast evaluation is unremarkable. The kidneys are unremarkable in size and contour.  Bilateral kidneys non occluding calculi. The ureters appear normal in course and diameter without intra ureteral stone.  Calcified plaques of the aorta and iliac arteries without aneurysmal dilatation.  There are no retroperitoneal periaortic enlarged lymph nodes.    Stomach is decompressed and difficult to assess.  No abnormal dilatation of loops of small bowel.  Pericecal metallic clips suggest prior appendectomy.  There is noninflamed diverticulosis coli.  No bowel obstruction.  No pneumoperitoneum.    Urinary bladder is mostly decompressed.  No intravesical stone identified. There is no pelvic free fluid.    Chronic  compression deformities of the thoracolumbar vertebrae.  Demineralization of the bones.  No acute or aggressive skeletal abnormality.                                       X-Ray Chest AP Portable (Final result)  Result time 06/30/23 12:28:05      Final result by Raj Gibbs MD (06/30/23 12:28:05)                   Impression:      No acute findings.      Electronically signed by: Raj Gibbs  Date:    06/30/2023  Time:    12:28               Narrative:    EXAMINATION:  XR CHEST AP PORTABLE    CLINICAL HISTORY:  Unspecified abdominal pain    COMPARISON:  14 June 2023    FINDINGS:  Portable frontal view of the chest was obtained. The heart is not significantly enlarged.  There are chronic changes towards the left lung base.  No new focal consolidation or pneumothorax.                                      Current Medications:     Infusions:   sodium bicarbonate drip 100 mL/hr at 07/03/23 1118        Scheduled:   albumin human 25%  50 g Intravenous BID    ceFEPime (MAXIPIME) IVPB  2 g Intravenous Q24H    heparin (porcine)  5,000 Units Subcutaneous Q12H    hydrocortisone   Topical (Top) BID    lactulose  10 g Oral BID    metronidazole  500 mg Intravenous Q8H    potassium bicarbonate  20 mEq Oral Once    potassium chloride  40 mEq Oral BID        PRN:  dextrose 10%, dextrose 10%, diphenhydrAMINE, glucagon (human recombinant), glucose, glucose, lorazepam, naloxone, ondansetron, sodium chloride 0.9%    Antibiotics and Day Number of Therapy:  Cefepime, Flagyl, 6/30/2023-present      Intake/Output Summary (Last 24 hours) at 7/4/2023 1315  Last data filed at 7/4/2023 0800  Gross per 24 hour   Intake 237 ml   Output --   Net 237 ml         Lines/Drains/Airways       Peripheral Intravenous Line  Duration                  Peripheral IV - Single Lumen 06/30/23 1859 20 G Posterior;Right Wrist 3 days         Peripheral IV - Single Lumen 07/02/23 0826 20 G Anterior;Left Forearm 2 days                      Assessment &  Plan:     Alcoholic Liver Cirrhosis with ascites  -On admission, had Meld-Na of 30 points (27-32% estimated 90 day mortality), Child Steve Class C  -recent hospitalization at [facility] , where EGD was performed.  Negative for varices, or portal gastropathy.  FOBT performed in the ED was negative  -Underwent paracentesis in ED on  drained 5 L of fluid, studies negative for SBP however, ascitic fluid albumin and total protein were not collected. Plan for repeat paracentesis today to  determine need for outpatient SBP prophylaxis  -Continue 25% albumin, 50 g b.i.d.  -continue lactulose 20 g b.i.d., with goal bowel movements 3-4 bowel movements per day  -Extensively counseled on avoidance of NSAIDs, ACE/ARB, shellfish and will need to be established in cirrhosis clinic on discharge     Sepsis, likely GI source SIRS 3/4 (tachycardia, tachypnea, leukocytosis)-   -On cefepime and flagyl (D5 start date 6/30/23)  -Blood culture x 2 negative at 72  hours; ascitic fluid culture no growth at 4 days; no WBC or bacteria on gram stain  -Repeating blood cultures and ascitic fluid culture today 7/4 due to uptrending leukocytosis     Hypokalemia  Hypophosphatemia- resolved  -continue to monitor electrolytes on daily labs and replete as appropriate      CHRISTEN-improving  Metabolic acidosis likely 2/2 above, improving   -Likely 2/2 poor PO intake for the past week prior to admission, with nausea and vomiting  -nephrology on board,  appreciate recommendations   -continue albumin and IV fluid resuscitation for intravascular repletion with 1/2 NS with 100 meq sodium bicarbonate at 100 cc/hour  -appropriate response to albumin thus not concerned for hepatorenal syndrome at this time  -Monitor Is/Os     History of Alcohol withdrawal with seizure history  -lorazepam 2 mg IV q.2.h prn withdrawal symptoms  -continue with CIWA protocol  -reportedly stopped drinking 2 months ago, but with labs on admission concerning for alcohol hepatitis.  Phosphatidylethanol level ordered and pending      CODE STATUS: Full Code  Access: Peripheral  Antibiotics: Cefepime, Metronidazole  Diet:  low sodium   DVT Prophylaxis: Heparin 5000U BID  GI Prophylaxis: pepcid  Fluids:  1/2NS w/ 2A bicarb 100 cc/hour    Disposition: day 4 of admission for CHRISTEN, alcoholic liver cirrhosis. Continue ivf and antibiotics.  Renal indices slowly improving. Appreciate Nephrology's help. Plan for repeat paracentesis with repeat studies/ cultures today     Katie Smith MD  [facility], PGY-3   07/04/2023

## 2023-07-04 NOTE — PROGRESS NOTES
[facility]  Nephrology Progress Note  Patient Name: Los Alatorre  Age: 58 y.o.  : 1965  MRN: [id number]  Admission Date: 2023    Chief complaint: Abdominal Pain (Abd pain , sent from clinic)      Hospital course  Los Alatorre is a 58 y.o. White male with past medical history of alcoholic cirrhosis of the liver and seizure disorder who was admitted on 2023 with complaints of abdominal pain and distention.  Patient was febrile, had leukocytosis and transaminitis, later developed acute kidney injury as well.  Nephrology is following for assistance with management of the latter.    Subjective  Patient is complaining of poor appetite and generalized weakness.  Denies shortness of breath.      Review of Systems  Cardiovascular:  Negative for chest pain   Respiratory: Negative for shortness of breath     Objective  /71   Pulse 97   Temp 98 °F (36.7 °C) (Oral)   Resp 18   Ht 6' (1.829 m)   Wt 63.7 kg (140 lb 6.9 oz)   SpO2 99%   BMI 19.05 kg/m²     Intake/Output Summary (Last 24 hours) at 2023 0754  Last data filed at 7/3/2023 1300  Gross per 24 hour   Intake 240 ml   Output --   Net 240 ml       Physical Exam  General appearance: Patient is in no acute distress.  HEENT: PERRLA, EOMI, no scleral icterus, no JVD. Neck is supple.  Chest: Respirations are unlabored. Lungs sounds are diminished to auscultation.   Heart: S1, S2.   Abdomen: + ascites.  : Deferred.  Extremities: No edema, peripheral pulses are palpable.   Neuro: No focal deficits.     Medications    Current Facility-Administered Medications:     albumin human 25% bottle 50 g, 50 g, Intravenous, BID, Palomo Helton DO, Stopped at 239    ceFEPIme (MAXIPIME) 2 g in dextrose 5 % in water (D5W) 5 % 100 mL IVPB (MB+), 2 g, Intravenous, Q24H, Palomo Helton DO, Stopped at 23 2148    dextrose 10% bolus 125 mL 125 mL, 12.5 g, Intravenous, PRN, Palomo Helton DO    dextrose 10% bolus 250 mL 250 mL,  25 g, Intravenous, PRN, Palomo Helton, DO    diphenhydrAMINE capsule 25 mg, 25 mg, Oral, Q6H PRN, Te Lui MD, 25 mg at 07/04/23 0300    glucagon (human recombinant) injection 1 mg, 1 mg, Intramuscular, PRN, Palomo Helton, DO    glucose chewable tablet 16 g, 16 g, Oral, PRN, Palomo Owenh, DO    glucose chewable tablet 24 g, 24 g, Oral, PRN, Palomo Helton, DO    heparin (porcine) injection 5,000 Units, 5,000 Units, Subcutaneous, Q12H, Palomo Helton, , 5,000 Units at 07/03/23 2118    hydrocortisone 1 % cream, , Topical (Top), BID, Te Lui MD, Given at 07/03/23 2251    lactulose 20 gram/30 mL solution Soln 10 g, 10 g, Oral, BID, Te Lui MD, 10 g at 07/03/23 0859    LORazepam injection 2 mg, 2 mg, Intravenous, Q2H PRN, Zena Chen MD    metronidazole IVPB 500 mg, 500 mg, Intravenous, Q8H, Palomo Helton DO, Stopped at 07/04/23 0649    naloxone 0.4 mg/mL injection 0.02 mg, 0.02 mg, Intravenous, PRN, Palomo Helton DO    ondansetron injection 4 mg, 4 mg, Intravenous, Q8H PRN, Palomo Helton, DO    potassium bicarbonate disintegrating tablet 20 mEq, 20 mEq, Oral, Once, Sharri Fisher MD    sodium bicarbonate 100 mEq in sodium chloride 0.45% 1,100 mL infusion, , Intravenous, Continuous, Mary Kay Dee MD, Last Rate: 100 mL/hr at 07/03/23 1118, New Bag at 07/03/23 1118    sodium chloride 0.9% flush 10 mL, 10 mL, Intravenous, Q12H PRN, Palomo Helton DO     Imaging:    Reviewed    Laboratory Data:  Hematology  Lab Results   Component Value Date    WBC 15.02 (H) 07/04/2023    HGB 8.5 (L) 07/04/2023    HCT 24.7 (L) 07/04/2023     07/04/2023     07/04/2023    K 3.0 (L) 07/04/2023    CHLORIDE 103 07/04/2023    CO2 21 (L) 07/04/2023    BUN 19.9 07/04/2023    CREATININE 2.46 (H) 07/04/2023    EGFRNORACEVR 30 07/04/2023    GLUCOSE 101 (H) 07/04/2023    CALCIUM 8.5 07/04/2023    ALKPHOS 140 07/04/2023    LABPROT 5.6 (L) 07/04/2023    ALBUMIN 3.9 07/04/2023    BILIDIR 1.9 (H) 04/29/2023    IBILI  0.10 02/16/2022    AST 25 07/04/2023    ALT 6 07/04/2023    MG 2.00 07/04/2023    PHOS 2.9 07/04/2023     Lab Results   Component Value Date    GDWRSKLY38SZ 56.6 06/14/2023       Lab Results   Component Value Date    IRON 58 06/05/2023    TIBC 150 (L) 06/05/2023    TRANS 163 (L) 06/04/2023    TRANS 163 (L) 06/04/2023    LABIRON 31 06/04/2023    FERRITIN 122.2 06/05/2023    FOLATE 6.8 (L) 06/04/2023    RVTACJCQ55 >2,000 (H) 06/04/2023     (H) 06/05/2023       Lab Results   Component Value Date    HIV Nonreactive 06/14/2023    HEPCAB Nonreactive 06/30/2023    HEPBSURFAG Nonreactive 06/30/2023         Impression  Acute kidney injury secondary to intravascular volume depletion, improving   Metabolic acidosis  Hypokalemia  Anemia   Leukocytosis   Cirrhosis of the liver  Ascites    Plan  Renal indices are gradually improving.  Unfortunately, urinary output has not been documented.  Will order accurate I&O monitoring.  Potassium has been replaced, continue bicarbonate drip at current rate for another 24 hours, repeat labs in a.m..    Radha Perez NP  [facility] Nephrology   7/4/2023

## 2023-07-05 PROBLEM — K70.11 ALCOHOLIC HEPATITIS WITH ASCITES: Status: ACTIVE | Noted: 2023-01-01

## 2023-07-05 PROBLEM — E43 SEVERE MALNUTRITION: Status: ACTIVE | Noted: 2023-01-01

## 2023-07-05 NOTE — PT/OT/SLP PROGRESS
"Occupational Therapy   Treatment    Name: Los Alatorre  MRN: [id number]  Admitting Diagnosis:  cirrhosis ascites, CHRISTEN, tachycardia, abd pain, leukocytosis; s/p paracentesis        Recommendations:     Discharge Recommendations: home with home health  Discharge Equipment Recommendations:  walker, rolling, shower chair  Barriers to discharge:       Assessment:     Los Alatorre is a 58 y.o. male with a medical diagnosis of cirrhosis ascites, CHRISTEN, tachycardia, abd pain, leukocytosis; s/p paracentesis .  He presents with decreased activity tolerance and generalized weakness. Performance deficits affecting function are weakness, impaired endurance, impaired self care skills, impaired functional mobility, pain, edema.     Rehab Prognosis:  Fair; patient would benefit from acute skilled OT services to address these deficits and reach maximum level of function.       Plan:     Patient to be seen 3 x/week to address the above listed problems via    Plan of Care Expires:  (DC)  Plan of Care Reviewed with: patient    Subjective     Chief Complaint: no c/o pain  Patient/Family Comments/goals: "I just need my stomach to feel better then I'll be alright"  Pain/Comfort:  Pain Rating 1:  (no c/o pain)    Objective:   Patient found supine with   peripheral IV's upon OT entry to room.    General Precautions: Standard, fall, seizure    Orthopedic Precautions:N/A  Braces: N/A  Respiratory Status: Room air     Occupational Performance:     Bed Mobility:    Patient completed Rolling/Turning to Left with  modified independence  Patient completed Scooting/Bridging with minimum assistance  Patient completed Supine to Sit with minimum assistance  Patient completed Sit to Supine with modified independence     Functional Mobility/Transfers:  Patient completed Sit <> Stand Transfer with contact guard assistance and with UE support on bed  with  rolling walker   Patient completed Toilet Transfer Step Transfer technique with stand by assistance " with  rolling walker, grab bars, and BSC placed over standard toilet for elevated surface  Functional Mobility: pt ambulated bed>toilet room ale 15 ft with RW SBA, ambulated back to bed (ale 10 ft ) without RW although, unsteady utilized Ue's on wall/furniture/IV pole    Activities of Daily Living:  Lower Body Dressing: pt able to thread B LE into pillowcase for simulated LE dressing mod I    Toileting: +BM on toilet, mod I pericleaning, mod I clothing management    Noted redness on buttocks, applied cream per pt request  (Pt declined grooming tasks; pt reports mod I with self feeding)      [facility] 6 Click ADL:      Treatment & Education:  Education on OT POC, increasing OOB activity , education pressure relief techniques, removing brief to maintain skin integrity    Patient left supine with all lines intact and call button in reach    GOALS:   Multidisciplinary Problems       Occupational Therapy Goals          Problem: Occupational Therapy    Goal Priority Disciplines Outcome Interventions   Occupational Therapy Goal     OT, PT/OT Ongoing, Progressing    Description: Patient will perform feeding independently seated EOB or up to recliner chair.     Patient will perform grooming with mod I while standing at sink.    Patient will perform toileting with mod I using BSC over toilet.    Patient will perform toilet transfer with mod I with use of grab bars and RW.    Patient will perform lower body dressing with mod I while seated EOB.                          Time Tracking:     OT Date of Treatment: 07/05/23  OT Start Time: 1459  OT Stop Time: 1523  OT Total Time (min): 24 min    Billable Minutes:Self Care/Home Management 2 units    OT/BLANCO: OT          7/5/2023

## 2023-07-05 NOTE — CONSULTS
[facility] GI Consults     Patient Name: Los Alatorre  MRN: [id number]  Admission Date: 6/30/2023  Hospital Length of Stay: 5 days  Code Status: Full Code  Attending Provider: Chico Malave MD  Primary Care Provider: DECLAN Small     Chief Complaint:   Abdominal Pain (Abd pain , sent from clinic)      Subjective:      Brief HPI:  Los Alatorre is a 58 y.o. male who  has a past medical history of Seizures.  He presented to [facility] on 6/30/2023  with a primary complaint of abdominal pain, and distension.  Patient says that when he was discharged from our [facility] from [place] that he was compliant with all his medications and that he was having adequate amount of bowel movements.  Patient was discharged on rifaximin 550 b.i.d., lactulose 20 mg b.i.d..  However he said that he has little bowel movements however they were in quantity.  Patient says that he felt good for proximally 1 week however he cannot work anymore.  He said that his abdomen got so distended that he had decreased p.o. intake and had pain.  Of note patient says that he has stopped drinking for past 2 months.  He says that he was a heavy drinker since he was 19 years old.  He says that he drinks for the past 1-2 years approximately a half to 3/4 of a 5th of vodka.  Patient says that he has been taking ibuprofen for his restless legs for quite a time.  He says that he has numbness in his bilateral lower extremities when he is sleep at night.  He says he did not try put in over side of bed.  He also denies any limb claudication or numbness or tingling all ambulation.  Patient follows up with [facility].  Patient says he is currently having 4 bowel movements per day even when he was de-escalated to 10 mg b.i.d..  He is alert and oriented x3.     Of note patient was recently hospitalized at our [facility] on 06/05/2023 with complaint of 3-4 week history of abdominal swelling, intermittent  confusion, jaundice, abdominal pain.  As well as melanotic stools for the past 1-2 months.  Patient underwent an EGD and was discharged on lactulose and rifaximin, and had vaccination against hepatitis a and 1st dose of hepatitis-B 06/07/2023    Patient family history includes Alzheimer's disease in his father; Dementia in his father; Lung cancer in his mother.       Patient  has a past surgical history that includes Appendectomy and Cholecystectomy.    Patient has No Known Allergies.    Patient  reports that he has been smoking cigarettes. He has been smoking an average of 1.5 packs per day. He has never used smokeless tobacco. He reports that he does not currently use alcohol. He reports that he does not currently use drugs after having used the following drugs: Marijuana.     Current Outpatient Medications   Medication Instructions    LACTULOSE ORAL Oral, 2 times daily    levETIRAcetam (KEPPRA) 750 mg, Oral, 2 times daily    polyethylene glycol (GLYCOLAX) 17 g, Oral, Daily PRN          Review of Systems:  Gen: No fevers, chills, night sweats, or change in vision  Heart: No chest pain, palpitations,  Lungs: No shortness of breath, cough, or wheezing  GI: No abdominal pain, nausea, vomiting, constipation, or diarrhea  : No hematuria, No dysuria  Musk: Full range of motion in both upper and lower extremities  Integumentary: No rash or itching  Neuro: Normal speech, no focal weakness or headache     Objective:     Vital Signs:  Vital Signs (Most Recent):  Temp: 98.1 °F (36.7 °C) (07/05/23 0723)  Pulse: 100 (07/05/23 0723)  Resp: 18 (07/04/23 0400)  BP: 103/68 (07/05/23 0723)  SpO2: 99 % (07/05/23 0723)  Body mass index is 19.05 kg/m².  Weight: 63.7 kg (140 lb 6.9 oz) Vital Signs (24h Range):  Temp:  [98 °F (36.7 °C)-98.3 °F (36.8 °C)] 98.1 °F (36.7 °C)  Pulse:  [] 100  SpO2:  [97 %-99 %] 99 %  BP: ()/(51-80) 103/68       Input/output:     Intake/Output Summary (Last 24 hours) at 7/5/2023 1049  Last data  filed at 7/5/2023 0800  Gross per 24 hour   Intake 474 ml   Output 300 ml   Net 174 ml       Physical Examination:  General:  Well developed, well nourished, no acute respiratory distress  Head: Normocephalic, atraumatic  Eyes: PERRL, EOMI, anicteric sclera  Throat: No posterior pharyngeal erythema or exudate, no tonsillar exudate  Neck: supple, normal ROM, no JVD  CVS:  RRR, S1 and S2 normal, no murmurs, no added heart sounds, rubs, gallops, regular peripheral pulses, and no peripheral edema  Resp:  Lungs clear to auscultation bilaterally, no wheezes, rales, or rhonci  GI:  Abdomen soft, non-tender, +distended, normoactive bowel sounds  MSK:  Full range of motion, no obvious deformities   Skin:  No rashes, ulcers, erythema  Neuro:  Alert and oriented x3, No focal neuro deficits, CNII-XII grossly intact  Psych:  Appropriate mood and affect     Lines/Drains/Airways       None                    Laboratory:    Recent Labs   Lab 07/05/23  0329   WBC 15.70*   HGB 8.2*   HCT 23.9*      MCV 93.7   RDW 15.9     No results for input(s): TROPONINI, CKTOTAL, CKMB, BNP in the last 24 hours.  Recent Labs   Lab 06/30/23  1152   TROPONINI <0.010     No results for input(s): CHOL, HDL, LDLCALC, TRIG, CHOLHDL in the last 168 hours. Recent Labs   Lab 07/05/23  0329      K 3.4*   CHLORIDE 106   CO2 22   BUN 16.9   CREATININE 2.35*   CALCIUM 8.8   MG 1.80   PHOS 2.1*     Recent Labs   Lab 07/05/23  0329   ALBUMIN 3.8   BILITOT 3.0*   AST 25   ALKPHOS 150   ALT 5     No results for input(s): IRON, TIBC, FERRITIN, SATURATEDIRO, [id number], FOLATE in the last 168 hours.  Recent Labs   Lab 06/30/23  1152   INR 1.38*   PROTIME 16.7   PTT 35.8              Other Results:    Current Medications:     Infusions:   sodium bicarbonate drip 100 mL/hr at 07/03/23 1118         Scheduled:   albumin human 25%  50 g Intravenous BID    ceFEPime (MAXIPIME) IVPB  2 g Intravenous Q24H    heparin (porcine)  5,000 Units Subcutaneous Q12H     hydrocortisone   Topical (Top) BID    lactulose  10 g Oral BID    LIDOcaine (PF) 10 mg/ml (1%)  10 mL Other Once    metroNIDAZOLE  500 mg Oral Q8H    potassium phosphate IVPB  20 mmol Intravenous Once         PRN:   acetaminophen    dextrose 10%    dextrose 10%    diphenhydrAMINE    glucagon (human recombinant)    glucose    glucose    lorazepam    naloxone    ondansetron    sodium chloride 0.9%        Microbiology Data:  Microbiology Results (last 7 days)       Procedure Component Value Units Date/Time    Body Fluid Culture [[id number]] Collected: 06/30/23 1757    Order Status: Completed Specimen: Abdominal Fluid from Abdomen Updated: 07/05/23 0715     Body Fluid Culture Final Report: At 5 days. No growth    Body Fluid Culture [[id number]] Collected: 07/04/23 1623    Order Status: Completed Specimen: Abdominal Fluid from Abdomen Updated: 07/05/23 0648     Body Fluid Culture No Growth At 24 Hours    Gram Stain [[id number]] Collected: 07/04/23 1623    Order Status: Completed Specimen: Body Fluid from Abdomen Updated: 07/05/23 0625     GRAM STAIN Rare WBC observed      No bacteria seen    Culture, Body Fluid (Aerobic) w/ GS [[id number]] Collected: 07/04/23 1609    Order Status: Canceled Specimen: Body Fluid from Ascites Updated: 07/04/23 1609    Blood Culture [[id number]] Collected: 07/04/23 1550    Order Status: Resulted Specimen: Blood from Arm, Right Updated: 07/04/23 1605    Blood Culture [[id number]] Collected: 07/04/23 1550    Order Status: Resulted Specimen: Blood from Hand, Right Updated: 07/04/23 1604    Blood Culture [[id number]]  (Normal) Collected: 06/30/23 1152    Order Status: Completed Specimen: Blood from Arm, Left Updated: 07/04/23 1500     CULTURE, BLOOD ([facility]) No Growth At 96 Hours    Blood Culture [[id number]]  (Normal) Collected: 06/30/23 1152    Order Status: Completed Specimen: Blood from Arm, Right Updated: 07/04/23 1500     CULTURE, BLOOD ([facility]) No Growth At 96 Hours    Gram Stain [[id number]] Collected:  06/30/23 1757    Order Status: Completed Specimen: Aspirate from Abdominal Fluid Updated: 06/30/23 2231     GRAM STAIN No WBCs, No bacteria seen             Antibiotics and Day Number of Therapy:  Antibiotics (From admission, onward)      Start     Stop Route Frequency Ordered    07/05/23 1400  metroNIDAZOLE tablet 500 mg         -- Oral Every 8 hours 07/05/23 1012    06/30/23 2145  ceFEPIme (MAXIPIME) 2 g in dextrose 5 % in water (D5W) 5 % 100 mL IVPB (MB+)         -- IV Every 24 hours (non-standard times) 06/30/23 2044             Imaging:  CT Abdomen Pelvis  Without Contrast  Narrative: EXAMINATION:  CT ABDOMEN PELVIS WITHOUT CONTRAST    CLINICAL HISTORY:  Abdominal pain, acute, nonlocalized;    TECHNIQUE:  Multidetector axial images were obtained from the  diaphragms to below symphysis pubis without the administration of IV contrast. Oral contrast was not administered.    Dose length product of 309 mGycm. Automated exposure control was utilized to minimize radiation dose.    COMPARISON:  June 4, 2023    FINDINGS:  There is trace of left dependent pleural effusion and left basilar atelectasis.    Liver is small in size consistent with cirrhosis.  There is moderate to large volume intraperitoneal free fluid consistent with ascites.  There are mesenteric inflammatory ground-glass opacities.  Within limitations of noncontrast technique, no acute findings liver, pancreas or the spleen identified.  Gallbladder is surgically absent..    The adrenal glands noncontrast evaluation is unremarkable. The kidneys are unremarkable in size and contour.  Bilateral kidneys non occluding calculi. The ureters appear normal in course and diameter without intra ureteral stone.  Calcified plaques of the aorta and iliac arteries without aneurysmal dilatation.  There are no retroperitoneal periaortic enlarged lymph nodes.    Stomach is decompressed and difficult to assess.  No abnormal dilatation of loops of small bowel.  Pericecal  metallic clips suggest prior appendectomy.  There is noninflamed diverticulosis coli.  No bowel obstruction.  No pneumoperitoneum.    Urinary bladder is mostly decompressed.  No intravesical stone identified. There is no pelvic free fluid.    Chronic compression deformities of the thoracolumbar vertebrae.  Demineralization of the bones.  No acute or aggressive skeletal abnormality.  Impression: 1. Trace of left pleural effusion.    2. Previous cholecystectomy.    3. Cirrhosis and ascites.    4.  Bilateral kidneys non occluding calculi.    5.  Noninflamed diverticulosis coli.    Electronically signed by: Raman Tiwari  Date:    2023  Time:    19:20  X-Ray Chest AP Portable  Narrative: EXAMINATION:  XR CHEST AP PORTABLE    CLINICAL HISTORY:  Unspecified abdominal pain    COMPARISON:  2023    FINDINGS:  Portable frontal view of the chest was obtained. The heart is not significantly enlarged.  There are chronic changes towards the left lung base.  No new focal consolidation or pneumothorax.  Impression: No acute findings.    Electronically signed by: Raj Gibbs  Date:    2023  Time:    12:28        2D ECHO Results    No results found for this or any previous visit.        Pulmonary Functions Testing Results:    No results found for: FEV1, FVC, GQY0DMC, TLC, DLCO    GI procedures:  EGD 2023:  Moderate portal gastropathy  US 2023: The liver is enlarged with the right hepatic lobe measuring 21 cm long. Moderately, diffusely increased hepatic parenchymal echogenicity is seen, and there is rounding of the hepatic contour. No focal hepatic lesion is seen.     Alpha-1 antitrypsin 9  Antimitochondrial antibody less than 20  Body fluid albumin equals 1.2  AFP: 1.2  2023: Albumin body fluid 0.4/ Serum albumin 1.8.  SAA.4 Total protein .9  2023:  Albumin body fluid 1.2/serum albumin 4.2 SAAG 3 Total protein: 1.6  Ammonia 2023:  75     Assessment & Plan:   Alcoholic  cirrhosis:  Meld Na: 31  Child Steve: B  -recommend echocardiogram to ensure adequate cardiac output for renal perfusion.  -patient was deescalated on his lactulose from 20 b.i.d. to 10 b.i.d..  Patient is still having 4 bowel movements per day.  Would recommend titration to 2-3 bowel movements per day can do 10 daily and assess.  -Recommend discontinuing albumin and continue IV fluid resuscitation.  Patient's albumin has normalized with daily albumin.    -recommend midodrine 5 t.i.d. to help augment flow to kidneys   -ascitic fluids consistent with portal hypertension with a SAAG of 3 and total protein of 1.6.  Patient can not tolerate diuretics with acute renal failure for ascites.  P.r.n. therapeutic paracentesis  -blood cultures have been negative, blood cultures currently pending for repeat, and ascitic fluid negative for SBP.  Patient has been treated with cefepime and Flagyl x 5 days.  With patient's acute renal failure in history of total protein of less than 1.5 in ascitic fluid (.9 in 6/2023) reasonable to deescalate to a cephalosporin for SBP prophylaxis.  Leukocytosis is common in patients with alcoholic hepatitis.      Te Lui MD  Internal Medicine Resident PGY-II

## 2023-07-05 NOTE — PROGRESS NOTES
Inpatient Nutrition Assessment    Admit Date: 6/30/2023   Total duration of encounter: 5 days     Nutrition Recommendation/Prescription     Continue Low Na , 1500 ml fluid diet; encourage oral intake -- consider Megace to stimulate appetite  Boost Plus (provides 360 kcal, 14 g protein per serving) TID  Suggest  MVI & Thiamine  Daily weight    Communication of Recommendations: reviewed with patient    Nutrition Assessment     Malnutrition Assessment/Nutrition-Focused Physical Exam    Malnutrition Context: acute illness or injury  Malnutrition Level: severe  Energy Intake (Malnutrition): less than or equal to 50% for greater than or equal to 5 days  Weight Loss (Malnutrition): greater than 7.5% in 3 months         A minimum of two characteristics is recommended for diagnosis of either severe or non-severe malnutrition.    Chart Review    Reason Seen: follow-up    Malnutrition Screening Tool Results   Have you recently lost weight without trying?: Yes: 34 lbs or more  Have you been eating poorly because of a decreased appetite?: Yes   MST Score: 5     Diagnosis:  SIRS/sepsis, etoh /cirrhosis, ascites, CHRISTEN, hx seizure    Relevant Medical History: ETOH cirrhosis, ETOH disorder, seizure     Nutrition-Related Medications: maxipime, famotidine, lactulose, K bicarb, K phosphate  Calorie Containing IV Medications: no significant kcals from medications at this time    Nutrition-Related Labs:  (7-1) H/H 8.2/24.5(L) Gluc 84 Bun 31 Cr 3.6(H) GFR 19(L) P 4.9(H)   7/5/23 -- H/H 8.2/23.9 L, K 3.4 L, BUN 16.9, Cr 2.3 H, Phos 2.1 L    Diet/PN Order: Diet Low Sodium, 2gm Fluid - 1500mL  Oral Supplement Order: Boost Plus  Tube Feeding Order: none  Appetite/Oral Intake: poor/25-50% of meals  Factors Affecting Nutritional Intake: abdominal distension, abdominal pain, decreased appetite, and nausea  Food/Sabianism/Cultural Preferences: none reported  Food Allergies: none reported    Skin Integrity: intact, other (see comments) (REDNESS TO  PERINEAL AREA)  Wound(s):   none reported     Comments    23 -- Pt in & out of sleep during visit reporting not sleeping well last 2 nights; pt reports decreased appetite, denies n/v, + abdominal pain & distention s/p paracentesis on  noted with 5 L removed per MD notes; K (L) - loose stool, continues on lactulose, replacing K; Continue Boost ONS, consider Megace to stimulate appetite    () Received MST trigger wt loss/decreased appetite. Unable to speak to pt; spoke to nurse caring for pt--pt remains with decreased appetite; ate 25% of meal earlier; + abd pain/distension; some nausea; hx ETOH cirrhosis/ascites. Will order oral supplement; consider megace to stimulate appetite; Pt on lactulose; NH4 level 45. Wt fluctuates with ascites/fluid--per EMR between 120-154#; will track. Unable to complete PA at this time . Noted elevated Bun/Cr; low GFR--CHRISTNE; nephrology consulted     Anthropometrics    Height: 6' (182.9 cm) Height Method: Stated  Last Weight: 70.6 kg (155 lb 10.3 oz) (23 1054) Weight Method: Bed Scale  BMI (Calculated): 21.1  BMI Classification: normal (BMI 18.5-24.9)        Ideal Body Weight (IBW), Male: 178 lb     % Ideal Body Weight, Male (lb): 84.27 %                 Usual Body Weight (UBW), k.2 kg pt reports last weighing 3 months ago  % Usual Body Weight: 91.64  % Weight Change From Usual Weight: -8.55 %  Usual Weight Provided By: patient    Wt Readings from Last 5 Encounters:   23 70.6 kg (155 lb 10.3 oz)   23 71 kg (156 lb 8.4 oz)   23 67.2 kg (148 lb 3.2 oz)   23 72.6 kg (160 lb)   23 72.6 kg (160 lb)     Weight Change(s) Since Admission:  Admit Weight: 68 kg (150 lb) (23 1051)  23 -- 70.6 kg, bed    Estimated Needs    Weight Used For Calorie Calculations: 63.7 kg (140 lb 6.9 oz)  Energy Calorie Requirements (kcal): 1911 kcal/d; 30 stuart/kg  Energy Need Method: Kcal/kg  Weight Used For Protein Calculations: 63.7 kg (140 lb 6.9 oz)  Protein  Requirements: 76 gm protein/d; 1.2 gm/kg monitor protein; CHRISTEN  Fluid Requirements (mL): 1592 ml/d; 25 ml/kg; ascites  Temp (24hrs), Av.2 °F (36.8 °C), Min:98 °F (36.7 °C), Max:98.3 °F (36.8 °C)       Enteral Nutrition    Patient not receiving enteral nutrition at this time.    Parenteral Nutrition    Patient not receiving parenteral nutrition support at this time.    Evaluation of Received Nutrient Intake    Calories: not meeting estimated needs  Protein: not meeting estimated needs    Patient Education    Not applicable.    Nutrition Diagnosis     PES: Malnutrition related to acute illness as evidenced by eating < 50% nutrition intake > 5 days, >7.5% wt loss x 3 months, ascites, muscle wasting (continues)    Interventions/Goals     Intervention(s): modified composition of meals/snacks, commercial beverage, multivitamin/mineral supplement therapy, and collaboration with other providers  Goal: Meet greater than 75% of nutritional needs by follow-up. (goal progressing)    Monitoring & Evaluation     Dietitian will monitor food and beverage intake, weight, electrolyte/renal panel, and gastrointestinal profile.  Nutrition Risk/Follow-Up: high (follow-up in 1-4 days)   Please consult if re-assessment needed sooner.

## 2023-07-05 NOTE — PROGRESS NOTES
[facility] Medicine Wards   Progress Note     Resident Team: [facility] Medicine List 4  Attending Physician: Chico Malave MD  Resident: Danie Lui  Intern: [facility] Length of Stay: 5 days    Subjective:      Brief HPI:  Los Alatorre is a 58 y.o. male who with a history of alcoholic cirrhosis, alcohol use disorder, and seizure disorder who presented to [facility] ED on 6/30/2023 with a primary complaint of abdominal pain. He reports poor PO intake and has felt his abdomen become more distended. Does not report any fevers or chills. Denies any melena, bright red blood in the stool, or any vomiting. Was recently transferred to [facility] earlier this month for an EGD, which ruled out varices and gastropathy. Unknown if he has been taking his home medications reliably.       In the ED patient was afebrile 100.2 F, pulse 96, respiratory rate 4, blood pressure 137/83, 87% on room air.  CBC revealed leukocytosis at 23, H and H 11.7/35.7, platelets 286.  CMP revealed mild hyponatremia 132, acidosis 17, BUN/CR 0.65/0.88.  Alkaline phosphatase elevated at 337, slight AST elevation at 56, INR 1.3.  Initial lactic acid 3.2, troponin negative. CT abdomen pelvis revealed trace left pleural effusion, cirrhosis ascites, previous cholecystectomy, bilateral kidneys with non occluding calculi, and noninflamed diverticulosis coli.  Paracentesis was performed and 5 L of fluid was drained.      Interval History: NAEON. VSS. Paracentesis repeated yesterday with 5L removed. Patient reports improvement in abdominal pain and distention. Renal indices continue to gradually improve. Cultures have been negative. Persistent leukocytosis. No complaints at this time    Review of Systems:  Pertinent items are noted in HPI.     Objective:     Vital Signs (Most Recent):  Temp: 98.5 °F (36.9 °C) (07/05/23 1109)  Pulse: 90 (07/05/23 1109)  Resp: 18 (07/04/23 0400)  BP: 100/66 (07/05/23 1109)  SpO2: 96 % (07/05/23 1109) Vital Signs (24h Range):  Temp:  [98 °F (36.7  °C)-98.5 °F (36.9 °C)] 98.5 °F (36.9 °C)  Pulse:  [] 90  SpO2:  [96 %-99 %] 96 %  BP: ()/(51-80) 100/66       Physical Examination:  Physical Exam  Vitals reviewed.   Constitutional:       Appearance: Normal appearance.   HENT:      Mouth/Throat:      Mouth: Mucous membranes are moist.   Eyes:      Extraocular Movements: Extraocular movements intact.   Cardiovascular:      Rate and Rhythm: Normal rate and regular rhythm.      Pulses: Normal pulses.      Heart sounds: Normal heart sounds. No murmur heard.    No friction rub. No gallop.   Pulmonary:      Effort: Pulmonary effort is normal. No respiratory distress.      Breath sounds: Normal breath sounds. No wheezing.   Abdominal:      General: Bowel sounds are normal. There is distension.      Palpations: Abdomen is soft.      Tenderness: There is no abdominal tenderness. There is no guarding.   Musculoskeletal:      Cervical back: Normal range of motion.   Skin:     General: Skin is warm and dry.      Capillary Refill: Capillary refill takes less than 2 seconds.   Neurological:      General: No focal deficit present.      Mental Status: He is alert.     Abdomen has excoriations versus rash    Laboratory:  Most Recent Data:  CBC:   Recent Labs   Lab 07/04/23  0313 07/05/23  0329   WBC 15.02* 15.70*   HGB 8.5* 8.2*   HCT 24.7* 23.9*    146       CMP:   Recent Labs   Lab 07/05/23  0329   CALCIUM 8.8   ALBUMIN 3.8      K 3.4*   CO2 22   BUN 16.9   CREATININE 2.35*   ALKPHOS 150   ALT 5   AST 25   BILITOT 3.0*           Microbiology Data Reviewed: yes  Pertinent Findings:  6/30/23 Blood culture x 2 pending  6/30/23 Body Fluid Culture pending    Other Results:      Radiology:  Imaging Results              CT Abdomen Pelvis  Without Contrast (Final result)  Result time 06/30/23 19:20:57      Final result by Raman Tiwari MD (06/30/23 19:20:57)                   Impression:      1. Trace of left pleural effusion.    2. Previous  cholecystectomy.    3. Cirrhosis and ascites.    4.  Bilateral kidneys non occluding calculi.    5.  Noninflamed diverticulosis coli.      Electronically signed by: Raman Tiwari  Date:    06/30/2023  Time:    19:20               Narrative:    EXAMINATION:  CT ABDOMEN PELVIS WITHOUT CONTRAST    CLINICAL HISTORY:  Abdominal pain, acute, nonlocalized;    TECHNIQUE:  Multidetector axial images were obtained from the  diaphragms to below symphysis pubis without the administration of IV contrast. Oral contrast was not administered.    Dose length product of 309 mGycm. Automated exposure control was utilized to minimize radiation dose.    COMPARISON:  June 4, 2023    FINDINGS:  There is trace of left dependent pleural effusion and left basilar atelectasis.    Liver is small in size consistent with cirrhosis.  There is moderate to large volume intraperitoneal free fluid consistent with ascites.  There are mesenteric inflammatory ground-glass opacities.  Within limitations of noncontrast technique, no acute findings liver, pancreas or the spleen identified.  Gallbladder is surgically absent..    The adrenal glands noncontrast evaluation is unremarkable. The kidneys are unremarkable in size and contour.  Bilateral kidneys non occluding calculi. The ureters appear normal in course and diameter without intra ureteral stone.  Calcified plaques of the aorta and iliac arteries without aneurysmal dilatation.  There are no retroperitoneal periaortic enlarged lymph nodes.    Stomach is decompressed and difficult to assess.  No abnormal dilatation of loops of small bowel.  Pericecal metallic clips suggest prior appendectomy.  There is noninflamed diverticulosis coli.  No bowel obstruction.  No pneumoperitoneum.    Urinary bladder is mostly decompressed.  No intravesical stone identified. There is no pelvic free fluid.    Chronic compression deformities of the thoracolumbar vertebrae.  Demineralization of the bones.  No acute or  aggressive skeletal abnormality.                                       X-Ray Chest AP Portable (Final result)  Result time 06/30/23 12:28:05      Final result by Raj Gibbs MD (06/30/23 12:28:05)                   Impression:      No acute findings.      Electronically signed by: Raj Gibbs  Date:    06/30/2023  Time:    12:28               Narrative:    EXAMINATION:  XR CHEST AP PORTABLE    CLINICAL HISTORY:  Unspecified abdominal pain    COMPARISON:  14 June 2023    FINDINGS:  Portable frontal view of the chest was obtained. The heart is not significantly enlarged.  There are chronic changes towards the left lung base.  No new focal consolidation or pneumothorax.                                      Current Medications:     Infusions:   sodium bicarbonate drip 100 mL/hr at 07/03/23 1118        Scheduled:   albumin human 25%  50 g Intravenous BID    ceFEPime (MAXIPIME) IVPB  2 g Intravenous Q24H    heparin (porcine)  5,000 Units Subcutaneous Q12H    hydrocortisone   Topical (Top) BID    lactulose  10 g Oral BID    LIDOcaine (PF) 10 mg/ml (1%)  10 mL Other Once    metroNIDAZOLE  500 mg Oral Q8H        PRN:  acetaminophen, dextrose 10%, dextrose 10%, diphenhydrAMINE, glucagon (human recombinant), glucose, glucose, lorazepam, naloxone, ondansetron, sodium chloride 0.9%    Antibiotics and Day Number of Therapy:  Cefepime, Flagyl, 6/30/2023-present      Intake/Output Summary (Last 24 hours) at 7/5/2023 1352  Last data filed at 7/5/2023 0800  Gross per 24 hour   Intake 237 ml   Output 300 ml   Net -63 ml         Lines/Drains/Airways       Peripheral Intravenous Line  Duration                  Peripheral IV - Single Lumen 06/30/23 1859 20 G Posterior;Right Wrist 4 days         Peripheral IV - Single Lumen 07/02/23 0826 20 G Anterior;Left Forearm 3 days                      Assessment & Plan:     Alcoholic Liver Cirrhosis with ascites  -On admission, had Meld 22 points, Child Steve Class C  -recent  hospitalization at [facility] , where EGD was performed.  Negative for varices, or portal gastropathy.  FOBT performed in the ED was negative  -Underwent paracentesis in ED on  drained 5 L of fluid, studies negative for SBP however, ascitic fluid albumin and total protein were not collected. Repeat paracentesis done yesterday with additional 5L removed. Based on sresults of studies, does not need outpatient sbp prophylaxis  -Continue 25% albumin, 50 g b.i.d.  -continue lactulose 20 g b.i.d., with goal bowel movements 3-4 bowel movements per day  -Extensively counseled on avoidance of NSAIDs, ACE/ARB, shellfish and will need to be established in cirrhosis clinic on discharge  -consult Gastroenterology for further assistance and recommendations     Sepsis, likely GI source SIRS 3/4 (tachycardia, tachypnea, leukocytosis)-   -On cefepime and flagyl (D6 start date 6/30/23)  -Blood culture x 2 negative at 96  hours; ascitic fluid culture no growth at 5 days; no WBC or bacteria on gram stain  -Repeated blood cultures and ascitic fluid culture on 7/4 due to uptrending leukocytosis, currently pending     Hypokalemia  Hypophosphatemia- resolved  -continue to monitor electrolytes on daily labs and replete as appropriate      CHRISTEN-improving  Metabolic acidosis likely 2/2 above, improving   -Likely 2/2 poor PO intake for the past week prior to admission, with nausea and vomiting  -nephrology on board,  appreciate recommendations   -continue albumin and IV fluid resuscitation for intravascular repletion with 1/2 NS with 100 meq sodium bicarbonate at 100 cc/hour  -appropriate response to albumin thus not concerned for hepatorenal syndrome at this time  -Monitor Is/Os     History of Alcohol withdrawal with seizure history  -lorazepam 2 mg IV q.2.h prn withdrawal symptoms  -continue with CIWA protocol  -reportedly stopped drinking 2 months ago, but with labs on admission concerning for alcohol hepatitis. Phosphatidylethanol  level ordered and pending      CODE STATUS: Full Code  Access: Peripheral  Antibiotics: Cefepime, Metronidazole  Diet:  low sodium   DVT Prophylaxis: Heparin 5000U BID  GI Prophylaxis: pepcid  Fluids:  1/2NS w/ 2A bicarb 100 cc/hour    Disposition: day 5 of admission for CHRISTEN, alcoholic liver cirrhosis. Continue ivf and antibiotics.  Renal indices slowly improving. Appreciate Nephrology's help.repeat cultures pending. GI consulted, will follow up recommendations    Katie Smith MD  [employer] [facility], PGY-3   07/05/2023

## 2023-07-05 NOTE — PT/OT/SLP PROGRESS
Physical Therapy Treatment    Patient Name:  Los Alatorre   MRN:  [id number]    Recommendations     Discharge Recommendations:  home health PT   Discharge Equipment Recommendations: walker, rolling   Barriers to discharge: fall risk    Assessment     Los Alatorre is a 58 y.o. male admitted with a medical diagnosis of:   Patient Active Problem List   Diagnosis    History of appendectomy    History of cholecystectomy    Atrial fibrillation    Primary hypertension    Seizure disorder    Tobacco user    Alcohol abuse    Severe malnutrition        He presents with the following impairments/functional limitations:  weakness, impaired endurance, impaired functional mobility, gait instability, impaired balance, pain.    Rehab Prognosis: Good.    Patient would benefit from continued skilled acute PT services to: address above listed impairments/functional limitations; receive patient/caregiver education; reduce fall risk; and maximize independency/safety with functional mobility.    Recent Surgery: * No surgery found *      Plan     During this hospitalization, patient to be seen 3 x/week to address the identified impairments/functional limitations via gait training, therapeutic activities, therapeutic exercises and progress toward the established goals.    Plan of Care Expires:  08/04/23    Subjective     Communicated with patient's nurse Audra prior to session.    Patient agreeable to participate in treatment session.    Chief Complaint: pain  Patient/Family Comments/goals: to get stronger  Pain/Comfort:  Pain Rating 1: 10/10  Location 1: back  Pain Addressed 1: Nurse notified  Pain Rating Post-Intervention 1: 10/10    Objective     Patient found supine in bed and with HOB elevated with peripheral IV  upon PT entry to room.    General Precautions: Standard, fall   Orthopedic Precautions:N/A   Braces: N/A  Respiratory Status: room air    Functional Mobility:    Bed Mobility:  Supine to Sit: stand by assistance  Sit to  Supine: stand by assistance    Transfers:  Sit to Stand: minimum assistance with rolling walker  Toilet Transfer: contact guard assistance with rolling walker using Stand Pivot    Gait:  Patient ambulated 15ft, 10 ft with rolling walker and minimum assistance.  Patient demonstrates occasional unsteady gait, decreased ludwin, decreased step length, and wide base of support.    Other Mobility:  not assessed    Patient left supine in bed and with HOB elevated with all lines intact, call button in reach, and RN notified.    Goals     Multidisciplinary Problems       Physical Therapy Goals          Problem: Physical Therapy    Goal Priority Disciplines Outcome Goal Variances Interventions   Physical Therapy Goal     PT, PT/OT Ongoing, Progressing     Description: Goals to be met by: 2023     Patient will increase functional independence with mobility by performin. Sit to stand transfer with Modified Floyd  2. Gait  x 260 feet with Modified Floyd using Rolling Walker.   Reviewed 2023                         Time Tracking     PT Received On: 23  PT Start Time: 1315     PT Stop Time: 1338  PT Total Time (min): 23 min     Billable Minutes: Therapeutic Activity 23 mins    2023

## 2023-07-05 NOTE — ASSESSMENT & PLAN NOTE
Patient meets ASPEN criteria for severe malnutrition of acute illness or injury per RD assessment as evidenced by:  Energy Intake (Malnutrition): less than or equal to 50% for greater than or equal to 5 days  Weight Loss (Malnutrition): greater than 7.5% in 3 months     Muscle Mass (Malnutrition): mild depletion  Fluid Accumulation (Malnutrition): mild (Ascites)        A minimum of two characteristics is recommended for diagnosis of either severe or non-severe malnutrition.    Ht Readings from Last 1 Encounters:   06/30/23 6' (1.829 m)     Wt Readings from Last 1 Encounters:   07/05/23 1054 70.6 kg (155 lb 10.3 oz)   06/30/23 2215 63.7 kg (140 lb 6.9 oz)   06/30/23 1051 68 kg (150 lb)     Body mass index is 21.11 kg/m².    Patient has been screened and assessed by RD. See nutrition recommendations documented in inpatient nutrition assessment. RD will continue to follow patient throughout hospitalization.

## 2023-07-05 NOTE — PROGRESS NOTES
[facility]  Nephrology Progress Note  Patient Name: Los Alatorre  Age: 58 y.o.  : 1965  MRN: [id number]  Admission Date: 2023    Chief complaint: Abdominal Pain (Abd pain , sent from clinic)      Hospital course  Los Alatorre is a 58 y.o. White male with past medical history of alcoholic cirrhosis of the liver and seizure disorder who was admitted on 2023 with complaints of abdominal pain and distention.  Patient was febrile, had leukocytosis and transaminitis, later developed acute kidney injury as well.  Nephrology is following for assistance with management of the latter.    Subjective  Patient reports diffuse abdominal discomfort, denies nausea or vomiting.  Patient underwent paracentesis with 5 L fluid removal yesterday.    Review of Systems  Cardiovascular:  Negative for chest pain   Respiratory: Negative for shortness of breath     Objective  /68   Pulse 100   Temp 98.1 °F (36.7 °C) (Oral)   Resp 18   Ht 6' (1.829 m)   Wt 63.7 kg (140 lb 6.9 oz)   SpO2 99%   BMI 19.05 kg/m²     Intake/Output Summary (Last 24 hours) at 2023 0817  Last data filed at 2023 0548  Gross per 24 hour   Intake 237 ml   Output 300 ml   Net -63 ml       Physical Exam  General appearance: Patient is in no acute distress.  HEENT: PERRLA, EOMI, no scleral icterus, no JVD. Neck is supple.  Chest: Respirations are unlabored. Lungs sounds are diminished to auscultation.   Heart: S1, S2.   Abdomen: + ascites.  : Deferred.  Extremities: No edema, peripheral pulses are palpable.   Neuro: No focal deficits.     Medications    Current Facility-Administered Medications:     acetaminophen tablet 650 mg, 650 mg, Oral, Q6H PRN, Zeeshan Castillo MD, 650 mg at 23 0117    albumin human 25% bottle 50 g, 50 g, Intravenous, BID, Palomo Helton DO, Stopped at 23 1301    ceFEPIme (MAXIPIME) 2 g in dextrose 5 % in water (D5W) 5 % 100 mL IVPB (MB+), 2 g, Intravenous, Q24H, Palomo  DO Danie, Stopped at 07/04/23 2153    dextrose 10% bolus 125 mL 125 mL, 12.5 g, Intravenous, PRN, Palomo Helton, DO    dextrose 10% bolus 250 mL 250 mL, 25 g, Intravenous, PRN, Palomo Owenh, DO    diphenhydrAMINE capsule 25 mg, 25 mg, Oral, Q6H PRN, Te Lui MD, 25 mg at 07/05/23 0414    glucagon (human recombinant) injection 1 mg, 1 mg, Intramuscular, PRN, Palomo Helton, DO    glucose chewable tablet 16 g, 16 g, Oral, PRN, Palomo Helton, DO    glucose chewable tablet 24 g, 24 g, Oral, PRN, Palomo Helton, DO    heparin (porcine) injection 5,000 Units, 5,000 Units, Subcutaneous, Q12H, Palomo Helton, , 5,000 Units at 07/04/23 2123    hydrocortisone 1 % cream, , Topical (Top), BID, Te Lui MD, Given at 07/04/23 0927    lactulose 20 gram/30 mL solution Soln 10 g, 10 g, Oral, BID, Te Lui MD, 10 g at 07/04/23 2123    LIDOcaine (PF) 10 mg/ml (1%) injection 100 mg, 10 mL, Other, Once, Keely Garcia MD    LORazepam injection 2 mg, 2 mg, Intravenous, Q2H PRN, Zena Chen MD    metronidazole IVPB 500 mg, 500 mg, Intravenous, Q8H, Palomo Helton DO, Stopped at 07/05/23 0657    naloxone 0.4 mg/mL injection 0.02 mg, 0.02 mg, Intravenous, PRN, Palomo Helton,     ondansetron injection 4 mg, 4 mg, Intravenous, Q8H PRN, Palomo Helton DO    potassium bicarbonate disintegrating tablet 20 mEq, 20 mEq, Oral, Once, Sharri Fisher MD    sodium bicarbonate 100 mEq in sodium chloride 0.45% 1,100 mL infusion, , Intravenous, Continuous, Mary Kay Dee MD, Last Rate: 100 mL/hr at 07/03/23 1118, New Bag at 07/03/23 1118    sodium chloride 0.9% flush 10 mL, 10 mL, Intravenous, Q12H PRN, Palomo Helton, DO     Imaging:    Reviewed    Laboratory Data:  Hematology  Lab Results   Component Value Date    WBC 15.70 (H) 07/05/2023    HGB 8.2 (L) 07/05/2023    HCT 23.9 (L) 07/05/2023     07/05/2023     07/05/2023    K 3.4 (L) 07/05/2023    CHLORIDE 106 07/05/2023    CO2 22 07/05/2023    BUN 16.9 07/05/2023     CREATININE 2.35 (H) 07/05/2023    EGFRNORACEVR 31 07/05/2023    GLUCOSE 106 (H) 07/05/2023    CALCIUM 8.8 07/05/2023    ALKPHOS 150 07/05/2023    LABPROT 5.5 (L) 07/05/2023    ALBUMIN 3.8 07/05/2023    BILIDIR 1.9 (H) 04/29/2023    IBILI 0.10 02/16/2022    AST 25 07/05/2023    ALT 5 07/05/2023    MG 1.80 07/05/2023    PHOS 2.1 (L) 07/05/2023     Lab Results   Component Value Date    LPVVREXA33IE 56.6 06/14/2023       Lab Results   Component Value Date    IRON 58 06/05/2023    TIBC 150 (L) 06/05/2023    TRANS 163 (L) 06/04/2023    TRANS 163 (L) 06/04/2023    LABIRON 31 06/04/2023    FERRITIN 122.2 06/05/2023    FOLATE 6.8 (L) 06/04/2023    [id number] >2,000 (H) 06/04/2023     (H) 06/05/2023       Lab Results   Component Value Date    HIV Nonreactive 06/14/2023    HEPCAB Nonreactive 06/30/2023    HEPBSURFAG Nonreactive 06/30/2023         Impression  Acute kidney injury secondary to intravascular volume depletion, improving   Metabolic acidosis  Hypokalemia  Hypophosphatemia  Anemia   Leukocytosis   Cirrhosis of the liver  Ascites    Plan  Renal indices are at a plateau.  Continue IV fluids at current rate, will replace phosphorus and potassium.  Continue to follow closely service, recommend maintaining systolic blood pressure of at least 100 mmHg or greater.     Radha Perez NP  [facility] Neph[facility]   7/5/2023

## 2023-07-06 PROBLEM — R18.8 TENSE ASCITES: Status: ACTIVE | Noted: 2023-01-01

## 2023-07-06 PROBLEM — N17.9 ACUTE RENAL FAILURE: Status: ACTIVE | Noted: 2023-01-01

## 2023-07-06 NOTE — PROGRESS NOTES
[facility] Medicine Wards   Progress Note     Resident Team: [facility] Medicine List 4  Attending Physician: Chico Malave MD  Resident: Danie Lui  Intern: [facility] Length of Stay: 6 days    Subjective:      Brief HPI:  Los Alatorre is a 58 y.o. male who with a history of alcoholic cirrhosis, alcohol use disorder, and seizure disorder who presented to [facility] ED on 6/30/2023 with a primary complaint of abdominal pain. He reports poor PO intake and has felt his abdomen become more distended. Does not report any fevers or chills. Denies any melena, bright red blood in the stool, or any vomiting. Was recently transferred to [facility] earlier this month for an EGD, which ruled out varices and gastropathy. Unknown if he has been taking his home medications reliably.       In the ED patient was afebrile 100.2 F, pulse 96, respiratory rate 4, blood pressure 137/83, 87% on room air.  CBC revealed leukocytosis at 23, H and H 11.7/35.7, platelets 286.  CMP revealed mild hyponatremia 132, acidosis 17, BUN/CR 0.65/0.88.  Alkaline phosphatase elevated at 337, slight AST elevation at 56, INR 1.3.  Initial lactic acid 3.2, troponin negative. CT abdomen pelvis revealed trace left pleural effusion, cirrhosis ascites, previous cholecystectomy, bilateral kidneys with non occluding calculi, and noninflamed diverticulosis coli.  Paracentesis was performed and 5 L of fluid was drained.      Interval History: NAEON. VSS. Patient has no new complaints at this time. GI evaluated patient yesterday, recommended LVP as needed, starting midodrine and deescalating antibiotics.     Review of Systems:  Pertinent items are noted in HPI.     Objective:     Vital Signs (Most Recent):  Temp: 98.2 °F (36.8 °C) (07/06/23 1138)  Pulse: 99 (07/06/23 1138)  Resp: 18 (07/06/23 0800)  BP: 106/61 (07/06/23 1138)  SpO2: 96 % (07/06/23 1138) Vital Signs (24h Range):  Temp:  [98 °F (36.7 °C)-98.4 °F (36.9 °C)] 98.2 °F (36.8 °C)  Pulse:  [] 99  Resp:  [18-23]  18  SpO2:  [94 %-98 %] 96 %  BP: ()/(61-72) 106/61       Physical Examination:  Physical Exam  Vitals reviewed.   Constitutional:       Appearance: Normal appearance.   HENT:      Mouth/Throat:      Mouth: Mucous membranes are moist.   Eyes:      Extraocular Movements: Extraocular movements intact.   Cardiovascular:      Rate and Rhythm: Normal rate and regular rhythm.      Pulses: Normal pulses.      Heart sounds: Normal heart sounds. No murmur heard.    No friction rub. No gallop.   Pulmonary:      Effort: Pulmonary effort is normal. No respiratory distress.      Breath sounds: Normal breath sounds. No wheezing.   Abdominal:      General: Bowel sounds are normal. There is distension.      Palpations: Abdomen is soft.      Tenderness: There is no abdominal tenderness. There is no guarding.   Musculoskeletal:      Cervical back: Normal range of motion.   Skin:     General: Skin is warm and dry.      Capillary Refill: Capillary refill takes less than 2 seconds.   Neurological:      General: No focal deficit present.      Mental Status: He is alert.     Abdomen has excoriations versus rash    Laboratory:  Most Recent Data:  CBC:   Recent Labs   Lab 07/05/23  0329 07/06/23  0318   WBC 15.70* 15.62*   HGB 8.2* 7.5*   HCT 23.9* 22.7*    134       CMP:   Recent Labs   Lab 07/06/23  0318   CALCIUM 9.1   ALBUMIN 3.9      K 3.4*   CO2 23   BUN 16.5   CREATININE 2.56*   ALKPHOS 128   ALT 5   AST 19   BILITOT 3.7*           Microbiology Data Reviewed: yes  Pertinent Findings:  6/30/23 Blood culture x 2 pending  6/30/23 Body Fluid Culture pending    Other Results:      Radiology:  Imaging Results              CT Abdomen Pelvis  Without Contrast (Final result)  Result time 06/30/23 19:20:57      Final result by Raman Tiwari MD (06/30/23 19:20:57)                   Impression:      1. Trace of left pleural effusion.    2. Previous cholecystectomy.    3. Cirrhosis and ascites.    4.  Bilateral kidneys non  occluding calculi.    5.  Noninflamed diverticulosis coli.      Electronically signed by: Raman Tiwari  Date:    06/30/2023  Time:    19:20               Narrative:    EXAMINATION:  CT ABDOMEN PELVIS WITHOUT CONTRAST    CLINICAL HISTORY:  Abdominal pain, acute, nonlocalized;    TECHNIQUE:  Multidetector axial images were obtained from the  diaphragms to below symphysis pubis without the administration of IV contrast. Oral contrast was not administered.    Dose length product of 309 mGycm. Automated exposure control was utilized to minimize radiation dose.    COMPARISON:  June 4, 2023    FINDINGS:  There is trace of left dependent pleural effusion and left basilar atelectasis.    Liver is small in size consistent with cirrhosis.  There is moderate to large volume intraperitoneal free fluid consistent with ascites.  There are mesenteric inflammatory ground-glass opacities.  Within limitations of noncontrast technique, no acute findings liver, pancreas or the spleen identified.  Gallbladder is surgically absent..    The adrenal glands noncontrast evaluation is unremarkable. The kidneys are unremarkable in size and contour.  Bilateral kidneys non occluding calculi. The ureters appear normal in course and diameter without intra ureteral stone.  Calcified plaques of the aorta and iliac arteries without aneurysmal dilatation.  There are no retroperitoneal periaortic enlarged lymph nodes.    Stomach is decompressed and difficult to assess.  No abnormal dilatation of loops of small bowel.  Pericecal metallic clips suggest prior appendectomy.  There is noninflamed diverticulosis coli.  No bowel obstruction.  No pneumoperitoneum.    Urinary bladder is mostly decompressed.  No intravesical stone identified. There is no pelvic free fluid.    Chronic compression deformities of the thoracolumbar vertebrae.  Demineralization of the bones.  No acute or aggressive skeletal abnormality.                                       X-Ray  Chest AP Portable (Final result)  Result time 06/30/23 12:28:05      Final result by Raj Gibbs MD (06/30/23 12:28:05)                   Impression:      No acute findings.      Electronically signed by: Raj Gibbs  Date:    06/30/2023  Time:    12:28               Narrative:    EXAMINATION:  XR CHEST AP PORTABLE    CLINICAL HISTORY:  Unspecified abdominal pain    COMPARISON:  14 June 2023    FINDINGS:  Portable frontal view of the chest was obtained. The heart is not significantly enlarged.  There are chronic changes towards the left lung base.  No new focal consolidation or pneumothorax.                                      Current Medications:     Infusions:   lactated ringers 100 mL/hr at 07/06/23 1149    octreotide (SANDOSTATIN) infusion          Scheduled:   albumin human 25%  50 g Intravenous BID    cefTRIAXone (ROCEPHIN) IVPB  1 g Intravenous Q24H    heparin (porcine)  5,000 Units Subcutaneous Q12H    hydrocortisone   Topical (Top) BID    lactulose  10 g Oral BID    LIDOcaine (PF) 10 mg/ml (1%)  10 mL Other Once    midodrine  5 mg Oral TID WM        PRN:  acetaminophen, dextrose 10%, dextrose 10%, diphenhydrAMINE, glucagon (human recombinant), glucose, glucose, lorazepam, naloxone, ondansetron, sodium chloride 0.9%    Antibiotics and Day Number of Therapy:  Cefepime, Flagyl, 6/30/2023-present      Intake/Output Summary (Last 24 hours) at 7/6/2023 1401  Last data filed at 7/5/2023 1632  Gross per 24 hour   Intake 237 ml   Output --   Net 237 ml         Lines/Drains/Airways       Peripheral Intravenous Line  Duration                  Peripheral IV - Single Lumen 06/30/23 1859 20 G Posterior;Right Wrist 5 days         Peripheral IV - Single Lumen 07/02/23 0826 20 G Anterior;Left Forearm 4 days                      Assessment & Plan:     Alcoholic Liver Cirrhosis with ascites  -On admission, had Meld 22 points, Child Steve Class C. Now meld 31 Child steve C  -recent hospitalization at [facility]  [place] , where EGD was performed.  Negative for varices, or portal gastropathy.  FOBT performed in the ED was negative  -Underwent paracentesis x2, on 6/30, 7/4, studies negative for SBP   -Continue 25% albumin, 50 g b.i.d.  -continue lactulose, but will decrease dosage to once daily as he has been having too many BM/day. Also initiated on midodrine 5 TID and octreotide.  -Extensively counseled on avoidance of NSAIDs, ACE/ARB, shellfish and will need to be established in cirrhosis clinic on discharge  -Gastroenterology on board, appreciate assistance and recommnedations    CHRISTEN 2/2 intravascular volume depletion  Concern for HRS  Metabolic Acidosis- resolved  -Likely 2/2 poor PO intake prior to admission, with nausea and vomiting  -nephrology on board,  appreciate recommendations   -continue albumin and IV fluid resuscitation with LR at 100 cc/ hour  -Initially low concern for hepatorenal syndrome due to response to albumin/ improvement of renal indices; however, with worsening of renal function today, will continue albumin, start midodrine 5 TID and octreotide with MAP goal of 80. If no improvement by tomorrow, consider upgrade to ICU for levophed to reach MAP goals  -Monitor Is/Os    Sepsis, likely GI source SIRS 3/4 (tachycardia, tachypnea, leukocytosis)-resolved but  with persistent leukocytosis  -De-escalated cefepime and flagyl (received 6 days, stopped 7/6/23) to ceftriaxone monotherapy (start date 7/6/23) for SBP prophylaxis based on GI recommendations that leukocytosis is likely d/t alcoholic hepatitis and not indicative of active ongoing sepsis.  -Blood cultures and ascitic fluid cultures on admission and repeat on 7/4 have been negative    Hypokalemia  Hypophosphatemia  -continue to monitor electrolytes on daily labs and replete as appropriate         History of Alcohol withdrawal with seizure history  -lorazepam 2 mg IV q.2.h prn withdrawal symptoms  -continue with CIWA protocol  -reportedly stopped  drinking 2 months ago, but with labs on admission concerning for alcohol hepatitis. Phosphatidylethanol level ordered and pending      CODE STATUS: Full Code  Access: Peripheral  Antibiotics: Ceftriaxone  Diet:  low sodium   DVT Prophylaxis: Heparin 5000U BID  GI Prophylaxis: pepcid  Fluids:  LR 100cc/hour    Disposition: day 6 of admission for CHRISTEN, alcoholic liver cirrhosis.  Renal indices worse today. Will continue ivf, albumin, and decrease lactulose to once daily given frequent bowel movements. Also initiated midodrine 5 TID and octreotide; MAP goal of 80. Consider upgrade to ICU for levophed to achieve MAPs of 80  if not improved with midodrine.  De-escalated broad spectrum antibiotics to rocephin per GI recs.  Appreciate Nephrology' and GI recommendations.     Katie Smith MD  [facility], PGY-3   07/06/2023

## 2023-07-06 NOTE — PLAN OF CARE
Problem: Adult Inpatient Plan of Care  Goal: Plan of Care Review  7/6/2023 0700 by Jennifer Alicia RN  Outcome: Ongoing, Progressing  7/6/2023 0657 by Jennifer Alicia RN  Outcome: Ongoing, Not Progressing  Goal: Patient-Specific Goal (Individualized)  7/6/2023 0700 by Jennifer Alicia RN  Outcome: Ongoing, Progressing  7/6/2023 0657 by Jennifer Alicia RN  Outcome: Ongoing, Not Progressing  Goal: Absence of Hospital-Acquired Illness or Injury  7/6/2023 0700 by Jennifer Alicia RN  Outcome: Ongoing, Progressing  7/6/2023 0657 by Jennifer Alicia RN  Outcome: Ongoing, Not Progressing  Goal: Optimal Comfort and Wellbeing  7/6/2023 0700 by Jennifer Alicia RN  Outcome: Ongoing, Progressing  7/6/2023 0657 by Jennifer Alicia RN  Outcome: Ongoing, Not Progressing  Goal: Readiness for Transition of Care  7/6/2023 0700 by Jennifer Alicia RN  Outcome: Ongoing, Progressing  7/6/2023 0657 by Jennifer Alicia RN  Outcome: Ongoing, Not Progressing     Problem: Skin Injury Risk Increased  Goal: Skin Health and Integrity  7/6/2023 0700 by Jennifer Alicia RN  Outcome: Ongoing, Progressing  7/6/2023 0657 by Jennifer Alicia RN  Outcome: Ongoing, Not Progressing

## 2023-07-06 NOTE — PROGRESS NOTES
[facility] GI Progress Note    Patient Name: Los Alatorre  MRN: [id number]  Admission Date: 6/30/2023  Hospital Length of Stay: 6 days  Code Status: Full Code  Attending Provider: Chico Malave MD  Primary Care Provider: DECLAN Small     Subjective:      Brief HPI:  Los Alatorre is a 58 y.o. male who  has a past medical history of Seizures.  He presented to [facility] on 6/30/2023  with a primary complaint of abdominal pain, and distension.  Patient says that when he was discharged from our [place] from [place] that he was compliant with all his medications and that he was having adequate amount of bowel movements.  Patient was discharged on rifaximin 550 b.i.d., lactulose 20 mg b.i.d..  However he said that he has little bowel movements however they were in quantity.  Patient says that he felt good for proximally 1 week however he cannot work anymore.  He said that his abdomen got so distended that he had decreased p.o. intake and had pain.  Of note patient says that he has stopped drinking for past 2 months.  He says that he was a heavy drinker since he was 19 years old.  He says that he drinks for the past 1-2 years approximately a half to 3/4 of a 5th of vodka.  Patient says that he has been taking ibuprofen for his restless legs for quite a time.  He says that he has numbness in his bilateral lower extremities when he is sleep at night.  He says he did not try put in over side of bed.  He also denies any limb claudication or numbness or tingling all ambulation.  Patient follows up with [facility].  Patient says he is currently having 4 bowel movements per day even when he was de-escalated to 10 mg b.i.d..  He is alert and oriented x3.      Interval History:  Patient is still on albumin.  Midodrine has not been initiated yet.  Patient's blood pressures were around 100 systolic this a.m. it is 95/63.  Patient says that he has had more than 3 bowel movements, he says that he  had 7, some were small in volume wall others were large in volume.  Patient still has complaints of abdominal distension and that it is hard for him to turn on his sides.      Review of Systems:  The remainder of the 14 system ROS is noncontributory or negative unless mentioned/reviewed above.     Objective:     Vital Signs:  Vital Signs (Most Recent):  Temp: 98.2 °F (36.8 °C) (07/06/23 0800)  Pulse: 101 (07/06/23 0800)  Resp: 18 (07/06/23 0800)  BP: 95/63 (07/06/23 0800)  SpO2: (!) 94 % (07/06/23 0800)  Body mass index is 21.11 kg/m².  Weight: 70.6 kg (155 lb 10.3 oz) Vital Signs (24h Range):  Temp:  [98 °F (36.7 °C)-98.5 °F (36.9 °C)] 98.2 °F (36.8 °C)  Pulse:  [] 101  Resp:  [18-23] 18  SpO2:  [94 %-98 %] 94 %  BP: ()/(62-72) 95/63       Input/output:     Intake/Output Summary (Last 24 hours) at 7/6/2023 1052  Last data filed at 7/5/2023 1632  Gross per 24 hour   Intake 237 ml   Output --   Net 237 ml       Physical Examination:  General:  Well developed, well nourished, no acute respiratory distress  Head: Normocephalic, atraumatic  Eyes: PERRL, EOMI, anicteric sclera  Throat: No posterior pharyngeal erythema or exudate, no tonsillar exudate  Neck: supple, normal ROM, no JVD  CVS:  RRR, S1 and S2 normal, no murmurs, no added heart sounds, rubs, gallops, regular peripheral pulses, and no peripheral edema  Resp:  Lungs clear to auscultation bilaterally, no wheezes, rales, or rhonci  GI:  Abdomen soft, non-tender, +distended, normoactive bowel sounds  MSK:  Full range of motion, no obvious deformities   Skin:  No rashes, ulcers, erythema  Neuro:  Alert and oriented x3, No focal neuro deficits, CNII-XII grossly intact  Psych:  Appropriate mood and affect   Rectal examination showed no bright red blood per rectum or black tarry stool      Lines/Drains/Airways       None                    Laboratory:    Recent Labs   Lab 07/04/23  0313 07/05/23  0329 07/06/23  0318   WBC 15.02* 15.70* 15.62*   RBC 2.59*  2.55* 2.39*   HGB 8.5* 8.2* 7.5*   HCT 24.7* 23.9* 22.7*   MCV 95.4* 93.7 95.0*   MCH 32.8* 32.2* 31.4*   MCHC 34.4 34.3 33.0   RDW 15.8 15.9 16.1    146 134   MPV 11.0* 10.7* 10.4      Recent Labs   Lab 07/04/23  0313 07/04/23  1550 07/05/23  0329 07/06/23 0318     --  142 143   K 3.0*  --  3.4* 3.4*   CHLORIDE 103  --  106 105   CO2 21*  --  22 23   BUN 19.9  --  16.9 16.5   CREATININE 2.46*  --  2.35* 2.56*   CALCIUM 8.5  --  8.8 9.1   MG 2.00  --  1.80 1.60   ALBUMIN 3.9 4.2 3.8 3.9   ALKPHOS 140  --  150 128   ALT 6  --  5 5   AST 25  --  25 19   BILITOT 2.4*  --  3.0* 3.7*        Other Results:    Current Medications:     Infusions:   sodium bicarbonate drip 100 mL/hr at 07/05/23 2203         Scheduled:   albumin human 25%  50 g Intravenous BID    ceFEPime (MAXIPIME) IVPB  2 g Intravenous Q24H    heparin (porcine)  5,000 Units Subcutaneous Q12H    hydrocortisone   Topical (Top) BID    lactulose  10 g Oral BID    LIDOcaine (PF) 10 mg/ml (1%)  10 mL Other Once    metroNIDAZOLE  500 mg Oral Q8H    midodrine  5 mg Oral TID WM         PRN:   acetaminophen    dextrose 10%    dextrose 10%    diphenhydrAMINE    glucagon (human recombinant)    glucose    glucose    lorazepam    naloxone    ondansetron    sodium chloride 0.9%        Microbiology Data:  Microbiology Results (last 7 days)       Procedure Component Value Units Date/Time    Body Fluid Culture [[id number]] Collected: 07/04/23 1623    Order Status: Completed Specimen: Abdominal Fluid from Abdomen Updated: 07/06/23 0804     Body Fluid Culture No Growth At 48 Hours    Blood Culture [[id number]]  (Normal) Collected: 07/04/23 1550    Order Status: Completed Specimen: Blood from Arm, Right Updated: 07/05/23 1800     CULTURE, BLOOD ([facility]) No Growth At 24 Hours    Blood Culture [[id number]]  (Normal) Collected: 07/04/23 1550    Order Status: Completed Specimen: Blood from Hand, Right Updated: 07/05/23 1800     CULTURE, BLOOD ([facility]) No Growth At 24 Hours     Blood Culture [[id number]]  (Normal) Collected: 06/30/23 1152    Order Status: Completed Specimen: Blood from Arm, Left Updated: 07/05/23 1500     CULTURE, BLOOD ([facility]) No Growth at 5 days    Blood Culture [[id number]]  (Normal) Collected: 06/30/23 1152    Order Status: Completed Specimen: Blood from Arm, Right Updated: 07/05/23 1500     CULTURE, BLOOD ([facility]) No Growth at 5 days    Body Fluid Culture [[id number]] Collected: 06/30/23 1757    Order Status: Completed Specimen: Abdominal Fluid from Abdomen Updated: 07/05/23 0715     Body Fluid Culture Final Report: At 5 days. No growth    Gram Stain [[id number]] Collected: 07/04/23 1623    Order Status: Completed Specimen: Body Fluid from Abdomen Updated: 07/05/23 0625     GRAM STAIN Rare WBC observed      No bacteria seen    Culture, Body Fluid (Aerobic) w/ GS [[id number]] Collected: 07/04/23 1609    Order Status: Canceled Specimen: Body Fluid from Ascites Updated: 07/04/23 1609    Gram Stain [[id number]] Collected: 06/30/23 1757    Order Status: Completed Specimen: Aspirate from Abdominal Fluid Updated: 06/30/23 2231     GRAM STAIN No WBCs, No bacteria seen             Antibiotics and Day Number of Therapy:  Antibiotics (From admission, onward)      Start     Stop Route Frequency Ordered    07/05/23 1400  metroNIDAZOLE tablet 500 mg         -- Oral Every 8 hours 07/05/23 1012    06/30/23 2145  ceFEPIme (MAXIPIME) 2 g in dextrose 5 % in water (D5W) 5 % 100 mL IVPB (MB+)         -- IV Every 24 hours (non-standard times) 06/30/23 2044             Imaging:  CT Abdomen Pelvis  Without Contrast  Narrative: EXAMINATION:  CT ABDOMEN PELVIS WITHOUT CONTRAST    CLINICAL HISTORY:  Abdominal pain, acute, nonlocalized;    TECHNIQUE:  Multidetector axial images were obtained from the  diaphragms to below symphysis pubis without the administration of IV contrast. Oral contrast was not administered.    Dose length product of 309 mGycm. Automated exposure control was utilized to minimize  radiation dose.    COMPARISON:  June 4, 2023    FINDINGS:  There is trace of left dependent pleural effusion and left basilar atelectasis.    Liver is small in size consistent with cirrhosis.  There is moderate to large volume intraperitoneal free fluid consistent with ascites.  There are mesenteric inflammatory ground-glass opacities.  Within limitations of noncontrast technique, no acute findings liver, pancreas or the spleen identified.  Gallbladder is surgically absent..    The adrenal glands noncontrast evaluation is unremarkable. The kidneys are unremarkable in size and contour.  Bilateral kidneys non occluding calculi. The ureters appear normal in course and diameter without intra ureteral stone.  Calcified plaques of the aorta and iliac arteries without aneurysmal dilatation.  There are no retroperitoneal periaortic enlarged lymph nodes.    Stomach is decompressed and difficult to assess.  No abnormal dilatation of loops of small bowel.  Pericecal metallic clips suggest prior appendectomy.  There is noninflamed diverticulosis coli.  No bowel obstruction.  No pneumoperitoneum.    Urinary bladder is mostly decompressed.  No intravesical stone identified. There is no pelvic free fluid.    Chronic compression deformities of the thoracolumbar vertebrae.  Demineralization of the bones.  No acute or aggressive skeletal abnormality.  Impression: 1. Trace of left pleural effusion.    2. Previous cholecystectomy.    3. Cirrhosis and ascites.    4.  Bilateral kidneys non occluding calculi.    5.  Noninflamed diverticulosis coli.    Electronically signed by: Raman Tiwari  Date:    06/30/2023  Time:    19:20  X-Ray Chest AP Portable  Narrative: EXAMINATION:  XR CHEST AP PORTABLE    CLINICAL HISTORY:  Unspecified abdominal pain    COMPARISON:  14 June 2023    FINDINGS:  Portable frontal view of the chest was obtained. The heart is not significantly enlarged.  There are chronic changes towards the left lung base.  No new  focal consolidation or pneumothorax.  Impression: No acute findings.    Electronically signed by: Raj Gibbs  Date:    06/30/2023  Time:    12:28        Assessment & Plan:   Alcoholic cirrhosis:  Meld Na: 31  Child Steve: B  -recommend echocardiogram to ensure adequate cardiac output for renal perfusion.  -Recommend continued titration of his lactulose as patient has been having more than 3 bowel movements per day.  -large volume paracentesis as required, patient is having some discomfort with abdominal distention may require further paracentesis tomorrow or later this week.    -patient will be trialed on midodrine at 12:00 p.m., can up titrate as required to further augment cardiac flow.  Continue IV fluid resuscitation  -As patient's albumin is within normal limits can discontinue and reorder as required for any future drops in albumin  -continue to trend INR.        Te Lui MD  Internal Medicine Resident PGY-II

## 2023-07-06 NOTE — PROGRESS NOTES
"[facility]  Nephrology Progress Note  Patient Name: Los Alatorre  Age: 58 y.o.  : 1965  MRN: [id number]  Admission Date: 2023    Chief complaint: Abdominal Pain (Abd pain , sent from clinic)      Hospital course  Los Alatorre is a 58 y.o. White male with past medical history of alcoholic cirrhosis of the liver and seizure disorder who was admitted on 2023 with complaints of abdominal pain and distention.  Patient was febrile, had leukocytosis and transaminitis, later developed acute kidney injury as well.  Nephrology is following for assistance with management of the latter.    Subjective  NAEON. Pt states he feels "alive", about the same as before. S/p paracentesis on 23 with removal of 5L    Review of Systems  Cardiovascular:  Negative for chest pain   Respiratory: Negative for shortness of breath     Objective  /61   Pulse 99   Temp 98.2 °F (36.8 °C) (Oral)   Resp 18   Ht 6' (1.829 m)   Wt 70.6 kg (155 lb 10.3 oz)   SpO2 96%   BMI 21.11 kg/m²     Intake/Output Summary (Last 24 hours) at 2023 1234  Last data filed at 2023 1632  Gross per 24 hour   Intake 237 ml   Output --   Net 237 ml       Physical Exam  General appearance: Patient is in no acute distress.  HEENT: PERRLA, EOMI, no scleral icterus, no JVD. Neck is supple.  Chest: Respirations are unlabored. Lungs sounds are diminished to auscultation.   Heart: S1, S2.   Abdomen: + ascites.  Extremities: No edema, peripheral pulses are palpable.   Neuro: No focal deficits.     Medications    Current Facility-Administered Medications:     acetaminophen tablet 650 mg, 650 mg, Oral, Q6H PRN, Zeeshan Castillo MD, 650 mg at 23 0119    albumin human 25% bottle 50 g, 50 g, Intravenous, BID, Palomo Helton DO, Stopped at 23 0924    ceFEPIme (MAXIPIME) 2 g in dextrose 5 % in water (D5W) 5 % 100 mL IVPB (MB+), 2 g, Intravenous, Q24H, Palomo Helton DO, Stopped at 23 2233    dextrose 10% " bolus 125 mL 125 mL, 12.5 g, Intravenous, PRN, Palomo Owenh, DO    dextrose 10% bolus 250 mL 250 mL, 25 g, Intravenous, PRN, Palomo Yeh, DO    diphenhydrAMINE capsule 25 mg, 25 mg, Oral, Q6H PRN, Te Lui MD, 25 mg at 07/05/23 2024    glucagon (human recombinant) injection 1 mg, 1 mg, Intramuscular, PRN, Palomo Yeh, DO    glucose chewable tablet 16 g, 16 g, Oral, PRN, Palomo Yeh, DO    glucose chewable tablet 24 g, 24 g, Oral, PRN, Palomo Yeh, DO    heparin (porcine) injection 5,000 Units, 5,000 Units, Subcutaneous, Q12H, Palomo Helton DO, 5,000 Units at 07/06/23 0823    hydrocortisone 1 % cream, , Topical (Top), BID, Te Lui MD, Given at 07/06/23 0823    lactated ringers infusion, , Intravenous, Continuous, Sharri Fisher MD, Last Rate: 100 mL/hr at 07/06/23 1149, New Bag at 07/06/23 1149    lactulose 20 gram/30 mL solution Soln 10 g, 10 g, Oral, BID, Te Lui MD, 10 g at 07/06/23 0823    LIDOcaine (PF) 10 mg/ml (1%) injection 100 mg, 10 mL, Other, Once, Keely Garcia MD    LORazepam injection 2 mg, 2 mg, Intravenous, Q2H PRN, Zena Chen MD    metroNIDAZOLE tablet 500 mg, 500 mg, Oral, Q8H, Katie Smith MD, 500 mg at 07/06/23 0501    midodrine tablet 5 mg, 5 mg, Oral, TID WM, Keely Garcia MD, 5 mg at 07/06/23 1149    naloxone 0.4 mg/mL injection 0.02 mg, 0.02 mg, Intravenous, PRN, Palomo Owenh, DO    ondansetron injection 4 mg, 4 mg, Intravenous, Q8H PRN, Palomo Helton, DO, 4 mg at 07/05/23 2024    sodium chloride 0.9% flush 10 mL, 10 mL, Intravenous, Q12H PRN, Palomo Helton, DO     Imaging:    Reviewed    Laboratory Data:  Hematology  Lab Results   Component Value Date    WBC 15.62 (H) 07/06/2023    HGB 7.5 (L) 07/06/2023    HCT 22.7 (L) 07/06/2023     07/06/2023     07/06/2023    K 3.4 (L) 07/06/2023    CHLORIDE 105 07/06/2023    CO2 23 07/06/2023    BUN 16.5 07/06/2023    CREATININE 2.56 (H) 07/06/2023    EGFRNORACEVR 28 07/06/2023    GLUCOSE 111 (H) 07/06/2023     CALCIUM 9.1 07/06/2023    ALKPHOS 128 07/06/2023    LABPROT 5.3 (L) 07/06/2023    ALBUMIN 3.9 07/06/2023    BILIDIR 1.9 (H) 04/29/2023    IBILI 0.10 02/16/2022    AST 19 07/06/2023    ALT 5 07/06/2023    MG 1.60 07/06/2023    PHOS 2.5 07/06/2023     Lab Results   Component Value Date    KVPXUTCG70VU 56.6 06/14/2023       Lab Results   Component Value Date    IRON 58 06/05/2023    TIBC 150 (L) 06/05/2023    TRANS 163 (L) 06/04/2023    TRANS 163 (L) 06/04/2023    LABIRON 31 06/04/2023    FERRITIN 122.2 06/05/2023    FOLATE 6.8 (L) 06/04/2023    PRKUJCSO17 >2,000 (H) 06/04/2023     (H) 06/05/2023       Lab Results   Component Value Date    HIV Nonreactive 06/14/2023    HEPCAB Nonreactive 06/30/2023    HEPBSURFAG Nonreactive 06/30/2023    HEPBSAB Nonreactive 07/06/2023         Impression  Acute kidney injury secondary to intravascular volume depletion, improving   Metabolic acidosis-resolved  Hypokalemia  Hypophosphatemia  Anemia   Leukocytosis   Cirrhosis of the liver  Ascites    Plan  -Renal indices slightly worsened this morning, likely secondary to hypotension  -Switch IVF to LR at 100 cc/hr instead of 1/2NS with bicarb  -Agree with albumin infusion for volume expansion  -Agree with addition of midodrine, might need to dose 10mg TID if SBP not sustained over 110 mmHg      Sharri Fisher M.D.  [employer] [facility] PGY-3     7/6/2023

## 2023-07-07 NOTE — PROGRESS NOTES
[facility] GI Progress Note    Patient Name: Los Alatorre  MRN: [id number]  Admission Date: 6/30/2023  Hospital Length of Stay: 7 days  Code Status: Full Code  Attending Provider: Chico Malave MD  Primary Care Provider: DECLAN Small     Subjective:      Brief HPI:  Los Alatorre is a 58 y.o. male who  has a past medical history of Seizures.  He presented to [facility] on 6/30/2023  with a primary complaint of abdominal pain, and distension.  Patient says that when he was discharged from our [place] from [place] that he was compliant with all his medications and that he was having adequate amount of bowel movements.  Patient was discharged on rifaximin 550 b.i.d., lactulose 20 mg b.i.d..  However he said that he has little bowel movements however they were in quantity.  Patient says that he felt good for proximally 1 week however he cannot work anymore.  He said that his abdomen got so distended that he had decreased p.o. intake and had pain.  Of note patient says that he has stopped drinking for past 2 months.  He says that he was a heavy drinker since he was 19 years old.  He says that he drinks for the past 1-2 years approximately a half to 3/4 of a 5th of vodka.  Patient says that he has been taking ibuprofen for his restless legs for quite a time.  He says that he has numbness in his bilateral lower extremities when he is sleep at night.  He says he did not try put in over side of bed.  He also denies any limb claudication or numbness or tingling all ambulation.  Patient follows up with [facility].  Patient says he is currently having 4 bowel movements per day even when he was de-escalated to 10 mg b.i.d..  He is alert and oriented x3.      Interval History:  Patient tells me this morning that he was feeling dizzy intermittently yesterday.  He also feels it when he stands up.  I got orthostatic blood pressure measurements: Lying 90/62, sitting 99/62, standing 100/43.   Patient is still having some issues with abdominal distention, he is not tender to palpation however he is noticing the distention is quite noticeable and cumbersome.  Patient said that he had proximally 7 bowel movements again even after down titration of lactulose.  He denies any melena or bright red blood per rectum, though patient tells me he does not look what is in the toilet when he flushes.    Review of Systems:  The remainder of the 14 system ROS is noncontributory or negative unless mentioned/reviewed above.     Objective:     Vital Signs:  Vital Signs (Most Recent):  Temp: 98.4 °F (36.9 °C) (07/07/23 0701)  Pulse: 96 (07/07/23 0701)  Resp: 20 (07/07/23 0701)  BP: 103/67 (07/07/23 0701)  SpO2: (!) 94 % (07/07/23 0701)  Body mass index is 21.11 kg/m².  Weight: 70.6 kg (155 lb 10.3 oz) Vital Signs (24h Range):  Temp:  [98 °F (36.7 °C)-99.1 °F (37.3 °C)] 98.4 °F (36.9 °C)  Pulse:  [94-99] 96  Resp:  [19-22] 20  SpO2:  [93 %-96 %] 94 %  BP: ()/(58-67) 103/67       Input/output:     Intake/Output Summary (Last 24 hours) at 7/7/2023 0956  Last data filed at 7/7/2023 0800  Gross per 24 hour   Intake 500 ml   Output 250 ml   Net 250 ml       Physical Examination:  General:  Well developed, well nourished, no acute respiratory distress  Head: Normocephalic, atraumatic  Eyes: PERRL, EOMI, anicteric sclera  Throat: No posterior pharyngeal erythema or exudate, no tonsillar exudate  Neck: supple, normal ROM, no JVD  CVS:  RRR, S1 and S2 normal, no murmurs, no added heart sounds, rubs, gallops, regular peripheral pulses, and no peripheral edema  Resp:  Lungs clear to auscultation bilaterally, no wheezes, rales, or rhonci  GI:  Abdomen soft, non-tender, +distended, normoactive bowel sounds  MSK:  Full range of motion, no obvious deformities   Skin:  No rashes, ulcers, erythema  Neuro:  Alert and oriented x3, No focal neuro deficits, CNII-XII grossly intact  Psych:  Appropriate mood and affect   Rectal examination  showed no bright red blood per rectum or black tarry stool      Lines/Drains/Airways       None                    Laboratory:    Recent Labs   Lab 07/05/23 0329 07/06/23 0318 07/07/23  0306   WBC 15.70* 15.62* 16.46*   RBC 2.55* 2.39* 2.27*   HGB 8.2* 7.5* 7.1*   HCT 23.9* 22.7* 22.0*   MCV 93.7 95.0* 96.9*   MCH 32.2* 31.4* 31.3*   MCHC 34.3 33.0 32.3*   RDW 15.9 16.1 16.7    134 130   MPV 10.7* 10.4 11.7*      Recent Labs   Lab 07/05/23 0329 07/06/23 0318 07/07/23  0306    143 142   K 3.4* 3.4* 4.0   CHLORIDE 106 105 106   CO2 22 23 24   BUN 16.9 16.5 17.0   CREATININE 2.35* 2.56* 2.68*   CALCIUM 8.8 9.1 9.5   MG 1.80 1.60 2.00   ALBUMIN 3.8 3.9 4.1   ALKPHOS 150 128 124   ALT 5 5 5   AST 25 19 19   BILITOT 3.0* 3.7* 4.5*        Other Results:    Current Medications:     Infusions:   lactated ringers 100 mL/hr at 07/07/23 0800    octreotide (SANDOSTATIN) infusion 50 mcg/hr (07/07/23 0213)         Scheduled:   albumin human 25%  50 g Intravenous BID    cefTRIAXone (ROCEPHIN) IVPB  1 g Intravenous Q24H    heparin (porcine)  5,000 Units Subcutaneous Q12H    hydrocortisone   Topical (Top) BID    lactulose  10 g Oral Daily    levETIRAcetam  750 mg Oral BID    LIDOcaine (PF) 10 mg/ml (1%)  10 mL Other Once    midodrine  10 mg Oral TID WM         PRN:   acetaminophen    dextrose 10%    dextrose 10%    diphenhydrAMINE    glucagon (human recombinant)    glucose    glucose    lorazepam    naloxone    ondansetron    sodium chloride 0.9%        Microbiology Data:  Microbiology Results (last 7 days)       Procedure Component Value Units Date/Time    Blood Culture [[id number]]  (Normal) Collected: 07/04/23 1550    Order Status: Completed Specimen: Blood from Arm, Right Updated: 07/06/23 1800     CULTURE, BLOOD ([facility]) No Growth At 48 Hours    Blood Culture [[id number]]  (Normal) Collected: 07/04/23 1550    Order Status: Completed Specimen: Blood from Hand, Right Updated: 07/06/23 1800     CULTURE, BLOOD ([facility]) No  Growth At 48 Hours    Body Fluid Culture [[id number]] Collected: 07/04/23 1623    Order Status: Completed Specimen: Abdominal Fluid from Abdomen Updated: 07/06/23 0804     Body Fluid Culture No Growth At 48 Hours    Blood Culture [[id number]]  (Normal) Collected: 06/30/23 1152    Order Status: Completed Specimen: Blood from Arm, Left Updated: 07/05/23 1500     CULTURE, BLOOD ([facility]) No Growth at 5 days    Blood Culture [[id number]]  (Normal) Collected: 06/30/23 1152    Order Status: Completed Specimen: Blood from Arm, Right Updated: 07/05/23 1500     CULTURE, BLOOD ([facility]) No Growth at 5 days    Body Fluid Culture [[id number]] Collected: 06/30/23 1757    Order Status: Completed Specimen: Abdominal Fluid from Abdomen Updated: 07/05/23 0715     Body Fluid Culture Final Report: At 5 days. No growth    Gram Stain [[id number]] Collected: 07/04/23 1623    Order Status: Completed Specimen: Body Fluid from Abdomen Updated: 07/05/23 0625     GRAM STAIN Rare WBC observed      No bacteria seen    Culture, Body Fluid (Aerobic) w/ GS [[id number]] Collected: 07/04/23 1609    Order Status: Canceled Specimen: Body Fluid from Ascites Updated: 07/04/23 1609    Gram Stain [[id number]] Collected: 06/30/23 1757    Order Status: Completed Specimen: Aspirate from Abdominal Fluid Updated: 06/30/23 2231     GRAM STAIN No WBCs, No bacteria seen             Antibiotics and Day Number of Therapy:  Antibiotics (From admission, onward)      Start     Stop Route Frequency Ordered    07/06/23 1500  cefTRIAXone (ROCEPHIN) 1 g in dextrose 5 % in water (D5W) 5 % 100 mL IVPB (MB+)         -- IV Every 24 hours (non-standard times) 07/06/23 1354             Imaging:  CT Abdomen Pelvis  Without Contrast  Narrative: EXAMINATION:  CT ABDOMEN PELVIS WITHOUT CONTRAST    CLINICAL HISTORY:  Abdominal pain, acute, nonlocalized;    TECHNIQUE:  Multidetector axial images were obtained from the  diaphragms to below symphysis pubis without the administration of IV contrast.  Oral contrast was not administered.    Dose length product of 309 mGycm. Automated exposure control was utilized to minimize radiation dose.    COMPARISON:  June 4, 2023    FINDINGS:  There is trace of left dependent pleural effusion and left basilar atelectasis.    Liver is small in size consistent with cirrhosis.  There is moderate to large volume intraperitoneal free fluid consistent with ascites.  There are mesenteric inflammatory ground-glass opacities.  Within limitations of noncontrast technique, no acute findings liver, pancreas or the spleen identified.  Gallbladder is surgically absent..    The adrenal glands noncontrast evaluation is unremarkable. The kidneys are unremarkable in size and contour.  Bilateral kidneys non occluding calculi. The ureters appear normal in course and diameter without intra ureteral stone.  Calcified plaques of the aorta and iliac arteries without aneurysmal dilatation.  There are no retroperitoneal periaortic enlarged lymph nodes.    Stomach is decompressed and difficult to assess.  No abnormal dilatation of loops of small bowel.  Pericecal metallic clips suggest prior appendectomy.  There is noninflamed diverticulosis coli.  No bowel obstruction.  No pneumoperitoneum.    Urinary bladder is mostly decompressed.  No intravesical stone identified. There is no pelvic free fluid.    Chronic compression deformities of the thoracolumbar vertebrae.  Demineralization of the bones.  No acute or aggressive skeletal abnormality.  Impression: 1. Trace of left pleural effusion.    2. Previous cholecystectomy.    3. Cirrhosis and ascites.    4.  Bilateral kidneys non occluding calculi.    5.  Noninflamed diverticulosis coli.    Electronically signed by: Raman Tiwari  Date:    06/30/2023  Time:    19:20  X-Ray Chest AP Portable  Narrative: EXAMINATION:  XR CHEST AP PORTABLE    CLINICAL HISTORY:  Unspecified abdominal pain    COMPARISON:  14 June 2023    FINDINGS:  Portable frontal view of the  chest was obtained. The heart is not significantly enlarged.  There are chronic changes towards the left lung base.  No new focal consolidation or pneumothorax.  Impression: No acute findings.    Electronically signed by: Raj Gibbs  Date:    06/30/2023  Time:    12:28        Assessment & Plan:   Alcoholic cirrhosis:  Meld Na: 31  Child Steve: B  -Recommend continued titration of his lactulose as patient has been having more than 3 bowel movements per day.  -Patient was initiated on octreotide drip and midodrine and his creatinine and INR continued to rise  -recommend up titration of midodrine 5 to midodrine 10 t.i.d., and continue octreotide drip.  Rising creatinine in the setting of continued albumin and IV fluid resuscitation is by definition hepatorenal syndrome.  Continue octreotide for splanchnic vaso constriction, and midodrine.  If unable to maintain maps at least 83 (map was 73 on admission for 10-15 above) w/ midodrine reasonable to upgrade to the ICU for Levophed infusion  -large volume paracentesis as required, patient is having some discomfort with abdominal distention may require further paracentesis soon  -pt's hgb has steadily declined, rectal examination yesterday showed no BRB or melena. Patient's platelets have decreased, and his bilirubin has also increased. Will send Direct Bipin, Haptoglobin, and LDH  -continue to trend INR.  -patient was deoxygenating to upper 80's to 90 percent sp02, on my examination. Recommend xray, Echo. Strict In and Outs. Will reach out to nephrology with regards of IVF.         Te Lui MD  Internal Medicine Resident PGY-II

## 2023-07-07 NOTE — PT/OT/SLP PROGRESS
Physical Therapy    Missed Treatment Session    Patient Name:  Los Alatorre   MRN:  [id number]      Patient not seen at this time secondary to Nurse/ JOSE RAUL hold (Hgb 7.1).    Will follow-up as patient is available to participate and as therapists' schedule allows.

## 2023-07-07 NOTE — PROGRESS NOTES
[facility] Medicine Wards   Progress Note     Resident Team: [facility] Medicine List 4  Attending Physician: Chico Malave MD  Hospital Length of Stay: 7 days    Subjective:      Brief HPI:  Los Alatorre is a 58 y.o. male who with a history of alcoholic cirrhosis, alcohol use disorder, and seizure disorder who presented to [facility] ED on 6/30/2023 with a primary complaint of abdominal pain. He reports poor PO intake and has felt his abdomen become more distended. Does not report any fevers or chills. Denies any melena, bright red blood in the stool, or any vomiting. Was recently transferred to [facility] earlier this month for an EGD, which ruled out varices and gastropathy. Unknown if he has been taking his home medications reliably.       In the ED patient was afebrile 100.2 F, pulse 96, respiratory rate 4, blood pressure 137/83, 87% on room air.  CBC revealed leukocytosis at 23, H and H 11.7/35.7, platelets 286.  CMP revealed mild hyponatremia 132, acidosis 17, BUN/CR 0.65/0.88.  Alkaline phosphatase elevated at 337, slight AST elevation at 56, INR 1.3.  Initial lactic acid 3.2, troponin negative. CT abdomen pelvis revealed trace left pleural effusion, cirrhosis ascites, previous cholecystectomy, bilateral kidneys with non occluding calculi, and noninflamed diverticulosis coli.  Paracentesis was performed and 5 L of fluid was drained.      Interval History: No acute overnight events. Denies subjective complaints. Increased midodrine to 10 TID. MAPs overnight high 70s to 80. Labs this AM revealed leukocytosis at 16, hgb 7.1 (no evidence of overt bleeding), creatinine elevated but stable (2.68), normal lytes. Blood culture NGTD.      Review of Systems:  Pertinent items are noted in HPI.       Objective:     Vital Signs (Most Recent):  Temp: 98.5 °F (36.9 °C) (07/07/23 0309)  Pulse: 94 (07/07/23 0309)  Resp: 19 (07/07/23 0309)  BP: (!) 96/58 (07/07/23 0309)  SpO2: (!) 93 % (07/07/23 0309) Vital Signs (24h Range):  Temp:  [98 °F (36.7  °C)-99.1 °F (37.3 °C)] 98.5 °F (36.9 °C)  Pulse:  [] 94  Resp:  [18-22] 19  SpO2:  [93 %-96 %] 93 %  BP: ()/(58-67) 96/58       Physical Examination:  Physical Exam  Constitutional:       General: He is not in acute distress.     Appearance: Normal appearance. He is not toxic-appearing.   HENT:      Head: Normocephalic and atraumatic.   Cardiovascular:      Rate and Rhythm: Normal rate and regular rhythm.   Pulmonary:      Breath sounds: Normal breath sounds. No wheezing or rhonchi.   Chest:      Chest wall: No tenderness.   Abdominal:      General: Bowel sounds are normal. There is distension.      Palpations: Abdomen is soft.      Tenderness: There is no abdominal tenderness.   Musculoskeletal:         General: No tenderness or deformity. Normal range of motion.      Cervical back: Normal range of motion and neck supple. No rigidity.   Skin:     General: Skin is warm and dry.   Neurological:      General: No focal deficit present.      Mental Status: He is alert and oriented to person, place, and time. Mental status is at baseline.       Laboratory:  Most Recent Data:  CBC:   Recent Labs   Lab 07/06/23  0318 07/07/23  0306   WBC 15.62* 16.46*   HGB 7.5* 7.1*   HCT 22.7* 22.0*    130     CMP:   Recent Labs   Lab 07/07/23  0306   CALCIUM 9.5   ALBUMIN 4.1      K 4.0   CO2 24   BUN 17.0   CREATININE 2.68*   ALKPHOS 124   ALT 5   AST 19   BILITOT 4.5*         Microbiology Data Reviewed: yes  Pertinent Findings:  Microbiology Results (last 7 days)       Procedure Component Value Units Date/Time    Blood Culture [[id number]]  (Normal) Collected: 07/04/23 1550    Order Status: Completed Specimen: Blood from Arm, Right Updated: 07/06/23 1800     CULTURE, BLOOD ([facility]) No Growth At 48 Hours    Blood Culture [[id number]]  (Normal) Collected: 07/04/23 1550    Order Status: Completed Specimen: Blood from Hand, Right Updated: 07/06/23 1800     CULTURE, BLOOD ([facility]) No Growth At 48 Hours    Body Fluid  Culture [[id number]] Collected: 07/04/23 1623    Order Status: Completed Specimen: Abdominal Fluid from Abdomen Updated: 07/06/23 0804     Body Fluid Culture No Growth At 48 Hours    Blood Culture [[id number]]  (Normal) Collected: 06/30/23 1152    Order Status: Completed Specimen: Blood from Arm, Left Updated: 07/05/23 1500     CULTURE, BLOOD ([facility]) No Growth at 5 days    Blood Culture [[id number]]  (Normal) Collected: 06/30/23 1152    Order Status: Completed Specimen: Blood from Arm, Right Updated: 07/05/23 1500     CULTURE, BLOOD ([facility]) No Growth at 5 days    Body Fluid Culture [[id number]] Collected: 06/30/23 1757    Order Status: Completed Specimen: Abdominal Fluid from Abdomen Updated: 07/05/23 0715     Body Fluid Culture Final Report: At 5 days. No growth    Gram Stain [[id number]] Collected: 07/04/23 1623    Order Status: Completed Specimen: Body Fluid from Abdomen Updated: 07/05/23 0625     GRAM STAIN Rare WBC observed      No bacteria seen    Culture, Body Fluid (Aerobic) w/ GS [[id number]] Collected: 07/04/23 1609    Order Status: Canceled Specimen: Body Fluid from Ascites Updated: 07/04/23 1609    Gram Stain [[id number]] Collected: 06/30/23 1757    Order Status: Completed Specimen: Aspirate from Abdominal Fluid Updated: 06/30/23 2231     GRAM STAIN No WBCs, No bacteria seen              Radiology:  Imaging Results              CT Abdomen Pelvis  Without Contrast (Final result)  Result time 06/30/23 19:20:57      Final result by Raman Tiwari MD (06/30/23 19:20:57)                   Impression:      1. Trace of left pleural effusion.    2. Previous cholecystectomy.    3. Cirrhosis and ascites.    4.  Bilateral kidneys non occluding calculi.    5.  Noninflamed diverticulosis coli.      Electronically signed by: Raman Tiwari  Date:    06/30/2023  Time:    19:20               Narrative:    EXAMINATION:  CT ABDOMEN PELVIS WITHOUT CONTRAST    CLINICAL HISTORY:  Abdominal pain, acute,  nonlocalized;    TECHNIQUE:  Multidetector axial images were obtained from the  diaphragms to below symphysis pubis without the administration of IV contrast. Oral contrast was not administered.    Dose length product of 309 mGycm. Automated exposure control was utilized to minimize radiation dose.    COMPARISON:  June 4, 2023    FINDINGS:  There is trace of left dependent pleural effusion and left basilar atelectasis.    Liver is small in size consistent with cirrhosis.  There is moderate to large volume intraperitoneal free fluid consistent with ascites.  There are mesenteric inflammatory ground-glass opacities.  Within limitations of noncontrast technique, no acute findings liver, pancreas or the spleen identified.  Gallbladder is surgically absent..    The adrenal glands noncontrast evaluation is unremarkable. The kidneys are unremarkable in size and contour.  Bilateral kidneys non occluding calculi. The ureters appear normal in course and diameter without intra ureteral stone.  Calcified plaques of the aorta and iliac arteries without aneurysmal dilatation.  There are no retroperitoneal periaortic enlarged lymph nodes.    Stomach is decompressed and difficult to assess.  No abnormal dilatation of loops of small bowel.  Pericecal metallic clips suggest prior appendectomy.  There is noninflamed diverticulosis coli.  No bowel obstruction.  No pneumoperitoneum.    Urinary bladder is mostly decompressed.  No intravesical stone identified. There is no pelvic free fluid.    Chronic compression deformities of the thoracolumbar vertebrae.  Demineralization of the bones.  No acute or aggressive skeletal abnormality.                                       X-Ray Chest AP Portable (Final result)  Result time 06/30/23 12:28:05      Final result by Raj Gibbs MD (06/30/23 12:28:05)                   Impression:      No acute findings.      Electronically signed by: Raj Gibbs  Date:    06/30/2023  Time:    12:28                Narrative:    EXAMINATION:  XR CHEST AP PORTABLE    CLINICAL HISTORY:  Unspecified abdominal pain    COMPARISON:  14 June 2023    FINDINGS:  Portable frontal view of the chest was obtained. The heart is not significantly enlarged.  There are chronic changes towards the left lung base.  No new focal consolidation or pneumothorax.                                      Current Medications:     Infusions:   lactated ringers 100 mL/hr at 07/06/23 1149    octreotide (SANDOSTATIN) infusion 50 mcg/hr (07/07/23 0213)        Scheduled:   albumin human 25%  50 g Intravenous BID    cefTRIAXone (ROCEPHIN) IVPB  1 g Intravenous Q24H    heparin (porcine)  5,000 Units Subcutaneous Q12H    hydrocortisone   Topical (Top) BID    lactulose  10 g Oral Daily    LIDOcaine (PF) 10 mg/ml (1%)  10 mL Other Once    midodrine  10 mg Oral TID WM        PRN:  acetaminophen, dextrose 10%, dextrose 10%, diphenhydrAMINE, glucagon (human recombinant), glucose, glucose, lorazepam, naloxone, ondansetron, sodium chloride 0.9%    Antibiotics and Day Number of Therapy:  Antibiotics (From admission, onward)      Start     Stop Route Frequency Ordered    07/06/23 1500  cefTRIAXone (ROCEPHIN) 1 g in dextrose 5 % in water (D5W) 5 % 100 mL IVPB (MB+)         -- IV Every 24 hours (non-standard times) 07/06/23 1354              Intake/Output Summary (Last 24 hours) at 7/7/2023 0615  Last data filed at 7/7/2023 0333  Gross per 24 hour   Intake 500 ml   Output 250 ml   Net 250 ml       Lines/Drains/Airways       Peripheral Intravenous Line  Duration                  Peripheral IV - Single Lumen 06/30/23 1859 20 G Posterior;Right Wrist 6 days         Peripheral IV - Single Lumen 07/02/23 0826 20 G Anterior;Left Forearm 4 days                      Assessment & Plan:     Alcoholic Liver Cirrhosis with ascites  -On admission, had Meld 22 points, Child Steve Class C. Now meld 31 Child steve C  -At [facility], EGD was negative for varices, or portal  gastropathy.  FOBT performed in the ED was negative  -Underwent paracentesis x2, on 6/30, 7/4, studies negative for SBP   -Continue 25% albumin, 50 g b.i.d.  -Continue lactulose OD, octreotide. Increased midodrine 10 TID.  -Extensively counseled on avoidance of NSAIDs, ACE/ARB, shellfish and will need to be established in cirrhosis clinic on discharge  -Gastroenterology on board, appreciate assistance and recommnedations     CHRISTEN 2/2 intravascular volume depletion  Concern for HRS  Metabolic Acidosis- resolved  -Likely 2/2 poor PO intake prior to admission, with nausea and vomiting  -Nephrology on board,  appreciate recommendations   -Continue albumin and IV fluid resuscitation with LR at 100 cc/ hour  -Initially low concern for hepatorenal syndrome due to response to albumin/ improvement of renal indices; however, with worsening of renal function today, will continue albumin, start midodrine 5 TID and octreotide with MAP goal of 80. If no improvement by tomorrow, consider upgrade to ICU for levophed to reach MAP goals  -Monitor Is/Os     Sepsis, likely GI source SIRS 3/4 (tachycardia, tachypnea, leukocytosis)-resolved but  with persistent leukocytosis  -De-escalated cefepime and flagyl (received 6 days, stopped 7/6/23) to ceftriaxone monotherapy (start date 7/6/23) for SBP prophylaxis based on GI recommendations that leukocytosis is likely d/t alcoholic hepatitis and not indicative of active ongoing sepsis.  -Blood cultures and ascitic fluid cultures on admission and repeat on 7/4 have been negative     Hypokalemia  Hypophosphatemia  -Continue to monitor electrolytes on daily labs and replete as appropriate         History of Alcohol withdrawal with seizure history  -Lorazepam 2 mg IV q.2.h prn withdrawal symptoms  -Continue with CIWA protocol  -Reportedly stopped drinking 2 months ago, but with labs on admission concerning for alcohol hepatitis. Phosphatidylethanol level ordered and pending        CODE STATUS: Full  Code  Access: Peripheral  Antibiotics: Ceftriaxone  Diet:  low sodium   DVT Prophylaxis: Heparin 5000U BID  GI Prophylaxis: pepcid  Fluids:  LR 100cc/hour     Disposition: day 7 of admission for CHRISTEN, alcoholic liver cirrhosis.  Renal indices declining. Will continue ivf, albumin, lactulose, midodrine 10 TID and octreotide; MAP goal of 80. Consider upgrade to ICU for levophed to achieve MAPs of 80  if not improved with uptitrating midodrine.  De-escalated broad spectrum antibiotics to rocephin per GI recs.  Appreciate Nephrology and GI recommendations.        Michaela Sesay MD  [employer] [facility] PGY-2

## 2023-07-07 NOTE — PROGRESS NOTES
[facility]  Nephrology Progress Note  Patient Name: Los Alatorre  Age: 58 y.o.  : 1965  MRN: [id number]  Admission Date: 2023    Chief complaint: Abdominal Pain (Abd pain , sent from clinic)    Hospital course  Los Alatorre is a 58 y.o. White male with past medical history of alcoholic cirrhosis of the liver and seizure disorder who was admitted on 2023 with complaints of abdominal pain and distention.  Patient was febrile, had leukocytosis and transaminitis, later developed acute kidney injury as well.  Nephrology is following for assistance with management of the latter.    Subjective  No acute events overnight.    Review of Systems  Cardiovascular:  Negative for chest pain   Respiratory: Negative for shortness of breath     Objective  /67   Pulse 96   Temp 98.4 °F (36.9 °C) (Oral)   Resp 20   Ht 6' (1.829 m)   Wt 70.6 kg (155 lb 10.3 oz)   SpO2 (!) 94%   BMI 21.11 kg/m²     Intake/Output Summary (Last 24 hours) at 2023 0847  Last data filed at 2023 0333  Gross per 24 hour   Intake 500 ml   Output 250 ml   Net 250 ml       Physical Exam  General appearance: Patient is in no acute distress.  HEENT: PERRLA, EOMI, no scleral icterus, no JVD. Neck is supple.  Chest: Respirations are unlabored. Lungs sounds are diminished to auscultation.   Heart: S1, S2.   Abdomen: + ascites.  : Deferred.  Extremities: No edema, peripheral pulses are palpable.   Neuro: No focal deficits.     Medications    Current Facility-Administered Medications:     acetaminophen tablet 650 mg, 650 mg, Oral, Q6H PRN, Zeeshan Castillo MD, 650 mg at 23 0212    albumin human 25% bottle 50 g, 50 g, Intravenous, BID, Palomo Helton DO, Stopped at 23 2113    cefTRIAXone (ROCEPHIN) 1 g in dextrose 5 % in water (D5W) 5 % 100 mL IVPB (MB+), 1 g, Intravenous, Q24H, Katie Smith MD, Stopped at 23 1741    dextrose 10% bolus 125 mL 125 mL, 12.5 g, Intravenous, PRN,  Palomo Helton DO    dextrose 10% bolus 250 mL 250 mL, 25 g, Intravenous, PRN, Palomo Helton, DO    diphenhydrAMINE capsule 25 mg, 25 mg, Oral, Q6H PRN, Te Lui MD, 25 mg at 07/05/23 2024    glucagon (human recombinant) injection 1 mg, 1 mg, Intramuscular, PRN, Palomo Helton,     glucose chewable tablet 16 g, 16 g, Oral, PRN, Palomo Helton, DO    glucose chewable tablet 24 g, 24 g, Oral, PRN, Palomo Helton, DO    heparin (porcine) injection 5,000 Units, 5,000 Units, Subcutaneous, Q12H, Palomo Helton DO, 5,000 Units at 07/06/23 2010    hydrocortisone 1 % cream, , Topical (Top), BID, Te Lui MD, Given at 07/06/23 2010    lactated ringers infusion, , Intravenous, Continuous, Sharri Fisher MD, Last Rate: 100 mL/hr at 07/06/23 1149, New Bag at 07/06/23 1149    lactulose 20 gram/30 mL solution Soln 10 g, 10 g, Oral, Daily, Katie Smith MD    levETIRAcetam tablet 750 mg, 750 mg, Oral, BID, Keely Garcia MD    LIDOcaine (PF) 10 mg/ml (1%) injection 100 mg, 10 mL, Other, Once, Keely Garcia MD    LORazepam injection 2 mg, 2 mg, Intravenous, Q2H PRN, Zena Chen MD    midodrine tablet 10 mg, 10 mg, Oral, TID WM, Michaela Sesay MD    naloxone 0.4 mg/mL injection 0.02 mg, 0.02 mg, Intravenous, PRN, Palomo Helton DO    octreotide (SANDOSTATIN) 500 mcg in sodium chloride 0.9% 100 mL infusion, 50 mcg/hr, Intravenous, Continuous, Katie Smith MD, Last Rate: 10 mL/hr at 07/07/23 0213, 50 mcg/hr at 07/07/23 0213    ondansetron injection 4 mg, 4 mg, Intravenous, Q8H PRN, Palomo Helton DO, 4 mg at 07/05/23 2024    sodium chloride 0.9% flush 10 mL, 10 mL, Intravenous, Q12H PRN, Palomo Helton DO     Imaging:    Reviewed    Laboratory Data:  Hematology  Lab Results   Component Value Date    WBC 16.46 (H) 07/07/2023    HGB 7.1 (L) 07/07/2023    HCT 22.0 (L) 07/07/2023     07/07/2023     07/07/2023    K 4.0 07/07/2023    CHLORIDE 106 07/07/2023    CO2 24 07/07/2023    BUN 17.0 07/07/2023    CREATININE 2.68  (H) 07/07/2023    EGFRNORACEVR 27 07/07/2023    GLUCOSE 139 (H) 07/07/2023    CALCIUM 9.5 07/07/2023    ALKPHOS 124 07/07/2023    LABPROT 5.5 (L) 07/07/2023    ALBUMIN 4.1 07/07/2023    BILIDIR 1.9 (H) 04/29/2023    IBILI 0.10 02/16/2022    AST 19 07/07/2023    ALT 5 07/07/2023    MG 2.00 07/07/2023    PHOS 2.8 07/07/2023     Lab Results   Component Value Date    CAZQPXFE59FD 56.6 06/14/2023       Lab Results   Component Value Date    IRON 58 06/05/2023    TIBC 150 (L) 06/05/2023    TRANS 163 (L) 06/04/2023    TRANS 163 (L) 06/04/2023    LABIRON 31 06/04/2023    FERRITIN 122.2 06/05/2023    FOLATE 6.8 (L) 06/04/2023    [id number] >2,000 (H) 06/04/2023     (H) 06/05/2023       Lab Results   Component Value Date    HIV Nonreactive 06/14/2023    HEPCAB Nonreactive 06/30/2023    HEPBSURFAG Nonreactive 06/30/2023    HEPBSAB Nonreactive 07/06/2023         Impression  Acute kidney injury   Anemia   Leukocytosis   Coagulopathy  Cirrhosis of the liver  Ascites    Plan  There has been no significant change in patient's condition.  He is still relatively hypotensive, midodrine dose has been increased to 10 mg 3 times a day.  Would continue maintenance fluids as well.  Consider PRBC transfusion since hemoglobin this morning is 7.1.  Nephrology will continue to follow closely along with primary team.    Radha Perez NP  [facility]ph[facility]   7/7/2023     Addendum:  Discussed case with Dr Ingram. Agree with holding Iv fluids due to increasing oxygen requirement. Agree with increase dose midodrine and Levophed if necessary to maintain adequate perfusion.  Mary Kay Dee M.D.  Nephrology

## 2023-07-07 NOTE — PROGRESS NOTES
Inpatient Nutrition Assessment    Admit Date: 6/30/2023   Total duration of encounter: 7 days     Nutrition Recommendation/Prescription     Continue Low Na diet; Fluid restriction per MD encourage oral intake -- consider Megace to stimulate appetite  Change Boost Plus to Boost Breeze (provides 250 kcal, 9 g protein per serving) TID per pt's request  Suggest  MVI, Fe, Thiamine  Daily weight    Communication of Recommendations: reviewed with patient    Nutrition Assessment     Malnutrition Assessment/Nutrition-Focused Physical Exam    Malnutrition Context: acute illness or injury  Malnutrition Level: severe  Energy Intake (Malnutrition): less than or equal to 50% for greater than or equal to 5 days  Weight Loss (Malnutrition): greater than 7.5% in 3 months         A minimum of two characteristics is recommended for diagnosis of either severe or non-severe malnutrition.    Chart Review    Reason Seen: follow-up    Malnutrition Screening Tool Results   Have you recently lost weight without trying?: Yes: 34 lbs or more  Have you been eating poorly because of a decreased appetite?: Yes   MST Score: 5     Diagnosis:  SIRS/sepsis, etoh /cirrhosis, ascites, CHRISTEN, hx seizure    Relevant Medical History: Alcoholic Cirrhosis with ascites, CHRISTEN d/t intravascular volume depletion, Sepsis likely GI source, Hypokalemia, Hypophosphatemia    Nutrition-Related Medications: LR @ 100 ml/hr, lactulose, Octreotide, Rocephin  Calorie Containing IV Medications: no significant kcals from medications at this time    Nutrition-Related Labs:  7/5/23 -- H/H 8.2/23.9 L, K 3.4 L, BUN 16.9, Cr 2.3 H, Phos 2.1 L  7/7 -- H/H 7.1/22 L, K 4, BUN 17, Cr 2.6 H, Glu 139 H    Diet/PN Order: Diet Low Sodium, 2gm Fluid - 1500mL  Oral Supplement Order: Boost Plus  Tube Feeding Order: none  Appetite/Oral Intake: poor/25-50% of meals  Factors Affecting Nutritional Intake: abdominal distension, decreased appetite, diarrhea, and nausea  Food/Sabianism/Cultural  Preferences: none reported  Food Allergies: none reported    Skin Integrity: puncture, other (see comments) (rash lower abdomen)  Wound(s):   none reported     Comments    23 -- Pt continues with poor appetite, request ham sandwich only for lunch; taking sips of Boost Plus, willing to try Boost Breeze instead; intermittent nausea with abdominal distention; multiple loose stools -- lactulose decreased per MD; current wt elevated, ascites present -- possible need for repeat paracentesis per MD notes    23 -- Pt in & out of sleep during visit reporting not sleeping well last 2 nights; pt reports decreased appetite, denies n/v, + abdominal pain & distention s/p paracentesis on  noted with 5 L removed per MD notes; K (L) - loose stool, continues on lactulose, replacing K; Continue Boost ONS, consider Megace to stimulate appetite    () Received MST trigger wt loss/decreased appetite. Unable to speak to pt; spoke to nurse caring for pt--pt remains with decreased appetite; ate 25% of meal earlier; + abd pain/distension; some nausea; hx ETOH cirrhosis/ascites. Will order oral supplement; consider megace to stimulate appetite; Pt on lactulose; NH4 level 45. Wt fluctuates with ascites/fluid--per EMR between 120-154#; will track. Unable to complete PA at this time . Noted elevated Bun/Cr; low GFR--CHRISTEN; nephrology consulted     Anthropometrics    Height: 6' (182.9 cm) Height Method: Stated  Last Weight: 73.2 kg (161 lb 6 oz) (23 1052) Weight Method: Bed Scale  BMI (Calculated): 21.9  BMI Classification: normal (BMI 18.5-24.9)        Ideal Body Weight (IBW), Male: 178 lb     % Ideal Body Weight, Male (lb): 84.27 %                 Usual Body Weight (UBW), k.2 kg pt reports last weighing 3 months ago  % Usual Body Weight: 91.64  % Weight Change From Usual Weight: -8.55 %  Usual Weight Provided By: patient    Wt Readings from Last 5 Encounters:   23 73.2 kg (161 lb 6 oz)   23 71 kg (156 lb 8.4  oz)   23 67.2 kg (148 lb 3.2 oz)   23 72.6 kg (160 lb)   23 72.6 kg (160 lb)     Weight Change(s) Since Admission:  Admit Weight: 68 kg (150 lb) (23 1051)  23 -- 70.6 kg, bed  23 -- 73.2 kg, bed    Estimated Needs    Weight Used For Calorie Calculations: 63.7 kg (140 lb 6.9 oz)  Energy Calorie Requirements (kcal): 1911 kcal/d; 30 stuart/kg  Energy Need Method: Kcal/kg  Weight Used For Protein Calculations: 63.7 kg (140 lb 6.9 oz)  Protein Requirements: 76 gm protein/d; 1.2 gm/kg monitor protein; CHRISTEN  Fluid Requirements (mL): 1592 ml/d; 25 ml/kg; ascites  Temp (24hrs), Av.4 °F (36.9 °C), Min:98 °F (36.7 °C), Max:99.1 °F (37.3 °C)       Enteral Nutrition    Patient not receiving enteral nutrition at this time.    Parenteral Nutrition    Patient not receiving parenteral nutrition support at this time.    Evaluation of Received Nutrient Intake    Calories: not meeting estimated needs  Protein: not meeting estimated needs    Patient Education    Not applicable.    Nutrition Diagnosis     PES: Malnutrition related to acute illness as evidenced by eating < 50% nutrition intake > 5 days, >7.5% wt loss x 3 months, ascites, muscle wasting (continues)    Interventions/Goals     Intervention(s): modified composition of meals/snacks, commercial beverage, multivitamin/mineral supplement therapy, and collaboration with other providers  Goal: Meet greater than 75% of nutritional needs by follow-up. (goal not met)    Monitoring & Evaluation     Dietitian will monitor food and beverage intake, weight, electrolyte/renal panel, and gastrointestinal profile.  Nutrition Risk/Follow-Up: high (follow-up in 1-4 days)   Please consult if re-assessment needed sooner.

## 2023-07-08 NOTE — PLAN OF CARE
Problem: Adult Inpatient Plan of Care  Goal: Plan of Care Review  Outcome: Ongoing, Progressing  Flowsheets (Taken 7/8/2023 1131)  Plan of Care Reviewed With:   patient   spouse  Goal: Patient-Specific Goal (Individualized)  Outcome: Ongoing, Progressing  Goal: Absence of Hospital-Acquired Illness or Injury  Outcome: Ongoing, Progressing  Intervention: Prevent and Manage VTE (Venous Thromboembolism) Risk  Flowsheets (Taken 7/8/2023 1131)  Activity Management: Rolling - L1  VTE Prevention/Management:   bleeding risk assessed   bleeding risk factor(s) identified, provider notified  Range of Motion: active ROM (range of motion) encouraged  Goal: Optimal Comfort and Wellbeing  Outcome: Ongoing, Progressing  Intervention: Monitor Pain and Promote Comfort  Flowsheets (Taken 7/8/2023 1131)  Pain Management Interventions: pain management plan reviewed with patient/caregiver  Goal: Readiness for Transition of Care  Outcome: Ongoing, Progressing     Problem: Skin Injury Risk Increased  Goal: Skin Health and Integrity  Outcome: Ongoing, Progressing     Problem: Fluid and Electrolyte Imbalance (Acute Kidney Injury/Impairment)  Goal: Fluid and Electrolyte Balance  Outcome: Ongoing, Progressing  Intervention: Monitor and Manage Fluid and Electrolyte Balance  Flowsheets (Taken 7/8/2023 1131)  Fluid/Electrolyte Management: fluids restricted

## 2023-07-08 NOTE — PROGRESS NOTES
[facility] Medicine Wards   Progress Note     Resident Team: [facility] Medicine List 4  Attending Physician: Chico Malave MD  Hospital Length of Stay: 8 days    Subjective:      Brief HPI:  Los Alatorre is a 58 y.o. male who with a history of alcoholic cirrhosis, alcohol use disorder, and seizure disorder who presented to [facility] ED on 6/30/2023 with a primary complaint of abdominal pain. He reports poor PO intake and has felt his abdomen become more distended. Does not report any fevers or chills. Denies any melena, bright red blood in the stool, or any vomiting. Was recently transferred to [facility] earlier this month for an EGD, which ruled out varices and gastropathy. Unknown if he has been taking his home medications reliably.       In the ED patient was afebrile 100.2 F, pulse 96, respiratory rate 4, blood pressure 137/83, 87% on room air.  CBC revealed leukocytosis at 23, H and H 11.7/35.7, platelets 286.  CMP revealed mild hyponatremia 132, acidosis 17, BUN/CR 0.65/0.88.  Alkaline phosphatase elevated at 337, slight AST elevation at 56, INR 1.3.  Initial lactic acid 3.2, troponin negative. CT abdomen pelvis revealed trace left pleural effusion, cirrhosis ascites, previous cholecystectomy, bilateral kidneys with non occluding calculi, and noninflamed diverticulosis coli.  Paracentesis was performed and 5 L of fluid was drained.      Interval History: Patient seen and examined this morning. VSS. MAPs now at goal on midodrine 10 TID, with MAPs ranging . Patient with increased confusion this morning, able to state his name, but otherwise does not respond appropriately to questioning.. Labs this AM revealed worsening leukocytosis at 21,3, hgb 9.3 , further elevation in creatinine to 3.12, normal lytes. Blood culture NGTD.      Review of Systems:  Pertinent items are noted in HPI.       Objective:     Vital Signs (Most Recent):  Temp: 97.8 °F (36.6 °C) (07/08/23 1137)  Pulse: 103 (07/08/23 1137)  Resp: 20 (07/08/23 1137)  BP:  130/83 (07/08/23 1137)  SpO2: 97 % (07/08/23 1137) Vital Signs (24h Range):  Temp:  [97.8 °F (36.6 °C)-98.4 °F (36.9 °C)] 97.8 °F (36.6 °C)  Pulse:  [] 103  Resp:  [18-20] 20  SpO2:  [79 %-99 %] 97 %  BP: (108-167)/(62-90) 130/83       Physical Examination:  Physical Exam  Constitutional:       General: He is not in acute distress.     Appearance: Normal appearance. He is not toxic-appearing.   HENT:      Head: Normocephalic and atraumatic.   Cardiovascular:      Rate and Rhythm: Normal rate and regular rhythm.   Pulmonary:      Breath sounds: Normal breath sounds. No wheezing or rhonchi.   Chest:      Chest wall: No tenderness.   Abdominal:      General: Bowel sounds are normal. There is distension.      Palpations: Abdomen is soft.      Tenderness: There is no abdominal tenderness.   Musculoskeletal:         General: No tenderness or deformity. Normal range of motion.      Cervical back: Normal range of motion and neck supple. No rigidity.   Skin:     General: Skin is warm and dry.   Neurological:      General: No focal deficit present.      Comments: Confused, oriented to person only       Laboratory:  Most Recent Data:  CBC:   Recent Labs   Lab 07/07/23  0306 07/08/23  0328   WBC 16.46* 21.30*   HGB 7.1* 9.3*   HCT 22.0* 28.7*    138       CMP:   Recent Labs   Lab 07/08/23  0328   CALCIUM 9.2   ALBUMIN 4.0      K 3.4*   CO2 22   BUN 17.2   CREATININE 3.12*   ALKPHOS 139   ALT <5   AST 19   BILITOT 5.0*           Microbiology Data Reviewed: yes  Pertinent Findings:  Microbiology Results (last 7 days)       Procedure Component Value Units Date/Time    Body Fluid Culture [[id number]] Collected: 07/04/23 1623    Order Status: Completed Specimen: Abdominal Fluid from Abdomen Updated: 07/08/23 0800     Body Fluid Culture No growth at 4 days    Blood Culture [[id number]]  (Normal) Collected: 07/04/23 1550    Order Status: Completed Specimen: Blood from Arm, Right Updated: 07/07/23 1800     CULTURE,  BLOOD ([facility]) No Growth At 72 Hours    Blood Culture [[id number]]  (Normal) Collected: 07/04/23 1550    Order Status: Completed Specimen: Blood from Hand, Right Updated: 07/07/23 1800     CULTURE, BLOOD ([facility]) No Growth At 72 Hours    Blood Culture [[id number]]  (Normal) Collected: 06/30/23 1152    Order Status: Completed Specimen: Blood from Arm, Left Updated: 07/05/23 1500     CULTURE, BLOOD ([facility]) No Growth at 5 days    Blood Culture [[id number]]  (Normal) Collected: 06/30/23 1152    Order Status: Completed Specimen: Blood from Arm, Right Updated: 07/05/23 1500     CULTURE, BLOOD ([facility]) No Growth at 5 days    Body Fluid Culture [[id number]] Collected: 06/30/23 1757    Order Status: Completed Specimen: Abdominal Fluid from Abdomen Updated: 07/05/23 0715     Body Fluid Culture Final Report: At 5 days. No growth    Gram Stain [[id number]] Collected: 07/04/23 1623    Order Status: Completed Specimen: Body Fluid from Abdomen Updated: 07/05/23 0625     GRAM STAIN Rare WBC observed      No bacteria seen    Culture, Body Fluid (Aerobic) w/ GS [[id number]] Collected: 07/04/23 1609    Order Status: Canceled Specimen: Body Fluid from Ascites Updated: 07/04/23 1609              Radiology:  Imaging Results              CT Abdomen Pelvis  Without Contrast (Final result)  Result time 06/30/23 19:20:57      Final result by Raman Tiwari MD (06/30/23 19:20:57)                   Impression:      1. Trace of left pleural effusion.    2. Previous cholecystectomy.    3. Cirrhosis and ascites.    4.  Bilateral kidneys non occluding calculi.    5.  Noninflamed diverticulosis coli.      Electronically signed by: Raman Tiwari  Date:    06/30/2023  Time:    19:20               Narrative:    EXAMINATION:  CT ABDOMEN PELVIS WITHOUT CONTRAST    CLINICAL HISTORY:  Abdominal pain, acute, nonlocalized;    TECHNIQUE:  Multidetector axial images were obtained from the  diaphragms to below symphysis pubis without the administration of IV contrast. Oral  contrast was not administered.    Dose length product of 309 mGycm. Automated exposure control was utilized to minimize radiation dose.    COMPARISON:  June 4, 2023    FINDINGS:  There is trace of left dependent pleural effusion and left basilar atelectasis.    Liver is small in size consistent with cirrhosis.  There is moderate to large volume intraperitoneal free fluid consistent with ascites.  There are mesenteric inflammatory ground-glass opacities.  Within limitations of noncontrast technique, no acute findings liver, pancreas or the spleen identified.  Gallbladder is surgically absent..    The adrenal glands noncontrast evaluation is unremarkable. The kidneys are unremarkable in size and contour.  Bilateral kidneys non occluding calculi. The ureters appear normal in course and diameter without intra ureteral stone.  Calcified plaques of the aorta and iliac arteries without aneurysmal dilatation.  There are no retroperitoneal periaortic enlarged lymph nodes.    Stomach is decompressed and difficult to assess.  No abnormal dilatation of loops of small bowel.  Pericecal metallic clips suggest prior appendectomy.  There is noninflamed diverticulosis coli.  No bowel obstruction.  No pneumoperitoneum.    Urinary bladder is mostly decompressed.  No intravesical stone identified. There is no pelvic free fluid.    Chronic compression deformities of the thoracolumbar vertebrae.  Demineralization of the bones.  No acute or aggressive skeletal abnormality.                                       X-Ray Chest AP Portable (Final result)  Result time 06/30/23 12:28:05      Final result by Raj Gibbs MD (06/30/23 12:28:05)                   Impression:      No acute findings.      Electronically signed by: Raj Gibbs  Date:    06/30/2023  Time:    12:28               Narrative:    EXAMINATION:  XR CHEST AP PORTABLE    CLINICAL HISTORY:  Unspecified abdominal pain    COMPARISON:  14 June 2023    FINDINGS:  Portable frontal  view of the chest was obtained. The heart is not significantly enlarged.  There are chronic changes towards the left lung base.  No new focal consolidation or pneumothorax.                                      Current Medications:     Infusions:   lactated ringers      octreotide (SANDOSTATIN) infusion 50 mcg/hr (07/08/23 1216)        Scheduled:   cefTRIAXone (ROCEPHIN) IVPB  1 g Intravenous Q24H    heparin (porcine)  5,000 Units Subcutaneous Q12H    hydrocortisone   Topical (Top) BID    lactulose  10 g Oral Daily    levETIRAcetam  750 mg Oral BID    LIDOcaine (PF) 10 mg/ml (1%)  10 mL Other Once    midodrine  10 mg Oral TID WM    pantoprazole  40 mg Oral Daily    rifAXIMin  550 mg Oral BID    vancomycin  125 mg Oral Q6H        PRN:  sodium chloride, acetaminophen, dextrose 10%, dextrose 10%, diphenhydrAMINE, glucagon (human recombinant), glucose, glucose, lorazepam, naloxone, ondansetron, sodium chloride 0.9%    Antibiotics and Day Number of Therapy:  Antibiotics (From admission, onward)      Start     Stop Route Frequency Ordered    07/08/23 1200  rifAXIMin tablet 550 mg         -- Oral 2 times daily 07/08/23 1047    07/07/23 1200  vancomycin 125 mg/5 mL oral solution 125 mg         -- Oral Every 6 hours 07/07/23 1014    07/06/23 1500  cefTRIAXone (ROCEPHIN) 1 g in dextrose 5 % in water (D5W) 5 % 100 mL IVPB (MB+)         -- IV Every 24 hours (non-standard times) 07/06/23 1354              Intake/Output Summary (Last 24 hours) at 7/8/2023 1318  Last data filed at 7/8/2023 0800  Gross per 24 hour   Intake 560 ml   Output --   Net 560 ml         Lines/Drains/Airways       Peripheral Intravenous Line  Duration                  Peripheral IV - Single Lumen 07/02/23 0826 20 G Anterior;Left Forearm 6 days         Peripheral IV - Single Lumen 07/07/23 2100 20 G Anterior;Right Forearm <1 day                      Assessment & Plan:     Alcoholic Liver Cirrhosis with ascites  -On admission, had Meld 22 points, Child Steve  Class C. Now meld 31 Child hightower C  -At [facility], EGD was negative for varices, or portal gastropathy.  FOBT performed in the ED was negative  -Underwent paracentesis x2, on 6/30, 7/4, studies negative for SBP   -Albumin stopped as level has been normal.  -Continue lactulose OD, octreotide. Continue midodrine 10 TID.  -Extensively counseled on avoidance of NSAIDs, ACE/ARB, shellfish and will need to be established in cirrhosis clinic on discharge  -Gastroenterology on board, appreciate assistance and recommnedations     CHRISTEN initially 2/2 intravascular volume depletion, now Hepatorenal Syndrome Type 1  Metabolic Acidosis- resolved  -Presented with CHRISTEN that was likely 2/2 poor PO intake which had been improving with albumin and IVF fluid resuscitation. Starting on hospital day 5, renal function has progressively worsened even on ivf and albumin therapy, consistent with HRS type 1.  -Nephrology on board,  appreciate recommendations   -Continue IV fluid resuscitation with LR at 100 cc/ hour. Albumin stopped per GI recommendations as levels have been wnl. If albumin drops, will restart.   -Continue midodrine 10 TID and octreotide gtt. MAP goal of 85. Low threshold to upgrade to ICU for levophed titration to MAP goals.   -Monitor Is/Os     Sepsis, likely GI source SIRS 3/4 (tachycardia, tachypnea, leukocytosis)-resolved but  with persistent leukocytosis  Diarrhea with concern for C. Diff infection  -De-escalated cefepime and flagyl (received 6 days, stopped 7/6/23) to ceftriaxone monotherapy (start date 7/6/23) for SBP prophylaxis based on GI recommendations that leukocytosis is likely d/t alcoholic hepatitis and not indicative of active ongoing sepsis.  -Blood cultures and ascitic fluid cultures on admission and repeat on 7/4 have been negative.  -Currently being treated for presumed C. Diff infection with PO vancomycin (D2, Start date 7/7/23)  -Blood cultures repeated today 7/8/23 due to worsening clinical status  and leukocytosis.    Acute hypoxic respiratory failure requiring supplemental O2 by NC, likely 2/2 volume overload  -CXR done 7/7 with multifocal opacities suggestive of atypical infection  -Echo 7/7 shows EF 54% with intercardiac shunt on tech read, pending official read.  -ivf stopped yesterday d/t concern for volume overload, but restarted today for hepatorenal syndrome as above     Hypokalemia  Hypophosphatemia  -Continue to monitor electrolytes on daily labs and replete as appropriate      History of Alcohol withdrawal with seizure history  -Lorazepam 2 mg IV q.2.h prn withdrawal symptoms  -On CIWA protocol  -Reportedly stopped drinking 2 months ago, but with labs on admission concerning for alcohol hepatitis. Phosphatidylethanol level ordered and pending        CODE STATUS: Full Code  Access: Peripheral  Antibiotics: Ceftriaxone  Diet:  low sodium   DVT Prophylaxis: Heparin 5000U BID  GI Prophylaxis: pepcid  Fluids:  LR 100cc/hour     Disposition: day 8 of admission for CHRISTEN, alcoholic liver cirrhosis.  Renal indices worsening. Will continue ivf,  lactulose, midodrine 10 TID and octreotide; MAP goal of 80. Consider upgrade to ICU for levophed to achieve MAPs of 80  if not improved with uptitrating midodrine. Low threshold for upgrade to icu for closer monitoring given worsening clinical picture. Added rifaximin today for hepatic encephalopathy, bowel movements on lactulose above goal.  Continue rocephin for SBP prophylaxis, PO vancomycin for presumed C diff.  Appreciate Nephrology and GI recommendations.     Katie Smith MD  [employer] [facility] PGY-3

## 2023-07-08 NOTE — PROGRESS NOTES
[facility]  Nephrology Progress Note  Patient Name: Los Alatorre  Age: 58 y.o.  : 1965  MRN: [id number]  Admission Date: 2023    Chief complaint: Abdominal Pain (Abd pain , sent from clinic)    Hospital course  Los Alatorre is a 58 y.o. White male with past medical history of alcoholic cirrhosis of the liver and seizure disorder who was admitted on 2023 with complaints of abdominal pain and distention.  Patient was febrile, had leukocytosis and transaminitis, later developed acute kidney injury as well.  Nephrology is following for assistance with management of the latter.    Subjective  No acute events overnight.    Review of Systems  Cardiovascular:  Negative for chest pain   Respiratory: Negative for shortness of breath     Objective  BP (!) 153/74   Pulse 99   Temp 97.8 °F (36.6 °C) (Oral)   Resp 20   Ht 6' (1.829 m)   Wt 73 kg (161 lb)   SpO2 97%   BMI 21.84 kg/m²     Intake/Output Summary (Last 24 hours) at 2023 0739  Last data filed at 2023 1801  Gross per 24 hour   Intake 620 ml   Output --   Net 620 ml       Physical Exam  General appearance: Patient is in no acute distress.  HEENT: PERRLA, EOMI, no scleral icterus, no JVD. Neck is supple.  Chest: Respirations are unlabored. Lungs sounds are diminished to auscultation.   Heart: S1, S2.   Abdomen: + ascites.  : Deferred.  Extremities: No edema, peripheral pulses are palpable.   Neuro: No focal deficits.     Medications    Current Facility-Administered Medications:     0.9%  NaCl infusion (for blood administration), , Intravenous, Q24H PRN, Michaela Sesay MD    acetaminophen tablet 650 mg, 650 mg, Oral, Q6H PRN, Zeeshan Castillo MD, 650 mg at 23 1225    cefTRIAXone (ROCEPHIN) 1 g in dextrose 5 % in water (D5W) 5 % 100 mL IVPB (MB+), 1 g, Intravenous, Q24H, Katie Smith MD, Stopped at 23 1459    dextrose 10% bolus 125 mL 125 mL, 12.5 g, Intravenous, PRN, Palomo Helton DO     dextrose 10% bolus 250 mL 250 mL, 25 g, Intravenous, PRN, Palomo Helton, DO    diphenhydrAMINE capsule 25 mg, 25 mg, Oral, Q6H PRN, Te Lui MD, 25 mg at 07/05/23 2024    glucagon (human recombinant) injection 1 mg, 1 mg, Intramuscular, PRN, Palomo Helton,     glucose chewable tablet 16 g, 16 g, Oral, PRN, Palomo Helton, DO    glucose chewable tablet 24 g, 24 g, Oral, PRN, Palomo Helton, DO    heparin (porcine) injection 5,000 Units, 5,000 Units, Subcutaneous, Q12H, Palomo Helton DO, 5,000 Units at 07/07/23 2008    hydrocortisone 1 % cream, , Topical (Top), BID, Te Lui MD, Given at 07/07/23 2008    lactulose 20 gram/30 mL solution Soln 10 g, 10 g, Oral, Daily, Katie Smith MD, 10 g at 07/07/23 0850    levETIRAcetam tablet 750 mg, 750 mg, Oral, BID, Keely Garcia MD, 750 mg at 07/07/23 2008    LIDOcaine (PF) 10 mg/ml (1%) injection 100 mg, 10 mL, Other, Once, Keely Garcia MD    LORazepam injection 2 mg, 2 mg, Intravenous, Q2H PRN, Zena Chen MD, 2 mg at 07/08/23 0303    midodrine tablet 10 mg, 10 mg, Oral, TID WM, Michaela Sesay MD, 10 mg at 07/07/23 1730    naloxone 0.4 mg/mL injection 0.02 mg, 0.02 mg, Intravenous, PRN, Palomo Helton DO    octreotide (SANDOSTATIN) 500 mcg in sodium chloride 0.9% 100 mL infusion, 50 mcg/hr, Intravenous, Continuous, Katie Smith MD, Last Rate: 10 mL/hr at 07/08/23 0201, 50 mcg/hr at 07/08/23 0201    ondansetron injection 4 mg, 4 mg, Intravenous, Q8H PRN, Palomo Helton DO, 4 mg at 07/05/23 2024    pantoprazole EC tablet 40 mg, 40 mg, Oral, Daily, Chico Malave MD, 40 mg at 07/07/23 1105    potassium chloride SA CR tablet 40 mEq, 40 mEq, Oral, Once, Katie Smith MD    sodium chloride 0.9% flush 10 mL, 10 mL, Intravenous, Q12H PRN, Palomo Helton DO    vancomycin 125 mg/5 mL oral solution 125 mg, 125 mg, Oral, Q6H, Chico Malave MD, 125 mg at 07/08/23 0715     Imaging:    Reviewed    Laboratory Data:  Hematology  Lab Results   Component Value Date    WBC  21.30 (H) 07/08/2023    HGB 9.3 (L) 07/08/2023    HCT 28.7 (L) 07/08/2023     07/08/2023     07/08/2023    K 3.4 (L) 07/08/2023    CHLORIDE 108 (H) 07/08/2023    CO2 22 07/08/2023    BUN 17.2 07/08/2023    CREATININE 3.12 (H) 07/08/2023    EGFRNORACEVR 22 07/08/2023    GLUCOSE 129 (H) 07/08/2023    CALCIUM 9.2 07/08/2023    ALKPHOS 139 07/08/2023    LABPROT 5.7 (L) 07/08/2023    ALBUMIN 4.0 07/08/2023    BILIDIR 2.8 (H) 07/07/2023    IBILI 0.10 02/16/2022    AST 19 07/08/2023    ALT <5 07/08/2023    MG 1.90 07/08/2023    PHOS 3.1 07/08/2023     Lab Results   Component Value Date    UDRPKCJA70JV 56.6 06/14/2023       Lab Results   Component Value Date    IRON 58 06/05/2023    TIBC 150 (L) 06/05/2023    TRANS 163 (L) 06/04/2023    TRANS 163 (L) 06/04/2023    LABIRON 31 06/04/2023    FERRITIN 122.2 06/05/2023    FOLATE 6.8 (L) 06/04/2023    [id number] >2,000 (H) 06/04/2023    HAPTO 55 07/07/2023     07/07/2023       Lab Results   Component Value Date    HIV Nonreactive 06/14/2023    HEPCAB Nonreactive 06/30/2023    HEPBSURFAG Nonreactive 06/30/2023    HEPBSAB Nonreactive 07/06/2023         Impression  Acute kidney injury   Anemia   Leukocytosis   Coagulopathy  Cirrhosis of the liver  Ascites    Plan  Renal indices continue to worsen, however, there are no acute indications for hemodialysis at present. RRT may become a consideration if kidney function continues to worsen or if patient develops complications of CHRISTEN (hyperkalemia, refractory acidosis, refractory volume overload, uremia).    Worsening leukocytosis is also of concern, recommend further evaluation for potential causes.  Hemoglobin and hematocrit have improved appropriately post transfusion.  Continue supportive care.  Will defer decision to initiate maintenance IV fluids to primary team.    Radha Perez NP  [facility]   7/8/2023     Addendum:  Agree with midodrine. IV fluids held yesterday due to increasing oxygen requirement.  Agree with levophed if midodrine insufficient to maintain bp/perfusion.  Mary Kay Dee M.D.  Nephrology

## 2023-07-09 PROBLEM — K76.82 HEPATIC ENCEPHALOPATHY: Status: ACTIVE | Noted: 2023-01-01

## 2023-07-09 NOTE — PROGRESS NOTES
[facility]  Nephrology Progress Note    Patient Name: Los Alatorre  Age: 58 y.o.  : 1965  MRN: [id number]  Admission Date: 2023    Chief complaint: Abdominal Pain (Abd pain , sent from clinic)    Hospital course  Los Alatorre is a 58 y.o. White male with past medical history of alcoholic cirrhosis of the liver and seizure disorder who was admitted on 2023 with complaints of abdominal pain and distention.  Patient was febrile, had leukocytosis and transaminitis, later developed acute kidney injury as well.  Nephrology is following for assistance with management of the latter.    Subjective  Patient decompensated overnight, became tachycardic and hypoxemic, placed on NIPPV.  He is oliguric, bladder scan was not able to be obtained secondary to significant ascites.    Review of Systems  Cardiovascular:  Negative for chest pain   Respiratory: + SOB  GI: + abdominal distention    Objective  /78   Pulse (!) 117   Temp 98.1 °F (36.7 °C) (Oral)   Resp (!) 25   Ht 6' (1.829 m)   Wt 73 kg (161 lb)   SpO2 96%   BMI 21.84 kg/m²     Intake/Output Summary (Last 24 hours) at 2023 0812  Last data filed at 2023 1840  Gross per 24 hour   Intake 806 ml   Output --   Net 806 ml       Physical Exam  General appearance: Patient is on NIPPV  HEENT: PERRLA, EOMI, no scleral icterus, no JVD. Neck is supple.  Chest: Patient is on NIPPV, he is mildly tachypneic,   + coarse crackles bilaterally  Heart: S1, S2.   Abdomen: + tense ascites.  : Deferred.  Extremities: No edema, peripheral pulses are palpable.   Neuro:  Slightly lethargic but easily arousable    Medications    Current Facility-Administered Medications:     0.9%  NaCl infusion (for blood administration), , Intravenous, Q24H PRN, Michaela Sesay MD    acetaminophen tablet 650 mg, 650 mg, Oral, Q6H PRN, Zeeshan Castillo MD, 650 mg at 23 1225    dextrose 10% bolus 125 mL 125 mL, 12.5 g, Intravenous, PRN, Palomo  Danie,     dextrose 10% bolus 250 mL 250 mL, 25 g, Intravenous, PRN, Palomo Helton DO    diphenhydrAMINE capsule 25 mg, 25 mg, Oral, Q6H PRN, Te Lui MD, 25 mg at 07/05/23 2024    glucagon (human recombinant) injection 1 mg, 1 mg, Intramuscular, PRN, Palomo Helton,     glucose chewable tablet 16 g, 16 g, Oral, PRN, Palomo Helton,     glucose chewable tablet 24 g, 24 g, Oral, PRN, Palomo Helton DO    heparin (porcine) injection 5,000 Units, 5,000 Units, Subcutaneous, Q12H, Palomo Helton DO, 5,000 Units at 07/08/23 2112    hydrocortisone 1 % cream, , Topical (Top), BID, Te Lui MD, Given at 07/08/23 2112    lactulose 20 gram/30 mL solution Soln 10 g, 10 g, Oral, Daily, Katie Smith MD, 10 g at 07/08/23 0838    levETIRAcetam tablet 750 mg, 750 mg, Oral, BID, Keely Garcia MD, 750 mg at 07/08/23 2112    [START ON 7/10/2023] levoFLOXacin 500 mg/100 mL IVPB 500 mg, 500 mg, Intravenous, Q48H, Zeeshan Castillo MD    LIDOcaine (PF) 10 mg/ml (1%) injection 100 mg, 10 mL, Other, Once, Keely Garcia MD    LORazepam injection 2 mg, 2 mg, Intravenous, Q2H PRN, Zena Chen MD, 2 mg at 07/08/23 1834    midodrine tablet 10 mg, 10 mg, Oral, TID WM, Michaela Sesay MD, 10 mg at 07/09/23 0712    naloxone 0.4 mg/mL injection 0.02 mg, 0.02 mg, Intravenous, PRN, Palomo Helton DO    octreotide (SANDOSTATIN) 500 mcg in sodium chloride 0.9% 100 mL infusion, 50 mcg/hr, Intravenous, Continuous, Katie Smith MD, Last Rate: 10 mL/hr at 07/09/23 0621, 50 mcg/hr at 07/09/23 0621    ondansetron injection 4 mg, 4 mg, Intravenous, Q8H PRN, Palomo Helton DO, 4 mg at 07/05/23 2024    pantoprazole EC tablet 40 mg, 40 mg, Oral, Daily, Chico Malave MD, 40 mg at 07/08/23 0838    piperacillin-tazobactam (ZOSYN) 4.5 g in dextrose 5 % in water (D5W) 5 % 100 mL IVPB (MB+), 4.5 g, Intravenous, Q12H, Zeeshan Castillo MD, Stopped at 07/09/23 0449    rifAXIMin tablet 550 mg, 550 mg, Oral, BID, Katie Smith MD, 550 mg at 07/08/23 2112     sodium chloride 0.9% flush 10 mL, 10 mL, Intravenous, Q12H PRN, Palomo Helton DO    vancomycin 125 mg/5 mL oral solution 125 mg, 125 mg, Oral, Q6H, Chico Malave MD, 125 mg at 07/09/23 0603    [START ON 7/10/2023] vancomycin 750 mg in dextrose 5 % (D5W) 250 mL IVPB, 750 mg, Intravenous, Q24H, Zeeshan Castillo MD     Imaging:    Reviewed    Laboratory Data:  Hematology  Lab Results   Component Value Date    WBC 22.59 (H) 07/09/2023    HGB 9.0 (L) 07/09/2023    HCT 28.0 (L) 07/09/2023     07/09/2023     07/09/2023    K 3.8 07/09/2023    CHLORIDE 108 (H) 07/09/2023    CO2 21 (L) 07/09/2023    BUN 20.3 07/09/2023    CREATININE 3.16 (H) 07/09/2023    EGFRNORACEVR 22 07/09/2023    GLUCOSE 104 (H) 07/09/2023    CALCIUM 9.5 07/09/2023    ALKPHOS 145 07/09/2023    LABPROT 5.8 (L) 07/09/2023    ALBUMIN 3.8 07/09/2023    BILIDIR 2.8 (H) 07/07/2023    IBILI 0.10 02/16/2022    AST 25 07/09/2023    ALT <5 07/09/2023    MG 1.80 07/09/2023    PHOS 3.6 07/09/2023     Lab Results   Component Value Date    VOHLUHOS88MU 56.6 06/14/2023       Lab Results   Component Value Date    IRON 58 06/05/2023    TIBC 150 (L) 06/05/2023    TRANS 163 (L) 06/04/2023    TRANS 163 (L) 06/04/2023    LABIRON 31 06/04/2023    FERRITIN 122.2 06/05/2023    FOLATE 6.8 (L) 06/04/2023    [id number] >2,000 (H) 06/04/2023    HAPTO 55 07/07/2023     07/07/2023       Lab Results   Component Value Date    HIV Nonreactive 06/14/2023    HEPCAB Nonreactive 06/30/2023    HEPBSURFAG Nonreactive 06/30/2023    HEPBSAB Nonreactive 07/06/2023         Impression  Acute kidney injury   Anemia   Leukocytosis   Coagulopathy  Cirrhosis of the liver  Ascites    Plan  No significant change in serum creatinine since yesterday, however, clinical status has worsened overall, patient is displaying signs of volume overload and appears to be oliguric.  Will place Kang catheter to better quantify urinary output.  Recommend discontinuing IV fluids, since systolic blood  pressure has been consistently greater than 110 mmHg, it would be reasonable to attempt diuresis (recommend bumetanide 2 mg x 1 dose).  Condition is guarded at best.  Patient is at high risk for further decompensation.     Radha Perez NP  [facility] Nephrology   7/9/2023      Addendum:  Agree with transfer to ICU. Case discussed with Dr Ingram. Agree with albumin and 1 Bumex or Lasix dose in an attempt to mobilize third spaced volume. Patient seen and examined by me much earlier this morning on rounds and case discussed in detail. Agree with plan of care.    Mary Kay Dee M.D.  Nephrology

## 2023-07-09 NOTE — NURSING
Attempted to administer oral medications with MD at bedside. Pt unable to move pills around mouth and swallow water. Pt not appropriate for oral medications.

## 2023-07-09 NOTE — PLAN OF CARE
Problem: Adult Inpatient Plan of Care  Goal: Plan of Care Review  Outcome: Ongoing, Progressing  Goal: Patient-Specific Goal (Individualized)  Outcome: Ongoing, Progressing  Goal: Absence of Hospital-Acquired Illness or Injury  Outcome: Ongoing, Progressing  Goal: Optimal Comfort and Wellbeing  Outcome: Ongoing, Progressing  Goal: Readiness for Transition of Care  Outcome: Ongoing, Progressing     Problem: Skin Injury Risk Increased  Goal: Skin Health and Integrity  Outcome: Ongoing, Progressing     Problem: Fluid and Electrolyte Imbalance (Acute Kidney Injury/Impairment)  Goal: Fluid and Electrolyte Balance  Outcome: Ongoing, Progressing     Problem: Oral Intake Inadequate (Acute Kidney Injury/Impairment)  Goal: Optimal Nutrition Intake  Outcome: Ongoing, Progressing     Problem: Renal Function Impairment (Acute Kidney Injury/Impairment)  Goal: Effective Renal Function  Outcome: Ongoing, Progressing     Problem: Fall Injury Risk  Goal: Absence of Fall and Fall-Related Injury  Outcome: Ongoing, Progressing     Problem: Fall Injury Risk  Goal: Absence of Fall and Fall-Related Injury  Outcome: Ongoing, Progressing     Problem: Restraint, Nonbehavioral (Nonviolent)  Goal: Absence of Harm or Injury  Outcome: Ongoing, Progressing

## 2023-07-09 NOTE — H&P
[facility] Tele[facility]  Pulmonary Critical Care Note    Patient Name: Los Alatorre  MRN: [id number]  Admission Date: 6/30/2023  Hospital Length of Stay: 9 days  Code Status: Full Code  Attending Provider: Chico Malave MD  Primary Care Provider: DECLAN Small     Subjective:     HPI:   Los Alatorre is a 58 y.o. male who with a history of alcoholic cirrhosis, alcohol use disorder, and seizure disorder who presented to [facility] ED on 6/30/2023 with a primary complaint of abdominal pain. He reports poor PO intake and has felt his abdomen become more distended. Does not report any fevers or chills. Denies any melena, bright red blood in the stool, or any vomiting. Was recently transferred to [facility] earlier this month for an EGD, which ruled out varices and gastropathy. Unknown if he has been taking his home medications reliably.       In the ED patient was afebrile 100.2 F, pulse 96, respiratory rate 4, blood pressure 137/83, 87% on room air.  CBC revealed leukocytosis at 23, H and H 11.7/35.7, platelets 286.  CMP revealed mild hyponatremia 132, acidosis 17, BUN/CR 0.65/0.88.  Alkaline phosphatase elevated at 337, slight AST elevation at 56, INR 1.3.  Initial lactic acid 3.2, troponin negative. CT abdomen pelvis revealed trace left pleural effusion, cirrhosis ascites, previous cholecystectomy, bilateral kidneys with non occluding calculi, and noninflamed diverticulosis coli.  Paracentesis was performed and 5 L of fluid was drained.      Hospital Course/Significant events:  Patient admitted for alcoholic liver cirrhosis with ascites and CHRISTEN that was initialyl due to intravascular volume depletion with appropriate response to fluids and albumin, but later developed hepatorenal syndrome, with worsening renal indices despite appropriate medical therapy. From hospital day 7-9, patient's mentation and renal indices have been worsening, and overnight hospital day 8, developed worsening respiratory  status requiring BiPAP (see oncall event note 7/8), prompting upgrade to ICU for close monitoring    24 Hour Interval History:      Past Medical History:   Diagnosis Date    Seizures        Past Surgical History:   Procedure Laterality Date    APPENDECTOMY      CHOLECYSTECTOMY         Social History     Socioeconomic History    Marital status:     Number of children: 1   Occupational History    Occupation: Employed   Tobacco Use    Smoking status: Every Day     Packs/day: 1.50     Types: Cigarettes    Smokeless tobacco: Never   Substance and Sexual Activity    Alcohol use: Not Currently     Comment: no alcohol x 2 month    Drug use: Not Currently     Types: Marijuana     Comment: No Marijuana x 2 month     Social Determinants of Health     Financial Resource Strain: High Risk    Difficulty of Paying Living Expenses: Very hard   Food Insecurity: Food Insecurity Present    Worried About Running Out of Food in the Last Year: Sometimes true    Ran Out of Food in the Last Year: Sometimes true   Transportation Needs: No Transportation Needs    Lack of Transportation (Medical): No    Lack of Transportation (Non-Medical): No   Physical Activity: Inactive    Days of Exercise per Week: 0 days    Minutes of Exercise per Session: 0 min   Social Connections: Moderately Isolated    Frequency of Communication with Friends and Family: Three times a week    Frequency of Social Gatherings with Friends and Family: More than three times a week    Attends Evangelical Services: Never    Active Member of Clubs or Organizations: No    Attends Club or Organization Meetings: Never    Marital Status: Living with partner   Housing Stability: High Risk    Unable to Pay for Housing in the Last Year: Yes    Number of Places Lived in the Last Year: 1    Unstable Housing in the Last Year: No           Current Outpatient Medications   Medication Instructions    LACTULOSE ORAL Oral, 2 times daily    levETIRAcetam (KEPPRA) 750 mg, Oral, 2 times  daily    polyethylene glycol (GLYCOLAX) 17 g, Oral, Daily PRN       Current Inpatient Medications   albumin human 5%  25 g Intravenous BID    bumetanide  2 mg Intravenous Once    heparin (porcine)  5,000 Units Subcutaneous Q12H    hydrocortisone   Topical (Top) BID    lactulose  10 g Oral Daily    levETIRAcetam  750 mg Oral BID    LIDOcaine (PF) 10 mg/ml (1%)  10 mL Other Once    midodrine  10 mg Oral TID WM    pantoprazole  40 mg Oral Daily    piperacillin-tazobactam (Zosyn) IV (PEDS and ADULTS) (extended infusion is not appropriate)  4.5 g Intravenous Q12H    rifAXIMin  550 mg Oral BID    vancomycin  125 mg Oral Q6H    [START ON 7/10/2023] vancomycin (VANCOCIN) IV (PEDS and ADULTS)  750 mg Intravenous Q24H       Current Intravenous Infusions   octreotide (SANDOSTATIN) infusion 50 mcg/hr (07/09/23 0621)         Review of Systems   Unable to perform ROS: Mental status change      Objective:       Intake/Output Summary (Last 24 hours) at 7/9/2023 1025  Last data filed at 7/8/2023 1840  Gross per 24 hour   Intake 806 ml   Output --   Net 806 ml         Vital Signs (Most Recent):  Temp: 98.1 °F (36.7 °C) (07/09/23 0728)  Pulse: (!) 117 (07/09/23 0728)  Resp: (!) 25 (07/08/23 1951)  BP: 123/78 (07/09/23 0728)  SpO2: 96 % (07/09/23 0728)  Body mass index is 21.84 kg/m².  Weight: 73 kg (161 lb) Vital Signs (24h Range):  Temp:  [97.4 °F (36.3 °C)-98.1 °F (36.7 °C)] 98.1 °F (36.7 °C)  Pulse:  [] 117  Resp:  [20-25] 25  SpO2:  [92 %-97 %] 96 %  BP: (114-138)/(78-88) 123/78     Physical Exam  Constitutional:       General: He is not in acute distress.     Appearance: Normal appearance. He is ill-appearing  HENT:      Head: Normocephalic and atraumatic.   Cardiovascular:      Rate and Rhythm: Normal rate and regular rhythm.   Pulmonary:      Breath sounds: Normal breath sounds. No wheezing or rhonchi.   Chest:      Chest wall: No tenderness.   Abdominal:      General: Bowel sounds are normal. There is distension.       Palpations: Abdomen is soft.      Tenderness: There is no abdominal tenderness.   Musculoskeletal:         General: No tenderness or deformity. Normal range of motion.      Cervical back: Normal range of motion and neck supple. No rigidity.   Skin:     General: Skin is warm and dry.   Neurological:      General: No focal deficit present.      Comments: Confused, oriented to person and place, but not time    Lines/Drains/Airways       Drain  Duration                  Urethral Catheter 07/09/23 0845 16 Fr. <1 day              Peripheral Intravenous Line  Duration                  Peripheral IV - Single Lumen 07/02/23 0826 20 G Anterior;Left Forearm 7 days         Peripheral IV - Single Lumen 07/07/23 2100 20 G Anterior;Right Forearm 1 day                    Significant Labs:    Lab Results   Component Value Date    WBC 22.59 (H) 07/09/2023    HGB 9.0 (L) 07/09/2023    HCT 28.0 (L) 07/09/2023    MCV 98.6 (H) 07/09/2023     07/09/2023         BMP  Lab Results   Component Value Date     07/09/2023    K 3.8 07/09/2023     06/08/2023    CO2 21 (L) 07/09/2023    BUN 20.3 07/09/2023    CREATININE 3.16 (H) 07/09/2023    CALCIUM 9.5 07/09/2023    ANIONGAP 9 06/08/2023    ESTGFRAFRICA 105 06/08/2023    EGFRNONAA 33 06/17/2022       ABG  Recent Labs   Lab 07/09/23  0555   PH 7.400   PO2 82.0   HCO3 25.4       Mechanical Ventilation Support:  Oxygen Concentration (%): 50 (07/09/23 0728)    Significant Imaging:  I have reviewed the pertinent imaging within the past 24 hours.        Assessment/Plan:     Assessment  Decompensated Alcoholic Liver Cirrhosis with ascites, hepatic encephalopathy, and hepatorenal syndrome  Acute hypoxic hypercapnic respiratory failure 2/2 volume overload vs hospital acquired pneumonia  Concern for C diff infection  Normocytic Anemia  History of Alcohol Withdrawal with seizures  History of seizure disorder    Plan  Admitted to ICU for close monitoring given clinical decompensation and  acute worsening of respiratory status over the past 24 hours  -Continue lactulose OD, rifaximine 550 BID, octreotide drip and midodrine 10 TID. MAP goal of 85 for HRS. MAPs have been stable on current regimen. Start levophed if needed to maintain MAP goals  -Nephrology on board, ivf stopped due to concerns for volume overload. Restarted on albumin 25 BID and will trial one time dose of IV bumex today. Monitor I's and O's, dela cruz in place.  -Continue BiPAP to main O2 sats >92%. Wean as tolerated. Repeat ABG in AM.  -Has been on antibiotic regimens since admission. Initiated on Vancomycin, Zosyn and levaquin overnight due to concern for HAP. Levaquin stopped today, consider stopping vanc tomorrow pending negative MRSA PCR. Blood cultures repeated on 7/8/23 pending (prior culture data since admission has been negative)  -Currently being treated for presumed C diff infection with PO vancomycin (d3 start date 7/7/23)  -Monitor electrolytes and rpelete as appropriate  -On CIWA protocol for alcohol withdrawal  -Continue home keppra for seizure disorder    DVT Prophylaxis: heparin 5000 BID  GI Prophylaxis: pantoprazole     Greater than 30 minutes of critical care was time spent personally by me on the following activities: development of treatment plan with patient or surrogate and bedside caregivers, discussions with consultants, evaluation of patient's response to treatment, examination of patient, ordering and performing treatments and interventions, ordering and review of laboratory studies, ordering and review of radiographic studies, pulse oximetry, re-evaluation of patient's condition.  This critical care time did not overlap with that of any other provider or involve time for any procedures.     Katie Smith MD  [facility], PGY-3  Pulmonary Critical Care Medicine  [facility] Telemetry

## 2023-07-09 NOTE — SIGNIFICANT EVENT
Nurse called at 9:38p to report that the patient had worsening respiratory status and tachycardia. Dr. Perez and I examined the patient and noted that he was tachycardic, hypoxic, but AO x 3.  HR was 120s. BP was 127/84. O2 sats were 70-80% on 2L NC). On physical exam, there were crackles in bilateral lungs, worse on the left. Clear S1 and S2. 2+ dp pulses. Abdomen was distended but non tender.     Attempted to use POCUS for bladder evaluation, but were unable to appreciate it 2/2 ascites. Lung POCUS was suspicious for left-sided infiltrates. RT was called and NT suction yielded approx 5cc of yellow sputum. CXR was ordered and showed bilateral lower lobe infiltrates. CBC and CMP were ordered. Patient remained tachypneic after suction, and was placed on BIPAP (10/5 at 50%).     Continuing to monitor patient closely. Considering ICU transfer if resp status worsens. Patient started on Vanc and Zosyn for pneumonia and sepsis.    Considered fluid overload based off elevated BNP and CXR findings. However given HRS, sepsis, and pneumonia concern we will hold off on any diuretics.

## 2023-07-09 NOTE — PT/OT/SLP PROGRESS
Physical Therapy    Missed Treatment Session    Patient Name:  Los Alatorre   MRN:  [id number]      Patient not seen at this time secondary to Nurse/ JOSE RALU hold. Pt's condition is deteriorating. Nurse states they may move pt to ICU.    Will follow-up as patient is available to participate and as therapists' schedule allows.

## 2023-07-10 NOTE — PT/OT/SLP PROGRESS
Physical Therapy    Missed Treatment Session    Patient Name:  Los Alatorre   MRN:  [id number]      -patient not seen at this time secondary to transfer to ICU  -await clearance (patient upgraded to ICU 1141-07/09/2023  -current PT Orders discontinued  -PT SERVICES will need 'new' orders to resume/continue therapy

## 2023-07-10 NOTE — PROGRESS NOTES
[facility] GI Progress Note    Patient Name: Los Alatorre  MRN: [id number]  Admission Date: 6/30/2023  Hospital Length of Stay: 10 days  Code Status: Full Code  Attending Provider: Kip Avalos MD  Primary Care Provider: DECLAN Small     Subjective:      Brief HPI:  Los Alatorre is a 58 y.o. male who  has a past medical history of Seizures.  He presented to [facility] on 6/30/2023  with a primary complaint of abdominal pain, and distension.  Patient says that when he was discharged from our [place] from [place] that he was compliant with all his medications and that he was having adequate amount of bowel movements.  Patient was discharged on rifaximin 550 b.i.d., lactulose 20 mg b.i.d..  However he said that he has little bowel movements however they were in quantity.  Patient says that he felt good for proximally 1 week however he cannot work anymore.  He said that his abdomen got so distended that he had decreased p.o. intake and had pain.  Of note patient says that he has stopped drinking for past 2 months.  He says that he was a heavy drinker since he was 19 years old.  He says that he drinks for the past 1-2 years approximately a half to 3/4 of a 5th of vodka.  Patient says that he has been taking ibuprofen for his restless legs for quite a time.  He says that he has numbness in his bilateral lower extremities when he is sleep at night.  He says he did not try put in over side of bed.  He also denies any limb claudication or numbness or tingling all ambulation.  Patient follows up with [facility].  Patient says he is currently having 4 bowel movements per day even when he was de-escalated to 10 mg b.i.d..  He is alert and oriented x3.      Interval History:  Patient's oxygenation status worsened over the weekend, patient was upgraded to the ICU.  Currently patient is encephalopathic unable to answer any questions at me.  Patient is restrained.  On BiPAP current  settings are 12/6 % FiO2.  Patient had 675 cc of urine output.  Patient's map is 90 which is within goal of his blood pressure targets.  Patient is afebrile.  Patient remains on octreotide drip  Review of Systems:  The remainder of the 14 system ROS is noncontributory or negative unless mentioned/reviewed above.     Objective:     Vital Signs:  Vital Signs (Most Recent):  Temp: 97.3 °F (36.3 °C) (07/10/23 0715)  Pulse: 107 (07/10/23 0800)  Resp: (!) 22 (07/10/23 0800)  BP: 112/79 (07/10/23 0800)  SpO2: (!) 92 % (07/10/23 0800)  Body mass index is 21.84 kg/m².  Weight: 73 kg (161 lb) Vital Signs (24h Range):  Temp:  [97.3 °F (36.3 °C)-98.6 °F (37 °C)] 97.3 °F (36.3 °C)  Pulse:  [] 107  Resp:  [19-29] 22  SpO2:  [92 %-100 %] 92 %  BP: (111-134)/(79-98) 112/79       Input/output:     Intake/Output Summary (Last 24 hours) at 7/10/2023 0914  Last data filed at 7/10/2023 0500  Gross per 24 hour   Intake 700 ml   Output 675 ml   Net 25 ml       Physical Examination:  General:  On 12/06 BiPAP 50% FiO2.  Patient is encephalopathic at this time.  Head: Normocephalic,   Eyes: PERRL, EOMI, icteric sclera  Throat: No posterior pharyngeal erythema or exudate, no tonsillar exudate  Neck: supple, normal ROM, no JVD  CVS:  RRR, S1 and S2 normal, no murmurs, no added heart sounds, rubs, gallops, regular peripheral pulses, and no peripheral edema  Resp:  Lungs distant with inspiratory crackles and expiratory wheezing with prolonged expiratory phase  GI:  Abdomen soft, non-tender, +distended, normoactive bowel sounds  MSK:  Full range of motion, no obvious deformities   Skin:  No rashes, ulcers, erythema  Neuro:  Patient is encephalopathic   Psych:  Patient is not alert oriented x3      Lines/Drains/Airways       None                    Laboratory:    Recent Labs   Lab 07/08/23  2203 07/09/23  0433 07/10/23  0405   WBC 25.43* 22.59* 21.80*   RBC 2.98* 2.84* 2.82*   HGB 9.5* 9.0* 9.0*   HCT 30.0* 28.0* 27.1*   .7* 98.6*  96.1*   MCH 31.9* 31.7* 31.9*   MCHC 31.7* 32.1* 33.2   RDW 17.9* 17.7* 17.4*    130 113*   MPV 11.4* 11.5* 11.0*      Recent Labs   Lab 07/08/23  2202 07/08/23  2240 07/09/23  0433 07/09/23  0555 07/10/23  0405 07/10/23  0427   *  --  143  --  146*  --    K 4.3  --  3.8  --  3.8  --    CHLORIDE 110*  --  108*  --  109*  --    CO2 23  --  21*  --  22  --    BUN 18.9  --  20.3  --  23.1  --    CREATININE 3.16*  --  3.16*  --  3.32*  --    CALCIUM 9.4  --  9.5  --  9.7  --    PH  --  7.250*  --  7.400  --  7.430   MG 1.90  --  1.80  --  1.60  --    ALBUMIN 4.0  --  3.8  --  3.9  --    ALKPHOS 148  --  145  --  132  --    ALT 5  --  <5  --  5  --    AST 32  --  25  --  28  --    BILITOT 4.2*  --  3.8*  --  4.8*  --         Other Results:    Current Medications:     Infusions:   octreotide (SANDOSTATIN) infusion 50 mcg/hr (07/10/23 0326)         Scheduled:   albumin human 5%  25 g Intravenous BID    heparin (porcine)  5,000 Units Subcutaneous Q12H    hydrocortisone   Topical (Top) BID    lactulose  20 g Per NG tube TID    levETIRAcetam  750 mg Oral BID    LIDOcaine (PF) 10 mg/ml (1%)  10 mL Other Once    midodrine  10 mg Oral TID WM    mupirocin   Nasal BID    pantoprazole  40 mg Oral Daily    piperacillin-tazobactam (Zosyn) IV (PEDS and ADULTS) (extended infusion is not appropriate)  4.5 g Intravenous Q12H    rifAXIMin  550 mg Oral BID    vancomycin (VANCOCIN) IV (PEDS and ADULTS)  500 mg Intravenous Q24H    vancomycin  125 mg Oral Q6H         PRN:   sodium chloride    acetaminophen    dextrose 10%    dextrose 10%    diphenhydrAMINE    glucagon (human recombinant)    glucose    glucose    naloxone    ondansetron    sodium chloride 0.9%    sodium chloride 0.9%        Microbiology Data:  Microbiology Results (last 7 days)       Procedure Component Value Units Date/Time    Blood Culture [[id number]]  (Normal) Collected: 07/08/23 1453    Order Status: Completed Specimen: Blood from Hand, Left Updated: 07/09/23  1900     CULTURE, BLOOD ([facility]) No Growth At 24 Hours    Blood Culture [[id number]]  (Normal) Collected: 07/08/23 1453    Order Status: Completed Specimen: Blood from Hand, Right Updated: 07/09/23 1900     CULTURE, BLOOD ([facility]) No Growth At 24 Hours    Blood Culture [[id number]]  (Normal) Collected: 07/04/23 1550    Order Status: Completed Specimen: Blood from Arm, Right Updated: 07/09/23 1800     CULTURE, BLOOD ([facility]) No Growth at 5 days    Blood Culture [[id number]]  (Normal) Collected: 07/04/23 1550    Order Status: Completed Specimen: Blood from Hand, Right Updated: 07/09/23 1800     CULTURE, BLOOD ([facility]) No Growth at 5 days    Body Fluid Culture [[id number]] Collected: 07/04/23 1623    Order Status: Completed Specimen: Abdominal Fluid from Abdomen Updated: 07/09/23 0732     Body Fluid Culture No Growth at 5 days    Blood Culture [[id number]]  (Normal) Collected: 06/30/23 1152    Order Status: Completed Specimen: Blood from Arm, Left Updated: 07/05/23 1500     CULTURE, BLOOD ([facility]) No Growth at 5 days    Blood Culture [[id number]]  (Normal) Collected: 06/30/23 1152    Order Status: Completed Specimen: Blood from Arm, Right Updated: 07/05/23 1500     CULTURE, BLOOD ([facility]) No Growth at 5 days    Body Fluid Culture [[id number]] Collected: 06/30/23 1757    Order Status: Completed Specimen: Abdominal Fluid from Abdomen Updated: 07/05/23 0715     Body Fluid Culture Final Report: At 5 days. No growth    Gram Stain [[id number]] Collected: 07/04/23 1623    Order Status: Completed Specimen: Body Fluid from Abdomen Updated: 07/05/23 0625     GRAM STAIN Rare WBC observed      No bacteria seen    Culture, Body Fluid (Aerobic) w/ GS [[id number]] Collected: 07/04/23 1609    Order Status: Canceled Specimen: Body Fluid from Ascites Updated: 07/04/23 1609             Antibiotics and Day Number of Therapy:  Antibiotics (From admission, onward)      Start     Stop Route Frequency Ordered    07/10/23 0930  mupirocin 2 % ointment         07/15 0859  Nasl 2 times daily 07/10/23 0827    07/10/23 0100  vancomycin (VANCOCIN) 500 mg in dextrose 5 % in water (D5W) 5 % 100 mL IVPB (MB+)         -- IV Every 24 hours (non-standard times) 07/10/23 0004    07/08/23 2247  piperacillin-tazobactam (ZOSYN) 4.5 g in dextrose 5 % in water (D5W) 5 % 100 mL IVPB (MB+)         -- IV Every 12 hours (non-standard times) 07/08/23 2248    07/08/23 1200  rifAXIMin tablet 550 mg         -- Oral 2 times daily 07/08/23 1047    07/07/23 1200  vancomycin 125 mg/5 mL oral solution 125 mg         -- Oral Every 6 hours 07/07/23 1014             Imaging:  X-Ray Chest 1 View  Narrative: EXAMINATION:  XR CHEST 1 VIEW    CLINICAL HISTORY:  hypoxia; Hypoxemia    TECHNIQUE:  Single frontal view of the chest was performed.    COMPARISON:  07/08/2023    FINDINGS:  The heart size enlarged the pulmonary vasculature is congested.    Patchy airspace opacities are identified not significantly changed.  Likely persistent small left-sided effusion  Impression: No significant interval change.    Electronically signed by: Amaury Brooks MD  Date:    07/09/2023  Time:    14:41  X-Ray Chest 1 View  EXAMINATION  XR CHEST 1 VIEW    CLINICAL HISTORY  hypoxia;    TECHNIQUE  A total of 1 frontal image(s) submitted of the chest.    COMPARISON  7 July 2023    FINDINGS  Lines/tubes/devices: ECG leads overlie the imaged region.    The cardiac silhouette and central vascular structures are unchanged.  The trachea is midline. Ill-defined patchy bilateral airspace opacities are again noted, similar in the interval.  No new or worsening organized consolidation is identified.  There is no large pleural effusion or convincing pneumothorax.    Regional osseous structures and extrathoracic soft tissues are similar.    IMPRESSION  Ill-defined bilateral lung infiltrates without significant interval change.    Electronically signed by: Gaston Gilmore  Date:    07/09/2023  Time:    09:44        Assessment & Plan:   Alcoholic  cirrhosis  Alcoholic hepatitis  HRS  Acute hypoxic, hypercapnic respiratory failure  MELD 38; CTP:C  -echocardiogram consistent with intracardiac shunt.  Not consistent with a intrapulmonary shunt  -however patient is hypoxic, chest x-ray consistent with possible alveolar hemorrhage vs atypical infectious process.  In discussion with intensivist, x-ray not consistent with pulmonary edema.  -Pa/Fio2 136  -patient's maps have been within target range, his urine output has significantly increased.  -Continue midodrine and octreotide infusions, for map goals of greater than 83 for HRS.  -patient may require ultrafiltration for toxin clearance, with Levophed concomitant infusion  -Continue rifaximin, lactulose.  Patient is still encephalopathic may require up titration. Aim for 2-3 bowel movements per day  -Prognosis is guarded. May benefit from hospice consult.    Te Lui MD  Internal Medicine Resident PGY-II

## 2023-07-10 NOTE — PROGRESS NOTES
[facility] Tele[facility]  Pulmonary Critical Care Note    Patient Name: Los Alatorre  MRN: [id number]  Admission Date: 6/30/2023  Hospital Length of Stay: 10 days  Code Status: Full Code  Attending Provider: Kip Avalos MD  Primary Care Provider: DECLAN Small     Subjective:     HPI:   Los Alatorre is a 58 y.o. male who with a history of alcoholic cirrhosis, alcohol use disorder, and seizure disorder who presented to [facility] ED on 6/30/2023 with a primary complaint of abdominal pain. He reports poor PO intake and has felt his abdomen become more distended. Does not report any fevers or chills. Denies any melena, bright red blood in the stool, or any vomiting. Was recently transferred to [facility] earlier this month for an EGD, which ruled out varices and gastropathy. Unknown if he has been taking his home medications reliably.       In the ED patient was afebrile 100.2 F, pulse 96, respiratory rate 4, blood pressure 137/83, 87% on room air.  CBC revealed leukocytosis at 23, H and H 11.7/35.7, platelets 286.  CMP revealed mild hyponatremia 132, acidosis 17, BUN/CR 0.65/0.88.  Alkaline phosphatase elevated at 337, slight AST elevation at 56, INR 1.3.  Initial lactic acid 3.2, troponin negative. CT abdomen pelvis revealed trace left pleural effusion, cirrhosis ascites, previous cholecystectomy, bilateral kidneys with non occluding calculi, and noninflamed diverticulosis coli.  Paracentesis was performed and 5 L of fluid was drained.      Hospital Course/Significant events:  Patient admitted for alcoholic liver cirrhosis with ascites and CHRISTEN that was initialyl due to intravascular volume depletion with appropriate response to fluids and albumin, but later developed hepatorenal syndrome, with worsening renal indices despite appropriate medical therapy. From hospital day 7-9, patient's mentation and renal indices have been worsening, and overnight hospital day 8 (7/9/23), developed worsening  respiratory status requiring BiPAP (see oncall event note 7/8), prompting upgrade to ICU for close monitoring    24 Hour Interval History:  Patient seen and examined this morning. Remains encephalopathic, not able to answer questions. Able to tell me his name, but not answering other questions. Remains on BiPAP with FiO2 of 50%. MAPs at goal over past 24 hours with no drops below 80. Leukocytosis slowly downtrending, H/H stable, INR rising, renal indices continuing to worsen. Bumex trialed yesterday, output of only 675 cc urine, net (+) 25. Will reach out to family today to discuss worsening clinical status.    Past Medical History:   Diagnosis Date    Seizures        Past Surgical History:   Procedure Laterality Date    APPENDECTOMY      CHOLECYSTECTOMY         Social History     Socioeconomic History    Marital status:     Number of children: 1   Occupational History    Occupation: Employed   Tobacco Use    Smoking status: Every Day     Packs/day: 1.50     Types: Cigarettes    Smokeless tobacco: Never   Substance and Sexual Activity    Alcohol use: Not Currently     Comment: no alcohol x 2 month    Drug use: Not Currently     Types: Marijuana     Comment: No Marijuana x 2 month     Social Determinants of Health     Financial Resource Strain: High Risk    Difficulty of Paying Living Expenses: Very hard   Food Insecurity: Food Insecurity Present    Worried About Running Out of Food in the Last Year: Sometimes true    Ran Out of Food in the Last Year: Sometimes true   Transportation Needs: No Transportation Needs    Lack of Transportation (Medical): No    Lack of Transportation (Non-Medical): No   Physical Activity: Inactive    Days of Exercise per Week: 0 days    Minutes of Exercise per Session: 0 min   Social Connections: Moderately Isolated    Frequency of Communication with Friends and Family: Three times a week    Frequency of Social Gatherings with Friends and Family: More than three times a week     Attends Yazdanism Services: Never    Active Member of Clubs or Organizations: No    Attends Club or Organization Meetings: Never    Marital Status: Living with partner   Housing Stability: High Risk    Unable to Pay for Housing in the Last Year: Yes    Number of Places Lived in the Last Year: 1    Unstable Housing in the Last Year: No           Current Outpatient Medications   Medication Instructions    LACTULOSE ORAL Oral, 2 times daily    levETIRAcetam (KEPPRA) 750 mg, Oral, 2 times daily    polyethylene glycol (GLYCOLAX) 17 g, Oral, Daily PRN       Current Inpatient Medications   albumin human 5%  25 g Intravenous BID    heparin (porcine)  5,000 Units Subcutaneous Q12H    hydrocortisone   Topical (Top) BID    lactulose  20 g Per NG tube TID    levETIRAcetam  750 mg Oral BID    LIDOcaine (PF) 10 mg/ml (1%)  10 mL Other Once    midodrine  10 mg Oral TID WM    pantoprazole  40 mg Oral Daily    piperacillin-tazobactam (Zosyn) IV (PEDS and ADULTS) (extended infusion is not appropriate)  4.5 g Intravenous Q12H    rifAXIMin  550 mg Oral BID    vancomycin (VANCOCIN) IV (PEDS and ADULTS)  500 mg Intravenous Q24H    vancomycin  125 mg Oral Q6H       Current Intravenous Infusions   octreotide (SANDOSTATIN) infusion 50 mcg/hr (07/10/23 0326)         Review of Systems   Unable to perform ROS: Mental status change      Objective:       Intake/Output Summary (Last 24 hours) at 7/10/2023 0625  Last data filed at 7/10/2023 0500  Gross per 24 hour   Intake 700 ml   Output 675 ml   Net 25 ml           Vital Signs (Most Recent):  Temp: 97.4 °F (36.3 °C) (07/10/23 0400)  Pulse: 99 (07/10/23 0600)  Resp: 20 (07/10/23 0600)  BP: 115/81 (07/10/23 0600)  SpO2: 96 % (07/10/23 0600)  Body mass index is 21.84 kg/m².  Weight: 73 kg (161 lb) Vital Signs (24h Range):  Temp:  [97.3 °F (36.3 °C)-98.6 °F (37 °C)] 97.4 °F (36.3 °C)  Pulse:  [] 99  Resp:  [19-29] 20  SpO2:  [93 %-100 %] 96 %  BP: (111-134)/(78-98) 115/81     Physical  Exam  Constitutional:       General: He is not in acute distress.     Appearance: Normal appearance. He is ill-appearing  HENT:      Head: Normocephalic and atraumatic.   Cardiovascular:      Rate and Rhythm: Normal rate and regular rhythm.   Pulmonary:      Breath sounds: Normal breath sounds. No wheezing or rhonchi.   Chest:      Chest wall: No tenderness.   Abdominal:      General: Bowel sounds are normal. There is distension.      Palpations: Abdomen is soft.      Tenderness: There is no abdominal tenderness.   Musculoskeletal:         General: No tenderness or deformity. Normal range of motion.      Cervical back: Normal range of motion and neck supple. No rigidity.   Skin:     General: Skin is warm and dry.   Neurological:      General: No focal deficit present.      Comments: Confused, oriented to person and place, but not time    Lines/Drains/Airways       Drain  Duration                  NG/OG Tube 07/09/23 1800 [place] sump 16 Fr. Right nostril <1 day         Urethral Catheter 07/09/23 0845 16 Fr. <1 day              Peripheral Intravenous Line  Duration                  Peripheral IV - Single Lumen 07/02/23 0826 20 G Anterior;Left Forearm 7 days         Peripheral IV - Single Lumen 07/07/23 2100 20 G Anterior;Right Forearm 2 days                    Significant Labs:    Lab Results   Component Value Date    WBC 21.80 (H) 07/10/2023    HGB 9.0 (L) 07/10/2023    HCT 27.1 (L) 07/10/2023    MCV 96.1 (H) 07/10/2023     (L) 07/10/2023         BMP  Lab Results   Component Value Date     (H) 07/10/2023    K 3.8 07/10/2023     06/08/2023    CO2 22 07/10/2023    BUN 23.1 07/10/2023    CREATININE 3.32 (H) 07/10/2023    CALCIUM 9.7 07/10/2023    ANIONGAP 9 06/08/2023    ESTGFRAFRICA 105 06/08/2023    EGFRNONAA 33 06/17/2022       ABG  Recent Labs   Lab 07/10/23  0427   PH 7.430   PO2 68.0*   HCO3 25.2         Mechanical Ventilation Support:  Oxygen Concentration (%): 50 (07/10/23 0403)    Significant  Imaging:  I have reviewed the pertinent imaging within the past 24 hours.        Assessment/Plan:     Assessment  Decompensated Alcoholic Liver Cirrhosis with ascites, hepatic encephalopathy, and hepatorenal syndrome  Acute hypoxic hypercapnic respiratory failure 2/2 volume overload vs hospital acquired pneumonia  Concern for C diff infection  Normocytic Anemia  History of Alcohol Withdrawal with seizures  History of seizure disorder    Plan  -Admitted to ICU for close monitoring given clinical decompensation and acute worsening of respiratory statu  -Continue lactulose OD, rifaximine 550 BID, octreotide drip and midodrine 10 TID. MAP goal of 85 for HRS. MAPs have been stable on current regimen. Start levophed if needed to maintain MAP goals  -Nephrology on board, ivf stopped due to concerns for volume overload. Continue on albumin 25 BID. Monitor I's and O's, dela cruz in place.  -Continue BiPAP to main O2 sats >92%. Wean as tolerated.   -Has been on antibiotic regimens since admission. Initiated on Vancomycin, Zosyn and levaquin (7/8) due to concern for HAP,. Levaquin stopped 7/9, MRSA PCR (-), consider stopping vanc today after discussing with staff. Most recent blood cultures from 7/8 negative at 24 hours; all culture data (blood, body fluid) from this hospitalization have been negative  -Currently being treated for presumed C diff infection with PO vancomycin (d4 start date 7/7/23)  -Monitor electrolytes and replete as appropriate  -Was on CIWA protocol for alcohol withdrawal- discontinue prn ativan at this time as he is out of the window for seizures from alcohol withdrawal (hospital day 10) and on BiPAP  -Continue home keppra for seizure disorder  -Will need to initiate goals of care discussions with family given lack of improvement thus far on current therapy    DVT Prophylaxis: heparin 5000 BID  GI Prophylaxis: pantoprazole     Greater than 30 minutes of critical care was time spent personally by me on the  following activities: development of treatment plan with patient or surrogate and bedside caregivers, discussions with consultants, evaluation of patient's response to treatment, examination of patient, ordering and performing treatments and interventions, ordering and review of laboratory studies, ordering and review of radiographic studies, pulse oximetry, re-evaluation of patient's condition.  This critical care time did not overlap with that of any other provider or involve time for any procedures.     Katie Smith MD  [facility] Internal Medicine, PGY-3  Pulmonary Critical Care Medicine  [facility]

## 2023-07-10 NOTE — PROGRESS NOTES
"Contacted SO, [address] ([phone]) in effort to obtain information on pt's next of kin. Pt reportedly has son, Thor Saenz, whom he is estranged from and sister, Stacy Alatorre in [place]; which Natividad was unable to provide contact information for. In addition, pt/SO reside with 2 of pt's brothers; however, Natividad would not provide names or ph numbers, stating "They will not talk to you. They don't care about him -- They want him dead".   When pressed for additional information, Natividad disconnected call.   "

## 2023-07-10 NOTE — PROGRESS NOTES
[facility]  Nephrology Progress Note    Patient Name: Los Alatorre  Age: 58 y.o.  : 1965  MRN: [id number]  Admission Date: 2023    Chief complaint: Abdominal Pain (Abd pain , sent from clinic)    Hospital course  Los Alatorre is a 58 y.o. White male with past medical history of alcoholic cirrhosis of the liver and seizure disorder who was admitted on 2023 with complaints of abdominal pain and distention.  Patient was febrile, had leukocytosis and transaminitis, later developed acute kidney injury as well.  Nephrology is following for assistance with management of the latter.    Subjective  Patient s/p dela cruz placement with total of 675 cc of urine output. Remained on BiPAP overnight and this morning.  Pt is not answering questions, only slightly opens eyes to verbal stimuli. Appears tremulous, ascitic abdomen appears worsened.     Review of Systems-unable to obtain due to mental status    Objective  /76   Pulse 104   Temp 97.3 °F (36.3 °C) (Axillary)   Resp (!) 22   Ht 6' (1.829 m)   Wt 73 kg (161 lb)   SpO2 (!) 92%   BMI 21.84 kg/m²     Intake/Output Summary (Last 24 hours) at 7/10/2023 1137  Last data filed at 7/10/2023 0500  Gross per 24 hour   Intake 700 ml   Output 675 ml   Net 25 ml       Physical Exam  General appearance: Patient is on BiPAP, tremulous, restless. Appears mildly jaundiced  HEENT: PERRLA, EOMI, no scleral icterus, no JVD. Neck is supple.  Chest: Patient is on NIPPV, he is mildly tachypneic,   + coarse crackles bilaterally  Heart: S1, S2.   Abdomen: + tense ascites.  : Deferred.  Extremities: No edema, peripheral pulses are palpable.   Neuro:  Slightly lethargic but easily arousable    Medications    Current Facility-Administered Medications:     0.9%  NaCl infusion (for blood administration), , Intravenous, Q24H PRN, Michaela Sesay MD    acetaminophen tablet 650 mg, 650 mg, Oral, Q6H PRN, Zeeshan Castillo MD, 650 mg at 23 1225     albumin human 5% bottle 25 g, 25 g, Intravenous, BID, Katie Smith MD, Stopped at 07/10/23 0959    dextrose 10% bolus 125 mL 125 mL, 12.5 g, Intravenous, PRN, Palomo Owenh, DO    dextrose 10% bolus 250 mL 250 mL, 25 g, Intravenous, PRN, Palomo Yeh, DO    diphenhydrAMINE capsule 25 mg, 25 mg, Oral, Q6H PRN, Te Lui MD, 25 mg at 07/05/23 2024    glucagon (human recombinant) injection 1 mg, 1 mg, Intramuscular, PRN, Palomo Owenh, DO    glucose chewable tablet 16 g, 16 g, Oral, PRN, Palomo Yeh, DO    glucose chewable tablet 24 g, 24 g, Oral, PRN, Palomo Yeh, DO    heparin (porcine) injection 5,000 Units, 5,000 Units, Subcutaneous, Q12H, Palomo Helton, DO, 5,000 Units at 07/10/23 0824    hydrocortisone 1 % cream, , Topical (Top), BID, Te Lui MD, Given at 07/10/23 0824    lactulose 20 gram/30 mL solution Soln 20 g, 20 g, Per NG tube, TID, Juanito Gormna, DO, 20 g at 07/10/23 0824    levETIRAcetam tablet 750 mg, 750 mg, Oral, BID, Keely Garcia MD, 750 mg at 07/10/23 0824    LIDOcaine (PF) 10 mg/ml (1%) injection 100 mg, 10 mL, Other, Once, Keely Garcia MD    midodrine tablet 10 mg, 10 mg, Oral, TID WM, Michaela Sesay MD, 10 mg at 07/10/23 0824    mupirocin 2 % ointment, , Nasal, BID, Kip Avalos MD, Given at 07/10/23 0900    naloxone 0.4 mg/mL injection 0.02 mg, 0.02 mg, Intravenous, PRN, Palomo Owenh, DO    octreotide (SANDOSTATIN) 500 mcg in sodium chloride 0.9% 100 mL infusion, 50 mcg/hr, Intravenous, Continuous, Katie Smith MD, Last Rate: 10 mL/hr at 07/10/23 0326, 50 mcg/hr at 07/10/23 0326    ondansetron injection 4 mg, 4 mg, Intravenous, Q8H PRN, Palomo Helton DO, 4 mg at 07/05/23 2024    pantoprazole EC tablet 40 mg, 40 mg, Oral, Daily, Chico Malave MD, 40 mg at 07/10/23 0824    piperacillin-tazobactam (ZOSYN) 4.5 g in dextrose 5 % in water (D5W) 5 % 100 mL IVPB (MB+), 4.5 g, Intravenous, Q12H, Zeeshan Castillo MD, Last Rate: 25 mL/hr at 07/10/23 1102, 4.5 g at 07/10/23 1102     rifAXIMin tablet 550 mg, 550 mg, Oral, BID, Katie Smith MD, 550 mg at 07/10/23 0824    sodium chloride 0.9% flush 10 mL, 10 mL, Intravenous, Q12H PRN, Palomo Helton,     sodium chloride 0.9% flush 10 mL, 10 mL, Intravenous, PRN, Katie Smith MD    vancomycin (VANCOCIN) 500 mg in dextrose 5 % in water (D5W) 5 % 100 mL IVPB (MB+), 500 mg, Intravenous, Q24H, Zeeshan Castillo MD, Stopped at 07/10/23 0156    vancomycin 125 mg/5 mL oral solution 125 mg, 125 mg, Oral, Q6H, Chico Malave MD, 125 mg at 07/10/23 1103     Imaging:    Reviewed    Laboratory Data:  Hematology  Lab Results   Component Value Date    WBC 21.80 (H) 07/10/2023    HGB 9.0 (L) 07/10/2023    HCT 27.1 (L) 07/10/2023     (L) 07/10/2023     (H) 07/10/2023    K 3.8 07/10/2023    CHLORIDE 109 (H) 07/10/2023    CO2 22 07/10/2023    BUN 23.1 07/10/2023    CREATININE 3.32 (H) 07/10/2023    EGFRNORACEVR 21 07/10/2023    GLUCOSE 130 (H) 07/10/2023    CALCIUM 9.7 07/10/2023    ALKPHOS 132 07/10/2023    LABPROT 5.8 (L) 07/10/2023    ALBUMIN 3.9 07/10/2023    BILIDIR 2.8 (H) 07/07/2023    IBILI 0.10 02/16/2022    AST 28 07/10/2023    ALT 5 07/10/2023    MG 1.60 07/10/2023    PHOS 3.0 07/10/2023     Lab Results   Component Value Date    RRTEJDTQ72VJ 56.6 06/14/2023       Lab Results   Component Value Date    IRON 58 06/05/2023    TIBC 150 (L) 06/05/2023    TRANS 163 (L) 06/04/2023    TRANS 163 (L) 06/04/2023    LABIRON 31 06/04/2023    FERRITIN 122.2 06/05/2023    FOLATE 6.8 (L) 06/04/2023    VSGRMBHS40 >2,000 (H) 06/04/2023    HAPTO 55 07/07/2023     07/07/2023       Lab Results   Component Value Date    HIV Nonreactive 06/14/2023    HEPCAB Nonreactive 06/30/2023    HEPBSURFAG Nonreactive 06/30/2023    HEPBSAB Nonreactive 07/06/2023         Impression  Acute kidney injury -worsening  Hepatorenal syndrome  Anemia   Leukocytosis -improving  Coagulopathy  Cirrhosis of the liver  Ascites    Plan  -Slight worsening of creatinine since  yesterday  -pt is s/p 1 dose of Bumex yesterday on 7/9, does not appear to be overly fluid overloaded this morning except for ascites  -better urine output since yesterday, now s/p Kang for more accurate measurements  -would hold off on further diuresis at this time unless respiratory status worsens due to possible pulmonary edema  -continue with albumin drip and midodrine, BP readings have significantly improved, able to maintain systolic > 110  -electrolytes are wnl, metabolic acidosis has resolved  -remains high risk for decompensation  -clinical status remains poor  -no indication for UF, HD or CRRT at this time, however will continue to monitor for worsening signs such as significant volume overload, electrolyte abnormalities or azotemia/uremia      Sharri Fisher M.D.  [facility] PGY-3      Addendum:  Agree with maintaining MAP as recommended by Dr Ingram.  This should help with organ perfusion.Reviewed Dr kohli interpertation chest x ray.   Mary Kay Dee M.D.  Nephrology

## 2023-07-11 NOTE — PROGRESS NOTES
Inpatient Nutrition Assessment    Admit Date: 6/30/2023   Total duration of encounter: 11 days     Nutrition Recommendation/Prescription     Pt NPO/ lethargic/has NGT--suggest use enteral feeds--Fibersource HN @ 20ml/hr; increase as tolerated to goal rate 70ml/hr  (23 hr cycle) to provide 1932 calories, 87 gm protein, 1300 ml free water . Flush tube with 50ml water every 4 hrs.   When pt alert--suggest ADAT to Low Na diet; Fluid restriction -- consider Megace to stimulate appetite  Boost Breeze (provides 250 kcal, 9 g protein per serving) TID per pt's request  Suggest  MVI, Fe, Thiamine  Daily weight    Communication of Recommendations: reviewed with nurse    Nutrition Assessment     Malnutrition Assessment/Nutrition-Focused Physical Exam    Malnutrition Context: acute illness or injury  Malnutrition Level: severe  Energy Intake (Malnutrition): less than or equal to 50% for greater than or equal to 5 days  Weight Loss (Malnutrition): greater than 7.5% in 3 months         A minimum of two characteristics is recommended for diagnosis of either severe or non-severe malnutrition.    Chart Review    Reason Seen: follow-up    Malnutrition Screening Tool Results   Have you recently lost weight without trying?: Yes: 34 lbs or more  Have you been eating poorly because of a decreased appetite?: Yes   MST Score: 5     Diagnosis:  SIRS/sepsis, etoh /cirrhosis, ascites, CHRISTEN, hx seizure; acute hypoxic respiratory failure/volume overload, ? C diff infection, Low K/Mg     Relevant Medical History: Alcoholic Cirrhosis with ascites, CHRISTEN d/t intravascular volume depletion, Sepsis likely GI source, Hypokalemia, Hypophosphatemia    Nutrition-Related Medications:, lactulose, Octreotide, albumin, heparin, pantoprazole, zosyn, vancomycin   Calorie Containing IV Medications: no significant kcals from medications at this time    Nutrition-Related Labs:  7/7 -- H/H 7.1/22 L, K 4, BUN 17, Cr 2.6 H, Glu 139 H  (7-11) H/H 8.6/25.9(L) Gluc 127  Bun 26.3 Cr 3.5(H) GFR 19(L) K 3.4(L) Tbili 5.1(H)     Diet/PN Order: Diet NPO  Oral Supplement Order: none  Tube Feeding Order: none  Appetite/Oral Intake: NPO/NPO  Factors Affecting Nutritional Intake: abdominal distension, decreased appetite, diarrhea, and nausea  Food/Oriental orthodox/Cultural Preferences: none reported  Food Allergies: none reported    Skin Integrity: rash  Wound(s):   none reported     Comments  (7/11) Pt sleeping during rounds; per staff----pt disoriented; has NGT ; has been not eating since 7/9; hx poor po intake prior; on bipap/ 2 hypoxemia; abdomen distended. Abnormal labs noted: reflective liver failure/ on lactulose. TF recs provided for nutrition support.     7/7/23 -- Pt continues with poor appetite, request ham sandwich only for lunch; taking sips of Boost Plus, willing to try Boost Breeze instead; intermittent nausea with abdominal distention; multiple loose stools -- lactulose decreased per MD; current wt elevated, ascites present -- possible need for repeat paracentesis per MD notes    7/5/23 -- Pt in & out of sleep during visit reporting not sleeping well last 2 nights; pt reports decreased appetite, denies n/v, + abdominal pain & distention s/p paracentesis on 7/4 noted with 5 L removed per MD notes; K (L) - loose stool, continues on lactulose, replacing K; Continue Boost ONS, consider Megace to stimulate appetite    (7/1) Received MST trigger wt loss/decreased appetite. Unable to speak to pt; spoke to nurse caring for pt--pt remains with decreased appetite; ate 25% of meal earlier; + abd pain/distension; some nausea; hx ETOH cirrhosis/ascites. Will order oral supplement; consider megace to stimulate appetite; Pt on lactulose; NH4 level 45. Wt fluctuates with ascites/fluid--per EMR between 120-154#; will track. Unable to complete PA at this time . Noted elevated Bun/Cr; low GFR--CHRISTEN; nephrology consulted     Anthropometrics    Height: 6' (182.9 cm) Height Method: Stated  Last Weight: 73  kg (161 lb) (23 1618) Weight Method: Bed Scale  BMI (Calculated): 21.8  BMI Classification: normal (BMI 18.5-24.9)        Ideal Body Weight (IBW), Male: 178 lb     % Ideal Body Weight, Male (lb): 90.45 %                 Usual Body Weight (UBW), k.2 kg pt reports last weighing 3 months ago  % Usual Body Weight: 91.64  % Weight Change From Usual Weight: -8.55 %  Usual Weight Provided By: patient    Wt Readings from Last 5 Encounters:   23 73 kg (161 lb)   23 71 kg (156 lb 8.4 oz)   23 67.2 kg (148 lb 3.2 oz)   23 72.6 kg (160 lb)   23 72.6 kg (160 lb)     Weight Change(s) Since Admission:  Admit Weight: 68 kg (150 lb) (23 1051)  23 -- 70.6 kg, bed  23 -- 73.2 kg, bed    Estimated Needs    Weight Used For Calorie Calculations: 68 kg (149 lb 14.6 oz) (wt fluctuating--use admit wt ; ? close dry wt)  Energy Calorie Requirements (kcal): 2040 kcal/d; 30 stuart/kg  Energy Need Method: Kcal/kg  Weight Used For Protein Calculations: 68 kg (149 lb 14.6 oz) (admit wt used /wt fluctuating)  Protein Requirements: 82 gm protein/d; 1.2 gm/kg /cirrhosis  Fluid Requirements (mL): 1592 ml/d; 25 ml/kg/ascites  Temp (24hrs), Av.5 °F (36.4 °C), Min:97.4 °F (36.3 °C), Max:97.6 °F (36.4 °C)       Enteral Nutrition    Patient not receiving enteral nutrition at this time.    Parenteral Nutrition    Patient not receiving parenteral nutrition support at this time.    Evaluation of Received Nutrient Intake    Calories: not meeting estimated needs  Protein: not meeting estimated needs    Patient Education    Not applicable.    Nutrition Diagnosis     PES: Malnutrition related to acute illness as evidenced by eating < 50% nutrition intake > 5 days, >7.5% wt loss x 3 months, ascites, muscle wasting (continues)    Interventions/Goals     Intervention(s): modified composition of meals/snacks, commercial beverage, multivitamin/mineral supplement therapy, and collaboration with other  providers  Goal: Meet greater than 75% of nutritional needs by follow-up. (goal not met)    Monitoring & Evaluation     Dietitian will monitor food and beverage intake, weight, electrolyte/renal panel, and gastrointestinal profile.  Nutrition Risk/Follow-Up: high (follow-up in 1-4 days)   Please consult if re-assessment needed sooner.

## 2023-07-11 NOTE — PROGRESS NOTES
[facility] GI Progress Note    Patient Name: Los Alatorre  MRN: [id number]  Admission Date: 6/30/2023  Hospital Length of Stay: 11 days  Code Status: Full Code  Attending Provider: Kip Avalos MD  Primary Care Provider: DECLAN Small     Subjective:      Brief HPI:  Los Alatorre is a 58 y.o. male who  has a past medical history of Seizures.  He presented to [facility] on 6/30/2023  with a primary complaint of abdominal pain, and distension.  Patient says that when he was discharged from our [place] from [place] that he was compliant with all his medications and that he was having adequate amount of bowel movements.  Patient was discharged on rifaximin 550 b.i.d., lactulose 20 mg b.i.d..  However he said that he has little bowel movements however they were in quantity.  Patient says that he felt good for proximally 1 week however he cannot work anymore.  He said that his abdomen got so distended that he had decreased p.o. intake and had pain.  Of note patient says that he has stopped drinking for past 2 months.  He says that he was a heavy drinker since he was 19 years old.  He says that he drinks for the past 1-2 years approximately a half to 3/4 of a 5th of vodka.  Patient says that he has been taking ibuprofen for his restless legs for quite a time.  He says that he has numbness in his bilateral lower extremities when he is sleep at night.  He says he did not try put in over side of bed.  He also denies any limb claudication or numbness or tingling all ambulation.  Patient follows up with [facility].  Patient says he is currently having 4 bowel movements per day even when he was de-escalated to 10 mg b.i.d..  He is alert and oriented x3.        Interval History:  Patient's oxygenation remains same currently on 12/650% FiO2 on BiPAP.  Patient had 3 bowel movements overnight and 3 in the daytime with 6-7 bowel movements yesterday.  Little improvement and encephalopathy.   Patient is not responsive unable to follow commands. Vital signs stable, for the most part stain within range of map goals of 83 and above.  Patient's urine output remains oliguric, with 520 cc of urine output.  Patient remains on albumin, lactulose 20 g t.i.d., midodrine 10 t.i.d., Zosyn, rifaximin, p.o. vancomycin  Review of Systems:  The remainder of the 14 system ROS is noncontributory or negative unless mentioned/reviewed above.     Objective:     Vital Signs:  Vital Signs (Most Recent):  Temp: 97.5 °F (36.4 °C) (07/11/23 0800)  Pulse: 96 (07/11/23 0800)  Resp: 19 (07/11/23 0800)  BP: 110/73 (07/11/23 0800)  SpO2: 95 % (07/11/23 0800)  Body mass index is 21.84 kg/m².  Weight: 73 kg (161 lb) Vital Signs (24h Range):  Temp:  [97.4 °F (36.3 °C)-97.6 °F (36.4 °C)] 97.5 °F (36.4 °C)  Pulse:  [] 96  Resp:  [17-28] 19  SpO2:  [92 %-97 %] 95 %  BP: ()/(73-87) 110/73       Input/output:     Intake/Output Summary (Last 24 hours) at 7/11/2023 0910  Last data filed at 7/11/2023 0530  Gross per 24 hour   Intake 1688.33 ml   Output 520 ml   Net 1168.33 ml       Physical Examination:  General:  On 12/06 BiPAP 50% FiO2.  Patient is encephalopathic at this time.  Head: Normocephalic,   Eyes: PERRL, EOMI, icteric sclera  Throat: No posterior pharyngeal erythema or exudate, no tonsillar exudate  Neck: supple, normal ROM, no JVD  CVS:  RRR, S1 and S2 normal, no murmurs, no added heart sounds, rubs, gallops, regular peripheral pulses, and no peripheral edema  Resp:  Lungs distant with inspiratory crackles and expiratory wheezing with prolonged expiratory phase  GI:  Abdomen soft, non-tender, +distended, normoactive bowel sounds  MSK:  Full range of motion, no obvious deformities   Skin:  Spider angiomata notice diffusely on the body  Neuro:  Patient is encephalopathic   Psych:  Patient is not alert oriented x3      Lines/Drains/Airways       None                    Laboratory:    Recent Labs   Lab 07/09/23  3943  07/10/23  0405 07/11/23  0431   WBC 22.59* 21.80* 17.62*   RBC 2.84* 2.82* 2.71*   HGB 9.0* 9.0* 8.6*   HCT 28.0* 27.1* 25.9*   MCV 98.6* 96.1* 95.6*   MCH 31.7* 31.9* 31.7*   MCHC 32.1* 33.2 33.2   RDW 17.7* 17.4* 17.4*    113* 103*   MPV 11.5* 11.0* 11.1*      Recent Labs   Lab 07/08/23  2240 07/09/23  0433 07/09/23  0555 07/10/23  0405 07/10/23  0427 07/11/23  0423   NA  --  143  --  146*  --  147*   K  --  3.8  --  3.8  --  3.4*   CHLORIDE  --  108*  --  109*  --  110*   CO2  --  21*  --  22  --  21*   BUN  --  20.3  --  23.1  --  26.3*   CREATININE  --  3.16*  --  3.32*  --  3.56*   CALCIUM  --  9.5  --  9.7  --  10.0   PH 7.250*  --  7.400  --  7.430  --    MG  --  1.80  --  1.60  --  1.50*   ALBUMIN  --  3.8  --  3.9  --  3.9   ALKPHOS  --  145  --  132  --  169*   ALT  --  <5  --  5  --  <5   AST  --  25  --  28  --  35*   BILITOT  --  3.8*  --  4.8*  --  5.1*        Other Results:    Current Medications:     Infusions:   octreotide (SANDOSTATIN) infusion 50 mcg/hr (07/10/23 9502)         Scheduled:   albumin human 5%  25 g Intravenous BID    heparin (porcine)  5,000 Units Subcutaneous Q12H    hydrocortisone   Topical (Top) BID    lactulose  20 g Per NG tube TID    levETIRAcetam  750 mg Oral BID    LIDOcaine (PF) 10 mg/ml (1%)  10 mL Other Once    midodrine  10 mg Oral TID WM    mupirocin   Nasal BID    pantoprazole  40 mg Oral Daily    piperacillin-tazobactam (Zosyn) IV (PEDS and ADULTS) (extended infusion is not appropriate)  4.5 g Intravenous Q12H    rifAXIMin  550 mg Oral BID    vancomycin  125 mg Oral Q6H         PRN:   sodium chloride    acetaminophen    dextrose 10%    dextrose 10%    diphenhydrAMINE    glucagon (human recombinant)    glucose    glucose    naloxone    ondansetron    sodium chloride 0.9%    sodium chloride 0.9%        Microbiology Data:  Microbiology Results (last 7 days)       Procedure Component Value Units Date/Time    Blood Culture [[id number]]  (Normal) Collected: 07/08/23  1453    Order Status: Completed Specimen: Blood from Hand, Left Updated: 07/10/23 1900     CULTURE, BLOOD ([facility]) No Growth At 48 Hours    Blood Culture [[id number]]  (Normal) Collected: 07/08/23 1453    Order Status: Completed Specimen: Blood from Hand, Right Updated: 07/10/23 1900     CULTURE, BLOOD ([facility]) No Growth At 48 Hours    Blood Culture [[id number]]  (Normal) Collected: 07/04/23 1550    Order Status: Completed Specimen: Blood from Arm, Right Updated: 07/09/23 1800     CULTURE, BLOOD ([facility]) No Growth at 5 days    Blood Culture [[id number]]  (Normal) Collected: 07/04/23 1550    Order Status: Completed Specimen: Blood from Hand, Right Updated: 07/09/23 1800     CULTURE, BLOOD ([facility]) No Growth at 5 days    Body Fluid Culture [[id number]] Collected: 07/04/23 1623    Order Status: Completed Specimen: Abdominal Fluid from Abdomen Updated: 07/09/23 0732     Body Fluid Culture No Growth at 5 days    Blood Culture [[id number]]  (Normal) Collected: 06/30/23 1152    Order Status: Completed Specimen: Blood from Arm, Left Updated: 07/05/23 1500     CULTURE, BLOOD ([facility]) No Growth at 5 days    Blood Culture [[id number]]  (Normal) Collected: 06/30/23 1152    Order Status: Completed Specimen: Blood from Arm, Right Updated: 07/05/23 1500     CULTURE, BLOOD ([facility]) No Growth at 5 days    Body Fluid Culture [[id number]] Collected: 06/30/23 1757    Order Status: Completed Specimen: Abdominal Fluid from Abdomen Updated: 07/05/23 0715     Body Fluid Culture Final Report: At 5 days. No growth    Gram Stain [[id number]] Collected: 07/04/23 1623    Order Status: Completed Specimen: Body Fluid from Abdomen Updated: 07/05/23 0625     GRAM STAIN Rare WBC observed      No bacteria seen    Culture, Body Fluid (Aerobic) w/ GS [[id number]] Collected: 07/04/23 1609    Order Status: Canceled Specimen: Body Fluid from Ascites Updated: 07/04/23 1609             Antibiotics and Day Number of Therapy:  Antibiotics (From admission, onward)      Start     Stop  Route Frequency Ordered    07/10/23 0930  mupirocin 2 % ointment         07/15 0859 Nasl 2 times daily 07/10/23 0827    07/08/23 2247  piperacillin-tazobactam (ZOSYN) 4.5 g in dextrose 5 % in water (D5W) 5 % 100 mL IVPB (MB+)         -- IV Every 12 hours (non-standard times) 07/08/23 2248    07/08/23 1200  rifAXIMin tablet 550 mg         -- Oral 2 times daily 07/08/23 1047    07/07/23 1200  vancomycin 125 mg/5 mL oral solution 125 mg         -- Oral Every 6 hours 07/07/23 1014             Imaging:  X-Ray Chest 1 View  Narrative: EXAMINATION:  XR CHEST 1 VIEW    CPT 86660    CLINICAL HISTORY:  Hypoxia;    COMPARISON:  July 9, 2023    FINDINGS:  Examination reveals cardiomediastinal silhouette and pleuroparenchymal changes to be essentially unchanged as compared with the previous examination when accounting for difference in technique and inspiratory effort.    There has been interval insertion of a nasogastric tube with the tip below the diaphragm  Impression: Interval insertion of nasogastric tube.    No other significant change    Electronically signed by: Godfrey Rodriguez  Date:    07/11/2023  Time:    08:28        Assessment & Plan:   Alcoholic cirrhosis  Alcoholic hepatitis  HRS  Acute hypoxic, hypercapnic respiratory failure  MELD 38; CTP:C  -Continue trend PT/INR, INR improved from 3.46-3.38, however patient's bilirubin continues to climb and patient's direct bilirubin is 2.9 with a total bilirubin of 5.1.  -echocardiogram consistent with intracardiac shunt.  Not consistent with a intrapulmonary shunt  -x-ray shows little improvement.  -Continue midodrine and octreotide infusions, for map goals of greater than 83 for HRS.  -nephrology is on board, may require ultrafiltration at some point.  Patient remains oliguric and urine output is actually worsened along with creatinine.  -Continue rifaximin, lactulose.   Aim for 2-3 bowel movements per day  -Continue Zosyn, vancomycin was de-escalated.  -continue goals  of care discussions with family, prognosis is guarded. May benefit from hospice consult.    Te Lui MD  Internal Medicine Resident PGY-II

## 2023-07-11 NOTE — PROGRESS NOTES
[facility] - ICU  Pulmonary Critical Care Note    Patient Name: Los Alatorre  MRN: [id number]  Admission Date: 6/30/2023  Hospital Length of Stay: 11 days  Code Status: Full Code  Attending Provider: Kip Avalos MD  Primary Care Provider: DECLAN Small     Subjective:     HPI:   Los Alatorre is a 58 y.o. male who with a history of alcoholic cirrhosis, alcohol use disorder, and seizure disorder who presented to [facility] ED on 6/30/2023 with a primary complaint of abdominal pain. He reports poor PO intake and has felt his abdomen become more distended. Does not report any fevers or chills. Denies any melena, bright red blood in the stool, or any vomiting. Was recently transferred to [facility] earlier this month for an EGD, which ruled out varices and gastropathy. Unknown if he has been taking his home medications reliably.       In the ED patient was afebrile 100.2 F, pulse 96, respiratory rate 4, blood pressure 137/83, 87% on room air.  CBC revealed leukocytosis at 23, H and H 11.7/35.7, platelets 286.  CMP revealed mild hyponatremia 132, acidosis 17, BUN/CR 0.65/0.88.  Alkaline phosphatase elevated at 337, slight AST elevation at 56, INR 1.3.  Initial lactic acid 3.2, troponin negative. CT abdomen pelvis revealed trace left pleural effusion, cirrhosis ascites, previous cholecystectomy, bilateral kidneys with non occluding calculi, and noninflamed diverticulosis coli.  Paracentesis was performed and 5 L of fluid was drained.      Hospital Course/Significant events:  Patient admitted for alcoholic liver cirrhosis with ascites and CHRISTEN that was initially due to intravascular volume depletion with appropriate response to fluids and albumin, but later developed hepatorenal syndrome, with worsening renal indices despite appropriate medical therapy. From hospital day 7-9, patient's mentation and renal indices have been worsening, and overnight hospital day 8 (7/9/23), developed worsening respiratory status  "requiring BiPAP (see oncall event note 7/8), prompting upgrade to ICU for close monitoring    24 Hour Interval History:  Patient remains encephalopathic. Follows some commands but not all. He is on BiPAP 12/6 50% FiO2. MAPs overnight ranges from . Total 24 hr  cc, net positive +1288.  Had 6 bowel movements in total for the past 24 hrs. Asterixis prominent on both hands, currently restrained as he tries to pull out his lines. Labs this AM revealed downtrending white count 17.62, hgb 8.6. PT 33.2, INR 3.38, mild hypernetremia 147, hypokalemia 3.4, renal indices worsening with BUN 26.3/crea 3.56, hypomagnesemia 1.5 (repleting this). Issues with determining who the surrogate decision maker is, his SO (not ) was contacted yesterday however did not give out numbers of patient's brothers stating "they don't care about him."    Past Medical History:   Diagnosis Date    Seizures        Past Surgical History:   Procedure Laterality Date    APPENDECTOMY      CHOLECYSTECTOMY         Social History     Socioeconomic History    Marital status:     Number of children: 1   Occupational History    Occupation: Employed   Tobacco Use    Smoking status: Every Day     Packs/day: 1.50     Types: Cigarettes    Smokeless tobacco: Never   Substance and Sexual Activity    Alcohol use: Not Currently     Comment: no alcohol x 2 month    Drug use: Not Currently     Types: Marijuana     Comment: No Marijuana x 2 month     Social Determinants of Health     Financial Resource Strain: High Risk    Difficulty of Paying Living Expenses: Very hard   Food Insecurity: Food Insecurity Present    Worried About Running Out of Food in the Last Year: Sometimes true    Ran Out of Food in the Last Year: Sometimes true   Transportation Needs: No Transportation Needs    Lack of Transportation (Medical): No    Lack of Transportation (Non-Medical): No   Physical Activity: Inactive    Days of Exercise per Week: 0 days    Minutes of " Exercise per Session: 0 min   Social Connections: Moderately Isolated    Frequency of Communication with Friends and Family: Three times a week    Frequency of Social Gatherings with Friends and Family: More than three times a week    Attends Anglican Services: Never    Active Member of Clubs or Organizations: No    Attends Club or Organization Meetings: Never    Marital Status: Living with partner   Housing Stability: High Risk    Unable to Pay for Housing in the Last Year: Yes    Number of Places Lived in the Last Year: 1    Unstable Housing in the Last Year: No           Current Outpatient Medications   Medication Instructions    LACTULOSE ORAL Oral, 2 times daily    levETIRAcetam (KEPPRA) 750 mg, Oral, 2 times daily    polyethylene glycol (GLYCOLAX) 17 g, Oral, Daily PRN       Current Inpatient Medications   albumin human 5%  25 g Intravenous BID    heparin (porcine)  5,000 Units Subcutaneous Q12H    hydrocortisone   Topical (Top) BID    lactulose  20 g Per NG tube TID    levETIRAcetam  750 mg Oral BID    LIDOcaine (PF) 10 mg/ml (1%)  10 mL Other Once    midodrine  10 mg Oral TID WM    mupirocin   Nasal BID    pantoprazole  40 mg Oral Daily    piperacillin-tazobactam (Zosyn) IV (PEDS and ADULTS) (extended infusion is not appropriate)  4.5 g Intravenous Q12H    rifAXIMin  550 mg Oral BID    vancomycin (VANCOCIN) IV (PEDS and ADULTS)  500 mg Intravenous Q24H    vancomycin  125 mg Oral Q6H       Current Intravenous Infusions   octreotide (SANDOSTATIN) infusion 50 mcg/hr (07/10/23 2327)         ROS   Unable to perform      Objective:       Intake/Output Summary (Last 24 hours) at 7/11/2023 0613  Last data filed at 7/11/2023 0530  Gross per 24 hour   Intake 1808.33 ml   Output 520 ml   Net 1288.33 ml         Vital Signs (Most Recent):  Temp: 97.6 °F (36.4 °C) (07/11/23 0300)  Pulse: 97 (07/11/23 0600)  Resp: 19 (07/11/23 0600)  BP: 106/79 (07/11/23 0600)  SpO2: 96 % (07/11/23 0600)  Body mass index is 21.84  kg/m².  Weight: 73 kg (161 lb) Vital Signs (24h Range):  Temp:  [97.3 °F (36.3 °C)-97.6 °F (36.4 °C)] 97.6 °F (36.4 °C)  Pulse:  [] 97  Resp:  [17-28] 19  SpO2:  [92 %-97 %] 96 %  BP: ()/(73-87) 106/79     Physical Exam  Constitutional:       General: He is not in acute distress.     Appearance: He is ill-appearing. He is not toxic-appearing.   HENT:      Head: Normocephalic and atraumatic.   Cardiovascular:      Rate and Rhythm: Normal rate and regular rhythm.   Pulmonary:      Breath sounds: Rales present. No wheezing.   Chest:      Chest wall: No tenderness.   Abdominal:      General: Bowel sounds are normal. There is distension.   Musculoskeletal:         General: No tenderness or deformity. Normal range of motion.      Cervical back: Normal range of motion and neck supple. No rigidity.   Skin:     General: Skin is warm and dry.   Neurological:      Mental Status: He is disoriented.         Lines/Drains/Airways       Drain  Duration                  NG/OG Tube 07/09/23 1800 [place] sump 16 Fr. Right nostril 1 day         Urethral Catheter 07/09/23 0845 16 Fr. 1 day              Peripheral Intravenous Line  Duration                  Peripheral IV - Single Lumen 07/02/23 0826 20 G Anterior;Left Forearm 8 days         Peripheral IV - Single Lumen 07/07/23 2100 20 G Anterior;Right Forearm 3 days                    Significant Labs:    Lab Results   Component Value Date    WBC 17.62 (H) 07/11/2023    HGB 8.6 (L) 07/11/2023    HCT 25.9 (L) 07/11/2023    MCV 95.6 (H) 07/11/2023     (L) 07/11/2023           BMP  Lab Results   Component Value Date     (H) 07/11/2023    K 3.4 (L) 07/11/2023    CHLORIDE 110 (H) 07/11/2023    CO2 21 (L) 07/11/2023    BUN 26.3 (H) 07/11/2023    CREATININE 3.56 (H) 07/11/2023    CALCIUM 10.0 07/11/2023    AGAP 7.0 05/01/2023    ESTGFRAFRICA 105 06/08/2023    EGFRNONAA 33 06/17/2022         ABG  Recent Labs   Lab 07/10/23  0427   PH 7.430   PO2 68.0*   HCO3 25.2    POCBASEDEF 0.90       Mechanical Ventilation Support:  Oxygen Concentration (%): 50 (07/11/23 0000)      Significant Imaging:  I have reviewed the pertinent imaging within the past 24 hours.        Assessment/Plan:     Assessment  Decompensated alcoholic liver cirrhosis with ascites, hepatic encephalopathy, and hepatorenal syndrome  Acute hypoxic hypercapnic respiratory failure 2/2 volume overload vs hospital acquired pneumonia  Concern for C diff infection  Normocytic anemia  Hypokalemia and hypomagnesemia  History of alcohol withdrawal with seizures  History of seizure disorder     Plan  -Admitted to ICU for close monitoring given clinical decompensation and acute worsening of respiratory status  -Continue lactulose TID, rifaximine 550 BID, octreotide drip and midodrine 10 TID. MAP goal of 85 for HRS. MAPs have been stable on current regimen. Start levophed if needed to maintain MAP goals  -Nephrology on board, IVF stopped due to concerns for volume overload. Continue on albumin 25 BID. Monitor I's and O's, dela cruz in place.  -Continue BiPAP to main O2 sats >92%. Wean as tolerated.   -Has been on antibiotic regimens since admission. Initiated on Vancomycin, Zosyn and levaquin (7/8) due to concern for HAP. Levaquin stopped 7/9, MRSA PCR (-). Most recent blood cultures from 7/8 negative at 24 hours; all culture data (blood, body fluid) from this hospitalization have been negative  -Currently being treated for presumed C diff infection with PO vancomycin (D5 start date 7/7/23)  -Monitor electrolytes and replete as appropriate--repleting magnesium this AM, holding off on repleting K in setting of HRS  -Was on CIWA protocol for alcohol withdrawal- discontinued prn ativan at this time as he is out of the window for seizures from alcohol withdrawal and on BiPAP  -Continue home keppra for seizure disorder  -Will need to initiate goals of care discussions with family given lack of improvement thus far on current therapy        DVT Prophylaxis: heparin 5000 BID  GI Prophylaxis: pantoprazole       32 minutes of critical care was time spent personally by me on the following activities: development of treatment plan with patient or surrogate and bedside caregivers, discussions with consultants, evaluation of patient's response to treatment, examination of patient, ordering and performing treatments and interventions, ordering and review of laboratory studies, ordering and review of radiographic studies, pulse oximetry, re-evaluation of patient's condition.  This critical care time did not overlap with that of any other provider or involve time for any procedures.     Michaela Sesay MD  Pulmonary Critical Care Medicine  [facility] - ICU

## 2023-07-11 NOTE — PROGRESS NOTES
[facility] - ICU  Pulmonary Critical Care Note    Patient Name: Los Alatorre  MRN: [id number]  Admission Date: 6/30/2023  Hospital Length of Stay: 11 days  Code Status: Full Code  Attending Provider: Kip Avalos MD  Primary Care Provider: DECLAN Small     Subjective:     HPI:   Los Alatorre is a 58 y.o. male who with a history of alcoholic cirrhosis, alcohol use disorder, and seizure disorder who presented to [facility] ED on 6/30/2023 with a primary complaint of abdominal pain. He reports poor PO intake and has felt his abdomen become more distended. Does not report any fevers or chills. Denies any melena, bright red blood in the stool, or any vomiting. Was recently transferred to [facility] earlier this month for an EGD, which ruled out varices and gastropathy. Unknown if he has been taking his home medications reliably.       In the ED patient was afebrile 100.2 F, pulse 96, respiratory rate 4, blood pressure 137/83, 87% on room air.  CBC revealed leukocytosis at 23, H and H 11.7/35.7, platelets 286.  CMP revealed mild hyponatremia 132, acidosis 17, BUN/CR 0.65/0.88.  Alkaline phosphatase elevated at 337, slight AST elevation at 56, INR 1.3.  Initial lactic acid 3.2, troponin negative. CT abdomen pelvis revealed trace left pleural effusion, cirrhosis ascites, previous cholecystectomy, bilateral kidneys with non occluding calculi, and noninflamed diverticulosis coli.  Paracentesis was performed and 5 L of fluid was drained.      Hospital Course/Significant events:  Patient admitted for alcoholic liver cirrhosis with ascites and CHRISTEN that was initially due to intravascular volume depletion with appropriate response to fluids and albumin, but later developed hepatorenal syndrome, with worsening renal indices despite appropriate medical therapy. From hospital day 7-9, patient's mentation and renal indices have been worsening, and overnight hospital day 8 (7/9/23), developed worsening respiratory status  "requiring BiPAP (see oncall event note 7/8), prompting upgrade to ICU for close monitoring    24 Hour Interval History:  Patient remains encephalopathic. Follows some commands but not all. He is on BiPAP 12/6 50% FiO2. MAPs overnight ranges from . Total 24 hr  cc, net positive +1288.  Had 6 bowel movements in total for the past 24 hrs. Asterixis prominent on both hands, currently restrained as he tries to pull out his lines. Labs this AM revealed downtrending white count 17.62, hgb 8.6. PT 33.2, INR 3.38, mild hypernetremia 147, hypokalemia 3.4, renal indices worsening with BUN 26.3/crea 3.56, hypomagnesemia 1.5 (repleting this). Issues with determining who the surrogate decision maker is, his SO (not ) was contacted yesterday however did not give out numbers of patient's brothers stating "they don't care about him."    Past Medical History:   Diagnosis Date    Seizures        Past Surgical History:   Procedure Laterality Date    APPENDECTOMY      CHOLECYSTECTOMY         Social History     Socioeconomic History    Marital status:     Number of children: 1   Occupational History    Occupation: Employed   Tobacco Use    Smoking status: Every Day     Packs/day: 1.50     Types: Cigarettes    Smokeless tobacco: Never   Substance and Sexual Activity    Alcohol use: Not Currently     Comment: no alcohol x 2 month    Drug use: Not Currently     Types: Marijuana     Comment: No Marijuana x 2 month     Social Determinants of Health     Financial Resource Strain: High Risk    Difficulty of Paying Living Expenses: Very hard   Food Insecurity: Food Insecurity Present    Worried About Running Out of Food in the Last Year: Sometimes true    Ran Out of Food in the Last Year: Sometimes true   Transportation Needs: No Transportation Needs    Lack of Transportation (Medical): No    Lack of Transportation (Non-Medical): No   Physical Activity: Inactive    Days of Exercise per Week: 0 days    Minutes of " Exercise per Session: 0 min   Social Connections: Moderately Isolated    Frequency of Communication with Friends and Family: Three times a week    Frequency of Social Gatherings with Friends and Family: More than three times a week    Attends Orthodox Services: Never    Active Member of Clubs or Organizations: No    Attends Club or Organization Meetings: Never    Marital Status: Living with partner   Housing Stability: High Risk    Unable to Pay for Housing in the Last Year: Yes    Number of Places Lived in the Last Year: 1    Unstable Housing in the Last Year: No           Current Outpatient Medications   Medication Instructions    LACTULOSE ORAL Oral, 2 times daily    levETIRAcetam (KEPPRA) 750 mg, Oral, 2 times daily    polyethylene glycol (GLYCOLAX) 17 g, Oral, Daily PRN       Current Inpatient Medications   albumin human 5%  25 g Intravenous BID    heparin (porcine)  5,000 Units Subcutaneous Q12H    hydrocortisone   Topical (Top) BID    lactulose  20 g Per NG tube TID    levETIRAcetam  750 mg Oral BID    LIDOcaine (PF) 10 mg/ml (1%)  10 mL Other Once    magnesium sulfate IVPB  2 g Intravenous Once    midodrine  10 mg Oral TID WM    mupirocin   Nasal BID    pantoprazole  40 mg Oral Daily    piperacillin-tazobactam (Zosyn) IV (PEDS and ADULTS) (extended infusion is not appropriate)  4.5 g Intravenous Q12H    rifAXIMin  550 mg Oral BID    vancomycin (VANCOCIN) IV (PEDS and ADULTS)  500 mg Intravenous Q24H    vancomycin  125 mg Oral Q6H       Current Intravenous Infusions   octreotide (SANDOSTATIN) infusion 50 mcg/hr (07/10/23 2327)         ROS   Unable to perform      Objective:       Intake/Output Summary (Last 24 hours) at 7/11/2023 0645  Last data filed at 7/11/2023 0530  Gross per 24 hour   Intake 1808.33 ml   Output 520 ml   Net 1288.33 ml           Vital Signs (Most Recent):  Temp: 97.6 °F (36.4 °C) (07/11/23 0300)  Pulse: 97 (07/11/23 0600)  Resp: 19 (07/11/23 0600)  BP: 106/79 (07/11/23 0600)  SpO2: 96 %  (07/11/23 0600)  Body mass index is 21.84 kg/m².  Weight: 73 kg (161 lb) Vital Signs (24h Range):  Temp:  [97.3 °F (36.3 °C)-97.6 °F (36.4 °C)] 97.6 °F (36.4 °C)  Pulse:  [] 97  Resp:  [17-28] 19  SpO2:  [92 %-97 %] 96 %  BP: ()/(73-87) 106/79     Physical Exam  Constitutional:       General: He is not in acute distress.     Appearance: He is ill-appearing. He is not toxic-appearing.   HENT:      Head: Normocephalic and atraumatic.   Cardiovascular:      Rate and Rhythm: Normal rate and regular rhythm.   Pulmonary:      Breath sounds: Rales present. No wheezing.   Chest:      Chest wall: No tenderness.   Abdominal:      General: Bowel sounds are normal. There is distension.   Musculoskeletal:         General: No tenderness or deformity. Normal range of motion.      Cervical back: Normal range of motion and neck supple. No rigidity.   Skin:     General: Skin is warm and dry.   Neurological:      Mental Status: He is disoriented.         Lines/Drains/Airways       Drain  Duration                  NG/OG Tube 07/09/23 1800 [place] sump 16 Fr. Right nostril 1 day         Urethral Catheter 07/09/23 0845 16 Fr. 1 day              Peripheral Intravenous Line  Duration                  Peripheral IV - Single Lumen 07/02/23 0826 20 G Anterior;Left Forearm 8 days         Peripheral IV - Single Lumen 07/07/23 2100 20 G Anterior;Right Forearm 3 days                    Significant Labs:    Lab Results   Component Value Date    WBC 17.62 (H) 07/11/2023    HGB 8.6 (L) 07/11/2023    HCT 25.9 (L) 07/11/2023    MCV 95.6 (H) 07/11/2023     (L) 07/11/2023           BMP  Lab Results   Component Value Date     (H) 07/11/2023    K 3.4 (L) 07/11/2023    CHLORIDE 110 (H) 07/11/2023    CO2 21 (L) 07/11/2023    BUN 26.3 (H) 07/11/2023    CREATININE 3.56 (H) 07/11/2023    CALCIUM 10.0 07/11/2023    AGAP 7.0 05/01/2023    ESTGFRAFRICA 105 06/08/2023    EGFRNONAA 33 06/17/2022         ABG  Recent Labs   Lab 07/10/23  0427    PH 7.430   PO2 68.0*   HCO3 25.2   POCBASEDEF 0.90         Mechanical Ventilation Support:  Oxygen Concentration (%): 50 (07/11/23 0000)      Significant Imaging:  I have reviewed the pertinent imaging within the past 24 hours.        Assessment/Plan:     Assessment  Decompensated alcoholic liver cirrhosis with ascites, hepatic encephalopathy, and hepatorenal syndrome  Acute hypoxic hypercapnic respiratory failure 2/2 volume overload vs hospital acquired pneumonia  Concern for C diff infection  Normocytic anemia  Hypokalemia and hypomagnesemia  History of alcohol withdrawal with seizures  History of seizure disorder     Plan  -Admitted to ICU for close monitoring given clinical decompensation and acute worsening of respiratory status  -Continue lactulose TID, rifaximine 550 BID, octreotide drip and midodrine 10 TID. MAP goal of 85 for HRS. MAPs have been stable on current regimen. Start levophed if needed to maintain MAP goals  -Nephrology on board, IVF stopped due to concerns for volume overload. Continue on albumin 25 BID. Monitor I's and O's, dela cruz in place.  -Continue BiPAP to main O2 sats >92%. Wean as tolerated.   -Has been on antibiotic regimens since admission. Initiated on Vancomycin, Zosyn and levaquin (7/8) due to concern for HAP. Levaquin stopped 7/9, MRSA PCR (-). Most recent blood cultures from 7/8 negative at 24 hours; all culture data (blood, body fluid) from this hospitalization have been negative  -Currently being treated for presumed C diff infection with PO vancomycin (D5 start date 7/7/23)  -Monitor electrolytes and replete as appropriate--repleting magnesium this AM, holding off on repleting K in setting of HRS  -Was on CIWA protocol for alcohol withdrawal- discontinued prn ativan at this time as he is out of the window for seizures from alcohol withdrawal and on BiPAP  -Continue home keppra for seizure disorder  -Will need to initiate goals of care discussions with family given lack of  improvement thus far on current therapy       DVT Prophylaxis: heparin 5000 BID  GI Prophylaxis: pantoprazole       32 minutes of critical care was time spent personally by me on the following activities: development of treatment plan with patient or surrogate and bedside caregivers, discussions with consultants, evaluation of patient's response to treatment, examination of patient, ordering and performing treatments and interventions, ordering and review of laboratory studies, ordering and review of radiographic studies, pulse oximetry, re-evaluation of patient's condition.  This critical care time did not overlap with that of any other provider or involve time for any procedures.     Michaela Sesay MD  Pulmonary Critical Care Medicine  [facility] - ICU

## 2023-07-11 NOTE — PROGRESS NOTES
Nephrology Progress Note    Hospital course:   58-year-old with alcoholic cirrhosis remains in intensive care unit octreotide midodrine.  Patient remains in intensive care unit.  Renal function continues to worsen.  Patient did have increased oxygen requirement pulmonary critical care physician has attributed the chest x-ray not as pulmonary edema but worsening pulmonary infiltrates.    Subjective:   Patient confused today.    Twelve point review of systems unobtainable as patient is very confused and lethargic    Objective:   /83   Pulse 104   Temp 97.6 °F (36.4 °C)   Resp (!) 26   Ht 6' (1.829 m)   Wt 73 kg (161 lb)   SpO2 100%   BMI 21.84 kg/m²     Intake/Output Summary (Last 24 hours) at 7/11/2023 1327  Last data filed at 7/11/2023 0530  Gross per 24 hour   Intake 968.33 ml   Output 520 ml   Net 448.33 ml        Physical exam:   General:  Patient is confused and lethargic  HEENT normocephalic atraumatic pupils equal round reactive to light, NG tube intact  Neck:  No JVD bruit thyromegaly  Lungs:  Expiratory wheezes appreciated  Cardiovascular: Tachycardic heart rate 104 no murmur rub or gallop appreciated  Abdomen:  Distended positive bowel sounds all 4 quadrants soft  Extremities no clubbing cyanosis or edema noted  Neurologic:  No clonus asterixis noted patient unable to comply with cranial nerve exam as he is lethargic and confused.  Patient is arousable but confused when arouse and unable to answer questions.    Laboratory data:   Recent Results (from the past 24 hour(s))   VANCOMYCIN, TROUGH    Collection Time: 07/10/23 11:32 PM   Result Value Ref Range    Vancomycin Trough 18.7 15.0 - 20.0 ug/ml   Comprehensive Metabolic Panel (CMP)    Collection Time: 07/11/23  4:23 AM   Result Value Ref Range    Sodium Level 147 (H) 136 - 145 mmol/L    Potassium Level 3.4 (L) 3.5 - 5.1 mmol/L    Chloride 110 (H) 98 - 107 mmol/L    Carbon Dioxide 21 (L) 22 - 29 mmol/L    Glucose Level 127 (H) 74 - 100 mg/dL     Blood Urea Nitrogen 26.3 (H) 8.4 - 25.7 mg/dL    Creatinine 3.56 (H) 0.73 - 1.18 mg/dL    Calcium Level Total 10.0 8.4 - 10.2 mg/dL    Protein Total 5.8 (L) 6.4 - 8.3 gm/dL    Albumin Level 3.9 3.5 - 5.0 g/dL    Globulin 1.9 (L) 2.4 - 3.5 gm/dL    Albumin/Globulin Ratio 2.1 (H) 1.1 - 2.0 ratio    Bilirubin Total 5.1 (H) <=1.5 mg/dL    Alkaline Phosphatase 169 (H) 40 - 150 unit/L    Alanine Aminotransferase <5 0 - 55 unit/L    Aspartate Aminotransferase 35 (H) 5 - 34 unit/L    eGFR 19 mls/min/1.73/m2   Magnesium    Collection Time: 07/11/23  4:23 AM   Result Value Ref Range    Magnesium Level 1.50 (L) 1.60 - 2.60 mg/dL   Phosphorus    Collection Time: 07/11/23  4:23 AM   Result Value Ref Range    Phosphorus Level 2.9 2.3 - 4.7 mg/dL   Protime-INR    Collection Time: 07/11/23  4:23 AM   Result Value Ref Range    PT 33.2 seconds    INR 3.38 (H) 0.00 - 1.30   Bilirubin, Direct    Collection Time: 07/11/23  4:23 AM   Result Value Ref Range    Bilirubin Direct 3.9 (H) 0.0 - <0.5 mg/dL   Uric Acid    Collection Time: 07/11/23  4:23 AM   Result Value Ref Range    Uric Acid 9.9 (H) 3.5 - 7.2 mg/dL   CBC with Differential    Collection Time: 07/11/23  4:31 AM   Result Value Ref Range    WBC 17.62 (H) 4.50 - 11.50 x10(3)/mcL    RBC 2.71 (L) 4.70 - 6.10 x10(6)/mcL    Hgb 8.6 (L) 14.0 - 18.0 g/dL    Hct 25.9 (L) 42.0 - 52.0 %    MCV 95.6 (H) 80.0 - 94.0 fL    MCH 31.7 (H) 27.0 - 31.0 pg    MCHC 33.2 33.0 - 36.0 g/dL    RDW 17.4 (H) 11.5 - 17.0 %    Platelet 103 (L) 130 - 400 x10(3)/mcL    MPV 11.1 (H) 7.4 - 10.4 fL    Neut % 80.6 %    Lymph % 6.7 %    Mono % 9.5 %    Eos % 1.8 %    Basophil % 0.5 %    Lymph # 1.18 0.6 - 4.6 x10(3)/mcL    Neut # 14.21 (H) 2.1 - 9.2 x10(3)/mcL    Mono # 1.68 (H) 0.1 - 1.3 x10(3)/mcL    Eos # 0.31 0 - 0.9 x10(3)/mcL    Baso # 0.09 <=0.2 x10(3)/mcL    IG# 0.15 (H) 0 - 0.04 x10(3)/mcL    IG% 0.9 %    NRBC% 0.0 %   Protein/Creatinine Ratio, Urine    Collection Time: 07/11/23  8:52 AM   Result Value  Ref Range    Urine Protein Level 107.8 mg/dL    Urine Creatinine 70.3 63.0 - 166.0 mg/dL    Urine Protein/Creatinine Ratio 1.5    Uric Acid, Random Urine    Collection Time: 07/11/23  8:52 AM   Result Value Ref Range    Urine Uric Acid 34.6 mg/dL   Chloride, Random Urine    Collection Time: 07/11/23  8:52 AM   Result Value Ref Range    Urine Chloride 69.4 mmol/L   Potassium, Random Urine    Collection Time: 07/11/23  8:52 AM   Result Value Ref Range    Urine Potassium 75 mmol/L   Sodium, Random Urine    Collection Time: 07/11/23  8:52 AM   Result Value Ref Range    Urine Sodium 31.7 mmol/L   Creatinine, Random Urine    Collection Time: 07/11/23  8:52 AM   Result Value Ref Range    Urine Creatinine 70.5 63.0 - 166.0 mg/dL       Imaging:   Imaging Results              CT Abdomen Pelvis  Without Contrast (Final result)  Result time 06/30/23 19:20:57      Final result by Raman Tiwari MD (06/30/23 19:20:57)                   Impression:      1. Trace of left pleural effusion.    2. Previous cholecystectomy.    3. Cirrhosis and ascites.    4.  Bilateral kidneys non occluding calculi.    5.  Noninflamed diverticulosis coli.      Electronically signed by: Raman Tiwari  Date:    06/30/2023  Time:    19:20               Narrative:    EXAMINATION:  CT ABDOMEN PELVIS WITHOUT CONTRAST    CLINICAL HISTORY:  Abdominal pain, acute, nonlocalized;    TECHNIQUE:  Multidetector axial images were obtained from the  diaphragms to below symphysis pubis without the administration of IV contrast. Oral contrast was not administered.    Dose length product of 309 mGycm. Automated exposure control was utilized to minimize radiation dose.    COMPARISON:  June 4, 2023    FINDINGS:  There is trace of left dependent pleural effusion and left basilar atelectasis.    Liver is small in size consistent with cirrhosis.  There is moderate to large volume intraperitoneal free fluid consistent with ascites.  There are mesenteric inflammatory  ground-glass opacities.  Within limitations of noncontrast technique, no acute findings liver, pancreas or the spleen identified.  Gallbladder is surgically absent..    The adrenal glands noncontrast evaluation is unremarkable. The kidneys are unremarkable in size and contour.  Bilateral kidneys non occluding calculi. The ureters appear normal in course and diameter without intra ureteral stone.  Calcified plaques of the aorta and iliac arteries without aneurysmal dilatation.  There are no retroperitoneal periaortic enlarged lymph nodes.    Stomach is decompressed and difficult to assess.  No abnormal dilatation of loops of small bowel.  Pericecal metallic clips suggest prior appendectomy.  There is noninflamed diverticulosis coli.  No bowel obstruction.  No pneumoperitoneum.    Urinary bladder is mostly decompressed.  No intravesical stone identified. There is no pelvic free fluid.    Chronic compression deformities of the thoracolumbar vertebrae.  Demineralization of the bones.  No acute or aggressive skeletal abnormality.                                       X-Ray Chest AP Portable (Final result)  Result time 06/30/23 12:28:05      Final result by Raj Gibbs MD (06/30/23 12:28:05)                   Impression:      No acute findings.      Electronically signed by: Raj Gibbs  Date:    06/30/2023  Time:    12:28               Narrative:    EXAMINATION:  XR CHEST AP PORTABLE    CLINICAL HISTORY:  Unspecified abdominal pain    COMPARISON:  14 June 2023    FINDINGS:  Portable frontal view of the chest was obtained. The heart is not significantly enlarged.  There are chronic changes towards the left lung base.  No new focal consolidation or pneumothorax.                                       Medications:     Current Facility-Administered Medications:     0.9%  NaCl infusion (for blood administration), , Intravenous, Q24H PRN, Michaela Sesay MD    acetaminophen tablet 650 mg, 650 mg, Oral, Q6H PRN, Zeeshan  MD Anna, 650 mg at 07/07/23 1225    dextrose 10% bolus 125 mL 125 mL, 12.5 g, Intravenous, PRN, Palomo Helton DO    dextrose 10% bolus 250 mL 250 mL, 25 g, Intravenous, PRN, Palomo Helton,     diphenhydrAMINE capsule 25 mg, 25 mg, Oral, Q6H PRN, Te Lui MD, 25 mg at 07/05/23 2024    glucagon (human recombinant) injection 1 mg, 1 mg, Intramuscular, PRN, Palomo Helton DO    glucose chewable tablet 16 g, 16 g, Oral, PRN, Palomo Helton,     glucose chewable tablet 24 g, 24 g, Oral, PRN, Palomo Helton, DO    heparin (porcine) injection 5,000 Units, 5,000 Units, Subcutaneous, Q12H, Palomo Helton DO, 5,000 Units at 07/11/23 0811    hydrocortisone 1 % cream, , Topical (Top), BID, Te Lui MD, Given at 07/11/23 0810    lactulose 20 gram/30 mL solution Soln 20 g, 20 g, Per NG tube, TID, Juanito Gorman, DO, 20 g at 07/11/23 0810    levETIRAcetam tablet 750 mg, 750 mg, Oral, BID, Keely Garcia MD, 750 mg at 07/11/23 0810    LIDOcaine (PF) 10 mg/ml (1%) injection 100 mg, 10 mL, Other, Once, Keely Garcia MD    midodrine tablet 10 mg, 10 mg, Oral, TID WM, Michaela Sesay MD, 10 mg at 07/11/23 1144    mupirocin 2 % ointment, , Nasal, BID, Kip Avalos MD, Given at 07/11/23 0811    naloxone 0.4 mg/mL injection 0.02 mg, 0.02 mg, Intravenous, PRN, Palomo Helton DO    octreotide (SANDOSTATIN) 500 mcg in sodium chloride 0.9% 100 mL infusion, 50 mcg/hr, Intravenous, Continuous, Katie Smith MD, Last Rate: 10 mL/hr at 07/11/23 1100, 50 mcg/hr at 07/11/23 1100    ondansetron injection 4 mg, 4 mg, Intravenous, Q8H PRN, Palomo Helton DO, 4 mg at 07/05/23 2024    pantoprazole EC tablet 40 mg, 40 mg, Oral, Daily, Chico Malave MD, 40 mg at 07/11/23 0811    piperacillin-tazobactam (ZOSYN) 4.5 g in dextrose 5 % in water (D5W) 5 % 100 mL IVPB (MB+), 4.5 g, Intravenous, Q12H, Zeeshan Castillo MD, Last Rate: 25 mL/hr at 07/11/23 1054, 4.5 g at 07/11/23 1054    rifAXIMin tablet 550 mg, 550 mg, Oral, BID, Katie Smith MD,  550 mg at 07/11/23 0810    sodium chloride 0.9% flush 10 mL, 10 mL, Intravenous, Q12H PRN, Palomo Helton, DO    sodium chloride 0.9% flush 10 mL, 10 mL, Intravenous, PRN, Katie Smith MD    vancomycin 125 mg/5 mL oral solution 125 mg, 125 mg, Oral, Q6H, Chico Malave MD, 125 mg at 07/11/23 1144     Impression/Plan:  Acute renal failure nonoliguric with urine output of 520 mL recorded in 24 hours.  The definition of oliguric is less than 450 mL of urine in a 24 hour.  Renal function however continues to worsen.  At this point patient is likely developing hepatorenal syndrome.  I do agree with octreotide and midodrine to keep blood pressure elevated and to maintain a map of 83-85 per gastroenterologist recommendations.  There are no indications for acute hemodialysis today.  Indication for dialysis would not be ultrafiltration as patient does not have peripheral edema.  The main indication for hemodialysis would be uremic toxin clearance.  However, is uncertain whether family would want to proceed with an invasive procedure such as dialysis patient has an elevated INR and this will be challenging for hemodialysis catheter placement also patient's blood pressure is tenuous and we would need to cautiously dialyze patient for uremic toxin clearance if he is hemodynamically stable enough.  Again there is no acute indication for renal replacement therapy today.   Metabolic acidosis will repeat CMP if persists or worsens will address with either lactated Ringer's or sodium bicarbonate therapy.  Hypomagnesemia agree with repletion.  Will continue to monitor patient's electrolyte status.  Hypokalemia:  Will recheck CMP further decrease will replete as needed.  Mary Kay Dee M.D.  Nephrology    Addendum:  Reviewed repeat labs: place patient of D5 1/2 ns with 100 meq of sodium bicarbonate. Replace potassium with 40 meq x 1 over 4 hours and replace magnesium. Will attempt to contact patient's family and discuss their  wishes for therapy moving forward. Notes from other services mention discussing goals of care with family and possibly hospice. I will contact other services to formulate plan going forward.  Mary Kay Dee M.D.  Nephrology    Addendum:   Spoke with Ms. St, Mr Alatorre's girlfriend of 17 years. She relates patient has a son, but they have not spoken in over 30 years. There  is no one available decision make at this time.  I wanted to discuss code status and to discuss whether family would want invasive measures. Also, I inquired as to whether patient had living will.  Ms St said she was unaware of living will. Prognosis guarded.   Mary Kay Dee M.D.  Nephrology

## 2023-07-12 NOTE — PROGRESS NOTES
Reached out to SO, Natividad St, again to request fly contact information. SO provided sister: Alexia Alatorre [phone], which was shared with Dr. Katie Smith.

## 2023-07-12 NOTE — TELEPHONE ENCOUNTER
"Pt's spouse called stating that pt is in icu with his liver and kidney's and they are talking about putting him on dialysis. Pt's spouse is requesting a call back from you she states "this is very important."  "

## 2023-07-12 NOTE — PROGRESS NOTES
Pt remains NPO; encephalopathic; hx alcoholic liver cirrhosis/ascites. Acknowledge initiation of recommended enteral feedings; pt has NGT.   Estimated needs: 2040 kcal; 82 gm protein  Labs (7-12) H/H 8.1/24.7(L) Gluc 94 Bun 29 Cr 3.6(H) GFR 19(L) Alk Phos 191, Tbili 5.3(H)  Pt remains with altered liver/renal fx.   Pt currently receiving TF/ fibersource HN @ 20ml/hr--providing 552 calories, 25 gm protein. Current rate is meeting 27% estimated calorie needs; 30% protein needs.   Rec : Continue to increase TF--Fibersource HN to goal rate 70ml/hr(23 hr cycle)  to meet estimated needs--1932 calories, 87 gm protein, 1300 ml free water. Flush tube with 50 ml water every 4 hrs.   Will monitor TF progression/tolerance/ nutrition status.

## 2023-07-12 NOTE — PROGRESS NOTES
[facility] - ICU  Pulmonary Critical Care Note    Patient Name: Los Alatorre  MRN: [id number]  Admission Date: 6/30/2023  Hospital Length of Stay: 12 days  Code Status: Full Code  Attending Provider: Kip Avalos MD  Primary Care Provider: DECLAN Small     Subjective:     HPI:   Los Alatorre is a 58 y.o. male who with a history of alcoholic cirrhosis, alcohol use disorder, and seizure disorder who presented to [facility] ED on 6/30/2023 with a primary complaint of abdominal pain. He reports poor PO intake and has felt his abdomen become more distended. Does not report any fevers or chills. Denies any melena, bright red blood in the stool, or any vomiting. Was recently transferred to [facility] earlier this month for an EGD, which ruled out varices and gastropathy. Unknown if he has been taking his home medications reliably.       In the ED patient was afebrile 100.2 F, pulse 96, respiratory rate 4, blood pressure 137/83, 87% on room air.  CBC revealed leukocytosis at 23, H and H 11.7/35.7, platelets 286.  CMP revealed mild hyponatremia 132, acidosis 17, BUN/CR 0.65/0.88.  Alkaline phosphatase elevated at 337, slight AST elevation at 56, INR 1.3.  Initial lactic acid 3.2, troponin negative. CT abdomen pelvis revealed trace left pleural effusion, cirrhosis ascites, previous cholecystectomy, bilateral kidneys with non occluding calculi, and noninflamed diverticulosis coli.  Paracentesis was performed and 5 L of fluid was drained.      Hospital Course/Significant events:  Patient admitted for alcoholic liver cirrhosis with ascites and CHRISTEN that was initially due to intravascular volume depletion with appropriate response to fluids and albumin, but later developed hepatorenal syndrome, with worsening renal indices despite appropriate medical therapy. From hospital day 7-9, patient's mentation and renal indices have been worsening, and overnight hospital day 8 (7/9/23), developed worsening respiratory status  requiring BiPAP (see oncall event note 7/8), prompting upgrade to ICU for close monitoring    24 Hour Interval History:  Patient remains encephalopathic. Eulalia is on BiPAP 12/6 50% FiO2. MAPs overnight ranges from . Total 24 hr  cc, net positive +1415.  Had 4 bowel movements in total for the past 24 hrs. Asterixis prominent on both hands, currently restrained as he tries to pull out his lines. Labs this AM revealed downtrending white count 15.19, hgb 8.1. PT 35.2, INR 3.65, hypernetremia 149, hypokalemia 3.2, renal indices worsening with BUN 29.4/crea 3.61,  Issues remain with determining who the surrogate decision maker is; patient and SO are not , and she is unable to provide contact information for other family    Past Medical History:   Diagnosis Date    Seizures        Past Surgical History:   Procedure Laterality Date    APPENDECTOMY      CHOLECYSTECTOMY         Social History     Socioeconomic History    Marital status:     Number of children: 1   Occupational History    Occupation: Employed   Tobacco Use    Smoking status: Every Day     Packs/day: 1.50     Types: Cigarettes    Smokeless tobacco: Never   Substance and Sexual Activity    Alcohol use: Not Currently     Comment: no alcohol x 2 month    Drug use: Not Currently     Types: Marijuana     Comment: No Marijuana x 2 month     Social Determinants of Health     Financial Resource Strain: High Risk    Difficulty of Paying Living Expenses: Very hard   Food Insecurity: Food Insecurity Present    Worried About Running Out of Food in the Last Year: Sometimes true    Ran Out of Food in the Last Year: Sometimes true   Transportation Needs: No Transportation Needs    Lack of Transportation (Medical): No    Lack of Transportation (Non-Medical): No   Physical Activity: Inactive    Days of Exercise per Week: 0 days    Minutes of Exercise per Session: 0 min   Social Connections: Moderately Isolated    Frequency of Communication with Friends  and Family: Three times a week    Frequency of Social Gatherings with Friends and Family: More than three times a week    Attends Confucianism Services: Never    Active Member of Clubs or Organizations: No    Attends Club or Organization Meetings: Never    Marital Status: Living with partner   Housing Stability: High Risk    Unable to Pay for Housing in the Last Year: Yes    Number of Places Lived in the Last Year: 1    Unstable Housing in the Last Year: No           Current Outpatient Medications   Medication Instructions    LACTULOSE ORAL Oral, 2 times daily    levETIRAcetam (KEPPRA) 750 mg, Oral, 2 times daily    polyethylene glycol (GLYCOLAX) 17 g, Oral, Daily PRN       Current Inpatient Medications   heparin (porcine)  5,000 Units Subcutaneous Q12H    hydrocortisone   Topical (Top) BID    lactulose  20 g Per NG tube TID    levETIRAcetam  750 mg Oral BID    LIDOcaine (PF) 10 mg/ml (1%)  10 mL Other Once    midodrine  10 mg Oral TID WM    mupirocin   Nasal BID    pantoprazole  40 mg Oral Daily    piperacillin-tazobactam (Zosyn) IV (PEDS and ADULTS) (extended infusion is not appropriate)  4.5 g Intravenous Q12H    rifAXIMin  550 mg Oral BID    vancomycin  125 mg Oral Q6H       Current Intravenous Infusions   octreotide (SANDOSTATIN) infusion 50 mcg/hr (07/11/23 1100)    sodium bicarbonate drip 100 mL/hr at 07/12/23 0616         ROS   Unable to perform      Objective:       Intake/Output Summary (Last 24 hours) at 7/12/2023 0655  Last data filed at 7/12/2023 0500  Gross per 24 hour   Intake 1915.42 ml   Output 500 ml   Net 1415.42 ml           Vital Signs (Most Recent):  Temp: 98.8 °F (37.1 °C) (07/12/23 0400)  Pulse: (!) 114 (07/12/23 0600)  Resp: (!) 21 (07/12/23 0600)  BP: 127/87 (07/12/23 0600)  SpO2: 99 % (07/12/23 0600)  Body mass index is 21.84 kg/m².  Weight: 73 kg (161 lb) Vital Signs (24h Range):  Temp:  [97.5 °F (36.4 °C)-98.8 °F (37.1 °C)] 98.8 °F (37.1 °C)  Pulse:  [] 114  Resp:  [19-31] 21  SpO2:   [93 %-100 %] 99 %  BP: ()/(61-94) 127/87     Physical Exam  Constitutional:       General: He is not in acute distress.     Appearance: He is ill-appearing. He is not toxic-appearing.   HENT:      Head: Normocephalic and atraumatic.   Cardiovascular:      Rate and Rhythm: Normal rate and regular rhythm.   Pulmonary:      Breath sounds: Rales present. No wheezing.   Chest:      Chest wall: No tenderness.   Abdominal:      General: Bowel sounds are normal. There is distension.   Musculoskeletal:         General: No tenderness or deformity. Normal range of motion.      Cervical back: Normal range of motion and neck supple. No rigidity.   Skin:     General: Skin is warm and dry.   Neurological:      Mental Status: He is disoriented.         Lines/Drains/Airways       Drain  Duration                  NG/OG Tube 07/09/23 1800 [place] sump 16 Fr. Right nostril 2 days         Urethral Catheter 07/09/23 0845 16 Fr. 2 days              Peripheral Intravenous Line  Duration                  Peripheral IV - Single Lumen 07/07/23 2100 20 G Anterior;Right Forearm 4 days         Peripheral IV - Single Lumen 07/12/23 0200 20 G Anterior;Right Forearm <1 day                    Significant Labs:    Lab Results   Component Value Date    WBC 15.19 (H) 07/12/2023    HGB 8.1 (L) 07/12/2023    HCT 24.7 (L) 07/12/2023    MCV 96.5 (H) 07/12/2023    PLT 92 (L) 07/12/2023           BMP  Lab Results   Component Value Date     (H) 07/12/2023    K 3.2 (L) 07/12/2023    CHLORIDE 110 (H) 07/12/2023    CO2 20 (L) 07/12/2023    BUN 29.4 (H) 07/12/2023    CREATININE 3.61 (H) 07/12/2023    CALCIUM 10.0 07/12/2023    AGAP 7.0 05/01/2023    ESTGFRAFRICA 105 06/08/2023    EGFRNONAA 33 06/17/2022         ABG  Recent Labs   Lab 07/12/23  0514   PH 7.380   PO2 65.0*   HCO3 25.4   POCBASEDEF 0.20         Mechanical Ventilation Support:  Oxygen Concentration (%): 50 (07/12/23 0000)      Significant Imaging:  I have reviewed the pertinent imaging  within the past 24 hours.        Assessment/Plan:     Assessment  Decompensated alcoholic liver cirrhosis with ascites, hepatic encephalopathy, and hepatorenal syndrome  Acute hypoxic hypercapnic respiratory failure 2/2 volume overload vs hospital acquired pneumonia  Concern for C diff infection  Normocytic anemia  Hypokalemia   History of alcohol withdrawal with seizures  History of seizure disorder     Plan  -Admitted to ICU for close monitoring given clinical decompensation and acute worsening of respiratory status  -Continue lactulose TID, rifaximine 550 BID, octreotide drip and midodrine 10 TID. MAP goal of 85 for HRS. MAPs have been stable on current regimen. Start levophed if needed to maintain MAP goals  -GI on board, recommending tube feeds if needed; comfort care if not candidate for rrt.  -Nephrology on board, no acute indications for HD. placed on D5 1/2 NS with 2A bicarb for metabolic acidosis Continue on albumin 25 BID. Monitor I's and O's, dela cruz in place.  -Continue BiPAP to main O2 sats >92%. ABG normal this morning, can attempt to wean bipap.   -Has been on antibiotic regimens since admission. Initiated on Vancomycin, Zosyn and levaquin (7/8) due to concern for HAP. Levaquin stopped 7/9, IV vanc stopped 7/10.. Most recent blood cultures from 7/8 negative at 72 hours; all culture data (blood, body fluid) from this hospitalization have been negative  -Currently being treated for presumed C diff infection with PO vancomycin (D6 start date 7/7/23)  -Monitor electrolytes and replete as appropriate  -CIWA protocol discontinued, no longer in window for seizures 2/2 alcohol withdrawal  -Continue home keppra for seizure disorder  -Ideally would need to have goals of care discussion, but with no immediate kin to make medical decisions at this time.      DVT Prophylaxis: heparin 5000 BID  GI Prophylaxis: pantoprazole      32 minutes of critical care was time spent personally by me on the following activities:  development of treatment plan with patient or surrogate and bedside caregivers, discussions with consultants, evaluation of patient's response to treatment, examination of patient, ordering and performing treatments and interventions, ordering and review of laboratory studies, ordering and review of radiographic studies, pulse oximetry, re-evaluation of patient's condition.  This critical care time did not overlap with that of any other provider or involve time for any procedures.     Katie Smith MD  Pulmonary Critical Care Medicine  [facility] - ICU

## 2023-07-12 NOTE — CARE UPDATE
Case management was able to provide a contact number for patient's sister Alexia Alatorre, [phone]. Attempted to reach out to her twice to discuss goals of care; however, phone calls were unanswered. Will continue trying to reach her to discuss goals of care, and continue efforts to reach next of kin    Katie Smith MD  [facility] Internal Medicine, PGY-3

## 2023-07-12 NOTE — PROGRESS NOTES
[facility]  Nephrology Progress Note    Patient Name: Los Alatorre  Age: 58 y.o.  : 1965  MRN: [id number]  Admission Date: 2023    Chief complaint: Abdominal Pain (Abd pain , sent from clinic)    Hospital course  Los Alatorre is a 58 y.o. White male with past medical history of alcoholic cirrhosis of the liver and seizure disorder who was admitted on 2023 with complaints of abdominal pain and distention.  Over the course of hospitalization, patient developed leukocytosis and acute kidney injury.  He was upgraded to the intensive care unit due to increased oxygen requirements and encephalopathy.  Nephrology is continuing to follow for CHRISTEN.    Subjective  Patient is confused, restless.     Review of Systems  Unable to obtain due to patient's condition.     Objective  /85   Pulse 109   Temp 97.6 °F (36.4 °C) (Axillary)   Resp (!) 27   Ht 6' (1.829 m)   Wt 73 kg (161 lb)   SpO2 96%   BMI 21.84 kg/m²     Intake/Output Summary (Last 24 hours) at 2023 0907  Last data filed at 2023 0500  Gross per 24 hour   Intake 1915.42 ml   Output 500 ml   Net 1415.42 ml       Physical Exam  General appearance: Patient is on NIPPV  HEENT: PERRLA, EOMI, no scleral icterus, no JVD. Neck is supple.  Chest: Patient is on NIPPV, he is mildly tachypneic,   + coarse crackles bilaterally  Heart: S1, S2.   Abdomen: + tense ascites.  : Deferred.  Extremities: No edema, peripheral pulses are palpable.   Neuro:  Slightly lethargic but easily arousable    Medications    Current Facility-Administered Medications:     0.9%  NaCl infusion (for blood administration), , Intravenous, Q24H PRN, Michaela Sesay MD    acetaminophen tablet 650 mg, 650 mg, Oral, Q6H PRN, Zeeshan Castillo MD, 650 mg at 23 1225    dextrose 10% bolus 125 mL 125 mL, 12.5 g, Intravenous, PRN, Palomo Yeh, DO    dextrose 10% bolus 250 mL 250 mL, 25 g, Intravenous, PRN, Palomo Yeh, DO    diphenhydrAMINE  capsule 25 mg, 25 mg, Oral, Q6H PRN, Te Lui MD, 25 mg at 07/05/23 2024    glucagon (human recombinant) injection 1 mg, 1 mg, Intramuscular, PRN, Palomo Helton DO    glucose chewable tablet 16 g, 16 g, Oral, PRN, Palomo Helton DO    glucose chewable tablet 24 g, 24 g, Oral, PRN, Palomo Helton DO    heparin (porcine) injection 5,000 Units, 5,000 Units, Subcutaneous, Q12H, Palomo Helton DO, 5,000 Units at 07/12/23 0821    hydrocortisone 1 % cream, , Topical (Top), BID, Te Lui MD, Given at 07/11/23 2010    lactulose 20 gram/30 mL solution Soln 20 g, 20 g, Per NG tube, TID, Juanito Gorman, DO, 20 g at 07/12/23 0821    levETIRAcetam tablet 750 mg, 750 mg, Oral, BID, Keely Garcia MD, 750 mg at 07/12/23 0821    LIDOcaine (PF) 10 mg/ml (1%) injection 100 mg, 10 mL, Other, Once, Keely Garcia MD    midodrine tablet 10 mg, 10 mg, Oral, TID WM, Michaela Sesay MD, 10 mg at 07/12/23 0821    mupirocin 2 % ointment, , Nasal, BID, Kip Avalos MD, Given at 07/12/23 0822    naloxone 0.4 mg/mL injection 0.02 mg, 0.02 mg, Intravenous, PRN, Palomo Helton DO    octreotide (SANDOSTATIN) 500 mcg in sodium chloride 0.9% 100 mL infusion, 50 mcg/hr, Intravenous, Continuous, Katie Smith MD, Last Rate: 10 mL/hr at 07/12/23 0715, 50 mcg/hr at 07/12/23 0715    ondansetron injection 4 mg, 4 mg, Intravenous, Q8H PRN, Palomo Helton DO, 4 mg at 07/05/23 2024    pantoprazole EC tablet 40 mg, 40 mg, Oral, Daily, Chico Malave MD, 40 mg at 07/12/23 0821    piperacillin-tazobactam (ZOSYN) 4.5 g in dextrose 5 % in water (D5W) 5 % 100 mL IVPB (MB+), 4.5 g, Intravenous, Q12H, Zeeshan Castillo MD, Stopped at 07/12/23 0306    potassium chloride 10 mEq in 100 mL IVPB, 10 mEq, Intravenous, Q1H, Katie Smith MD    rifAXIMin tablet 550 mg, 550 mg, Oral, BID, Katie Smith MD, 550 mg at 07/12/23 0821    sodium bicarbonate 100 mEq in dextrose 5 % (D5W) 1,100 mL infusion, , Intravenous, Continuous, Mray Kay Dee MD, Last  Rate: 100 mL/hr at 07/12/23 0616, New Bag at 07/12/23 0616    sodium chloride 0.9% flush 10 mL, 10 mL, Intravenous, Q12H PRN, Palomo Helton,     sodium chloride 0.9% flush 10 mL, 10 mL, Intravenous, PRN, Katie Smith MD    vancomycin 125 mg/5 mL oral solution 125 mg, 125 mg, Oral, Q6H, Chico Malave MD, 125 mg at 07/12/23 0535     Imaging:    Reviewed    Laboratory Data:  Hematology  Lab Results   Component Value Date    WBC 15.19 (H) 07/12/2023    HGB 8.1 (L) 07/12/2023    HCT 24.7 (L) 07/12/2023    PLT 92 (L) 07/12/2023     (H) 07/12/2023    K 3.2 (L) 07/12/2023    CHLORIDE 110 (H) 07/12/2023    CO2 20 (L) 07/12/2023    BUN 29.4 (H) 07/12/2023    CREATININE 3.61 (H) 07/12/2023    EGFRNORACEVR 19 07/12/2023    GLUCOSE 94 07/12/2023    CALCIUM 10.0 07/12/2023    ALKPHOS 191 (H) 07/12/2023    LABPROT 6.3 (L) 07/12/2023    ALBUMIN 4.0 07/12/2023    BILIDIR 3.9 (H) 07/11/2023    IBILI 0.10 02/16/2022    AST 41 (H) 07/12/2023    ALT 6 07/12/2023    MG 2.10 07/12/2023    PHOS 2.6 07/12/2023     Lab Results   Component Value Date    XJMBNFYG29TJ 56.6 06/14/2023       Lab Results   Component Value Date    IRON 58 06/05/2023    TIBC 150 (L) 06/05/2023    TRANS 163 (L) 06/04/2023    TRANS 163 (L) 06/04/2023    LABIRON 31 06/04/2023    FERRITIN 122.2 06/05/2023    FOLATE 6.8 (L) 06/04/2023    XHDERHGS06 >2,000 (H) 06/04/2023    HAPTO 55 07/07/2023     07/07/2023       Lab Results   Component Value Date    HIV Nonreactive 06/14/2023    HEPCAB Nonreactive 06/30/2023    HEPBSURFAG Nonreactive 06/30/2023    HEPBSAB Nonreactive 07/06/2023         Impression  Acute kidney injury   Metabolic acidosis   Hypernatremia  Hypokalemia   Anemia   Leukocytosis   Coagulopathy  Cirrhosis of the liver  Ascites    Plan  There is no significant improvement in kidney function yet.  However, there are no emergent indications for renal replacement therapy (those include refractory acidosis, hyperkalemia, uremia).  Patient is a  poor candidate candidate for RRT due to coagulopathy and overall poor prognosis.  Continue supportive care.  Potassium replacement is in progress.  Continue bicarbonate drip, will add free water to tube feedings due to hypernatremia.    Radha Perez NP  [facility] Neph[facility]   7/12/2023      Addendum:  Hypernatremia: Changed IVF to D5 with 100 meq sodium bicarbonate. Replaced magnesium yesterday afternoon and potassium. Potassium replacement in place this am. Agree with free water flushes and will recheck CMP later today to reassess .Will discuss goals of care with primary team and Dr Ingram. Continue volume expansion as tolerated and agree with maintaining MAP per GI recommendations. Cr slightly improvement, but overall no significant improvement. No acute indications today for RRT. Spoke with patient's girlfriend of 17 years yesterday and she did not have phone number for patient's son . She related that they have not spoken in 30 years. She is unaware of any advanced directive or living will for patient.  Mary Kay Dee M.D.  Nephrology

## 2023-07-12 NOTE — PROGRESS NOTES
[facility] GI Progress Note    Patient Name: Los Alatorre  MRN: [id number]  Admission Date: 6/30/2023  Hospital Length of Stay: 12 days  Code Status: Full Code  Attending Provider: Kip Avalos MD  Primary Care Provider: DECLAN Small     Subjective:      Brief HPI:  Los Alatorre is a 58 y.o. male who  has a past medical history of Seizures.  He presented to [facility] on 6/30/2023  with a primary complaint of abdominal pain, and distension.  Patient says that when he was discharged from our [facility] from [place] that he was compliant with all his medications and that he was having adequate amount of bowel movements.  Patient was discharged on rifaximin 550 b.i.d., lactulose 20 mg b.i.d..  However he said that he has little bowel movements however they were in quantity.  Patient says that he felt good for proximally 1 week however he cannot work anymore.  He said that his abdomen got so distended that he had decreased p.o. intake and had pain.  Of note patient says that he has stopped drinking for past 2 months.  He says that he was a heavy drinker since he was 19 years old.  He says that he drinks for the past 1-2 years approximately a half to 3/4 of a 5th of vodka.  Patient says that he has been taking ibuprofen for his restless legs for quite a time.  He says that he has numbness in his bilateral lower extremities when he is sleep at night.  He says he did not try put in over side of bed.  He also denies any limb claudication or numbness or tingling all ambulation.  Patient follows up with [facility].  Patient says he is currently having 4 bowel movements per day even when he was de-escalated to 10 mg b.i.d..  He is alert and oriented x3.        Interval History:  Per report patient had 1-2 bowel movements overnight, 350 cc of urine output as well.  Patient remains oliguric with 500 cc of urine output in last 24 hours.  Patient is positive 1.4 liters the past 24 hours  well.  Patient is + 10.8 liters since admission.  Patient was trialed on Ventimask however patient had tachypnea in the oxygenation and was put on back BiPAP 12 6 at 50 percent FiO2.  ABG 7.22/69/61/28.2  The remainder of the 14 system ROS is noncontributory or negative unless mentioned/reviewed above.     Objective:     Vital Signs:  Vital Signs (Most Recent):  Temp: 97.6 °F (36.4 °C) (07/12/23 1115)  Pulse: 89 (07/12/23 1300)  Resp: (!) 22 (07/12/23 1300)  BP: 92/67 (07/12/23 1300)  SpO2: (!) 86 % (07/12/23 1300)  Body mass index is 21.84 kg/m².  Weight: 73 kg (161 lb) Vital Signs (24h Range):  Temp:  [97.6 °F (36.4 °C)-98.8 °F (37.1 °C)] 97.6 °F (36.4 °C)  Pulse:  [] 89  Resp:  [20-27] 22  SpO2:  [86 %-100 %] 86 %  BP: ()/(61-99) 92/67       Input/output:     Intake/Output Summary (Last 24 hours) at 7/12/2023 1421  Last data filed at 7/12/2023 0500  Gross per 24 hour   Intake 1915.42 ml   Output 500 ml   Net 1415.42 ml       Physical Examination:  General:  On 12/06 BiPAP 50% FiO2.  Patient is able to nod to my questions  Head: Normocephalic,   Eyes: PERRL, EOMI, icteric sclera  Throat: No posterior pharyngeal erythema or exudate, no tonsillar exudate  Neck: supple, normal ROM, no JVD  CVS:  RRR, S1 and S2 normal, no murmurs, no added heart sounds, rubs, gallops, regular peripheral pulses, and no peripheral edema  Resp:  Lungs distant with inspiratory crackles and expiratory wheezing with prolonged expiratory phase  GI:  Abdomen soft, non-tender, +distended, normoactive bowel sounds  MSK:  Full range of motion, no obvious deformities   Skin:  Spider angiomata notice diffusely on the body  Neuro:  Patient is encephalopathic   Psych:  Patient is not alert oriented x3      Lines/Drains/Airways       None                    Laboratory:    Recent Labs   Lab 07/10/23  0405 07/11/23  0431 07/12/23  0340   WBC 21.80* 17.62* 15.19*   RBC 2.82* 2.71* 2.56*   HGB 9.0* 8.6* 8.1*   HCT 27.1* 25.9* 24.7*   MCV  96.1* 95.6* 96.5*   MCH 31.9* 31.7* 31.6*   MCHC 33.2 33.2 32.8*   RDW 17.4* 17.4* 18.0*   * 103* 92*   MPV 11.0* 11.1* 11.9*      Recent Labs   Lab 07/10/23  0405 07/10/23  0427 07/11/23  0423 07/11/23  1404 07/12/23  0340 07/12/23  0514 07/12/23  1323   *  --  147* 147* 149*  --   --    K 3.8  --  3.4* 3.2* 3.2*  --   --    CHLORIDE 109*  --  110* 111* 110*  --   --    CO2 22  --  21* 20* 20*  --   --    BUN 23.1  --  26.3* 28.9* 29.4*  --   --    CREATININE 3.32*  --  3.56* 3.77* 3.61*  --   --    CALCIUM 9.7  --  10.0 10.1 10.0  --   --    PH  --  7.430  --   --   --  7.380 7.220*   MG 1.60  --  1.50*  --  2.10  --   --    ALBUMIN 3.9  --  3.9 4.0 4.0  --   --    ALKPHOS 132  --  169* 167* 191*  --   --    ALT 5  --  <5 5 6  --   --    AST 28  --  35* 34 41*  --   --    BILITOT 4.8*  --  5.1* 5.1* 5.3*  --   --         Other Results:    Current Medications:     Infusions:   octreotide (SANDOSTATIN) infusion 50 mcg/hr (07/12/23 0715)    sodium bicarbonate drip 100 mL/hr at 07/12/23 0616         Scheduled:   heparin (porcine)  5,000 Units Subcutaneous Q12H    hydrocortisone   Topical (Top) BID    lactulose  20 g Per NG tube TID    levETIRAcetam  750 mg Oral BID    LIDOcaine (PF) 10 mg/ml (1%)  10 mL Other Once    midodrine  10 mg Oral TID WM    mupirocin   Nasal BID    pantoprazole  40 mg Oral Daily    piperacillin-tazobactam (Zosyn) IV (PEDS and ADULTS) (extended infusion is not appropriate)  4.5 g Intravenous Q12H    rifAXIMin  550 mg Oral BID    vancomycin  125 mg Oral Q6H         PRN:   sodium chloride    acetaminophen    dextrose 10%    dextrose 10%    diphenhydrAMINE    glucagon (human recombinant)    glucose    glucose    naloxone    ondansetron    sodium chloride 0.9%    sodium chloride 0.9%        Microbiology Data:  Microbiology Results (last 7 days)       Procedure Component Value Units Date/Time    Blood Culture [[id number]]  (Normal) Collected: 07/08/23 1453    Order Status: Completed  Specimen: Blood from Hand, Left Updated: 07/11/23 1900     CULTURE, BLOOD ([facility]) No Growth At 72 Hours    Blood Culture [[id number]]  (Normal) Collected: 07/08/23 1453    Order Status: Completed Specimen: Blood from Hand, Right Updated: 07/11/23 1900     CULTURE, BLOOD ([facility]) No Growth At 72 Hours    Blood Culture [[id number]]  (Normal) Collected: 07/04/23 1550    Order Status: Completed Specimen: Blood from Arm, Right Updated: 07/09/23 1800     CULTURE, BLOOD ([facility]) No Growth at 5 days    Blood Culture [[id number]]  (Normal) Collected: 07/04/23 1550    Order Status: Completed Specimen: Blood from Hand, Right Updated: 07/09/23 1800     CULTURE, BLOOD ([facility]) No Growth at 5 days    Body Fluid Culture [[id number]] Collected: 07/04/23 1623    Order Status: Completed Specimen: Abdominal Fluid from Abdomen Updated: 07/09/23 0732     Body Fluid Culture No Growth at 5 days    Blood Culture [[id number]]  (Normal) Collected: 06/30/23 1152    Order Status: Completed Specimen: Blood from Arm, Left Updated: 07/05/23 1500     CULTURE, BLOOD ([facility]) No Growth at 5 days    Blood Culture [[id number]]  (Normal) Collected: 06/30/23 1152    Order Status: Completed Specimen: Blood from Arm, Right Updated: 07/05/23 1500     CULTURE, BLOOD ([facility]) No Growth at 5 days             Antibiotics and Day Number of Therapy:  Antibiotics (From admission, onward)      Start     Stop Route Frequency Ordered    07/10/23 0930  mupirocin 2 % ointment         07/15 0859 Nasl 2 times daily 07/10/23 0827    07/08/23 2247  piperacillin-tazobactam (ZOSYN) 4.5 g in dextrose 5 % in water (D5W) 5 % 100 mL IVPB (MB+)         -- IV Every 12 hours (non-standard times) 07/08/23 2248    07/08/23 1200  rifAXIMin tablet 550 mg         -- Oral 2 times daily 07/08/23 1047    07/07/23 1200  vancomycin 125 mg/5 mL oral solution 125 mg         -- Oral Every 6 hours 07/07/23 1014             Imaging:  X-Ray Chest 1 View  Narrative: EXAMINATION:  XR CHEST 1 VIEW    CPT 21999    CLINICAL  HISTORY:  Hypoxia;    COMPARISON:  July 9, 2023    FINDINGS:  Examination reveals cardiomediastinal silhouette and pleuroparenchymal changes to be essentially unchanged as compared with the previous examination when accounting for difference in technique and inspiratory effort.    There has been interval insertion of a nasogastric tube with the tip below the diaphragm  Impression: Interval insertion of nasogastric tube.    No other significant change    Electronically signed by: Godfrey Rodriguez  Date:    07/11/2023  Time:    08:28        Assessment & Plan:   Alcoholic cirrhosis  Alcoholic hepatitis  HRS  Acute hypoxic, hypercapnic respiratory failure  MELD 40; CTP:C  -Continue midodrine and octreotide infusions, for map goals of greater than 83 for HRS.  -nephrology is on board appreciate recommendations.  Currently trying to get a hold of patient's family with regards to patient's code status.  And if renal replacement therapy should be pursued.  At this time patient's renal status continues to decline with oliguric output  -Continue rifaximin, lactulose.   Aim for 2-3 bowel movements per day  -Continue Zosyn  -at this time patient's prognosis is guarded, will await to see if patient has any meaningful recovery of his liver function and kidney function.  If there is a meaningful recovery will intervene appropriately    Te Lui MD  Internal Medicine Resident PGY-II

## 2023-07-13 NOTE — PROGRESS NOTES
Pharmacist Renal Dose Adjustment Note    Los Alatorre is a 58 y.o. male being treated with the medication Zosyn     Patient Data:    Vital Signs (Most Recent):  Temp: 97.5 °F (36.4 °C) (07/13/23 1600)  Pulse: 90 (07/13/23 1600)  Resp: 20 (07/13/23 1600)  BP: 94/64 (07/13/23 1600)  SpO2: 99 % (07/13/23 1600) Vital Signs (72h Range):  Temp:  [97.3 °F (36.3 °C)-98.8 °F (37.1 °C)]   Pulse:  []   Resp:  [17-31]   BP: ()/(61-99)   SpO2:  [86 %-100 %]      Recent Labs   Lab 07/11/23  1404 07/12/23  0340 07/13/23  0335   CREATININE 3.77* 3.61* 3.61*     Serum creatinine: 3.61 mg/dL (H) 07/13/23 0335  Estimated creatinine clearance: 23.3 mL/min (A)    Medication: Zosyn 4.5 gm every 12 hours will be changed to medication:Zosyn 4.5 gm every 8 hours.     Pharmacist's Name: Mayra Russell  Pharmacist's Extension: [phone]

## 2023-07-13 NOTE — PROGRESS NOTES
Contacted [facility] Police who was able to provide ph # for brother, Tesfaye Alatorre ([phone]) and Marcial Alatorre ([phone]). Number for Tesfaye answered by a female who stated it was a wrong #. Left v/m at # for [place], requesting return call. Officers will be dispatched to pt's home to request fly contact Dr. Katie Smith ([phone]) to discuss pt's medical status.

## 2023-07-13 NOTE — PROGRESS NOTES
[facility]  Nephrology Progress Note    Patient Name: Los Alatorre  Age: 58 y.o.  : 1965  MRN: [id number]  Admission Date: 2023    Chief complaint: Abdominal Pain (Abd pain , sent from clinic)    Hospital course  Los Alatorre is a 58 y.o. White male with past medical history of alcoholic cirrhosis of the liver and seizure disorder who was admitted on 2023 with complaints of abdominal pain and distention.  Over the course of hospitalization, patient developed leukocytosis and acute kidney injury.  He was upgraded to the intensive care unit due to increased oxygen requirements and encephalopathy.  Nephrology is continuing to follow for CHRISTEN.    Subjective  Patient remains on bipap, restrained and restless. Opens eyes to verbal stimuli, nodded yes when asked to, tried squeezing finger.    Review of Systems  Unable to obtain due to patient's condition.     Objective  /68   Pulse 91   Temp 97.5 °F (36.4 °C)   Resp (!) 23   Ht 6' (1.829 m)   Wt 73 kg (161 lb)   SpO2 (!) 93%   BMI 21.84 kg/m²     Intake/Output Summary (Last 24 hours) at 2023 1200  Last data filed at 2023 0800  Gross per 24 hour   Intake 2091 ml   Output 200 ml   Net 1891 ml       Physical Exam  General appearance: Patient is on NIPPV  HEENT: PERRLA, EOMI, no scleral icterus, no JVD. Neck is supple.  Chest: Patient is on NIPPV, he is mildly tachypneic,   + coarse crackles bilaterally  Heart: S1, S2.   Abdomen: + tense ascites.  : Deferred.  Extremities: No edema, peripheral pulses are palpable.   Neuro:  Slightly lethargic but easily arousable, attempts to follow commands; shakes head no, nods yes and attempt to squeeze fingers    Medications    Current Facility-Administered Medications:     0.9%  NaCl infusion (for blood administration), , Intravenous, Q24H PRN, Michaela Sesay MD    acetaminophen tablet 650 mg, 650 mg, Oral, Q6H PRN, Zeeshan Castillo MD, 650 mg at 23 1225     dextrose 10% bolus 125 mL 125 mL, 12.5 g, Intravenous, PRN, Palomo Helton,     dextrose 10% bolus 250 mL 250 mL, 25 g, Intravenous, PRN, Palomo Helton DO    diphenhydrAMINE capsule 25 mg, 25 mg, Oral, Q6H PRN, Te Lui MD, 25 mg at 07/05/23 2024    glucagon (human recombinant) injection 1 mg, 1 mg, Intramuscular, PRN, Palomo Helton,     glucose chewable tablet 16 g, 16 g, Oral, PRN, Palomo Helton,     glucose chewable tablet 24 g, 24 g, Oral, PRN, Palomo Helton, DO    heparin (porcine) injection 5,000 Units, 5,000 Units, Subcutaneous, Q12H, Palomo Helton DO, 5,000 Units at 07/13/23 0803    hydrocortisone 1 % cream, , Topical (Top), BID, Te Lui MD, Given at 07/13/23 0805    lactulose 20 gram/30 mL solution Soln 20 g, 20 g, Per NG tube, TID, Juanito Gorman DO, 20 g at 07/13/23 0803    levETIRAcetam tablet 750 mg, 750 mg, Oral, BID, Keely Garcia MD, 750 mg at 07/13/23 0803    LIDOcaine (PF) 10 mg/ml (1%) injection 100 mg, 10 mL, Other, Once, Keely Garcia MD    midodrine tablet 10 mg, 10 mg, Oral, TID WM, Michaela Sesay MD, 10 mg at 07/13/23 0803    mupirocin 2 % ointment, , Nasal, BID, Kip Avalos MD, Given at 07/13/23 0805    naloxone 0.4 mg/mL injection 0.02 mg, 0.02 mg, Intravenous, PRN, Palomo Helton DO    octreotide (SANDOSTATIN) 500 mcg in sodium chloride 0.9% 100 mL infusion, 50 mcg/hr, Intravenous, Continuous, Katie Smith MD, Last Rate: 10 mL/hr at 07/12/23 0715, 50 mcg/hr at 07/12/23 0715    ondansetron injection 4 mg, 4 mg, Intravenous, Q8H PRN, Palomo Helton DO, 4 mg at 07/05/23 2024    pantoprazole EC tablet 40 mg, 40 mg, Oral, Daily, Chico Malave MD, 40 mg at 07/13/23 0803    piperacillin-tazobactam (ZOSYN) 4.5 g in dextrose 5 % in water (D5W) 5 % 100 mL IVPB (MB+), 4.5 g, Intravenous, Q12H, Zeeshan Castillo MD, Last Rate: 25 mL/hr at 07/13/23 1131, 4.5 g at 07/13/23 1131    rifAXIMin tablet 550 mg, 550 mg, Oral, BID, Katie Smith MD, 550 mg at 07/13/23 0803    sodium  chloride 0.9% flush 10 mL, 10 mL, Intravenous, Q12H PRN, Palomo Helton DO    sodium chloride 0.9% flush 10 mL, 10 mL, Intravenous, PRN, Katie Smith MD    vancomycin 125 mg/5 mL oral solution 125 mg, 125 mg, Oral, Q6H, Chico Malave MD, 125 mg at 07/13/23 1131     Imaging:    Reviewed    Laboratory Data:  Hematology  Lab Results   Component Value Date    WBC 11.73 (H) 07/13/2023    HGB 8.1 (L) 07/13/2023    HCT 25.4 (L) 07/13/2023    PLT 72 (L) 07/13/2023     (H) 07/13/2023    K 3.1 (L) 07/13/2023    CHLORIDE 106 07/13/2023    CO2 24 07/13/2023    BUN 31.9 (H) 07/13/2023    CREATININE 3.61 (H) 07/13/2023    EGFRNORACEVR 19 07/13/2023    GLUCOSE 115 (H) 07/13/2023    CALCIUM 9.7 07/13/2023    ALKPHOS 176 (H) 07/13/2023    LABPROT 5.9 (L) 07/13/2023    ALBUMIN 3.5 07/13/2023    BILIDIR 3.9 (H) 07/11/2023    IBILI 0.10 02/16/2022    AST 39 (H) 07/13/2023    ALT 6 07/13/2023    MG 2.00 07/13/2023    PHOS 3.4 07/13/2023     Lab Results   Component Value Date    SMVKMTTD08UW 56.6 06/14/2023       Lab Results   Component Value Date    IRON 58 06/05/2023    TIBC 150 (L) 06/05/2023    TRANS 163 (L) 06/04/2023    TRANS 163 (L) 06/04/2023    LABIRON 31 06/04/2023    FERRITIN 122.2 06/05/2023    FOLATE 6.8 (L) 06/04/2023    IMBXSQRZ57 >2,000 (H) 06/04/2023    HAPTO 55 07/07/2023     07/07/2023       Lab Results   Component Value Date    HIV Nonreactive 06/14/2023    HEPCAB Nonreactive 06/30/2023    HEPBSURFAG Nonreactive 06/30/2023    HEPBSAB Nonreactive 07/06/2023         Impression  Acute kidney injury   Metabolic acidosis -improved  Hypernatremia-improved  Hypotension  Hypokalemia   Anemia-stable  Leukocytosis-improving  Coagulopathy  Cirrhosis of the liver  Ascites    Plan  Renal indices at a plateau. There remains  no emergent indications for renal replacement therapy (those include refractory acidosis, hyperkalemia, uremia).  Patient is a poor candidate candidate for RRT due to coagulopathy (increasing  INR, platelet of 72) and overall poor prognosis.  Continue supportive care.  Hypernatremia and acidosis corrected with D5 and 2 amps of bicarb, (Na 146, Co2 24) no longer needs fluids.  Potassium repleted today with 40 mEq. Continue to monitor CMP tomorrow morning. Will need continued attempt to discuss goals of care given overall poor prognosis and overt liver failure.     Sharri Fisher M.D.  [facility] PGY-3    Addendum:  Will continue to monitor and reassess renal function daily, replace electrolytes. No family available for discussion of goals of care. Appreciated pulmonary critical care and GI evaluation and management.  Mary Kay Dee M.D.  Nephrology

## 2023-07-13 NOTE — PROGRESS NOTES
Unsuccessful attempts to reach sister, Alexia Alatorre ([phone]) by phone & message. Spoke with SO, Natividad St, again, who stated they reside with pt's 2 brothers, Lonnie Alatorre & Lalit Alatorre, but they don't speak with each other, nor does she have contact #'s for them. SO stated the brothers only allow her to stay there because she's with pt. SO concerned that she will not have a home if pt dies.

## 2023-07-13 NOTE — PROGRESS NOTES
[facility]  Pulmonary Critical Care Note    Patient Name: Los Alatorre  MRN: [id number]  Admission Date: 6/30/2023  Hospital Length of Stay: 13 days  Code Status: Full Code  Attending Provider: Kip Avalos MD  Primary Care Provider: DECLAN Small     Subjective:     HPI:   Los Alatorre is a 58 y.o. male who with a history of alcoholic cirrhosis, alcohol use disorder, and seizure disorder who presented to [facility] ED on 6/30/2023 with a primary complaint of abdominal pain. He reports poor PO intake and has felt his abdomen become more distended. Does not report any fevers or chills. Denies any melena, bright red blood in the stool, or any vomiting. Was recently transferred to [facility] earlier this month for an EGD, which ruled out varices and gastropathy. Unknown if he has been taking his home medications reliably.       In the ED patient was afebrile 100.2 F, pulse 96, respiratory rate 4, blood pressure 137/83, 87% on room air.  CBC revealed leukocytosis at 23, H and H 11.7/35.7, platelets 286.  CMP revealed mild hyponatremia 132, acidosis 17, BUN/CR 0.65/0.88.  Alkaline phosphatase elevated at 337, slight AST elevation at 56, INR 1.3.  Initial lactic acid 3.2, troponin negative. CT abdomen pelvis revealed trace left pleural effusion, cirrhosis ascites, previous cholecystectomy, bilateral kidneys with non occluding calculi, and noninflamed diverticulosis coli.  Paracentesis was performed and 5 L of fluid was drained.      Hospital Course/Significant events:  Patient admitted for alcoholic liver cirrhosis with ascites and CHRISTEN that was initially due to intravascular volume depletion with appropriate response to fluids and albumin, but later developed hepatorenal syndrome, with worsening renal indices despite appropriate medical therapy. From hospital day 7-9, patient's mentation and renal indices have been worsening, and overnight hospital day 8 (7/9/23), developed worsening respiratory status  requiring BiPAP (see oncall event note 7/8), prompting upgrade to ICU for close monitoring    24 Hour Interval History:  Patient remains encephalopathic, able to say his name this morning. He is on BiPAP 12/6 FiO2 increased to 65%. MAPs overnight ranges in 's. Total 24 hr  cc, net positive +1771.   Labs this AM revealed downtrending white count 11.7, hgb 8.1. PT 36, INR 3.72, hypernetremia 146, hypokalemia 3.1, renal indices unchanged with BUN31.9/crea 3.61,  Issues remain with determining who the surrogate decision maker is; patient and SO are not , was provided with contact number for a sister but have been unable to reach her on multiple attempts. Concern for bright red output from ngt overnight, but patient remained hemodynamically stable with no drop in H/h this morning    Past Medical History:   Diagnosis Date    Seizures        Past Surgical History:   Procedure Laterality Date    APPENDECTOMY      CHOLECYSTECTOMY         Social History     Socioeconomic History    Marital status:     Number of children: 1   Occupational History    Occupation: Employed   Tobacco Use    Smoking status: Every Day     Packs/day: 1.50     Types: Cigarettes    Smokeless tobacco: Never   Substance and Sexual Activity    Alcohol use: Not Currently     Comment: no alcohol x 2 month    Drug use: Not Currently     Types: Marijuana     Comment: No Marijuana x 2 month     Social Determinants of Health     Financial Resource Strain: High Risk    Difficulty of Paying Living Expenses: Very hard   Food Insecurity: Food Insecurity Present    Worried About Running Out of Food in the Last Year: Sometimes true    Ran Out of Food in the Last Year: Sometimes true   Transportation Needs: No Transportation Needs    Lack of Transportation (Medical): No    Lack of Transportation (Non-Medical): No   Physical Activity: Inactive    Days of Exercise per Week: 0 days    Minutes of Exercise per Session: 0 min   Social Connections:  Moderately Isolated    Frequency of Communication with Friends and Family: Three times a week    Frequency of Social Gatherings with Friends and Family: More than three times a week    Attends Judaism Services: Never    Active Member of Clubs or Organizations: No    Attends Club or Organization Meetings: Never    Marital Status: Living with partner   Housing Stability: High Risk    Unable to Pay for Housing in the Last Year: Yes    Number of Places Lived in the Last Year: 1    Unstable Housing in the Last Year: No           Current Outpatient Medications   Medication Instructions    LACTULOSE ORAL Oral, 2 times daily    levETIRAcetam (KEPPRA) 750 mg, Oral, 2 times daily    polyethylene glycol (GLYCOLAX) 17 g, Oral, Daily PRN       Current Inpatient Medications   heparin (porcine)  5,000 Units Subcutaneous Q12H    hydrocortisone   Topical (Top) BID    lactulose  20 g Per NG tube TID    levETIRAcetam  750 mg Oral BID    LIDOcaine (PF) 10 mg/ml (1%)  10 mL Other Once    midodrine  10 mg Oral TID WM    mupirocin   Nasal BID    pantoprazole  40 mg Oral Daily    piperacillin-tazobactam (Zosyn) IV (PEDS and ADULTS) (extended infusion is not appropriate)  4.5 g Intravenous Q12H    potassium chloride  10 mEq Intravenous Once    rifAXIMin  550 mg Oral BID    vancomycin  125 mg Oral Q6H       Current Intravenous Infusions   octreotide (SANDOSTATIN) infusion 50 mcg/hr (07/12/23 0715)         ROS   Unable to perform      Objective:       Intake/Output Summary (Last 24 hours) at 7/13/2023 0703  Last data filed at 7/13/2023 0630  Gross per 24 hour   Intake 1971 ml   Output 200 ml   Net 1771 ml           Vital Signs (Most Recent):  Temp: 97.5 °F (36.4 °C) (07/13/23 0600)  Pulse: 90 (07/13/23 0600)  Resp: (!) 22 (07/13/23 0600)  BP: 115/72 (07/13/23 0600)  SpO2: (!) 94 % (07/13/23 0600)  Body mass index is 21.84 kg/m².  Weight: 73 kg (161 lb) Vital Signs (24h Range):  Temp:  [97.3 °F (36.3 °C)-98.2 °F (36.8 °C)] 97.5 °F (36.4  °C)  Pulse:  [] 90  Resp:  [19-29] 22  SpO2:  [86 %-99 %] 94 %  BP: ()/(67-99) 115/72     Physical Exam  Constitutional:       General: He is not in acute distress.     Appearance: He is ill-appearing. He is not toxic-appearing.   HENT:      Head: Normocephalic and atraumatic.   Cardiovascular:      Rate and Rhythm: Normal rate and regular rhythm.   Pulmonary:      Breath sounds: Rales present. No wheezing.   Chest:      Chest wall: No tenderness.   Abdominal:      General: Bowel sounds are normal. There is distension.   Musculoskeletal:         General: No tenderness or deformity. Normal range of motion.      Cervical back: Normal range of motion and neck supple. No rigidity.   Skin:     General: Skin is warm and dry.   Neurological:      Mental Status: He is disoriented.         Lines/Drains/Airways       Drain  Duration                  NG/OG Tube 07/09/23 1800 [place] sump 16 Fr. Right nostril 3 days         Urethral Catheter 07/09/23 0845 16 Fr. 3 days              Peripheral Intravenous Line  Duration                  Peripheral IV - Single Lumen 07/07/23 2100 20 G Anterior;Right Forearm 5 days         Peripheral IV - Single Lumen 07/12/23 0200 20 G Anterior;Right Forearm 1 day                    Significant Labs:    Lab Results   Component Value Date    WBC 11.73 (H) 07/13/2023    HGB 8.1 (L) 07/13/2023    HCT 25.4 (L) 07/13/2023    MCV 98.4 (H) 07/13/2023    PLT 72 (L) 07/13/2023           BMP  Lab Results   Component Value Date     (H) 07/13/2023    K 3.1 (L) 07/13/2023    CHLORIDE 106 07/13/2023    CO2 24 07/13/2023    BUN 31.9 (H) 07/13/2023    CREATININE 3.61 (H) 07/13/2023    CALCIUM 9.7 07/13/2023    AGAP 7.0 05/01/2023    ESTGFRAFRICA 105 06/08/2023    EGFRNONAA 33 06/17/2022         ABG  Recent Labs   Lab 07/13/23  0611   PH 7.310*   PO2 61.0*   HCO3 31.7*   POCBASEDEF 4.70*         Mechanical Ventilation Support:  Oxygen Concentration (%): 65 (07/13/23 0600)      Significant  Imaging:  I have reviewed the pertinent imaging within the past 24 hours.        Assessment/Plan:     Assessment  Decompensated alcoholic liver cirrhosis with ascites, hepatic encephalopathy, and hepatorenal syndrome  Acute hypoxic hypercapnic respiratory failure 2/2 volume overload vs hospital acquired pneumonia  Concern for C diff infection  Normocytic anemia  Hypokalemia   History of alcohol withdrawal with seizures  History of seizure disorder     Plan  -Admitted to ICU for close monitoring given clinical decompensation and acute worsening of respiratory status  -Continue lactulose TID, rifaximine 550 BID, octreotide drip and midodrine 10 TID. MAP goal of 85 for HRS. MAPs have been stable on current regimen. Start levophed if needed to maintain MAP goals  -GI on board, recommending tube feeds if needed; comfort care if not candidate for rrt.  -Nephrology on board, no acute indications for HD. Continue on albumin 25 BID. Monitor I's and O's, dela cruz in place.  -Continue BiPAP to main O2 sats >92%. Wean as tolerated. Has been unable to tolerate weaning thus far. FiO2 increased to 65%  -Has been on antibiotic regimens since admission. Initiated on Vancomycin, Zosyn and levaquin (7/8) due to concern for HAP. Levaquin stopped 7/9, IV vanc stopped 7/10.Remains on zosyn (d6) with plans to complete 7 day course. Most recent blood cultures from 7/8 negative at 72 hours; all culture data (blood, body fluid) from this hospitalization have been negative  -Currently being treated for presumed C diff infection with PO vancomycin (D7 start date 7/7/23)  -Monitor electrolytes and replete as appropriate  -CIWA protocol discontinued, no longer in window for seizures 2/2 alcohol withdrawal  -Continue home keppra for seizure disorder  -Ideally would need to have goals of care discussion, but with no immediate kin available to make medical decisions at this time. Apparently has sister, Alexia Alatorre [phone] but have been unable to  reach on multiple attempts.      DVT Prophylaxis: heparin 5000 BID  GI Prophylaxis: pantoprazole      32 minutes of critical care was time spent personally by me on the following activities: development of treatment plan with patient or surrogate and bedside caregivers, discussions with consultants, evaluation of patient's response to treatment, examination of patient, ordering and performing treatments and interventions, ordering and review of laboratory studies, ordering and review of radiographic studies, pulse oximetry, re-evaluation of patient's condition.  This critical care time did not overlap with that of any other provider or involve time for any procedures.     Katie Smith MD  Pulmonary Critical Care Medicine  [facility] - ICU

## 2023-07-13 NOTE — PROGRESS NOTES
Pharmacist Renal Dose Adjustment Note    Los Alatorre is a 58 y.o. male being treated with the medication Keppra    Patient Data:    Vital Signs (Most Recent):  Temp: 97.5 °F (36.4 °C) (07/13/23 1600)  Pulse: 90 (07/13/23 1600)  Resp: 20 (07/13/23 1600)  BP: 94/64 (07/13/23 1600)  SpO2: 99 % (07/13/23 1600) Vital Signs (72h Range):  Temp:  [97.3 °F (36.3 °C)-98.8 °F (37.1 °C)]   Pulse:  []   Resp:  [17-31]   BP: ()/(61-99)   SpO2:  [86 %-100 %]      Recent Labs   Lab 07/11/23  1404 07/12/23  0340 07/13/23  0335   CREATININE 3.77* 3.61* 3.61*     Serum creatinine: 3.61 mg/dL (H) 07/13/23 0335  Estimated creatinine clearance: 23.3 mL/min (A)    Medication:Keppra 750 mg every 12 hours will be changed to medication:Keppra 500 mg every 12 hours.     Pharmacist's Name: Mayra Russell  Pharmacist's Extension: [phone]

## 2023-07-14 NOTE — PROGRESS NOTES
[facility] GI Progress Note    Patient Name: Los Alatorre  MRN: [id number]  Admission Date: 6/30/2023  Hospital Length of Stay: 14 days  Code Status: Full Code  Attending Provider: Kip Avalos MD  Primary Care Provider: DECLAN Small     Subjective:      Brief HPI:  Los Alatorre is a 58 y.o. male who  has a past medical history of Seizures.  He presented to [facility] on 6/30/2023  with a primary complaint of abdominal pain, and distension.  Patient says that when he was discharged from our [place] from [place] that he was compliant with all his medications and that he was having adequate amount of bowel movements.  Patient was discharged on rifaximin 550 b.i.d., lactulose 20 mg b.i.d..  However he said that he has little bowel movements however they were in quantity.  Patient says that he felt good for proximally 1 week however he cannot work anymore.  He said that his abdomen got so distended that he had decreased p.o. intake and had pain.  Of note patient says that he has stopped drinking for past 2 months.  He says that he was a heavy drinker since he was 19 years old.  He says that he drinks for the past 1-2 years approximately a half to 3/4 of a 5th of vodka.  Patient says that he has been taking ibuprofen for his restless legs for quite a time.  He says that he has numbness in his bilateral lower extremities when he is sleep at night.  He says he did not try put in over side of bed.  He also denies any limb claudication or numbness or tingling all ambulation.  Patient follows up with [facility].  Patient says he is currently having 4 bowel movements per day even when he was de-escalated to 10 mg b.i.d..  He is alert and oriented x3.        Interval History:  Patient's bowel movements have decrease he had approximately 3 bowel movements from 8 yesterday.  Patient is still oliguric to aneuric with 350 cc of urine output.  Patient had residual tube feedings, with read  intermixed with rifaximin with tube feeds.  Patient is lot more alert and is able to mouth some words to me and able to follow commands.  Primary team in case management still trying to contact family with regards for code status.  Patient remains on octreotide infusion     Objective:     Vital Signs:  Vital Signs (Most Recent):  Temp: 97.5 °F (36.4 °C) (07/14/23 1200)  Pulse: 109 (07/14/23 1200)  Resp: (!) 30 (07/14/23 1200)  BP: 109/82 (07/14/23 1200)  SpO2: 99 % (07/14/23 1200)  Body mass index is 24.61 kg/m².  Weight: 82.3 kg (181 lb 7 oz) (wt elevated/ fluid) Vital Signs (24h Range):  Temp:  [97.5 °F (36.4 °C)-97.9 °F (36.6 °C)] 97.5 °F (36.4 °C)  Pulse:  [] 109  Resp:  [18-32] 30  SpO2:  [94 %-100 %] 99 %  BP: ()/(64-90) 109/82       Input/output:     Intake/Output Summary (Last 24 hours) at 7/14/2023 1311  Last data filed at 7/14/2023 0559  Gross per 24 hour   Intake 994.83 ml   Output 350 ml   Net 644.83 ml       Physical Examination:  General:  On 12/06 BiPAP 50% FiO2.  Patient is able to nod to my questions, and follow commands  Head: Normocephalic,   Eyes: PERRL, EOMI, icteric sclera  Throat: No posterior pharyngeal erythema or exudate, no tonsillar exudate  Neck: supple, normal ROM, no JVD  CVS:  RRR, S1 and S2 normal, no murmurs, no added heart sounds, rubs, gallops, regular peripheral pulses, and no peripheral edema  Resp:  Lungs distant with inspiratory crackles and expiratory wheezing with prolonged expiratory phase  GI:  Abdomen soft, non-tender, +distended, normoactive bowel sounds  MSK:  Full range of motion, no obvious deformities   Skin:  Spider angiomata notice diffusely on the body  Neuro:  Patient is encephalopathic   Psych:  Patient is not alert oriented x3      Lines/Drains/Airways       None                    Laboratory:    Recent Labs   Lab 07/12/23  0340 07/13/23  0335 07/14/23  0317   WBC 15.19* 11.73* 14.34*   RBC 2.56* 2.58* 2.52*   HGB 8.1* 8.1* 8.0*   HCT 24.7* 25.4*  24.3*   MCV 96.5* 98.4* 96.4*   MCH 31.6* 31.4* 31.7*   MCHC 32.8* 31.9* 32.9*   RDW 18.0* 18.7* 18.9*   PLT 92* 72* 71*   MPV 11.9* 12.1* 12.7*      Recent Labs   Lab 07/12/23  0340 07/12/23  0514 07/12/23  1323 07/13/23  0335 07/13/23  0611 07/14/23  0317 07/14/23  0618   *  --   --  146*  --  144  --    K 3.2*  --   --  3.1*  --  3.4*  --    CHLORIDE 110*  --   --  106  --  104  --    CO2 20*  --   --  24  --  24  --    BUN 29.4*  --   --  31.9*  --  35.5*  --    CREATININE 3.61*  --   --  3.61*  --  3.60*  --    CALCIUM 10.0  --   --  9.7  --  10.3*  --    PH  --    < > 7.220*  --  7.310*  --  7.410   MG 2.10  --   --  2.00  --  1.90  --    ALBUMIN 4.0  --   --  3.5  --  3.4*  --    ALKPHOS 191*  --   --  176*  --  242*  --    ALT 6  --   --  6  --  9  --    AST 41*  --   --  39*  --  61*  --    BILITOT 5.3*  --   --  4.7*  --  5.6*  --     < > = values in this interval not displayed.        Other Results:    Current Medications:     Infusions:   octreotide (SANDOSTATIN) infusion 50 mcg/hr (07/14/23 0959)         Scheduled:   heparin (porcine)  5,000 Units Subcutaneous Q12H    hydrocortisone   Topical (Top) BID    lactulose  20 g Per NG tube TID    levETIRAcetam  500 mg Oral BID    LIDOcaine (PF) 10 mg/ml (1%)  10 mL Other Once    miconazole   Topical (Top) BID    midodrine  10 mg Oral TID WM    mupirocin   Nasal BID    pantoprazole  40 mg Oral Daily    piperacillin-tazobactam (Zosyn) IV (PEDS and ADULTS) (extended infusion is not appropriate)  4.5 g Intravenous Q8H    rifAXIMin  550 mg Oral BID    vancomycin  125 mg Oral Q6H         PRN:   acetaminophen    dextrose 10%    dextrose 10%    diphenhydrAMINE    glucagon (human recombinant)    glucose    glucose    naloxone    ondansetron    sodium chloride 0.9%    sodium chloride 0.9%        Microbiology Data:  Microbiology Results (last 7 days)       Procedure Component Value Units Date/Time    Blood Culture [[id number]]  (Normal) Collected: 07/08/23 1457     Order Status: Completed Specimen: Blood from Hand, Left Updated: 07/13/23 1901     CULTURE, BLOOD ([facility]) No Growth at 5 days    Blood Culture [[id number]]  (Normal) Collected: 07/08/23 1453    Order Status: Completed Specimen: Blood from Hand, Right Updated: 07/13/23 1901     CULTURE, BLOOD ([facility]) No Growth at 5 days    Blood Culture [[id number]]  (Normal) Collected: 07/04/23 1550    Order Status: Completed Specimen: Blood from Arm, Right Updated: 07/09/23 1800     CULTURE, BLOOD ([facility]) No Growth at 5 days    Blood Culture [[id number]]  (Normal) Collected: 07/04/23 1550    Order Status: Completed Specimen: Blood from Hand, Right Updated: 07/09/23 1800     CULTURE, BLOOD ([facility]) No Growth at 5 days    Body Fluid Culture [[id number]] Collected: 07/04/23 1623    Order Status: Completed Specimen: Abdominal Fluid from Abdomen Updated: 07/09/23 0732     Body Fluid Culture No Growth at 5 days             Antibiotics and Day Number of Therapy:  Antibiotics (From admission, onward)      Start     Stop Route Frequency Ordered    07/13/23 2000  piperacillin-tazobactam (ZOSYN) 4.5 g in dextrose 5 % in water (D5W) 5 % 100 mL IVPB (MB+)         -- IV Every 8 hours (non-standard times) 07/13/23 1634    07/10/23 0930  mupirocin 2 % ointment         07/15 0859 Nasl 2 times daily 07/10/23 0827    07/08/23 1200  rifAXIMin tablet 550 mg         -- Oral 2 times daily 07/08/23 1047    07/07/23 1200  vancomycin 125 mg/5 mL oral solution 125 mg         -- Oral Every 6 hours 07/07/23 1014             Imaging:  X-Ray Chest 1 View  Narrative: EXAMINATION:  XR CHEST 1 VIEW    CPT 24783    CLINICAL HISTORY:  increasing o2 requirement;    COMPARISON:  July 11th 2023    FINDINGS:  Examination reveals cardiomediastinal silhouette to be unchanged as compared with the previous exam.  Worsening of confluent airspace opacities throughout both lung fields with more confluent opacities in both perihilar regions and right cardiophrenic angle region.    No other  significant change    Support catheters remain unchanged  Impression: Worsening of confluent airspace opacities throughout both lung fields more so on in the perihilar regions and right cardiophrenic angle region.    No other change    Electronically signed by: Godfrey Rodriguez  Date:    07/12/2023  Time:    14:36        Assessment & Plan:   Alcoholic cirrhosis  Alcoholic hepatitis  HRS  Acute hypoxic, hypercapnic respiratory failure  MELD 38; CTP:C  -Continue midodrine and octreotide infusions, for map goals of greater than 83 for HRS.  -patient's urine output has steadily declined however his creatinine is stable.  Nephrology is on board appreciate recommendations.  Currently trying to get a hold of patient's family with regards to patient's code status.  And if renal replacement therapy should be pursued.    -Continue rifaximin, lactulose.   Aim for 2-3 bowel movements per day.    -patient has been on 6 days of Zosyn, can likely be deescalated to Rocephin for SBP prophylaxis  -will monitor patient's H/H with reported clots and red residuals.  If patient's H/H decreases dramatically, or copious amounts of blood is aspirated from and G-tube patient may require a emergent endoscopy  -at this time patient's prognosis is guarded, will await to see if patient has any meaningful recovery of his liver function and kidney function.   -recommend p.r.n. medications for agitation such as benzodiazepines and opiates  Te Lui MD  Internal Medicine Resident PGY-II

## 2023-07-14 NOTE — PROGRESS NOTES
Inpatient Nutrition Assessment    Admit Date: 6/30/2023   Total duration of encounter: 14 days     Nutrition Recommendation/Prescription     Pt remains NPO/ confused/has NGT--pt was started on enteral feeds--Fibersource HN @ 20ml/hr however TF on hold 2 blood clots/maroon secretions from NGT. Currently has peripheral access only. May consider use PPN /fluid overload may become issue/need monitor closely. Clinimix 4.25/5 @ 80ml/hr with lipids 20% 250 ml daily to provide 1153 calories, 82 gm protein.   If central line placed---then can consider TPN (less volume)--Clinimix 5/20 @ 70ml/hr with lipids 20% 250 ml daily to provide 1978 calories, 84 gm protein.    When able to use GI tract , suggest retry entereal feedings--Fibersource HN @ 20ml/hr; increase as tolerated to goal rate 70ml/hr  (23 hr cycle) to provide 1932 calories, 87 gm protein, 1300 ml free water . Flush tube with 50ml water every 4 hrs.   When pt alert--suggest ADAT to Low Na diet; Fluid restriction -- consider Megace to stimulate appetite  Boost Breeze (provides 250 kcal, 9 g protein per serving) TID per pt's request  Suggest  MVI, Fe, Thiamine  Daily weight    Communication of Recommendations: reviewed with nurse    Nutrition Assessment     Malnutrition Assessment/Nutrition-Focused Physical Exam    Malnutrition Context: acute illness or injury  Malnutrition Level: severe  Energy Intake (Malnutrition): less than or equal to 50% for greater than or equal to 5 days  Weight Loss (Malnutrition): greater than 7.5% in 3 months         A minimum of two characteristics is recommended for diagnosis of either severe or non-severe malnutrition.    Chart Review    Reason Seen: follow-up    Malnutrition Screening Tool Results   Have you recently lost weight without trying?: Yes: 34 lbs or more  Have you been eating poorly because of a decreased appetite?: Yes   MST Score: 5     Diagnosis:  SIRS/sepsis, etoh /cirrhosis, ascites, CHRISTEN, hx seizure; acute hypoxic  respiratory failure/volume overload, ? C diff infection, Low K/Mg     Relevant Medical History: Alcoholic Cirrhosis with ascites, CHRISTEN d/t intravascular volume depletion, Sepsis likely GI source, Hypokalemia, Hypophosphatemia    Nutrition-Related Medications:, lactulose, Octreotide, Kcl, rifaximin , pantoprazole, zosyn, vancomycin   Calorie Containing IV Medications: no significant kcals from medications at this time    Nutrition-Related Labs:  (7-11) H/H 8.6/25.9(L) Gluc 127 Bun 26.3 Cr 3.5(H) GFR 19(L) K 3.4(L) Tbili 5.1(H)   (7-14) H/H 8.0/24.3(L) Gluc 75 Bun 35 Cr 3.6(H) GFR19(L) K 3.4(L) P 3.0 Tbili 5.6(H) AST 61(H)     Diet/PN Order: Diet NPO  Oral Supplement Order: none  Tube Feeding Order: none  Appetite/Oral Intake: NPO/NPO  Factors Affecting Nutritional Intake: abdominal distension, decreased appetite, diarrhea, and nausea  Food/Amish/Cultural Preferences: none reported  Food Allergies: none reported    Skin Integrity: rash  Wound(s):   none reported     Comments  (7/14) Pt remains NPO; TF on hold 2 blood clots/maroon fluid from NGT; pt has peripheral access; PPN recs provided and TPN recs provided but would need central access; fluid volume will need to be monitored. Nephrology team monitoring renal fx; CHRISTEN. Abnormal labs noted.     (7/11) Pt sleeping during rounds; per staff----pt disoriented; has NGT ; has been not eating since 7/9; hx poor po intake prior; on bipap/ 2 hypoxemia; abdomen distended. Abnormal labs noted: reflective liver failure/ on lactulose. TF recs provided for nutrition support.     7/7/23 -- Pt continues with poor appetite, request ham sandwich only for lunch; taking sips of Boost Plus, willing to try Boost Breeze instead; intermittent nausea with abdominal distention; multiple loose stools -- lactulose decreased per MD; current wt elevated, ascites present -- possible need for repeat paracentesis per MD notes    7/5/23 -- Pt in & out of sleep during visit reporting not sleeping  well last 2 nights; pt reports decreased appetite, denies n/v, + abdominal pain & distention s/p paracentesis on  noted with 5 L removed per MD notes; K (L) - loose stool, continues on lactulose, replacing K; Continue Boost ONS, consider Megace to stimulate appetite    () Received MST trigger wt loss/decreased appetite. Unable to speak to pt; spoke to nurse caring for pt--pt remains with decreased appetite; ate 25% of meal earlier; + abd pain/distension; some nausea; hx ETOH cirrhosis/ascites. Will order oral supplement; consider megace to stimulate appetite; Pt on lactulose; NH4 level 45. Wt fluctuates with ascites/fluid--per EMR between 120-154#; will track. Unable to complete PA at this time . Noted elevated Bun/Cr; low GFR--CHRISTEN; nephrology consulted     Anthropometrics    Height: 6' (182.9 cm) Height Method: Stated  Last Weight: 82.3 kg (181 lb 7 oz) (wt elevated/ fluid) (23 1137) Weight Method: Bed Scale  BMI (Calculated): 24.6  BMI Classification: normal (BMI 18.5-24.9)        Ideal Body Weight (IBW), Male: 178 lb     % Ideal Body Weight, Male (lb): 90.45 %                 Usual Body Weight (UBW), k.2 kg pt reports last weighing 3 months ago  % Usual Body Weight: 91.64  % Weight Change From Usual Weight: -8.55 %  Usual Weight Provided By: patient    Wt Readings from Last 5 Encounters:   23 82.3 kg (181 lb 7 oz)   23 71 kg (156 lb 8.4 oz)   23 67.2 kg (148 lb 3.2 oz)   23 72.6 kg (160 lb)   23 72.6 kg (160 lb)     Weight Change(s) Since Admission:  Admit Weight: 68 kg (150 lb) (23 1051)  23 -- 70.6 kg, bed  23 -- 73.2 kg, bed  --bedwt 82.3kg--wt elevated/ fluid     Estimated Needs    Weight Used For Calorie Calculations: 68 kg (149 lb 14.6 oz) (wt fluctuating--use admit wt ; ? close dry wt)  Energy Calorie Requirements (kcal): 2040 kcal/d; 30 stuart/kg  Energy Need Method: Kcal/kg  Weight Used For Protein Calculations: 68 kg (149 lb 14.6 oz) (admit  wt used /wt fluctuating)  Protein Requirements: 82 gm protein/d; 1.2 gm/kg /cirrhosis  Fluid Requirements (mL): 1592 ml/d; 25 ml/kg/ascites  Temp (24hrs), Av.7 °F (36.5 °C), Min:97.5 °F (36.4 °C), Max:97.9 °F (36.6 °C)       Enteral Nutrition    Patient not receiving enteral nutrition at this time.    Parenteral Nutrition    Patient not receiving parenteral nutrition support at this time.    Evaluation of Received Nutrient Intake    Calories: not meeting estimated needs  Protein: not meeting estimated needs    Patient Education    Not applicable.    Nutrition Diagnosis     PES: Malnutrition related to acute illness as evidenced by eating < 50% nutrition intake > 5 days, >7.5% wt loss x 3 months, ascites, muscle wasting (continues)    Interventions/Goals     Intervention(s): modified composition of meals/snacks, commercial beverage, multivitamin/mineral supplement therapy, and collaboration with other providers  Goal: Meet greater than 75% of nutritional needs by follow-up. (goal not met)    Monitoring & Evaluation     Dietitian will monitor food and beverage intake, weight, electrolyte/renal panel, and gastrointestinal profile.  Nutrition Risk/Follow-Up: high (follow-up in 1-4 days)   Please consult if re-assessment needed sooner.

## 2023-07-14 NOTE — PROGRESS NOTES
[facility] - ICU  Pulmonary Critical Care Note    Patient Name: Los Alatorre  MRN: [id number]  Admission Date: 6/30/2023  Hospital Length of Stay: 14 days  Code Status: Full Code  Attending Provider: Kip Avalos MD  Primary Care Provider: DECLAN Small     Subjective:     HPI:   Los Alatorre is a 58 y.o. male who with a history of alcoholic cirrhosis, alcohol use disorder, and seizure disorder who presented to [facility] ED on 6/30/2023 with a primary complaint of abdominal pain. He reports poor PO intake and has felt his abdomen become more distended. Does not report any fevers or chills. Denies any melena, bright red blood in the stool, or any vomiting. Was recently transferred to [facility] earlier this month for an EGD, which ruled out varices and gastropathy. Unknown if he has been taking his home medications reliably.       In the ED patient was afebrile 100.2 F, pulse 96, respiratory rate 4, blood pressure 137/83, 87% on room air.  CBC revealed leukocytosis at 23, H and H 11.7/35.7, platelets 286.  CMP revealed mild hyponatremia 132, acidosis 17, BUN/CR 0.65/0.88.  Alkaline phosphatase elevated at 337, slight AST elevation at 56, INR 1.3.  Initial lactic acid 3.2, troponin negative. CT abdomen pelvis revealed trace left pleural effusion, cirrhosis ascites, previous cholecystectomy, bilateral kidneys with non occluding calculi, and noninflamed diverticulosis coli.  Paracentesis was performed and 5 L of fluid was drained.      Hospital Course/Significant events:  Patient admitted for alcoholic liver cirrhosis with ascites and CHRISTEN that was initially due to intravascular volume depletion with appropriate response to fluids and albumin, but later developed hepatorenal syndrome, with worsening renal indices despite appropriate medical therapy. From hospital day 7-9, patient's mentation and renal indices have been worsening, and overnight hospital day 8 (7/9/23), developed worsening respiratory status  requiring BiPAP (see oncall event note 7/8), prompting upgrade to ICU for close monitoring    24 Hour Interval History:  Patient remains encephalopathic, able to say his name this morning. He is on BiPAP 12/6 FiO2 increased to 65%. MAPs overnight remain at goal. Total 24 hr  cc, net positive +899.   Labs this AM revealed  white count 14.3, hgb 8. PT 31, INR 3.2, Na 144, hypokalemia 3.4( repleting), renal indices unchanged with BUN35/crea 3.6, Have still been unable to reach family for goals of care discussions. Again with difficulty tolerating tube feeds, with blood clots noted in ngt output, but patient remained hemodynamically stable with no drop in H/h this morning    Past Medical History:   Diagnosis Date    Seizures        Past Surgical History:   Procedure Laterality Date    APPENDECTOMY      CHOLECYSTECTOMY         Social History     Socioeconomic History    Marital status:     Number of children: 1   Occupational History    Occupation: Employed   Tobacco Use    Smoking status: Every Day     Packs/day: 1.50     Types: Cigarettes    Smokeless tobacco: Never   Substance and Sexual Activity    Alcohol use: Not Currently     Comment: no alcohol x 2 month    Drug use: Not Currently     Types: Marijuana     Comment: No Marijuana x 2 month     Social Determinants of Health     Financial Resource Strain: High Risk    Difficulty of Paying Living Expenses: Very hard   Food Insecurity: Food Insecurity Present    Worried About Running Out of Food in the Last Year: Sometimes true    Ran Out of Food in the Last Year: Sometimes true   Transportation Needs: No Transportation Needs    Lack of Transportation (Medical): No    Lack of Transportation (Non-Medical): No   Physical Activity: Inactive    Days of Exercise per Week: 0 days    Minutes of Exercise per Session: 0 min   Social Connections: Moderately Isolated    Frequency of Communication with Friends and Family: Three times a week    Frequency of Social  Gatherings with Friends and Family: More than three times a week    Attends Hindu Services: Never    Active Member of Clubs or Organizations: No    Attends Club or Organization Meetings: Never    Marital Status: Living with partner   Housing Stability: High Risk    Unable to Pay for Housing in the Last Year: Yes    Number of Places Lived in the Last Year: 1    Unstable Housing in the Last Year: No           Current Outpatient Medications   Medication Instructions    LACTULOSE ORAL Oral, 2 times daily    levETIRAcetam (KEPPRA) 750 mg, Oral, 2 times daily    polyethylene glycol (GLYCOLAX) 17 g, Oral, Daily PRN       Current Inpatient Medications   heparin (porcine)  5,000 Units Subcutaneous Q12H    hydrocortisone   Topical (Top) BID    lactulose  20 g Per NG tube TID    levETIRAcetam  500 mg Oral BID    LIDOcaine (PF) 10 mg/ml (1%)  10 mL Other Once    midodrine  10 mg Oral TID WM    mupirocin   Nasal BID    pantoprazole  40 mg Oral Daily    piperacillin-tazobactam (Zosyn) IV (PEDS and ADULTS) (extended infusion is not appropriate)  4.5 g Intravenous Q8H    potassium chloride  10 mEq Intravenous Q1H    rifAXIMin  550 mg Oral BID    vancomycin  125 mg Oral Q6H       Current Intravenous Infusions   octreotide (SANDOSTATIN) infusion 50 mcg/hr (07/13/23 2340)         ROS   Unable to perform      Objective:       Intake/Output Summary (Last 24 hours) at 7/14/2023 0709  Last data filed at 7/14/2023 0559  Gross per 24 hour   Intake 1249.83 ml   Output 350 ml   Net 899.83 ml           Vital Signs (Most Recent):  Temp: 97.8 °F (36.6 °C) (07/14/23 0400)  Pulse: 105 (07/14/23 0400)  Resp: (!) 26 (07/14/23 0400)  BP: 113/75 (07/14/23 0400)  SpO2: 100 % (07/14/23 0400)  Body mass index is 21.84 kg/m².  Weight: 73 kg (161 lb) Vital Signs (24h Range):  Temp:  [97.5 °F (36.4 °C)-97.9 °F (36.6 °C)] 97.8 °F (36.6 °C)  Pulse:  [] 105  Resp:  [17-32] 26  SpO2:  [93 %-100 %] 100 %  BP: ()/(64-90) 113/75     Physical  Exam  Constitutional:       General: He is not in acute distress.     Appearance: He is ill-appearing. He is not toxic-appearing.   HENT:      Head: Normocephalic and atraumatic.   Cardiovascular:      Rate and Rhythm: Normal rate and regular rhythm.   Pulmonary:      Breath sounds: Rales present. No wheezing.   Chest:      Chest wall: No tenderness.   Abdominal:      General: Bowel sounds are normal. There is distension.   Musculoskeletal:         General: No tenderness or deformity. Normal range of motion.      Cervical back: Normal range of motion and neck supple. No rigidity.   Skin:     General: Skin is warm and dry.   Neurological:      Mental Status: He is disoriented.         Lines/Drains/Airways       Drain  Duration                  NG/OG Tube 07/09/23 1800 Catron sump 16 Fr. Right nostril 4 days         Urethral Catheter 07/09/23 0845 16 Fr. 4 days         Rectal Tube 07/14/23 0300 rectal tube w/ balloon (indicate number of mLs) <1 day              Peripheral Intravenous Line  Duration                  Peripheral IV - Single Lumen 07/07/23 2100 20 G Anterior;Right Forearm 6 days         Peripheral IV - Single Lumen 07/12/23 0200 20 G Anterior;Right Forearm 2 days                    Significant Labs:    Lab Results   Component Value Date    WBC 14.34 (H) 07/14/2023    HGB 8.0 (L) 07/14/2023    HCT 24.3 (L) 07/14/2023    MCV 96.4 (H) 07/14/2023    PLT 71 (L) 07/14/2023           BMP  Lab Results   Component Value Date     07/14/2023    K 3.4 (L) 07/14/2023    CHLORIDE 104 07/14/2023    CO2 24 07/14/2023    BUN 35.5 (H) 07/14/2023    CREATININE 3.60 (H) 07/14/2023    CALCIUM 10.3 (H) 07/14/2023    AGAP 7.0 05/01/2023    ESTGFRAFRICA 105 06/08/2023    EGFRNONAA 33 06/17/2022         ABG  Recent Labs   Lab 07/14/23  0618   PH 7.410   PO2 55.0*   HCO3 27.9*   POCBASEDEF 2.90*         Mechanical Ventilation Support:  Oxygen Concentration (%): 65 (07/14/23 0422)      Significant Imaging:  I have reviewed the  pertinent imaging within the past 24 hours.        Assessment/Plan:     Assessment  Decompensated alcoholic liver cirrhosis with ascites, hepatic encephalopathy, and hepatorenal syndrome  Acute hypoxic hypercapnic respiratory failure 2/2 volume overload vs hospital acquired pneumonia  Concern for C diff infection  Normocytic anemia  Hypokalemia   History of alcohol withdrawal with seizures  History of seizure disorder     Plan  -Admitted to ICU for close monitoring given clinical decompensation and acute worsening of respiratory status  -Continue lactulose TID, rifaximine 550 BID, octreotide drip and midodrine 10 TID. MAP goal of 85 for HRS. MAPs have been stable on current regimen. Start levophed if needed to maintain MAP goals  -GI on board, recommending comfort care if not candidate for rrt.  -Nephrology on board, no acute indications for HD. Continue on albumin 25 BID. Monitor I's and O's, dela cruz in place.  -Continue BiPAP to main O2 sats >92%. Wean as tolerated. Has been unable to tolerate weaning thus far. FiO2 increased to 65%  -Has been on antibiotic regimens since admission. Initiated on Vancomycin, Zosyn and levaquin (7/8) due to concern for HAP. Levaquin stopped 7/9, IV vanc stopped 7/10.Remains on zosyn (d7) with plans to complete 7 day course. Most recent blood cultures from 7/8 negative at 5 days; all culture data (blood, body fluid) from this hospitalization have been negative  -Currently being treated for presumed C diff infection with PO vancomycin (D8 start date 7/7/23)  -Monitor electrolytes and replete as appropriate  -CIWA protocol discontinued, no longer in window for seizures 2/2 alcohol withdrawal  -Continue home keppra for seizure disorder  -Ideally would need to have goals of care discussion, but with no immediate kin available to make medical decisions at this time. Apparently has sister, Alexia Alatorre [phone] and brother Marcial Alatorre [phone]  but have been unable to reach on  multiple attempts.      DVT Prophylaxis: heparin 5000 BID  GI Prophylaxis: pantoprazole      32 minutes of critical care was time spent personally by me on the following activities: development of treatment plan with patient or surrogate and bedside caregivers, discussions with consultants, evaluation of patient's response to treatment, examination of patient, ordering and performing treatments and interventions, ordering and review of laboratory studies, ordering and review of radiographic studies, pulse oximetry, re-evaluation of patient's condition.  This critical care time did not overlap with that of any other provider or involve time for any procedures.     Katie Smith MD  Pulmonary Critical Care Medicine  [facility] - ICU

## 2023-07-14 NOTE — PROGRESS NOTES
[facility]  Nephrology Progress Note    Patient Name: Los Alatorre  Age: 58 y.o.  : 1965  MRN: [id number]  Admission Date: 2023    Chief complaint: Abdominal Pain (Abd pain , sent from clinic)    Hospital course  Los Alatorre is a 58 y.o. White male with past medical history of alcoholic cirrhosis of the liver and seizure disorder who was admitted on 2023 with complaints of abdominal pain and distention.  Over the course of hospitalization, patient developed leukocytosis and acute kidney injury.  He was upgraded to the intensive care unit due to increased oxygen requirements and encephalopathy.  Nephrology is continuing to follow for CHRISTEN.    Subjective  Patient is confused, on NIPPV.     Review of Systems  Unable to obtain due to patient's condition.     Objective  /75   Pulse 105   Temp 97.8 °F (36.6 °C) (Axillary)   Resp (!) 26   Ht 6' (1.829 m)   Wt 73 kg (161 lb)   SpO2 100%   BMI 21.84 kg/m²     Intake/Output Summary (Last 24 hours) at 2023 0656  Last data filed at 2023 0559  Gross per 24 hour   Intake 1249.83 ml   Output 350 ml   Net 899.83 ml       Physical Exam  General appearance: Patient is on NIPPV  HEENT: PERRLA, EOMI, no scleral icterus, no JVD. Neck is supple.  Chest: Patient is on NIPPV, he is mildly tachypneic,   + coarse crackles bilaterally  Heart: S1, S2.   Abdomen: + ascites. Fecal collection system in place. NGT is clamped.   : Kang to gravity.  Extremities: No edema, peripheral pulses are palpable.   Neuro: Confused  Skin: Rash to lower abdomen and perineal area    Medications    Current Facility-Administered Medications:     0.9%  NaCl infusion (for blood administration), , Intravenous, Q24H PRN, Michaela Sesay MD    acetaminophen tablet 650 mg, 650 mg, Oral, Q6H PRN, Zeeshan Castillo MD, 650 mg at 23 1225    dextrose 10% bolus 125 mL 125 mL, 12.5 g, Intravenous, PRN, Palomo Helton DO    dextrose 10% bolus 250 mL 250  mL, 25 g, Intravenous, PRN, Palomo Helton,     diphenhydrAMINE capsule 25 mg, 25 mg, Oral, Q6H PRN, Te Lui MD, 25 mg at 07/13/23 2303    glucagon (human recombinant) injection 1 mg, 1 mg, Intramuscular, PRN, Palomo Helton,     glucose chewable tablet 16 g, 16 g, Oral, PRN, Palomo Owenh, DO    glucose chewable tablet 24 g, 24 g, Oral, PRN, Palomo Helton DO    heparin (porcine) injection 5,000 Units, 5,000 Units, Subcutaneous, Q12H, Palomo Helton DO, 5,000 Units at 07/13/23 2032    hydrocortisone 1 % cream, , Topical (Top), BID, Te Lui MD, Given at 07/13/23 2044    lactulose 20 gram/30 mL solution Soln 20 g, 20 g, Per NG tube, TID, Juanito Gorman, DO, 20 g at 07/13/23 2032    levETIRAcetam tablet 500 mg, 500 mg, Oral, BID, Katie Smith MD, 500 mg at 07/13/23 2032    LIDOcaine (PF) 10 mg/ml (1%) injection 100 mg, 10 mL, Other, Once, Keely Garcia MD    midodrine tablet 10 mg, 10 mg, Oral, TID WM, Michaela Sesay MD, 10 mg at 07/13/23 1625    mupirocin 2 % ointment, , Nasal, BID, Kip Avalos MD, Given at 07/13/23 2044    naloxone 0.4 mg/mL injection 0.02 mg, 0.02 mg, Intravenous, PRN, Palomo Helton DO    octreotide (SANDOSTATIN) 500 mcg in sodium chloride 0.9% 100 mL infusion, 50 mcg/hr, Intravenous, Continuous, Katie Smith MD, Last Rate: 10 mL/hr at 07/13/23 2340, 50 mcg/hr at 07/13/23 2340    ondansetron injection 4 mg, 4 mg, Intravenous, Q8H PRN, Palomo Helton DO, 4 mg at 07/05/23 2024    pantoprazole EC tablet 40 mg, 40 mg, Oral, Daily, Chico Malave MD, 40 mg at 07/13/23 0803    piperacillin-tazobactam (ZOSYN) 4.5 g in dextrose 5 % in water (D5W) 5 % 100 mL IVPB (MB+), 4.5 g, Intravenous, Q8H, Katie Smith MD, Last Rate: 25 mL/hr at 07/14/23 0430, 4.5 g at 07/14/23 0430    rifAXIMin tablet 550 mg, 550 mg, Oral, BID, Katie Smith MD, 550 mg at 07/13/23 2032    sodium chloride 0.9% flush 10 mL, 10 mL, Intravenous, Q12H PRN, Palomo Helton,     sodium chloride 0.9% flush  10 mL, 10 mL, Intravenous, PRN, Katie Smith MD    vancomycin 125 mg/5 mL oral solution 125 mg, 125 mg, Oral, Q6H, Chico Malave MD, 125 mg at 07/14/23 0554     Imaging:    Reviewed    Laboratory Data:  Hematology  Lab Results   Component Value Date    WBC 14.34 (H) 07/14/2023    HGB 8.0 (L) 07/14/2023    HCT 24.3 (L) 07/14/2023    PLT 71 (L) 07/14/2023     07/14/2023    K 3.4 (L) 07/14/2023    CHLORIDE 104 07/14/2023    CO2 24 07/14/2023    BUN 35.5 (H) 07/14/2023    CREATININE 3.60 (H) 07/14/2023    EGFRNORACEVR 19 07/14/2023    GLUCOSE 75 07/14/2023    CALCIUM 10.3 (H) 07/14/2023    ALKPHOS 242 (H) 07/14/2023    LABPROT 5.6 (L) 07/14/2023    ALBUMIN 3.4 (L) 07/14/2023    BILIDIR 3.9 (H) 07/11/2023    IBILI 0.10 02/16/2022    AST 61 (H) 07/14/2023    ALT 9 07/14/2023    MG 1.90 07/14/2023    PHOS 3.0 07/14/2023     Lab Results   Component Value Date    LLIVXHXU86MC 56.6 06/14/2023       Lab Results   Component Value Date    IRON 58 06/05/2023    TIBC 150 (L) 06/05/2023    TRANS 163 (L) 06/04/2023    TRANS 163 (L) 06/04/2023    LABIRON 31 06/04/2023    FERRITIN 122.2 06/05/2023    FOLATE 6.8 (L) 06/04/2023    RLKDSIDF19 >2,000 (H) 06/04/2023    HAPTO 55 07/07/2023     07/07/2023       Lab Results   Component Value Date    HIV Nonreactive 06/14/2023    HEPCAB Nonreactive 06/30/2023    HEPBSURFAG Nonreactive 06/30/2023    HEPBSAB Nonreactive 07/06/2023         Impression  Acute kidney injury   Metabolic acidosis, resolved  Hypernatremia, resolved  Hypokalemia   Anemia   Leukocytosis   Thrombocytopenia  Coagulopathy  Cirrhosis of the liver  Ascites  Respiratory failure    Plan  There is no significant change in patient's condition.  Per nursing, patient was not able to tolerate tube feedings due to volume of blood-tinged gastric residual. May need to consider parenteral nutrition.   Will replace potassium, continue supportive care.     Radha Perez NP  [facility]  07/14/2023

## 2023-07-15 NOTE — PROGRESS NOTES
Nephrology Progress Note    Hospital course:   58-year-old  male with alcoholic cirrhosis with decompensated liver failure and what appears to be hepatorenal syndrome at this juncture.  Patient remains in intensive care unit he has NG-tube and is on BiPAP.  Renal function continues to slowly decline.  Blood pressure has remained stable on midodrine I do agree with DNR DNI status as signed by Dr. Chavarria and Dr. Mckinney.    Subjective:   Patient appears weak and he is confused.    Twelve point review of systems unobtainable as patient is confused this morning.    Objective:   /74 (BP Location: Left arm, Patient Position: Lying)   Pulse 105   Temp 97.6 °F (36.4 °C) (Axillary)   Resp (!) 24   Ht 6' (1.829 m)   Wt 82.3 kg (181 lb 7 oz) Comment: wt elevated/ fluid  SpO2 97%   BMI 24.61 kg/m²     Intake/Output Summary (Last 24 hours) at 7/15/2023 0904  Last data filed at 7/15/2023 0700  Gross per 24 hour   Intake 1786.67 ml   Output 1125 ml   Net 661.67 ml        Physical exam:   General patient is alert and confused.  HEENT normocephalic atraumatic pupils equal round reactive to light NG tube intact  Neck no JVD bruit thyromegaly  Lungs scattered rhonchi clears with cough  Cardiovascular heart rate 198 at bedside.  No murmur rub or gallop appreciated  Abdomen distended positive bowel sounds all 4 quadrants  Extremities no clubbing cyanosis or edema noted  Neurologic no clonus asterixis appreciated his unable to comply with cranial nerve exam due to confusion    Laboratory data:   Recent Results (from the past 24 hour(s))   Comprehensive Metabolic Panel (CMP)    Collection Time: 07/15/23  3:28 AM   Result Value Ref Range    Sodium Level 146 (H) 136 - 145 mmol/L    Potassium Level 3.5 3.5 - 5.1 mmol/L    Chloride 106 98 - 107 mmol/L    Carbon Dioxide 23 22 - 29 mmol/L    Glucose Level 87 74 - 100 mg/dL    Blood Urea Nitrogen 39.8 (H) 8.4 - 25.7 mg/dL    Creatinine 3.83 (H) 0.73 - 1.18 mg/dL    Calcium  Level Total 10.3 (H) 8.4 - 10.2 mg/dL    Protein Total 5.7 (L) 6.4 - 8.3 gm/dL    Albumin Level 3.2 (L) 3.5 - 5.0 g/dL    Globulin 2.5 2.4 - 3.5 gm/dL    Albumin/Globulin Ratio 1.3 1.1 - 2.0 ratio    Bilirubin Total 6.7 (H) <=1.5 mg/dL    Alkaline Phosphatase 277 (H) 40 - 150 unit/L    Alanine Aminotransferase 8 0 - 55 unit/L    Aspartate Aminotransferase 56 (H) 5 - 34 unit/L    eGFR 18 mls/min/1.73/m2   Magnesium    Collection Time: 07/15/23  3:28 AM   Result Value Ref Range    Magnesium Level 1.90 1.60 - 2.60 mg/dL   Phosphorus    Collection Time: 07/15/23  3:28 AM   Result Value Ref Range    Phosphorus Level 3.4 2.3 - 4.7 mg/dL   CBC with Differential    Collection Time: 07/15/23  5:34 AM   Result Value Ref Range    WBC 21.82 (H) 4.50 - 11.50 x10(3)/mcL    RBC 2.61 (L) 4.70 - 6.10 x10(6)/mcL    Hgb 8.3 (L) 14.0 - 18.0 g/dL    Hct 25.5 (L) 42.0 - 52.0 %    MCV 97.7 (H) 80.0 - 94.0 fL    MCH 31.8 (H) 27.0 - 31.0 pg    MCHC 32.5 (L) 33.0 - 36.0 g/dL    RDW 20.1 (H) 11.5 - 17.0 %    Platelet 85 (L) 130 - 400 x10(3)/mcL    MPV 12.1 (H) 7.4 - 10.4 fL    Neut % 81.5 %    Lymph % 5.8 %    Mono % 10.0 %    Eos % 1.1 %    Basophil % 0.5 %    Lymph # 1.26 0.6 - 4.6 x10(3)/mcL    Neut # 17.80 (H) 2.1 - 9.2 x10(3)/mcL    Mono # 2.18 (H) 0.1 - 1.3 x10(3)/mcL    Eos # 0.23 0 - 0.9 x10(3)/mcL    Baso # 0.10 <=0.2 x10(3)/mcL    IG# 0.25 (H) 0 - 0.04 x10(3)/mcL    IG% 1.1 %    NRBC% 0.1 %       Imaging:   Imaging Results              CT Abdomen Pelvis  Without Contrast (Final result)  Result time 06/30/23 19:20:57      Final result by Raman Tiwari MD (06/30/23 19:20:57)                   Impression:      1. Trace of left pleural effusion.    2. Previous cholecystectomy.    3. Cirrhosis and ascites.    4.  Bilateral kidneys non occluding calculi.    5.  Noninflamed diverticulosis coli.      Electronically signed by: Raman Tiwari  Date:    06/30/2023  Time:    19:20               Narrative:    EXAMINATION:  CT ABDOMEN PELVIS  WITHOUT CONTRAST    CLINICAL HISTORY:  Abdominal pain, acute, nonlocalized;    TECHNIQUE:  Multidetector axial images were obtained from the  diaphragms to below symphysis pubis without the administration of IV contrast. Oral contrast was not administered.    Dose length product of 309 mGycm. Automated exposure control was utilized to minimize radiation dose.    COMPARISON:  June 4, 2023    FINDINGS:  There is trace of left dependent pleural effusion and left basilar atelectasis.    Liver is small in size consistent with cirrhosis.  There is moderate to large volume intraperitoneal free fluid consistent with ascites.  There are mesenteric inflammatory ground-glass opacities.  Within limitations of noncontrast technique, no acute findings liver, pancreas or the spleen identified.  Gallbladder is surgically absent..    The adrenal glands noncontrast evaluation is unremarkable. The kidneys are unremarkable in size and contour.  Bilateral kidneys non occluding calculi. The ureters appear normal in course and diameter without intra ureteral stone.  Calcified plaques of the aorta and iliac arteries without aneurysmal dilatation.  There are no retroperitoneal periaortic enlarged lymph nodes.    Stomach is decompressed and difficult to assess.  No abnormal dilatation of loops of small bowel.  Pericecal metallic clips suggest prior appendectomy.  There is noninflamed diverticulosis coli.  No bowel obstruction.  No pneumoperitoneum.    Urinary bladder is mostly decompressed.  No intravesical stone identified. There is no pelvic free fluid.    Chronic compression deformities of the thoracolumbar vertebrae.  Demineralization of the bones.  No acute or aggressive skeletal abnormality.                                       X-Ray Chest AP Portable (Final result)  Result time 06/30/23 12:28:05      Final result by Raj Gibbs MD (06/30/23 12:28:05)                   Impression:      No acute findings.      Electronically signed  by: Raj Gibbs  Date:    06/30/2023  Time:    12:28               Narrative:    EXAMINATION:  XR CHEST AP PORTABLE    CLINICAL HISTORY:  Unspecified abdominal pain    COMPARISON:  14 June 2023    FINDINGS:  Portable frontal view of the chest was obtained. The heart is not significantly enlarged.  There are chronic changes towards the left lung base.  No new focal consolidation or pneumothorax.                                       Medications:     Current Facility-Administered Medications:     acetaminophen tablet 650 mg, 650 mg, Oral, Q6H PRN, Zeeshan Castillo MD, 650 mg at 07/07/23 1225    dextrose 10% bolus 125 mL 125 mL, 12.5 g, Intravenous, PRN, Palomo Owenh, DO    dextrose 10% bolus 250 mL 250 mL, 25 g, Intravenous, PRN, Palomo Yeh, DO    diphenhydrAMINE capsule 25 mg, 25 mg, Oral, Q6H PRN, Te Lui MD, 25 mg at 07/14/23 2343    glucagon (human recombinant) injection 1 mg, 1 mg, Intramuscular, PRN, Palomo Owenh, DO    glucose chewable tablet 16 g, 16 g, Oral, PRN, Palomo Yeh, DO    glucose chewable tablet 24 g, 24 g, Oral, PRN, Palomo Braydenh, DO    heparin (porcine) injection 5,000 Units, 5,000 Units, Subcutaneous, Q12H, Palomo Helton DO, 5,000 Units at 07/15/23 0835    hydrocortisone 1 % cream, , Topical (Top), BID, Te Lui MD, Given at 07/15/23 0838    lactulose 20 gram/30 mL solution Soln 20 g, 20 g, Per NG tube, TID, Juanito Gorman DO, 20 g at 07/15/23 0834    levETIRAcetam tablet 500 mg, 500 mg, Oral, BID, Katie Smith MD, 500 mg at 07/15/23 0841    LIDOcaine (PF) 10 mg/ml (1%) injection 100 mg, 10 mL, Other, Once, Keely Garcia MD    miconazole 2 % cream, , Topical (Top), BID, Katie Smith MD, Given at 07/15/23 0838    midodrine tablet 10 mg, 10 mg, Oral, TID WM, Michaela Sesay MD, 10 mg at 07/15/23 0825    naloxone 0.4 mg/mL injection 0.02 mg, 0.02 mg, Intravenous, PRN, Palomo Helton DO    octreotide (SANDOSTATIN) 500 mcg in sodium chloride 0.9% 100 mL infusion, 50 mcg/hr,  Intravenous, Continuous, Katie Smith MD, Last Rate: 10 mL/hr at 07/15/23 0838, 50 mcg/hr at 07/15/23 0838    ondansetron injection 4 mg, 4 mg, Intravenous, Q8H PRN, Palomo Helton DO, 4 mg at 07/05/23 2024    pantoprazole EC tablet 40 mg, 40 mg, Oral, Daily, Chico Malave MD, 40 mg at 07/15/23 0835    rifAXIMin tablet 550 mg, 550 mg, Oral, BID, Katie Smith MD, 550 mg at 07/15/23 0836    sodium chloride 0.9% flush 10 mL, 10 mL, Intravenous, Q12H PRN, Palomo Helton DO    sodium chloride 0.9% flush 10 mL, 10 mL, Intravenous, PRN, Katie Smith MD    vancomycin 125 mg/5 mL oral solution 125 mg, 125 mg, Oral, Q6H, Chico Malave MD, 125 mg at 07/15/23 0511     Impression/Plan:  Acute nonoliguric renal failure:  Renal function slowly is worsening.  For 2 days creatinine nadired at 3.6 today creatinine 3.8 BUN is 39.8.  There is no indication for acute hemodialysis cyst as severe hyperkalemia metabolic acidosis or massive volume overload.  We will continue to monitor patient's clinical progress I do agree with DNR DNI patient will be rather poor candidate for renal replacement therapy due to tenuous hemodynamic status as well.  Hypernatremia:  I did speak to patient's nurse will increase free water flushes to 200 mL.  Borderline hypercalcemia possibly due to intravascular depletion will monitor.  Will recheck CMP in a.m.  Cirrhosis and liver failure:  White count 30298 today INR 3.45.  Continue present management continue blood pressure support with midodrine to maintain map of 85 per Gastroenterology Service recommendations.  Mary Kay Dee M.D.  Nephrology

## 2023-07-15 NOTE — PROGRESS NOTES
[facility] - ICU  Pulmonary Critical Care Note    Patient Name: Los Alatorre  MRN: [id number]  Admission Date: 6/30/2023  Hospital Length of Stay: 15 days  Code Status: Full Code  Attending Provider: Kip Avalos MD  Primary Care Provider: DECLAN Small     Subjective:     HPI:   Los Alatorre is a 58 y.o. male who with a history of alcoholic cirrhosis, alcohol use disorder, and seizure disorder who presented to [facility] ED on 6/30/2023 with a primary complaint of abdominal pain. He reports poor PO intake and has felt his abdomen become more distended. Does not report any fevers or chills. Denies any melena, bright red blood in the stool, or any vomiting. Was recently transferred to [facility] earlier this month for an EGD, which ruled out varices and gastropathy. Unknown if he has been taking his home medications reliably.       In the ED patient was afebrile 100.2 F, pulse 96, respiratory rate 4, blood pressure 137/83, 87% on room air.  CBC revealed leukocytosis at 23, H and H 11.7/35.7, platelets 286.  CMP revealed mild hyponatremia 132, acidosis 17, BUN/CR 0.65/0.88.  Alkaline phosphatase elevated at 337, slight AST elevation at 56, INR 1.3.  Initial lactic acid 3.2, troponin negative. CT abdomen pelvis revealed trace left pleural effusion, cirrhosis ascites, previous cholecystectomy, bilateral kidneys with non occluding calculi, and noninflamed diverticulosis coli.  Paracentesis was performed and 5 L of fluid was drained.      Hospital Course/Significant events:  Patient admitted for alcoholic liver cirrhosis with ascites and CHRISTEN that was initially due to intravascular volume depletion with appropriate response to fluids and albumin, but later developed hepatorenal syndrome, with worsening renal indices despite appropriate medical therapy. From hospital day 7-9, patient's mentation and renal indices have been worsening, and overnight hospital day 8 (7/9/23), developed worsening respiratory status  requiring BiPAP (see oncall event note 7/8), prompting upgrade to ICU for close monitoring    24 Hour Interval History:  Patient remains encephalopathic, able to tell me his name today and that he is in the hospital, not really following commands today. He is on BiPAP 12/6 FiO2  65%, has not tolerate weaning. MAPs overnight remain at goal. Total 24 hr  cc, net positive +897.   Labs this AM revealed  white count uptrending again, hgb stable. PT , INR pending;  Na 146, K 3.5, renal indices worsening with BUN39.8/crea 3.83, Have still been unable to reach family for goals of care discussions. Tube feeds    Past Medical History:   Diagnosis Date    Seizures        Past Surgical History:   Procedure Laterality Date    APPENDECTOMY      CHOLECYSTECTOMY         Social History     Socioeconomic History    Marital status:     Number of children: 1   Occupational History    Occupation: Employed   Tobacco Use    Smoking status: Every Day     Packs/day: 1.50     Types: Cigarettes    Smokeless tobacco: Never   Substance and Sexual Activity    Alcohol use: Not Currently     Comment: no alcohol x 2 month    Drug use: Not Currently     Types: Marijuana     Comment: No Marijuana x 2 month     Social Determinants of Health     Financial Resource Strain: High Risk    Difficulty of Paying Living Expenses: Very hard   Food Insecurity: Food Insecurity Present    Worried About Running Out of Food in the Last Year: Sometimes true    Ran Out of Food in the Last Year: Sometimes true   Transportation Needs: No Transportation Needs    Lack of Transportation (Medical): No    Lack of Transportation (Non-Medical): No   Physical Activity: Inactive    Days of Exercise per Week: 0 days    Minutes of Exercise per Session: 0 min   Social Connections: Moderately Isolated    Frequency of Communication with Friends and Family: Three times a week    Frequency of Social Gatherings with Friends and Family: More than three times a week     Attends Mosque Services: Never    Active Member of Clubs or Organizations: No    Attends Club or Organization Meetings: Never    Marital Status: Living with partner   Housing Stability: High Risk    Unable to Pay for Housing in the Last Year: Yes    Number of Places Lived in the Last Year: 1    Unstable Housing in the Last Year: No           Current Outpatient Medications   Medication Instructions    LACTULOSE ORAL Oral, 2 times daily    levETIRAcetam (KEPPRA) 750 mg, Oral, 2 times daily    polyethylene glycol (GLYCOLAX) 17 g, Oral, Daily PRN       Current Inpatient Medications   heparin (porcine)  5,000 Units Subcutaneous Q12H    hydrocortisone   Topical (Top) BID    lactulose  20 g Per NG tube TID    levETIRAcetam  500 mg Oral BID    LIDOcaine (PF) 10 mg/ml (1%)  10 mL Other Once    miconazole   Topical (Top) BID    midodrine  10 mg Oral TID WM    pantoprazole  40 mg Oral Daily    piperacillin-tazobactam (Zosyn) IV (PEDS and ADULTS) (extended infusion is not appropriate)  4.5 g Intravenous Q8H    rifAXIMin  550 mg Oral BID    vancomycin  125 mg Oral Q6H       Current Intravenous Infusions   octreotide (SANDOSTATIN) infusion 50 mcg/hr (07/14/23 2236)         ROS   Unable to perform      Objective:       Intake/Output Summary (Last 24 hours) at 7/15/2023 0524  Last data filed at 7/15/2023 0332  Gross per 24 hour   Intake 1373.5 ml   Output 525 ml   Net 848.5 ml           Vital Signs (Most Recent):  Temp: 97.5 °F (36.4 °C) (07/15/23 0345)  Pulse: 90 (07/15/23 0000)  Resp: (!) 21 (07/15/23 0000)  BP: 113/79 (07/15/23 0000)  SpO2: 98 % (07/15/23 0100)  Body mass index is 24.61 kg/m².  Weight: 82.3 kg (181 lb 7 oz) (wt elevated/ fluid) Vital Signs (24h Range):  Temp:  [97.4 °F (36.3 °C)-97.9 °F (36.6 °C)] 97.5 °F (36.4 °C)  Pulse:  [] 90  Resp:  [19-30] 21  SpO2:  [95 %-100 %] 98 %  BP: (108-136)/(72-95) 113/79     Physical Exam  Constitutional:       General: He is not in acute distress.     Appearance: He is  ill-appearing. He is not toxic-appearing.   HENT:      Head: Normocephalic and atraumatic.   Cardiovascular:      Rate and Rhythm: Normal rate and regular rhythm.   Pulmonary:      Breath sounds: Rales present. No wheezing.   Chest:      Chest wall: No tenderness.   Abdominal:      General: Bowel sounds are normal. There is distension.   Musculoskeletal:         General: No tenderness or deformity. Normal range of motion.      Cervical back: Normal range of motion and neck supple. No rigidity.   Skin:     General: Skin is warm and dry.   Neurological:      Mental Status: He is disoriented.         Lines/Drains/Airways       Drain  Duration                  NG/OG Tube 07/09/23 1800 [place] sump 16 Fr. Right nostril 5 days         Urethral Catheter 07/09/23 0845 16 Fr. 5 days         Rectal Tube 07/14/23 0300 rectal tube w/ balloon (indicate number of mLs) 1 day              Peripheral Intravenous Line  Duration                  Peripheral IV - Single Lumen 07/07/23 2100 20 G Anterior;Right Forearm 7 days         Peripheral IV - Single Lumen 07/12/23 0200 20 G Anterior;Right Forearm 3 days         Peripheral IV - Single Lumen 07/14/23 0800 Anterior;Left;Proximal Forearm <1 day                    Significant Labs:    Lab Results   Component Value Date    WBC 14.34 (H) 07/14/2023    HGB 8.0 (L) 07/14/2023    HCT 24.3 (L) 07/14/2023    MCV 96.4 (H) 07/14/2023    PLT 71 (L) 07/14/2023           BMP  Lab Results   Component Value Date     (H) 07/15/2023    K 3.5 07/15/2023    CHLORIDE 106 07/15/2023    CO2 23 07/15/2023    BUN 39.8 (H) 07/15/2023    CREATININE 3.83 (H) 07/15/2023    CALCIUM 10.3 (H) 07/15/2023    AGAP 7.0 05/01/2023    ESTGFRAFRICA 105 06/08/2023    EGFRNONAA 33 06/17/2022         ABG  Recent Labs   Lab 07/14/23  0618   PH 7.410   PO2 55.0*   HCO3 27.9*   POCBASEDEF 2.90*         Mechanical Ventilation Support:  Oxygen Concentration (%): 55 (07/15/23 0100)      Significant Imaging:  I have reviewed the  pertinent imaging within the past 24 hours.        Assessment/Plan:     Assessment  Decompensated alcoholic liver cirrhosis with ascites, hepatic encephalopathy, and hepatorenal syndrome  Acute hypoxic hypercapnic respiratory failure 2/2 volume overload vs hospital acquired pneumonia  Concern for C diff infection  Normocytic anemia  Hypokalemia   History of alcohol withdrawal with seizures  History of seizure disorder     Plan  -Admitted to ICU for close monitoring given clinical decompensation and acute worsening of respiratory status  -Continue lactulose TID, rifaximine 550 BID, octreotide drip and midodrine 10 TID. MAP goal of 85 for HRS. MAPs have been stable on current regimen. Start levophed if needed to maintain MAP goals  -GI on board, recommending comfort care if not candidate for rrt.  -Nephrology on board, no acute indications for HD. Monitor I's and O's, dela cruz in place.  -Continue BiPAP to main O2 sats >92%. Wean as tolerated. Has been unable to tolerate weaning thus far. FiO2 increased to 65%  -Has been on antibiotic regimens since admission. Initiated on Vancomycin, Zosyn and levaquin (7/8) due to concern for HAP. Levaquin stopped 7/9, IV vanc stopped 7/10. Completed 7 day course of zosyn. Most recent blood cultures from 7/8 negative at 5 days; all culture data (blood, body fluid) from this hospitalization have been negative  -Currently being treated for presumed C diff infection with PO vancomycin (D8 start date 7/7/23)  -Monitor electrolytes and replete as appropriate  -CIWA protocol discontinued, no longer in window for seizures 2/2 alcohol withdrawal  -Continue home keppra for seizure disorder  -Ideally would need to have goals of care discussion, but with no immediate kin available to make medical decisions at this time. Apparently has sister, Alexia Alatorre [phone] and brother Marcial Alatorre [phone]  but have been unable to reach on multiple attempts.   -Given futile attempts to reach  family even with law enforcement assistance, after discussion with Pulmonary/Crit and Gastroenterology teams, decision has been made that patient should be a DNR/DNI. Code status has been updated in chart     DVT Prophylaxis: heparin 5000 BID  GI Prophylaxis: pantoprazole      32 minutes of critical care was time spent personally by me on the following activities: development of treatment plan with patient or surrogate and bedside caregivers, discussions with consultants, evaluation of patient's response to treatment, examination of patient, ordering and performing treatments and interventions, ordering and review of laboratory studies, ordering and review of radiographic studies, pulse oximetry, re-evaluation of patient's condition.  This critical care time did not overlap with that of any other provider or involve time for any procedures.     Katie Smith MD  [employer] [facility], PGY-3  Pulmonary Critical Care Medicine  [facility] - ICU

## 2023-07-15 NOTE — PLAN OF CARE
Problem: Skin Injury Risk Increased  Goal: Skin Health and Integrity  Outcome: Ongoing, Not Progressing  Intervention: Optimize Skin Protection  Flowsheets (Taken 7/15/2023 1816)  Pressure Reduction Techniques:   heels elevated off bed   positioned off wounds   pressure points protected   sit time limited to 2 hours  Pressure Reduction Devices:   foam padding utilized   pressure-redistributing mattress utilized   positioning supports utilized  Skin Protection:   adhesive use limited   skin sealant/moisture barrier applied   tubing/devices free from skin contact  Head of Bed (HOB) Positioning: HOB at 30 degrees     Problem: Fluid and Electrolyte Imbalance (Acute Kidney Injury/Impairment)  Goal: Fluid and Electrolyte Balance  Outcome: Ongoing, Not Progressing  Intervention: Monitor and Manage Fluid and Electrolyte Balance  Flowsheets (Taken 7/15/2023 1816)  Fluid/Electrolyte Management:   fluids provided   oral rehydration therapy initiated     Problem: Renal Function Impairment (Acute Kidney Injury/Impairment)  Goal: Effective Renal Function  Outcome: Ongoing, Not Progressing  Intervention: Monitor and Support Renal Function  Flowsheets (Taken 7/15/2023 1816)  Stabilization Measures: legs elevated  Medication Review/Management: medications reviewed

## 2023-07-16 NOTE — PROGRESS NOTES
Nephrology Progress Note    Hospital course:   58 year old male with alcoholic cirrhosis and decompensated liver failure and acute renal failure. INR and renal function continue to worsen.    Subjective:   Patient obtunded      12 point ros: patient obtunded, unobtainable.    Objective:   /74   Pulse 81   Temp 97 °F (36.1 °C) (Axillary)   Resp (!) 23   Ht 6' (1.829 m)   Wt 82.3 kg (181 lb 7 oz) Comment: wt elevated/ fluid  SpO2 98%   BMI 24.61 kg/m²     Intake/Output Summary (Last 24 hours) at 7/16/2023 0719  Last data filed at 7/16/2023 0536  Gross per 24 hour   Intake 1672.65 ml   Output 2390 ml   Net -717.35 ml        Physical exam:   Gen: obtunded, arousable, confused when aroused  HEENT: Normocephalic atraumatic. PERRL  Neck: no JVD bruits  Lungs: coarse breath sounds  CVS RRR, no m/r/g  Abdomen: positive bowel sounds, distended  Extremites: no clubbing, cyanosis edema  Neurologic: no clonus or asterixis, unable to comply with cranial nerve exam    Laboratory data:   Recent Results (from the past 24 hour(s))   Hemoglobin and Hematocrit    Collection Time: 07/15/23 11:49 PM   Result Value Ref Range    Hgb 8.0 (L) 14.0 - 18.0 g/dL    Hct 24.2 (L) 42.0 - 52.0 %   Comprehensive Metabolic Panel (CMP)    Collection Time: 07/16/23  3:58 AM   Result Value Ref Range    Sodium Level 146 (H) 136 - 145 mmol/L    Potassium Level 3.1 (L) 3.5 - 5.1 mmol/L    Chloride 106 98 - 107 mmol/L    Carbon Dioxide 22 22 - 29 mmol/L    Glucose Level 124 (H) 74 - 100 mg/dL    Blood Urea Nitrogen 44.7 (H) 8.4 - 25.7 mg/dL    Creatinine 3.97 (H) 0.73 - 1.18 mg/dL    Calcium Level Total 10.2 8.4 - 10.2 mg/dL    Protein Total 5.9 (L) 6.4 - 8.3 gm/dL    Albumin Level 3.4 (L) 3.5 - 5.0 g/dL    Globulin 2.5 2.4 - 3.5 gm/dL    Albumin/Globulin Ratio 1.4 1.1 - 2.0 ratio    Bilirubin Total 6.7 (H) <=1.5 mg/dL    Alkaline Phosphatase 249 (H) 40 - 150 unit/L    Alanine Aminotransferase 6 0 - 55 unit/L    Aspartate Aminotransferase 44  (H) 5 - 34 unit/L    eGFR 17 mls/min/1.73/m2   Magnesium    Collection Time: 07/16/23  3:58 AM   Result Value Ref Range    Magnesium Level 1.90 1.60 - 2.60 mg/dL   Phosphorus    Collection Time: 07/16/23  3:58 AM   Result Value Ref Range    Phosphorus Level 3.5 2.3 - 4.7 mg/dL   Protime-INR    Collection Time: 07/16/23  3:58 AM   Result Value Ref Range    PT 39.2 seconds    INR 4.20 (H) 0.00 - 1.30   CBC with Differential    Collection Time: 07/16/23  3:58 AM   Result Value Ref Range    WBC 22.91 (H) 4.50 - 11.50 x10(3)/mcL    RBC 2.28 (L) 4.70 - 6.10 x10(6)/mcL    Hgb 7.2 (L) 14.0 - 18.0 g/dL    Hct 22.0 (L) 42.0 - 52.0 %    MCV 96.5 (H) 80.0 - 94.0 fL    MCH 31.6 (H) 27.0 - 31.0 pg    MCHC 32.7 (L) 33.0 - 36.0 g/dL    RDW 20.0 (H) 11.5 - 17.0 %    Platelet 85 (L) 130 - 400 x10(3)/mcL    MPV 12.7 (H) 7.4 - 10.4 fL    Neut % 79.2 %    Lymph % 6.8 %    Mono % 11.6 %    Eos % 1.0 %    Basophil % 0.3 %    Lymph # 1.55 0.6 - 4.6 x10(3)/mcL    Neut # 18.14 (H) 2.1 - 9.2 x10(3)/mcL    Mono # 2.66 (H) 0.1 - 1.3 x10(3)/mcL    Eos # 0.23 0 - 0.9 x10(3)/mcL    Baso # 0.08 <=0.2 x10(3)/mcL    IG# 0.25 (H) 0 - 0.04 x10(3)/mcL    IG% 1.1 %    NRBC% 0.1 %       Imaging:   Imaging Results              CT Abdomen Pelvis  Without Contrast (Final result)  Result time 06/30/23 19:20:57      Final result by Raman Tiwari MD (06/30/23 19:20:57)                   Impression:      1. Trace of left pleural effusion.    2. Previous cholecystectomy.    3. Cirrhosis and ascites.    4.  Bilateral kidneys non occluding calculi.    5.  Noninflamed diverticulosis coli.      Electronically signed by: Raman Tiwari  Date:    06/30/2023  Time:    19:20               Narrative:    EXAMINATION:  CT ABDOMEN PELVIS WITHOUT CONTRAST    CLINICAL HISTORY:  Abdominal pain, acute, nonlocalized;    TECHNIQUE:  Multidetector axial images were obtained from the  diaphragms to below symphysis pubis without the administration of IV contrast. Oral contrast was  not administered.    Dose length product of 309 mGycm. Automated exposure control was utilized to minimize radiation dose.    COMPARISON:  June 4, 2023    FINDINGS:  There is trace of left dependent pleural effusion and left basilar atelectasis.    Liver is small in size consistent with cirrhosis.  There is moderate to large volume intraperitoneal free fluid consistent with ascites.  There are mesenteric inflammatory ground-glass opacities.  Within limitations of noncontrast technique, no acute findings liver, pancreas or the spleen identified.  Gallbladder is surgically absent..    The adrenal glands noncontrast evaluation is unremarkable. The kidneys are unremarkable in size and contour.  Bilateral kidneys non occluding calculi. The ureters appear normal in course and diameter without intra ureteral stone.  Calcified plaques of the aorta and iliac arteries without aneurysmal dilatation.  There are no retroperitoneal periaortic enlarged lymph nodes.    Stomach is decompressed and difficult to assess.  No abnormal dilatation of loops of small bowel.  Pericecal metallic clips suggest prior appendectomy.  There is noninflamed diverticulosis coli.  No bowel obstruction.  No pneumoperitoneum.    Urinary bladder is mostly decompressed.  No intravesical stone identified. There is no pelvic free fluid.    Chronic compression deformities of the thoracolumbar vertebrae.  Demineralization of the bones.  No acute or aggressive skeletal abnormality.                                       X-Ray Chest AP Portable (Final result)  Result time 06/30/23 12:28:05      Final result by Raj Gibbs MD (06/30/23 12:28:05)                   Impression:      No acute findings.      Electronically signed by: Raj Gibbs  Date:    06/30/2023  Time:    12:28               Narrative:    EXAMINATION:  XR CHEST AP PORTABLE    CLINICAL HISTORY:  Unspecified abdominal pain    COMPARISON:  14 June 2023    FINDINGS:  Portable frontal view of the  chest was obtained. The heart is not significantly enlarged.  There are chronic changes towards the left lung base.  No new focal consolidation or pneumothorax.                                       Medications:     Current Facility-Administered Medications:     acetaminophen tablet 650 mg, 650 mg, Oral, Q6H PRN, Zeeshan Castillo MD, 650 mg at 07/07/23 1225    dextrose 10% bolus 125 mL 125 mL, 12.5 g, Intravenous, PRN, Palomo Yeh, DO    dextrose 10% bolus 250 mL 250 mL, 25 g, Intravenous, PRN, Palomo Yeh, DO    diphenhydrAMINE capsule 25 mg, 25 mg, Oral, Q6H PRN, Te Lui MD, 25 mg at 07/15/23 2225    glucagon (human recombinant) injection 1 mg, 1 mg, Intramuscular, PRN, Palomo Helton, DO    glucose chewable tablet 16 g, 16 g, Oral, PRN, Palomo Owenh, DO    glucose chewable tablet 24 g, 24 g, Oral, PRN, Palomo Yeh, DO    heparin (porcine) injection 5,000 Units, 5,000 Units, Subcutaneous, Q12H, Palomo Helton, DO, 5,000 Units at 07/15/23 2047    hydrocortisone 1 % cream, , Topical (Top), BID, Te Lui MD, Given at 07/15/23 2054    lactulose 20 gram/30 mL solution Soln 20 g, 20 g, Per NG tube, TID, Juanito Gorman, DO, 20 g at 07/15/23 2047    levETIRAcetam tablet 500 mg, 500 mg, Oral, BID, Katie Smith MD, 500 mg at 07/15/23 2047    LIDOcaine (PF) 10 mg/ml (1%) injection 100 mg, 10 mL, Other, Once, Keely Garcia MD    miconazole 2 % cream, , Topical (Top), BID, Katie Smith MD, Given at 07/15/23 2053    midodrine tablet 10 mg, 10 mg, Oral, TID WM, Michaela Sesay MD, 10 mg at 07/15/23 1741    naloxone 0.4 mg/mL injection 0.02 mg, 0.02 mg, Intravenous, PRN, Palomo Helton,     NORepinephrine 4 mg in dextrose 5% 250 mL infusion (premix) (titrating), 0-3 mcg/kg/min, Intravenous, Continuous, Elo Ardon MD, Last Rate: 12.3 mL/hr at 07/16/23 0536, 0.04 mcg/kg/min at 07/16/23 0536    octreotide (SANDOSTATIN) 500 mcg in sodium chloride 0.9% 100 mL infusion, 50 mcg/hr, Intravenous, Continuous,  Katie Smith MD, Last Rate: 10 mL/hr at 07/16/23 0556, 50 mcg/hr at 07/16/23 0556    ondansetron injection 4 mg, 4 mg, Intravenous, Q8H PRN, Palomo Helton DO, 4 mg at 07/05/23 2024    pantoprazole EC tablet 40 mg, 40 mg, Oral, Daily, Chico Malave MD, 40 mg at 07/15/23 0835    potassium chloride 10 mEq in 100 mL IVPB, 10 mEq, Intravenous, Q1H, Katie Smith MD    rifAXIMin tablet 550 mg, 550 mg, Oral, BID, Katie Smith MD, 550 mg at 07/15/23 2047    sodium chloride 0.9% flush 10 mL, 10 mL, Intravenous, Q12H PRN, Palomo Helton DO    sodium chloride 0.9% flush 10 mL, 10 mL, Intravenous, PRN, Katie Smith MD    vancomycin 125 mg/5 mL oral solution 125 mg, 125 mg, Oral, Q6H, Chico Malave MD, 125 mg at 07/16/23 0512     Impression/Plan:  Acute renal failure :oliguric. No acute indications for renal replacement therapy. Patient poor candidate for renal replacement therapy due to hemodynamics overall poor health with significant liver failure and Inr 4 which will make it difficult for HD catheter placement . Volume negative yesterday, but due to stool output. Monitor renal function daily.  Hypernatremia: increased free water flushes to 200 ml yesterday. 1/2 NS is a consideration or LR. However, hypercalcemia noted and LR less desirable due to hypercalcemia. LR has 130 meq sodium.  Hypokalemia: replete today. Will monitor  Mild metabolic acidosis; likely diarrhea, liver failure and acute renal failure. Monitor  Hypercalcemia: improved. Agree with increasing free water flushes. CMP in am.  Overall, poor prognosis with elevated wbc, elevated INR and worsening renal function.    Mary Kay Dee M.D.  Nephrology

## 2023-07-16 NOTE — PLAN OF CARE
Problem: Restraint, Nonbehavioral (Nonviolent)  Goal: Absence of Harm or Injury  Outcome: Met  Intervention: Implement Least Restrictive Safety Strategies  Flowsheets (Taken 7/16/2023 1177)  De-Escalation Techniques: ([place] applied)   medication administered   other (see comments)

## 2023-07-16 NOTE — PROGRESS NOTES
[facility] - ICU  Pulmonary Critical Care Note    Patient Name: Los Alatorre  MRN: [id number]  Admission Date: 6/30/2023  Hospital Length of Stay: 16 days  Code Status: DNR  Attending Provider: Kip Avalos MD  Primary Care Provider: DECLAN Small     Subjective:     HPI:   Los Alatorre is a 58 y.o. male who with a history of alcoholic cirrhosis, alcohol use disorder, and seizure disorder who presented to [facility] ED on 6/30/2023 with a primary complaint of abdominal pain. He reports poor PO intake and has felt his abdomen become more distended. Does not report any fevers or chills. Denies any melena, bright red blood in the stool, or any vomiting. Was recently transferred to [facility] earlier this month for an EGD, which ruled out varices and gastropathy. Unknown if he has been taking his home medications reliably.       In the ED patient was afebrile 100.2 F, pulse 96, respiratory rate 4, blood pressure 137/83, 87% on room air.  CBC revealed leukocytosis at 23, H and H 11.7/35.7, platelets 286.  CMP revealed mild hyponatremia 132, acidosis 17, BUN/CR 0.65/0.88.  Alkaline phosphatase elevated at 337, slight AST elevation at 56, INR 1.3.  Initial lactic acid 3.2, troponin negative. CT abdomen pelvis revealed trace left pleural effusion, cirrhosis ascites, previous cholecystectomy, bilateral kidneys with non occluding calculi, and noninflamed diverticulosis coli.  Paracentesis was performed and 5 L of fluid was drained.      Hospital Course/Significant events:  Patient admitted for alcoholic liver cirrhosis with ascites and CHRISTEN that was initially due to intravascular volume depletion with appropriate response to fluids and albumin, but later developed hepatorenal syndrome, with worsening renal indices despite appropriate medical therapy. From hospital day 7-9, patient's mentation and renal indices have been worsening, and overnight hospital day 8 (7/9/23), developed worsening respiratory status requiring  BiPAP (see oncall event note 7/8), prompting upgrade to ICU for close monitoring    24 Hour Interval History:  Patient remains encephalopathic, able to tell me his name today not answering other questions, and not  following commands meaningfully today. Reports of bloody output from NGT approximately 600 cc. He is on BiPAP 12/6 FiO2  60%, has not tolerate weaning. MAPs overnight dropped to the 60's, started on levophed to maintain MAP goal of 85, currently on .02. Total 24 hr .   Labs this AM revealed rising leukocytosis, stable but slight drop in H/H, pending recheck this afternoon. INR 4.2;  Na 146, K 3.1 (repleting this AM), renal indices worsening with BUN 44.7/crea 3.97, Have still been unable to reach family for goals of care discussion    Past Medical History:   Diagnosis Date    Seizures        Past Surgical History:   Procedure Laterality Date    APPENDECTOMY      CHOLECYSTECTOMY         Social History     Socioeconomic History    Marital status:     Number of children: 1   Occupational History    Occupation: Employed   Tobacco Use    Smoking status: Every Day     Packs/day: 1.50     Types: Cigarettes    Smokeless tobacco: Never   Substance and Sexual Activity    Alcohol use: Not Currently     Comment: no alcohol x 2 month    Drug use: Not Currently     Types: Marijuana     Comment: No Marijuana x 2 month     Social Determinants of Health     Financial Resource Strain: High Risk    Difficulty of Paying Living Expenses: Very hard   Food Insecurity: Food Insecurity Present    Worried About Running Out of Food in the Last Year: Sometimes true    Ran Out of Food in the Last Year: Sometimes true   Transportation Needs: No Transportation Needs    Lack of Transportation (Medical): No    Lack of Transportation (Non-Medical): No   Physical Activity: Inactive    Days of Exercise per Week: 0 days    Minutes of Exercise per Session: 0 min   Social Connections: Moderately Isolated    Frequency of  Communication with Friends and Family: Three times a week    Frequency of Social Gatherings with Friends and Family: More than three times a week    Attends Hoahaoism Services: Never    Active Member of Clubs or Organizations: No    Attends Club or Organization Meetings: Never    Marital Status: Living with partner   Housing Stability: High Risk    Unable to Pay for Housing in the Last Year: Yes    Number of Places Lived in the Last Year: 1    Unstable Housing in the Last Year: No           Current Outpatient Medications   Medication Instructions    LACTULOSE ORAL Oral, 2 times daily    levETIRAcetam (KEPPRA) 750 mg, Oral, 2 times daily    polyethylene glycol (GLYCOLAX) 17 g, Oral, Daily PRN       Current Inpatient Medications   heparin (porcine)  5,000 Units Subcutaneous Q12H    hydrocortisone   Topical (Top) BID    lactulose  20 g Per NG tube TID    levETIRAcetam  500 mg Oral BID    LIDOcaine (PF) 10 mg/ml (1%)  10 mL Other Once    miconazole   Topical (Top) BID    midodrine  10 mg Oral TID WM    pantoprazole  40 mg Oral Daily    potassium chloride  10 mEq Intravenous Q1H    rifAXIMin  550 mg Oral BID    vancomycin  125 mg Oral Q6H       Current Intravenous Infusions   NORepinephrine bitartrate-D5W 0.02 mcg/kg/min (07/16/23 0848)    octreotide (SANDOSTATIN) infusion 50 mcg/hr (07/16/23 0556)         ROS   Unable to perform      Objective:       Intake/Output Summary (Last 24 hours) at 7/16/2023 0916  Last data filed at 7/16/2023 0715  Gross per 24 hour   Intake 1772.65 ml   Output 2390 ml   Net -617.35 ml           Vital Signs (Most Recent):  Temp: 96.6 °F (35.9 °C) (07/16/23 0715)  Pulse: 108 (07/16/23 0830)  Resp: (!) 21 (07/16/23 0830)  BP: 119/73 (07/16/23 0830)  SpO2: 97 % (07/16/23 0830)  Body mass index is 24.61 kg/m².  Weight: 82.3 kg (181 lb 7 oz) (wt elevated/ fluid) Vital Signs (24h Range):  Temp:  [96.6 °F (35.9 °C)-97.6 °F (36.4 °C)] 96.6 °F (35.9 °C)  Pulse:  [] 108  Resp:  [17-35] 21  SpO2:   [89 %-100 %] 97 %  BP: ()/(59-82) 119/73     Physical Exam  Constitutional:       General: He is not in acute distress.     Appearance: He is ill-appearing. He is not toxic-appearing.   HENT:      Head: Normocephalic and atraumatic.   Cardiovascular:      Rate and Rhythm: Normal rate and regular rhythm.   Pulmonary:      Breath sounds: Rales present. No wheezing.   Chest:      Chest wall: No tenderness.   Abdominal:      General: Bowel sounds are normal. There is distension.   Musculoskeletal:         General: No tenderness or deformity. Normal range of motion.      Cervical back: Normal range of motion and neck supple. No rigidity.   Skin:     General: Skin is warm and dry.   Neurological:      Mental Status: He is disoriented.         Lines/Drains/Airways       Drain  Duration                  Urethral Catheter 07/09/23 0845 16 Fr. 7 days         NG/OG Tube 07/09/23 1800 Sharon sump 16 Fr. Right nostril 6 days         Rectal Tube 07/14/23 0300 rectal tube w/ balloon (indicate number of mLs) 2 days              Peripheral Intravenous Line  Duration                  Peripheral IV - Single Lumen 07/07/23 2100 20 G Anterior;Right Forearm 8 days         Peripheral IV - Single Lumen 07/12/23 0200 20 G Anterior;Right Forearm 4 days                    Significant Labs:    Lab Results   Component Value Date    WBC 22.91 (H) 07/16/2023    HGB 7.2 (L) 07/16/2023    HCT 22.0 (L) 07/16/2023    MCV 96.5 (H) 07/16/2023    PLT 85 (L) 07/16/2023           BMP  Lab Results   Component Value Date     (H) 07/16/2023    K 3.1 (L) 07/16/2023    CHLORIDE 106 07/16/2023    CO2 22 07/16/2023    BUN 44.7 (H) 07/16/2023    CREATININE 3.97 (H) 07/16/2023    CALCIUM 10.2 07/16/2023    AGAP 7.0 05/01/2023    ESTGFRAFRICA 105 06/08/2023    EGFRNONAA 33 06/17/2022         ABG  Recent Labs   Lab 07/14/23  0618   PH 7.410   PO2 55.0*   HCO3 27.9*   POCBASEDEF 2.90*         Mechanical Ventilation Support:  Oxygen Concentration (%): 60  (07/16/23 9359)      Significant Imaging:  I have reviewed the pertinent imaging within the past 24 hours.        Assessment/Plan:     Assessment  Decompensated alcoholic liver cirrhosis with ascites, hepatic encephalopathy, and hepatorenal syndrome  Acute hypoxic hypercapnic respiratory failure 2/2 volume overload vs hospital acquired pneumonia  Concern for C diff infection  Normocytic anemia  Hypokalemia   History of alcohol withdrawal with seizures  History of seizure disorder     Plan  -Admitted to ICU for close monitoring given clinical decompensation and acute worsening of respiratory status  -Continue lactulose TID, rifaximin 550 BID, octreotide drip and midodrine 10 TID.  - MAP goal of 85 for HRS. Levophed initiated to maintain MAP goal, currently at 0.02, wean as tolerated.  -GI on board, recommending comfort care if not candidate for rrt.  -Nephrology on board, no acute indications for HD. Monitor I's and O's, dela cruz in place.  -Continue BiPAP to main O2 sats >92%. Wean as tolerated. Has been unable to tolerate weaning thus far. FiO2 increased to 65%  -Has been on antibiotic regimens since admission. Initiated on Vancomycin, Zosyn and levaquin (7/8) due to concern for HAP. Levaquin stopped 7/9, IV vanc stopped 7/10. Completed 7 day course of zosyn. Most recent blood cultures from 7/8 negative at 5 days; all culture data (blood, body fluid) from this hospitalization have been negative  -Currently being treated for presumed C diff infection with PO vancomycin (D8 start date 7/7/23)  -Concern for bloody output from NGT, nursing reports approximately 600 cc. Hgb dropped from baseline of 8 to 7.2. Plan to recheck H/H this afternoon, transfuse if needed.  -Monitor electrolytes and replete as appropriate  -CIWA protocol discontinued, no longer in window for seizures 2/2 alcohol withdrawal  -Continue home keppra for seizure disorder  -Ideally would need to have goals of care discussion, but with no immediate kin  available to make medical decisions at this time. Apparently has sister, Alexia Alatorre [phone] and brother Marcial Alatorre [phone]  but have been unable to reach on multiple attempts daily. Per case management notes, law enforcement to be dispatched to home again today to locate family  -Given futile attempts to reach family even with law enforcement assistance, after discussion with Pulmonary/Crit and Gastroenterology teams, decision has been made that patient should be a DNR/DNI. Code status has been updated in chart     DVT Prophylaxis: heparin 5000 BID  GI Prophylaxis: pantoprazole      32 minutes of critical care was time spent personally by me on the following activities: development of treatment plan with patient or surrogate and bedside caregivers, discussions with consultants, evaluation of patient's response to treatment, examination of patient, ordering and performing treatments and interventions, ordering and review of laboratory studies, ordering and review of radiographic studies, pulse oximetry, re-evaluation of patient's condition.  This critical care time did not overlap with that of any other provider or involve time for any procedures.     Katie Smith MD  [facility], PGY-3  Pulmonary Critical Care Medicine  [facility] - ICU

## 2023-07-16 NOTE — PROGRESS NOTES
"Contacted [employer] re request for 7/16/2023 home visit. Martha  ([phone]) reported officers were dispatched to home; however, "no one came to door". As requested, Martha agreed to have officers make additional attempt to reach fly today.  "

## 2023-07-17 NOTE — PROGRESS NOTES
[facility]  Nephrology Progress Note    Patient Name: Los Alatorre  Age: 58 y.o.  : 1965  MRN: [id number]  Admission Date: 2023    Chief complaint: Abdominal Pain (Abd pain , sent from clinic)    Hospital course  Los Alatorre is a 58 y.o. White male with past medical history of alcoholic cirrhosis of the liver and seizure disorder who was admitted on 2023 with complaints of abdominal pain and distention.  Over the course of hospitalization, patient developed leukocytosis, acute kidney injury, encephalopathy and respiratory failure. Nephrology is continuing to follow for assistance of management of acute kidney injury.    Subjective  Patient is obtunded.     Review of Systems  Unable to obtain due to patient's condition.     Objective  /69   Pulse 82   Temp 96.3 °F (35.7 °C) (Axillary)   Resp 16   Ht 6' (1.829 m)   Wt 82.3 kg (181 lb 7 oz) Comment: wt elevated/ fluid  SpO2 97%   BMI 24.61 kg/m²     Intake/Output Summary (Last 24 hours) at 2023 0741  Last data filed at 2023 0625  Gross per 24 hour   Intake 2216.02 ml   Output 1010 ml   Net 1206.02 ml       Physical Exam  General appearance: Patient is on NIPPV, lethargic  HEENT: PERRLA, EOMI, no scleral icterus, no JVD. Neck is supple.  Chest: Patient is on NIPPV, + coarse crackles bilaterally  Heart: S1, S2.   Abdomen: + ascites. Fecal collection system in place. NGT in place.   : Kang to gravity.  Extremities: No edema, peripheral pulses are palpable.   Neuro: Confused, lethargic  Skin: Rash to lower abdomen and perineal area    Medications    Current Facility-Administered Medications:     0.9%  NaCl infusion (for blood administration), , Intravenous, Q24H PRN, Katie Smith MD    acetaminophen tablet 650 mg, 650 mg, Oral, Q6H PRN, Zeeshan Castillo MD, 650 mg at 23 1225    dextrose 10% bolus 125 mL 125 mL, 12.5 g, Intravenous, PRN, Palomo Helton DO    dextrose 10% bolus 250 mL 250 mL,  25 g, Intravenous, PRN, Palomo Helton DO    diphenhydrAMINE capsule 25 mg, 25 mg, Oral, Q6H PRN, Te Lui MD, 25 mg at 07/16/23 1614    glucagon (human recombinant) injection 1 mg, 1 mg, Intramuscular, PRN, Palomo eHlton,     glucose chewable tablet 16 g, 16 g, Oral, PRN, Palomo Helton, DO    glucose chewable tablet 24 g, 24 g, Oral, PRN, Palomo Helton, DO    hydrocortisone 1 % cream, , Topical (Top), BID, Te Lui MD, Given at 07/16/23 2100    lactulose 20 gram/30 mL solution Soln 20 g, 20 g, Per NG tube, TID, Juanito Gorman DO, 20 g at 07/16/23 2037    levETIRAcetam tablet 500 mg, 500 mg, Oral, BID, Katie Smith MD, 500 mg at 07/16/23 2037    LIDOcaine (PF) 10 mg/ml (1%) injection 100 mg, 10 mL, Other, Once, Keely Garcia MD    miconazole 2 % cream, , Topical (Top), BID, Katie Smith MD, Given at 07/16/23 2100    midodrine tablet 10 mg, 10 mg, Oral, TID WM, Michaela Sesay MD, 10 mg at 07/16/23 1738    naloxone 0.4 mg/mL injection 0.02 mg, 0.02 mg, Intravenous, PRN, Palomo Helton DO    NORepinephrine 4 mg in dextrose 5% 250 mL infusion (premix) (titrating), 0-3 mcg/kg/min, Intravenous, Continuous, Elo Ardon MD, Stopped at 07/16/23 1147    octreotide (SANDOSTATIN) 500 mcg in sodium chloride 0.9% 100 mL infusion, 50 mcg/hr, Intravenous, Continuous, Katie Smith MD, Last Rate: 10 mL/hr at 07/17/23 0453, 50 mcg/hr at 07/17/23 0453    ondansetron injection 4 mg, 4 mg, Intravenous, Q8H PRN, Palomo Helton DO, 4 mg at 07/05/23 2024    pantoprazole injection 40 mg, 40 mg, Intravenous, Daily, Katie Smith MD, 40 mg at 07/16/23 1230    rifAXIMin tablet 550 mg, 550 mg, Oral, BID, Katie Smith MD, 550 mg at 07/16/23 2037    sodium chloride 0.9% flush 10 mL, 10 mL, Intravenous, Q12H PRN, Palomo Helton,     sodium chloride 0.9% flush 10 mL, 10 mL, Intravenous, PRN, Katie Smith MD    vancomycin 125 mg/5 mL oral solution 125 mg, 125 mg, Oral, Q6H, Chico Malave MD, 125 mg at  07/17/23 0601     Imaging:    Reviewed    Laboratory Data:  Hematology  Lab Results   Component Value Date    WBC 23.23 (H) 07/17/2023    HGB 7.8 (L) 07/17/2023    HCT 23.7 (L) 07/17/2023    PLT 76 (L) 07/17/2023     07/17/2023    K 3.5 07/17/2023    CHLORIDE 106 07/17/2023    CO2 22 07/17/2023    BUN 45.6 (H) 07/17/2023    CREATININE 3.94 (H) 07/17/2023    EGFRNORACEVR 17 07/17/2023    GLUCOSE 111 (H) 07/17/2023    CALCIUM 10.3 (H) 07/17/2023    ALKPHOS 249 (H) 07/17/2023    LABPROT 5.6 (L) 07/17/2023    ALBUMIN 3.1 (L) 07/17/2023    BILIDIR 3.9 (H) 07/11/2023    IBILI 0.10 02/16/2022    AST 55 (H) 07/17/2023    ALT 8 07/17/2023    MG 2.00 07/17/2023    PHOS 4.4 07/17/2023     Lab Results   Component Value Date    MUZSGOFA16SO 56.6 06/14/2023       Lab Results   Component Value Date    IRON 58 06/05/2023    TIBC 150 (L) 06/05/2023    TRANS 163 (L) 06/04/2023    TRANS 163 (L) 06/04/2023    LABIRON 31 06/04/2023    FERRITIN 122.2 06/05/2023    FOLATE 6.8 (L) 06/04/2023    VLNMHRJP12 >2,000 (H) 06/04/2023    HAPTO 55 07/07/2023     07/07/2023       Lab Results   Component Value Date    HIV Nonreactive 06/14/2023    HEPCAB Nonreactive 06/30/2023    HEPBSURFAG Nonreactive 06/30/2023    HEPBSAB Nonreactive 07/06/2023         Impression  Acute kidney injury   Hypercapnic hypoxemic respiratory failure  Anemia   Leukocytosis   Thrombocytopenia  Coagulopathy  Cirrhosis of the liver  Ascites     Plan  There is no significant change in patient's condition. There are no urgent indications for RRT, moreover, patient is not a good candidate for any form of dialysis due to multiorgan dysfunction, coagulopathy and poor prognosis. Continue supportive care.     Radha Perez NP  [facility] Nephrology  07/17/2023    Addendum:    Hypernatremia: improved with free water flushes per ngt. Monitor CMP Agree with above assessment and plan of care. Patient seen and examined by me. ARF:  No urgent indications for hemodialysis and  patient poor candidate due to multiorgan failure/ multiple comorbid conditions. Agree with DNR/DNI. Monitor daily for signs of renal and liver function recovery.    Mary Kay Dee M.D.  Nephrology

## 2023-07-17 NOTE — CARE UPDATE
Care update:    Patient has been made DNR/DNI. Patient had several episodes of MAP <65 mmHg; critical care team will not escalate care as it is futile at this point. Patient's family members are still unable to be reached.     Demarcus Guzman,   [facility] Internal Medicine PGY-II

## 2023-07-17 NOTE — PROGRESS NOTES
[facility] - ICU  Pulmonary Critical Care Note    Patient Name: Los Alatorre  MRN: [id number]  Admission Date: 6/30/2023  Hospital Length of Stay: 17 days  Code Status: DNR  Attending Provider: Kip Avalos MD  Primary Care Provider: DECLAN Small     Subjective:     HPI:   Los Alatorre is a 58 y.o. male with PMH of alcoholic cirrhosis, alcohol use disorder, and seizure disorder who presented to [facility] ED on 6/30/2023 with a primary complaint of abdominal pain. He reports poor PO intake and has felt his abdomen become more distended. Does not report any fevers or chills. Denies any melena, bright red blood in the stool, or any vomiting. Was recently transferred to [facility] earlier this month for an EGD, which ruled out varices and gastropathy. Unknown if he has been taking his home medications reliably. In the ED patient was afebrile 100.2 F, pulse 96, respiratory rate 4, blood pressure 137/83, 87% on room air.  CBC revealed leukocytosis at 23, H and H 11.7/35.7, platelets 286.  CMP revealed mild hyponatremia 132, acidosis 17, BUN/CR 0.65/0.88.  Alkaline phosphatase elevated at 337, slight AST elevation at 56, INR 1.3.  Initial lactic acid 3.2, troponin negative. CT abdomen pelvis revealed trace left pleural effusion, cirrhosis ascites, previous cholecystectomy, bilateral kidneys with non occluding calculi, and noninflamed diverticulosis coli.  Paracentesis was performed and 5 L of fluid was drained.      Hospital Course/Significant events:  7/9/2023 - Admitted to ICU d/t decompensated cirrhosis with respiratory distress  7/16/2023 - Made DNR/DNI    24 Hour Interval History:  Per staff RN, increased bloody bowel movements is observed. Mental status remains grossly unchanged. Labs this AM: WBC up trending to 23k, H/H 7.8/23.7 s/op 1 unit PRBC on 7/16/2023, platelets of 76 with INR of 4.09, renal functions remain grossly unchanged at BUN/Cr 45.6/3.94, alk phos 249. I/O: 1 L stool and urine output past 24  hours, net positive 5.7 L    Past Medical History:   Diagnosis Date    Seizures        Past Surgical History:   Procedure Laterality Date    APPENDECTOMY      CHOLECYSTECTOMY         Social History     Socioeconomic History    Marital status:     Number of children: 1   Occupational History    Occupation: Employed   Tobacco Use    Smoking status: Every Day     Packs/day: 1.50     Types: Cigarettes    Smokeless tobacco: Never   Substance and Sexual Activity    Alcohol use: Not Currently     Comment: no alcohol x 2 month    Drug use: Not Currently     Types: Marijuana     Comment: No Marijuana x 2 month     Social Determinants of Health     Financial Resource Strain: High Risk    Difficulty of Paying Living Expenses: Very hard   Food Insecurity: Food Insecurity Present    Worried About Running Out of Food in the Last Year: Sometimes true    Ran Out of Food in the Last Year: Sometimes true   Transportation Needs: No Transportation Needs    Lack of Transportation (Medical): No    Lack of Transportation (Non-Medical): No   Physical Activity: Inactive    Days of Exercise per Week: 0 days    Minutes of Exercise per Session: 0 min   Social Connections: Moderately Isolated    Frequency of Communication with Friends and Family: Three times a week    Frequency of Social Gatherings with Friends and Family: More than three times a week    Attends Faith Services: Never    Active Member of Clubs or Organizations: No    Attends Club or Organization Meetings: Never    Marital Status: Living with partner   Housing Stability: High Risk    Unable to Pay for Housing in the Last Year: Yes    Number of Places Lived in the Last Year: 1    Unstable Housing in the Last Year: No       Current Outpatient Medications   Medication Instructions    LACTULOSE ORAL Oral, 2 times daily    levETIRAcetam (KEPPRA) 750 mg, Oral, 2 times daily    polyethylene glycol (GLYCOLAX) 17 g, Oral, Daily PRN     Current Inpatient Medications    hydrocortisone   Topical (Top) BID    lactulose  20 g Per NG tube TID    levETIRAcetam  500 mg Oral BID    LIDOcaine (PF) 10 mg/ml (1%)  10 mL Other Once    miconazole   Topical (Top) BID    midodrine  10 mg Oral TID WM    pantoprazole  40 mg Intravenous Daily    rifAXIMin  550 mg Oral BID    vancomycin  125 mg Oral Q6H     Current Intravenous Infusions   NORepinephrine bitartrate-D5W Stopped (07/16/23 1147)    octreotide (SANDOSTATIN) infusion 50 mcg/hr (07/17/23 0453)       Objective:     Intake/Output Summary (Last 24 hours) at 7/17/2023 0653  Last data filed at 7/17/2023 0625  Gross per 24 hour   Intake 2416.02 ml   Output 1010 ml   Net 1406.02 ml     Vital Signs (Most Recent):  Temp: 96.3 °F (35.7 °C) (07/17/23 0400)  Pulse: 68 (07/17/23 0600)  Resp: 18 (07/17/23 0600)  BP: 100/79 (07/17/23 0600)  SpO2: 97 % (07/17/23 0600)  Body mass index is 24.61 kg/m².  Weight: 82.3 kg (181 lb 7 oz) (wt elevated/ fluid) Vital Signs (24h Range):  Temp:  [96.2 °F (35.7 °C)-96.8 °F (36 °C)] 96.3 °F (35.7 °C)  Pulse:  [] 68  Resp:  [15-35] 18  SpO2:  [91 %-99 %] 97 %  BP: ()/(61-89) 100/79     Physical Exam  General: Ill-appearing   HEENT: NC/AT; PERRL; nasal and oral mucosa moist and clear  Pulm: Diminished in lower lobes bilaterally, currently on BiPAP   CV: S1, S2 w/o murmurs or gallops; no edema noted  GI: Bowel sound present in all quadrants, distended abdomen with positive fluid wave  MSK: No obvious deformities  Derm: Generalized jaundice  Neuro: Disoriented and unable to follow simple commands    Lines/Drains/Airways       Drain  Duration                  NG/OG Tube 07/09/23 1800 [place] sump 16 Fr. Right nostril 7 days         Urethral Catheter 07/09/23 0845 16 Fr. 7 days         Rectal Tube 07/14/23 0300 rectal tube w/ balloon (indicate number of mLs) 3 days              Peripheral Intravenous Line  Duration                  Peripheral IV - Single Lumen 07/07/23 2100 20 G Anterior;Right Forearm 9 days          Peripheral IV - Single Lumen 07/12/23 0200 20 G Anterior;Right Forearm 5 days                  Significant Labs:  Lab Results   Component Value Date    WBC 23.23 (H) 07/17/2023    HGB 7.8 (L) 07/17/2023    HCT 23.7 (L) 07/17/2023    MCV 93.7 07/17/2023    PLT 76 (L) 07/17/2023       BMP  Lab Results   Component Value Date     07/17/2023    K 3.5 07/17/2023    CHLORIDE 106 07/17/2023    CO2 22 07/17/2023    BUN 45.6 (H) 07/17/2023    CREATININE 3.94 (H) 07/17/2023    CALCIUM 10.3 (H) 07/17/2023    AGAP 7.0 05/01/2023    ESTGFRAFRICA 105 06/08/2023    EGFRNONAA 33 06/17/2022     ABG  Recent Labs   Lab 07/17/23  0442   PH 7.320*   PO2 63.0*   HCO3 25.8     Mechanical Ventilation Support:  Oxygen Concentration (%): 80 (07/17/23 0406)    Significant Imaging:  I have reviewed the pertinent imaging within the past 24 hours.    Assessment/Plan:     Assessment  Decompensated alcoholic liver cirrhosis with ascites, hepatic encephalopathy, and hepatorenal syndrome  Acute hypoxic hypercapnic respiratory failure   Thrombocytopenia  Normocytic anemia  Electrolyte abnormalities  HX of alcohol abuse, seizure disorder    Plan  -Continue ICU level of care for ongoing monitoring and medical management  -D/C heparin DVT PPX d/t thrombocytopenia; continue to monitor bloody output and transfuse as needed  -Continue lactulose TID, rifaximin 550 BID, octreotide drip and midodrine 10 TID  -Levophed was weaned off on 7/16/2023  -Last paracentesis was performed on 7/4/2023; will repeat as needed  -Vancomycin, Zosyn and levaquin initiated on 7/8/2023 due to concern for HAP. Levaquin stopped 7/9, IV vanc stopped 7/10, completed 7 day course of zosyn; currently on PO vancomycin for presumed C-diff; blood cultures 7/8/2023 negative  -Nephrology and GI teams following, appreciate their assistance  -Unable to reach family members (Alexia Celi [phone] and brother Marcial Celi [phone]); law enforcement is involved in locating family  members. Code status changed to DNR/DNI by two physicians as any escalation of care is futile at this time    DVT Prophylaxis: SCD  GI Prophylaxis: Protonix     32 minutes of critical care was time spent personally by me on the following activities: development of treatment plan with patient or surrogate and bedside caregivers, discussions with consultants, evaluation of patient's response to treatment, examination of patient, ordering and performing treatments and interventions, ordering and review of laboratory studies, ordering and review of radiographic studies, pulse oximetry, re-evaluation of patient's condition.  This critical care time did not overlap with that of any other provider or involve time for any procedures.     Demarcus Guzman DO, PGY-II  Pulmonary Critical Care Medicine  [facility] - ICU

## 2023-07-17 NOTE — PROGRESS NOTES
[facility] GI Progress Note    Patient Name: Los Alatorre  MRN: [id number]  Admission Date: 6/30/2023  Hospital Length of Stay: 17 days  Code Status: DNR  Attending Provider: Kip Avalos MD  Primary Care Provider: DECLAN Small     Subjective:      Brief HPI:  Los Alatorre is a 58 y.o. male who  has a past medical history of Seizures.  He presented to [facility] on 6/30/2023  with a primary complaint of abdominal pain, and distension.  Patient says that when he was discharged from our [facility] from [place] that he was compliant with all his medications and that he was having adequate amount of bowel movements.  Patient was discharged on rifaximin 550 b.i.d., lactulose 20 mg b.i.d..  However he said that he has little bowel movements however they were in quantity.  Patient says that he felt good for proximally 1 week however he cannot work anymore.  He said that his abdomen got so distended that he had decreased p.o. intake and had pain.  Of note patient says that he has stopped drinking for past 2 months.  He says that he was a heavy drinker since he was 19 years old.  He says that he drinks for the past 1-2 years approximately a half to 3/4 of a 5th of vodka.  Patient says that he has been taking ibuprofen for his restless legs for quite a time.  He says that he has numbness in his bilateral lower extremities when he is sleep at night.  He says he did not try put in over side of bed.  He also denies any limb claudication or numbness or tingling all ambulation.  Patient follows up with [facility].  Patient says he is currently having 4 bowel movements per day even when he was de-escalated to 10 mg b.i.d..  He is alert and oriented x3.        Interval History:  Patient remains oliguric with 300 cc of urine output, he has had no residual NG-tube feedings and has continued to receive roughly 1.2 L daily, his stool output has decreased from 1500 cc to 700 cc, he is had no  residuals and his NG.  He remains oriented and following commands but meaningful conversation is limited due to patient being on 80% FiO2 via BiPAP.  Patient does seem to endorse left lower extremity pain.     Objective:     Vital Signs:  Vital Signs (Most Recent):  Temp: 96.8 °F (36 °C) (07/17/23 0845)  Pulse: 74 (07/17/23 0908)  Resp: 16 (07/17/23 0908)  BP: 102/71 (07/17/23 0901)  SpO2: 99 % (07/17/23 0908)  Body mass index is 24.61 kg/m².  Weight: 82.3 kg (181 lb 7 oz) (wt elevated/ fluid) Vital Signs (24h Range):  Temp:  [96.2 °F (35.7 °C)-96.8 °F (36 °C)] 96.8 °F (36 °C)  Pulse:  [] 74  Resp:  [15-35] 16  SpO2:  [91 %-99 %] 99 %  BP: ()/(61-89) 102/71       Input/output:     Intake/Output Summary (Last 24 hours) at 7/17/2023 1001  Last data filed at 7/17/2023 0625  Gross per 24 hour   Intake 2136.02 ml   Output 1010 ml   Net 1126.02 ml         Physical Examination:  General:  On 12/06 BiPAP 80% FiO2.  Patient is able to nod to my questions, and follow commands  Head: Normocephalic,   Eyes: PERRL, EOMI, icteric sclera  Throat: No posterior pharyngeal erythema or exudate, no tonsillar exudate  Neck: supple, normal ROM, no JVD  CVS:  RRR, S1 and S2 normal, no murmurs, no added heart sounds, rubs, gallops, regular peripheral pulses, 2+ left lower extremity edema  Resp:  Lungs distant with inspiratory crackles and expiratory wheezing with prolonged expiratory phase, scattered rhonchi throughout  GI:  Abdomen soft, non-tender, +distended, normoactive bowel sounds  MSK:  Full range of motion, no obvious deformities   Skin:  Spider angiomata notice diffusely on the body, diffuse anasarca  Neuro:  Patient is encephalopathic   Psych:  Patient is not alert oriented x3      Lines/Drains/Airways       None                    Laboratory:    Recent Labs   Lab 07/15/23  0534 07/15/23  2349 07/16/23  0358 07/16/23  1152 07/17/23  0406   WBC 21.82*  --  22.91*  --  23.23*   RBC 2.61*  --  2.28*  --  2.53*   HGB 8.3*    < > 7.2* 6.7* 7.8*   HCT 25.5*   < > 22.0* 20.5* 23.7*   MCV 97.7*  --  96.5*  --  93.7   MCH 31.8*  --  31.6*  --  30.8   MCHC 32.5*  --  32.7*  --  32.9*   RDW 20.1*  --  20.0*  --  21.2*   PLT 85*  --  85*  --  76*   MPV 12.1*  --  12.7*  --  12.9*    < > = values in this interval not displayed.        Recent Labs   Lab 07/13/23  0611 07/14/23  0317 07/14/23  0618 07/15/23  0328 07/16/23  0358 07/17/23  0406 07/17/23  0442   NA  --    < >  --  146* 146* 145  --    K  --    < >  --  3.5 3.1* 3.5  --    CHLORIDE  --    < >  --  106 106 106  --    CO2  --    < >  --  23 22 22  --    BUN  --    < >  --  39.8* 44.7* 45.6*  --    CREATININE  --    < >  --  3.83* 3.97* 3.94*  --    CALCIUM  --    < >  --  10.3* 10.2 10.3*  --    PH 7.310*  --  7.410  --   --   --  7.320*   MG  --    < >  --  1.90 1.90 2.00  --    ALBUMIN  --    < >  --  3.2* 3.4* 3.1*  --    ALKPHOS  --    < >  --  277* 249* 249*  --    ALT  --    < >  --  8 6 8  --    AST  --    < >  --  56* 44* 55*  --    BILITOT  --    < >  --  6.7* 6.7* 7.1*  --     < > = values in this interval not displayed.          Other Results:    Current Medications:     Infusions:   octreotide (SANDOSTATIN) infusion 50 mcg/hr (07/17/23 0453)         Scheduled:   hydrocortisone   Topical (Top) BID    lactulose  20 g Per NG tube TID    levETIRAcetam  500 mg Oral BID    LIDOcaine (PF) 10 mg/ml (1%)  10 mL Other Once    miconazole   Topical (Top) BID    midodrine  10 mg Oral TID WM    pantoprazole  40 mg Intravenous Daily    rifAXIMin  550 mg Oral BID    vancomycin  125 mg Oral Q6H         PRN:   sodium chloride    acetaminophen    dextrose 10%    dextrose 10%    diphenhydrAMINE    glucagon (human recombinant)    glucose    glucose    naloxone    ondansetron    sodium chloride 0.9%    sodium chloride 0.9%        Microbiology Data:  Microbiology Results (last 7 days)       Procedure Component Value Units Date/Time    Blood Culture [[id number]]  (Normal) Collected: 07/08/23 1452     Order Status: Completed Specimen: Blood from Hand, Left Updated: 07/13/23 1901     CULTURE, BLOOD ([facility]) No Growth at 5 days    Blood Culture [[id number]]  (Normal) Collected: 07/08/23 1453    Order Status: Completed Specimen: Blood from Hand, Right Updated: 07/13/23 1901     CULTURE, BLOOD ([facility]) No Growth at 5 days             Antibiotics and Day Number of Therapy:  Antibiotics (From admission, onward)      Start     Stop Route Frequency Ordered    07/08/23 1200  rifAXIMin tablet 550 mg         -- Oral 2 times daily 07/08/23 1047    07/07/23 1200  vancomycin 125 mg/5 mL oral solution 125 mg         -- Oral Every 6 hours 07/07/23 1014             Imaging:  X-Ray Chest 1 View  Narrative: EXAMINATION:  XR CHEST 1 VIEW    CPT 62955    CLINICAL HISTORY:  increasing o2 requirement;    COMPARISON:  July 11th 2023    FINDINGS:  Examination reveals cardiomediastinal silhouette to be unchanged as compared with the previous exam.  Worsening of confluent airspace opacities throughout both lung fields with more confluent opacities in both perihilar regions and right cardiophrenic angle region.    No other significant change    Support catheters remain unchanged  Impression: Worsening of confluent airspace opacities throughout both lung fields more so on in the perihilar regions and right cardiophrenic angle region.    No other change    Electronically signed by: Godfrey Rodriguez  Date:    07/12/2023  Time:    14:36        Assessment & Plan:   Alcoholic cirrhosis  Alcoholic hepatitis  HRS  Acute hypoxic, hypercapnic respiratory failure  MELD 3.0: 40 at 7/17/2023 ; CTP:C  -Continue midodrine and octreotide infusions, for map goals of greater than 83 for HRS.  -patient's urine output has steadily declined however his creatinine is stable.  Nephrology is on board appreciate recommendations.  Currently trying to get a hold of patient's family with regards to patient's code status.  -per Nephrology, no emergent indications for  dialysis at this time, and due to multi organ dysfunction likely not a good candidate per Nephrology  -Continue rifaximin, lactulose.   Aim for 2-3 bowel movements per day.    -patient has completed 6 days of Zosyn and continues to be on vancomycin all cultures have been negative including paracentesis, blood cultures, however respiratory cultures have been unable to be obtained  -hemoglobin stable, no further reports of upper GI clots or bleeding at this time continue to monitor  -at this time patient's prognosis is guarded, will await to see if patient has any meaningful recovery of his liver function and kidney function.   -recommend p.r.n. medications for agitation such as benzodiazepines and opiates  -consider ordering right lower extremity DVT ultrasound given swelling, increased coagulopathy in setting of cirrhosis and lower extremity pain    Kyler Calzada MD  Internal Medicine Resident PGY-III

## 2023-07-18 NOTE — CARE UPDATE
Care update:    Consulted CM for hospice evaluation. Spoke to inpatient hospice care team regarding patient's overall status. Future plan of care is dependent on evaluation by hospice care representative.     Demarcus Guzman DO  [facility] Internal Medicine PGY-II

## 2023-07-18 NOTE — CONSULTS
[facility]  Family Medicine  Inpatient Hospice Consult Note      Patient Name: Los Alatorre  : 1965  MRN: [id number]  Patient Class: IP- Inpatient   Admission Date: 2023   Length of Stay: 18  Admitting Service: Hospital Medicine  Attending Physician: Kip Avalos MD  PCP: DECLAN Small    HOSPITAL COURSE     Received notification regarding consult for inpatient hospice care.    Case discussed with primary inpatient team and Elida with [facility].  Chart reviewed and pt seen and evaluated with attending.     Pt has had extended hospital course. Was admitted for decompensated alcoholic cirrhosis with ascites and CHRISTEN, initially responded to fluids and albumin, later developed hepatorenal syndrome with worsening renal indices and mentation despite appropriate medical therapy. Later developed worsening respiratory status requiring BiPAP and upgrade to ICU. Pt has been unable to wean from BiPAP support.     OBJECTIVE/PHYSICAL EXAM     VITAL SIGNS (MOST RECENT):  Temp: 97.4 °F (36.3 °C) (23 1503)  Pulse: 87 (23 1712)  Resp: (!) 22 (23 1712)  BP: (!) 87/58 (23 1702)  SpO2: 99 % (23 171) VITAL SIGNS (24 HOUR RANGE):  Temp:  [97.4 °F (36.3 °C)-97.8 °F (36.6 °C)]   Pulse:  [85-98]   Resp:  [20-25]   BP: (81-95)/(54-66)   SpO2:  [91 %-99 %]      Physical Exam:  Gen: appears ill  CV: +pitting edema  Resp: BiPAP dependent to maintain O2 sats  Abd: distended, tight  Neuro: arouses to stimuli, unable to follow commands or make meaningful response to questions    INTAKE/OUTPUT    Intake/Output Summary (Last 24 hours) at 2023 1804  Last data filed at 2023 1700  Gross per 24 hour   Intake 2370.41 ml   Output 1500 ml   Net 870.41 ml          MEDICATIONS   hydrocortisone   Topical (Top) BID    lactulose  20 g Per NG tube TID    levETIRAcetam  500 mg Oral BID    LIDOcaine (PF) 10 mg/ml (1%)  10 mL Other Once    miconazole   Topical (Top) BID     "midodrine  10 mg Oral TID WM    pantoprazole  40 mg Intravenous Daily    rifAXIMin  550 mg Oral BID      sodium chloride, acetaminophen, dextrose 10%, dextrose 10%, diphenhydrAMINE, fentaNYL, glucagon (human recombinant), glucose, glucose, HYDROmorphone, lorazepam, naloxone, ondansetron, sodium chloride 0.9%, sodium chloride 0.9%     INFUSIONS   lactated ringers 100 mL/hr at 07/18/23 1500    octreotide (SANDOSTATIN) infusion 50 mcg/hr (07/18/23 1500)        ASSESSMENT/PLAN     Given co-morbidities and pt exam, pt is eligible for hospice care, however, unable to admit pt to hospice due to inability to consent pt or pt surrogate decision maker at this time. Per report, law enforcement are involved in locating next of kin. Chart review shows multiple attempts to call family with no answer. Only able to contact significant other/girlfriend who allegedly is "being told to leave the property by family" however, girlfriend has not been forthcoming in proving contact information to pt's family members to notify of pt current status.     If pt's mentation improves to make decision or surrogate decision maker can be located, please notify us or re-consult us for any changes.    Thank you for the consult    Ewelina Darling MD  [facility] HO-III   "

## 2023-07-18 NOTE — PROGRESS NOTES
Nephrology Progress Note    Hospital course:   58-year-old  male with alcoholic cirrhosis decompensated.  INR worsening today.  Map has been difficult to maintain.  Per Pulmonary critical Care patient is DNR DNI Dr. Islas and suggested possible hospice or palliative care.  Per Dr. Guzman note, patient did have low blood pressure and they elected not to start Levophed.  Agree with this decision.  From a renal standpoint there has been no improvement renal function continues to decline slowly.    Subjective:   Patient is obtunded.    Twelve point review of systems unobtainable patient is obtunded.    Objective:   BP (!) 89/66   Pulse 92   Temp 97.5 °F (36.4 °C)   Resp (!) 23   Ht 6' (1.829 m)   Wt 82.3 kg (181 lb 7 oz) Comment: wt elevated/ fluid  SpO2 (!) 94%   BMI 24.61 kg/m²     Intake/Output Summary (Last 24 hours) at 7/18/2023 1206  Last data filed at 7/18/2023 1140  Gross per 24 hour   Intake 2181.24 ml   Output 1200 ml   Net 981.24 ml        Physical exam:   General:  Patient is obtunded  HEENT normocephalic atraumatic pupils equal round reactive to light.  NG tube in place  Neck:  No JVD bruit thyromegaly adenopathy appreciated  Lungs: Clear to auscultation bilaterally all fields  Cardiovascular regular rate and rhythm no murmur rub gallop appreciated  Abdomen:  Positive bowel sounds all 4 quadrants, distended significantly  Extremities no clubbing cyanosis noted trace edema of lower extremities appreciated  Neurologic: No clonus asterixis appreciated unable to assess cranial nerves 2-12 patient is obtunded    Laboratory data:   Recent Results (from the past 24 hour(s))   Comprehensive Metabolic Panel (CMP)    Collection Time: 07/18/23  4:06 AM   Result Value Ref Range    Sodium Level 142 136 - 145 mmol/L    Potassium Level 3.5 3.5 - 5.1 mmol/L    Chloride 104 98 - 107 mmol/L    Carbon Dioxide 20 (L) 22 - 29 mmol/L    Glucose Level 128 (H) 74 - 100 mg/dL    Blood Urea Nitrogen 51.2 (H) 8.4 - 25.7  mg/dL    Creatinine 4.54 (H) 0.73 - 1.18 mg/dL    Calcium Level Total 9.9 8.4 - 10.2 mg/dL    Protein Total 5.2 (L) 6.4 - 8.3 gm/dL    Albumin Level 2.8 (L) 3.5 - 5.0 g/dL    Globulin 2.4 2.4 - 3.5 gm/dL    Albumin/Globulin Ratio 1.2 1.1 - 2.0 ratio    Bilirubin Total 7.0 (H) <=1.5 mg/dL    Alkaline Phosphatase 253 (H) 40 - 150 unit/L    Alanine Aminotransferase 7 0 - 55 unit/L    Aspartate Aminotransferase 68 (H) 5 - 34 unit/L    eGFR 14 mls/min/1.73/m2   Magnesium    Collection Time: 07/18/23  4:06 AM   Result Value Ref Range    Magnesium Level 1.90 1.60 - 2.60 mg/dL   Phosphorus    Collection Time: 07/18/23  4:06 AM   Result Value Ref Range    Phosphorus Level 4.2 2.3 - 4.7 mg/dL   Protime-INR    Collection Time: 07/18/23  4:06 AM   Result Value Ref Range    PT 44.6 seconds    INR 4.97 (H) 0.00 - 1.30   CBC with Differential    Collection Time: 07/18/23  4:06 AM   Result Value Ref Range    WBC 22.82 (H) 4.50 - 11.50 x10(3)/mcL    RBC 2.29 (L) 4.70 - 6.10 x10(6)/mcL    Hgb 7.0 (L) 14.0 - 18.0 g/dL    Hct 21.3 (L) 42.0 - 52.0 %    MCV 93.0 80.0 - 94.0 fL    MCH 30.6 27.0 - 31.0 pg    MCHC 32.9 (L) 33.0 - 36.0 g/dL    RDW 21.8 (H) 11.5 - 17.0 %    Platelet 85 (L) 130 - 400 x10(3)/mcL    MPV 13.5 (H) 7.4 - 10.4 fL    IPF 21.6 (H) 0.9 - 11.2 %    Neut % 82.0 %    Lymph % 7.2 %    Mono % 8.8 %    Eos % 0.5 %    Basophil % 0.3 %    Lymph # 1.64 0.6 - 4.6 x10(3)/mcL    Neut # 18.71 (H) 2.1 - 9.2 x10(3)/mcL    Mono # 2.00 (H) 0.1 - 1.3 x10(3)/mcL    Eos # 0.12 0 - 0.9 x10(3)/mcL    Baso # 0.07 <=0.2 x10(3)/mcL    IG# 0.28 (H) 0 - 0.04 x10(3)/mcL    IG% 1.2 %    NRBC% 0.4 %       Imaging:   Imaging Results              CT Abdomen Pelvis  Without Contrast (Final result)  Result time 06/30/23 19:20:57      Final result by Raman Tiwari MD (06/30/23 19:20:57)                   Impression:      1. Trace of left pleural effusion.    2. Previous cholecystectomy.    3. Cirrhosis and ascites.    4.  Bilateral kidneys non  occluding calculi.    5.  Noninflamed diverticulosis coli.      Electronically signed by: Raman Tiwari  Date:    06/30/2023  Time:    19:20               Narrative:    EXAMINATION:  CT ABDOMEN PELVIS WITHOUT CONTRAST    CLINICAL HISTORY:  Abdominal pain, acute, nonlocalized;    TECHNIQUE:  Multidetector axial images were obtained from the  diaphragms to below symphysis pubis without the administration of IV contrast. Oral contrast was not administered.    Dose length product of 309 mGycm. Automated exposure control was utilized to minimize radiation dose.    COMPARISON:  June 4, 2023    FINDINGS:  There is trace of left dependent pleural effusion and left basilar atelectasis.    Liver is small in size consistent with cirrhosis.  There is moderate to large volume intraperitoneal free fluid consistent with ascites.  There are mesenteric inflammatory ground-glass opacities.  Within limitations of noncontrast technique, no acute findings liver, pancreas or the spleen identified.  Gallbladder is surgically absent..    The adrenal glands noncontrast evaluation is unremarkable. The kidneys are unremarkable in size and contour.  Bilateral kidneys non occluding calculi. The ureters appear normal in course and diameter without intra ureteral stone.  Calcified plaques of the aorta and iliac arteries without aneurysmal dilatation.  There are no retroperitoneal periaortic enlarged lymph nodes.    Stomach is decompressed and difficult to assess.  No abnormal dilatation of loops of small bowel.  Pericecal metallic clips suggest prior appendectomy.  There is noninflamed diverticulosis coli.  No bowel obstruction.  No pneumoperitoneum.    Urinary bladder is mostly decompressed.  No intravesical stone identified. There is no pelvic free fluid.    Chronic compression deformities of the thoracolumbar vertebrae.  Demineralization of the bones.  No acute or aggressive skeletal abnormality.                                       X-Ray  Chest AP Portable (Final result)  Result time 06/30/23 12:28:05      Final result by Raj Gibbs MD (06/30/23 12:28:05)                   Impression:      No acute findings.      Electronically signed by: Raj Gibbs  Date:    06/30/2023  Time:    12:28               Narrative:    EXAMINATION:  XR CHEST AP PORTABLE    CLINICAL HISTORY:  Unspecified abdominal pain    COMPARISON:  14 June 2023    FINDINGS:  Portable frontal view of the chest was obtained. The heart is not significantly enlarged.  There are chronic changes towards the left lung base.  No new focal consolidation or pneumothorax.                                       Medications:     Current Facility-Administered Medications:     0.9%  NaCl infusion (for blood administration), , Intravenous, Q24H PRN, Katie Smith MD    acetaminophen tablet 650 mg, 650 mg, Oral, Q6H PRN, Zeeshan Castillo MD, 650 mg at 07/07/23 1225    dextrose 10% bolus 125 mL 125 mL, 12.5 g, Intravenous, PRN, Palomo Helton, DO    dextrose 10% bolus 250 mL 250 mL, 25 g, Intravenous, PRN, Palomo Helton, DO    diphenhydrAMINE capsule 25 mg, 25 mg, Oral, Q6H PRN, Te Lui MD, 25 mg at 07/16/23 1614    fentaNYL injection 25 mcg, 25 mcg, Intravenous, Q1H PRN, Demarcus Guzman DO    glucagon (human recombinant) injection 1 mg, 1 mg, Intramuscular, PRN, Palomo Helton,     glucose chewable tablet 16 g, 16 g, Oral, PRN, Palomo Helton, DO    glucose chewable tablet 24 g, 24 g, Oral, PRN, Palomo Helton,     hydrocortisone 1 % cream, , Topical (Top), BID, Te Lui MD, Given at 07/18/23 0901    HYDROmorphone injection 0.5 mg, 0.5 mg, Intravenous, Q4H PRN, Demarcus Guzman DO    lactulose 20 gram/30 mL solution Soln 20 g, 20 g, Per NG tube, TID, Juanito Gorman DO, 20 g at 07/18/23 0853    levETIRAcetam tablet 500 mg, 500 mg, Oral, BID, Katie Smith MD, 500 mg at 07/18/23 0853    LIDOcaine (PF) 10 mg/ml (1%) injection 100 mg, 10 mL, Other, Once, Keely Garcia MD    LORazepam  injection 1 mg, 1 mg, Intravenous, Q2H PRN, Demarcus Guzman, DO, 1 mg at 07/18/23 1131    miconazole 2 % cream, , Topical (Top), BID, Katie Smith MD, Given at 07/18/23 0901    midodrine tablet 10 mg, 10 mg, Oral, TID WM, Michaela Sesay MD, 10 mg at 07/18/23 1132    naloxone 0.4 mg/mL injection 0.02 mg, 0.02 mg, Intravenous, PRN, Palomo Helton DO    octreotide (SANDOSTATIN) 500 mcg in sodium chloride 0.9% 100 mL infusion, 50 mcg/hr, Intravenous, Continuous, Katie Smith MD, Last Rate: 10 mL/hr at 07/18/23 1140, 50 mcg/hr at 07/18/23 1140    ondansetron injection 4 mg, 4 mg, Intravenous, Q8H PRN, Palomo Helton DO, 4 mg at 07/05/23 2024    pantoprazole injection 40 mg, 40 mg, Intravenous, Daily, Katie Smith MD, 40 mg at 07/18/23 0856    rifAXIMin tablet 550 mg, 550 mg, Oral, BID, Katie Smith MD, 550 mg at 07/18/23 0853    sodium chloride 0.9% flush 10 mL, 10 mL, Intravenous, Q12H PRN, Palomo Helton DO    sodium chloride 0.9% flush 10 mL, 10 mL, Intravenous, PRN, Katie Smith MD     Impression/Plan:  Acute renal failure: Oliguric likely due to evolving hepatorenal syndrome.  Patient is not a candidate for renal replacement therapy due to other severe comorbidities..  Patient now with low blood pressure unable to maintain maps as recommended by Gastroenterology service.  Primary team elected not to start Levophed and agree with their decision.  I also agree with Dr. Mckinney's assessment of patient this morning in his addendum.  Hypernatremia:  Improved with NG tube flushes of free water and half-normal saline.  Will discontinue half-normal saline as patient will likely start to 3rd spaced volume due to low albumin.  Metabolic acidosis: Likely due to accommodation of liver and renal failure:  Consider lactated Ringer's or sodium bicarbonate therapy to address acidosis.  Will monitor CMP to further assess acid-base status.    Mary Kay Dee M.D.  Nephrology

## 2023-07-18 NOTE — PROGRESS NOTES
Pt eligible for [facility] hospice care; however, Elida Farris, reported she has been unable to reach SO, [address] ([phone]). SO's number leaves message that phone has calling restrictions preventing completion of call.

## 2023-07-18 NOTE — PROGRESS NOTES
[facility] GI Progress Note    Patient Name: Los Alatorre  MRN: [id number]  Admission Date: 6/30/2023  Hospital Length of Stay: 18 days  Code Status: DNR  Attending Provider: Kip Avalos MD  Primary Care Provider: DECLAN Small     Subjective:      Brief HPI:  Los Alatorre is a 58 y.o. male who  has a past medical history of Seizures.  He presented to [facility] on 6/30/2023  with a primary complaint of abdominal pain, and distension.  Patient says that when he was discharged from our [place] from [place] that he was compliant with all his medications and that he was having adequate amount of bowel movements.  Patient was discharged on rifaximin 550 b.i.d., lactulose 20 mg b.i.d..  However he said that he has little bowel movements however they were in quantity.  Patient says that he felt good for proximally 1 week however he cannot work anymore.  He said that his abdomen got so distended that he had decreased p.o. intake and had pain.  Of note patient says that he has stopped drinking for past 2 months.  He says that he was a heavy drinker since he was 19 years old.  He says that he drinks for the past 1-2 years approximately a half to 3/4 of a 5th of vodka.  Patient says that he has been taking ibuprofen for his restless legs for quite a time.  He says that he has numbness in his bilateral lower extremities when he is sleep at night.  He says he did not try put in over side of bed.  He also denies any limb claudication or numbness or tingling all ambulation.  Patient follows up with [facility].  Patient says he is currently having 4 bowel movements per day even when he was de-escalated to 10 mg b.i.d..  He is alert and oriented x3.        Interval History:  Patient remains oliguric with 50 cc of urine output, he has had no residual NG-tube feedings and has continued to receive roughly 0.6 L daily, his stool output has increased to 1200 cc, he is had no residuals and his  NG.  He is more comfortable and alert and continues to follow commands but meaningful conversation is limited due to patient being on now 100% FiO2 via BiPAP.      Objective:     Vital Signs:  Vital Signs (Most Recent):  Temp: 97.5 °F (36.4 °C) (07/18/23 0733)  Pulse: 93 (07/18/23 1027)  Resp: (!) 22 (07/18/23 1027)  BP: (!) 89/66 (07/18/23 1033)  SpO2: (!) 93 % (07/18/23 1027)  Body mass index is 24.61 kg/m².  Weight: 82.3 kg (181 lb 7 oz) (wt elevated/ fluid) Vital Signs (24h Range):  Temp:  [96.3 °F (35.7 °C)-97.8 °F (36.6 °C)] 97.5 °F (36.4 °C)  Pulse:  [] 93  Resp:  [18-26] 22  SpO2:  [73 %-95 %] 93 %  BP: ()/(47-70) 89/66       Input/output:     Intake/Output Summary (Last 24 hours) at 7/18/2023 1121  Last data filed at 7/18/2023 1040  Gross per 24 hour   Intake 2171.24 ml   Output 1200 ml   Net 971.24 ml         Physical Examination:  General:  On 12/06 BiPAP 80% FiO2.  Patient is able to nod to my questions, and follow commands  Head: Normocephalic,   Eyes: PERRL, EOMI, icteric sclera  Throat: No posterior pharyngeal erythema or exudate, no tonsillar exudate  Neck: supple, normal ROM, no JVD  CVS:  RRR, S1 and S2 normal, no murmurs, no added heart sounds, rubs, gallops, regular peripheral pulses, 2+ left lower extremity edema  Resp:  Lungs distant with inspiratory crackles and expiratory wheezing with prolonged expiratory phase, scattered rhonchi throughout  GI:  Abdomen soft, non-tender, +distended, normoactive bowel sounds  MSK:  Full range of motion, no obvious deformities   Skin:  Spider angiomata notice diffusely on the body, diffuse anasarca  Neuro:  Patient is encephalopathic   Psych:  Patient is not alert oriented x3      Lines/Drains/Airways       None                    Laboratory:    Recent Labs   Lab 07/16/23  1701 07/16/23  1152 07/17/23  0406 07/18/23  0406   WBC 22.91*  --  23.23* 22.82*   RBC 2.28*  --  2.53* 2.29*   HGB 7.2* 6.7* 7.8* 7.0*   HCT 22.0* 20.5* 23.7* 21.3*   MCV  96.5*  --  93.7 93.0   MCH 31.6*  --  30.8 30.6   MCHC 32.7*  --  32.9* 32.9*   RDW 20.0*  --  21.2* 21.8*   PLT 85*  --  76* 85*   MPV 12.7*  --  12.9* 13.5*        Recent Labs   Lab 07/13/23  0611 07/14/23  0317 07/14/23  0618 07/15/23  0328 07/16/23  0358 07/17/23  0406 07/17/23  0442 07/18/23  0406   NA  --    < >  --    < > 146* 145  --  142   K  --    < >  --    < > 3.1* 3.5  --  3.5   CHLORIDE  --    < >  --    < > 106 106  --  104   CO2  --    < >  --    < > 22 22  --  20*   BUN  --    < >  --    < > 44.7* 45.6*  --  51.2*   CREATININE  --    < >  --    < > 3.97* 3.94*  --  4.54*   CALCIUM  --    < >  --    < > 10.2 10.3*  --  9.9   PH 7.310*  --  7.410  --   --   --  7.320*  --    MG  --    < >  --    < > 1.90 2.00  --  1.90   ALBUMIN  --    < >  --    < > 3.4* 3.1*  --  2.8*   ALKPHOS  --    < >  --    < > 249* 249*  --  253*   ALT  --    < >  --    < > 6 8  --  7   AST  --    < >  --    < > 44* 55*  --  68*   BILITOT  --    < >  --    < > 6.7* 7.1*  --  7.0*    < > = values in this interval not displayed.          Other Results:    Current Medications:     Infusions:   octreotide (SANDOSTATIN) infusion 50 mcg/hr (07/18/23 1040)         Scheduled:   hydrocortisone   Topical (Top) BID    lactulose  20 g Per NG tube TID    levETIRAcetam  500 mg Oral BID    LIDOcaine (PF) 10 mg/ml (1%)  10 mL Other Once    miconazole   Topical (Top) BID    midodrine  10 mg Oral TID WM    pantoprazole  40 mg Intravenous Daily    rifAXIMin  550 mg Oral BID         PRN:   sodium chloride    acetaminophen    dextrose 10%    dextrose 10%    diphenhydrAMINE    fentaNYL    glucagon (human recombinant)    glucose    glucose    HYDROmorphone    lorazepam    naloxone    ondansetron    sodium chloride 0.9%    sodium chloride 0.9%        Microbiology Data:  Microbiology Results (last 7 days)       Procedure Component Value Units Date/Time    Blood Culture [[id number]]  (Normal) Collected: 07/08/23 1453    Order Status: Completed Specimen:  Blood from Hand, Left Updated: 07/13/23 1901     CULTURE, BLOOD ([facility]) No Growth at 5 days    Blood Culture [[id number]]  (Normal) Collected: 07/08/23 1453    Order Status: Completed Specimen: Blood from Hand, Right Updated: 07/13/23 1901     CULTURE, BLOOD ([facility]) No Growth at 5 days             Antibiotics and Day Number of Therapy:  Antibiotics (From admission, onward)      Start     Stop Route Frequency Ordered    07/08/23 1200  rifAXIMin tablet 550 mg         -- Oral 2 times daily 07/08/23 1047             Imaging:  X-Ray Chest 1 View  Narrative: EXAMINATION:  XR CHEST 1 VIEW    CPT 50739    CLINICAL HISTORY:  increasing o2 requirement;    COMPARISON:  July 11th 2023    FINDINGS:  Examination reveals cardiomediastinal silhouette to be unchanged as compared with the previous exam.  Worsening of confluent airspace opacities throughout both lung fields with more confluent opacities in both perihilar regions and right cardiophrenic angle region.    No other significant change    Support catheters remain unchanged  Impression: Worsening of confluent airspace opacities throughout both lung fields more so on in the perihilar regions and right cardiophrenic angle region.    No other change    Electronically signed by: Godfrey Rodriguez  Date:    07/12/2023  Time:    14:36        Assessment & Plan:   Alcoholic cirrhosis  Alcoholic hepatitis  HRS  Acute hypoxic, hypercapnic respiratory failure  MELD 3.0: 40 at 7/18/2023 ; CTP:C  -Continue midodrine and octreotide infusions, for map goals of greater than 83 for HRS.  -patient's urine output has steadily declined however his creatinine is stable.  Nephrology is on board appreciate recommendations.  Currently trying to get a hold of patient's family with regards to patient's code status.  -per Nephrology, no emergent indications for dialysis at this time, and due to multi organ dysfunction likely not a good candidate per Nephrology  -Continue rifaximin, lactulose.   Aim for 2-3  bowel movements per day.    -patient has completed 6 days of Zosyn and continues to be on vancomycin all cultures have been negative including paracentesis, blood cultures, however respiratory cultures have been unable to be obtained  -hemoglobin stable, no further reports of upper GI clots or bleeding at this time continue to monitor  -at this time patient's prognosis is guarded, will await to see if patient has any meaningful recovery of his liver function and kidney function.   -recommend p.r.n. medications for agitation such as benzodiazepines and opiates  -consider ordering right lower extremity DVT ultrasound given swelling, increased coagulopathy in setting of cirrhosis and lower extremity pain  -agree with palliation of suffering given severe multiorgan failure and poor chance of recovery    Kyler Calzada MD  Internal Medicine Resident PGY-III

## 2023-07-18 NOTE — PROGRESS NOTES
Inpatient Nutrition Assessment    Admit Date: 6/30/2023   Total duration of encounter: 18 days     Nutrition Recommendation/Prescription     Pt remains NPO/ confused -- TF Fibersource HN resumed @ 20ml/hr, increase as tolerated to goal rate 70ml/hr  (23 hr cycle) to provide 1932 calories, 87 gm protein, 1300 ml free water . Flush tube with 50ml water every 4 hrs.   If unable to tolerate TF, May consider use PPN /fluid overload may become issue/need monitor closely. Clinimix 4.25/5 @ 80ml/hr with lipids 20% 250 ml daily to provide 1153 calories, 82 gm protein.   If central line placed---then can consider TPN (less volume)--Clinimix 5/20 @ 70ml/hr with lipids 20% 250 ml daily to provide 1978 calories, 84 gm protein.   When pt alert--suggest ADAT to Low Na diet; Fluid restriction -- consider Megace to stimulate appetite  Boost Breeze (provides 250 kcal, 9 g protein per serving) TID per pt's request  Suggest  MVI, Fe, Thiamine  Daily weight    Communication of Recommendations: reviewed with nurse    Nutrition Assessment     Malnutrition Assessment/Nutrition-Focused Physical Exam    Malnutrition Context: acute illness or injury  Malnutrition Level: severe  Energy Intake (Malnutrition): less than or equal to 50% for greater than or equal to 5 days  Weight Loss (Malnutrition): greater than 7.5% in 3 months         A minimum of two characteristics is recommended for diagnosis of either severe or non-severe malnutrition.    Chart Review    Reason Seen: follow-up    Malnutrition Screening Tool Results   Have you recently lost weight without trying?: Yes: 34 lbs or more  Have you been eating poorly because of a decreased appetite?: Yes   MST Score: 5     Diagnosis:  Decompensated alcoholic liver cirrhosis with ascites, Hepatic encephalopathy, Hepatorenal syndrome, Thrombocytopenia, Normocytic anemia, Electrolyte abnormalities, acute hypoxic respiratory failure/volume overload    Relevant Medical History: Alcoholic Cirrhosis  with ascites, CHRISTEN d/t intravascular volume depletion, Sepsis likely GI source, Hypokalemia, Hypophosphatemia    Nutrition-Related Medications:, lactulose, Octreotide, Kcl, rifaximin , pantoprazole  Calorie Containing IV Medications: no significant kcals from medications at this time    Nutrition-Related Labs:  (7-11) H/H 8.6/25.9(L) Gluc 127 Bun 26.3 Cr 3.5(H) GFR 19(L) K 3.4(L) Tbili 5.1(H)   (7-14) H/H 8.0/24.3(L) Gluc 75 Bun 35 Cr 3.6(H) GFR19(L) K 3.4(L) P 3.0 Tbili 5.6(H) AST 61(H)   7/18/23 -- H/H 7/21 L, K 3.5, BUN 51 H, Cr 4.5 H, Glu 128 H, Phos 4.2, AST 68 H, Alk Phos 253 H, T Bili 7 H    Diet/PN Order: Diet NPO  Oral Supplement Order: none  Tube Feeding Order:  Fibersource @ 20 ml/hr (see below for calculation)  Appetite/Oral Intake: NPO/NPO  Factors Affecting Nutritional Intake: abdominal distension, impaired cognitive status/motor control, decreased appetite, diarrhea, and nausea  Food/Pentecostalism/Cultural Preferences: none reported  Food Allergies: none reported    Skin Integrity: excoriation, rash  Wound(s):   none reported     Comments    7/18/23 -- Mental status unchanged, remains NPO with NGT in place -- TF resumed @ 20 ml/hr on 7/17; continues with bloody Bms; wt elevated, generalized edema present; hospice evaluation pending    (7/14) Pt remains NPO; TF on hold 2 blood clots/maroon fluid from NGT; pt has peripheral access; PPN recs provided and TPN recs provided but would need central access; fluid volume will need to be monitored. Nephrology team monitoring renal fx; CHRISTEN. Abnormal labs noted.     (7/11) Pt sleeping during rounds; per staff----pt disoriented; has NGT ; has been not eating since 7/9; hx poor po intake prior; on bipap/ 2 hypoxemia; abdomen distended. Abnormal labs noted: reflective liver failure/ on lactulose. TF recs provided for nutrition support.     7/7/23 -- Pt continues with poor appetite, request ham sandwich only for lunch; taking sips of Boost Plus, willing to try Boost  Breeze instead; intermittent nausea with abdominal distention; multiple loose stools -- lactulose decreased per MD; current wt elevated, ascites present -- possible need for repeat paracentesis per MD notes    23 -- Pt in & out of sleep during visit reporting not sleeping well last 2 nights; pt reports decreased appetite, denies n/v, + abdominal pain & distention s/p paracentesis on  noted with 5 L removed per MD notes; K (L) - loose stool, continues on lactulose, replacing K; Continue Boost ONS, consider Megace to stimulate appetite    () Received MST trigger wt loss/decreased appetite. Unable to speak to pt; spoke to nurse caring for pt--pt remains with decreased appetite; ate 25% of meal earlier; + abd pain/distension; some nausea; hx ETOH cirrhosis/ascites. Will order oral supplement; consider megace to stimulate appetite; Pt on lactulose; NH4 level 45. Wt fluctuates with ascites/fluid--per EMR between 120-154#; will track. Unable to complete PA at this time . Noted elevated Bun/Cr; low GFR--CHRISTEN; nephrology consulted     Anthropometrics    Height: 6' (182.9 cm) Height Method: Stated  Last Weight: 82.3 kg (181 lb 7 oz) (wt elevated/ fluid) (23 1137) Weight Method: Bed Scale  BMI (Calculated): 24.6  BMI Classification: normal (BMI 18.5-24.9)        Ideal Body Weight (IBW), Male: 178 lb     % Ideal Body Weight, Male (lb): 90.45 %                 Usual Body Weight (UBW), k.2 kg pt reports last weighing 3 months ago  % Usual Body Weight: 91.64  % Weight Change From Usual Weight: -8.55 %  Usual Weight Provided By: patient    Wt Readings from Last 5 Encounters:   23 82.3 kg (181 lb 7 oz)   23 71 kg (156 lb 8.4 oz)   23 67.2 kg (148 lb 3.2 oz)   23 72.6 kg (160 lb)   23 72.6 kg (160 lb)     Weight Change(s) Since Admission:  Admit Weight: 68 kg (150 lb) (23 1051)  23 -- 70.6 kg, bed  23 -- 73.2 kg, bed  --bedwt 82.3kg--wt elevated/ fluid     Estimated  Needs    Weight Used For Calorie Calculations: 68 kg (149 lb 14.6 oz) (wt fluctuating--use admit wt ; ? close dry wt)  Energy Calorie Requirements (kcal): 2040 kcal/d; 30 stuart/kg  Energy Need Method: Kcal/kg  Weight Used For Protein Calculations: 68 kg (149 lb 14.6 oz) (admit wt used /wt fluctuating)  Protein Requirements: 82 gm protein/d; 1.2 gm/kg /cirrhosis  Fluid Requirements (mL): 1592 ml/d; 25 ml/kg/ascites  Temp (24hrs), Av.1 °F (36.2 °C), Min:96.3 °F (35.7 °C), Max:97.8 °F (36.6 °C)       Enteral Nutrition    Formula: Fibersource HN  Rate/Volume: 20 ml/hr  Water Flushes: 200 ml Q 4 H  Additives/Modulars: none at this time  Route: nasogastric tube  Method: continuous  Total Nutrition Provided by Tube Feeding, Additives, and Flushes:  Calories Provided  552 kcal/d, 27% needs   Protein Provided  25 g/d, 30% needs   Fluid Provided  1580 ml/d, 99% needs   Continuous feeding calculations based on estimated 20 hr/d run time unless otherwise stated.    Parenteral Nutrition    Patient not receiving parenteral nutrition support at this time.    Evaluation of Received Nutrient Intake    Calories: not meeting estimated needs  Protein: not meeting estimated needs    Patient Education    Not applicable.    Nutrition Diagnosis     PES: Malnutrition related to acute illness as evidenced by eating < 50% nutrition intake > 5 days, >7.5% wt loss x 3 months, ascites, muscle wasting (continues)    Interventions/Goals     Intervention(s): modified rate of enteral nutrition, multivitamin/mineral supplement therapy, and collaboration with other providers  Goal: Meet greater than 75% of nutritional needs by follow-up. (goal not met)    Monitoring & Evaluation     Dietitian will monitor energy intake, enteral nutrition intake, weight change, electrolyte/renal panel, and gastrointestinal profile.  Nutrition Risk/Follow-Up: high (follow-up in 1-4 days)   Please consult if re-assessment needed sooner.

## 2023-07-18 NOTE — PROGRESS NOTES
[facility] - ICU  Pulmonary Critical Care Note    Patient Name: Los Alatorre  MRN: [id number]  Admission Date: 6/30/2023  Hospital Length of Stay: 18 days  Code Status: DNR  Attending Provider: Kip Avalos MD  Primary Care Provider: DECLAN Small     Subjective:     HPI:   Los Alatorre is a 58 y.o. male with PMH of alcoholic cirrhosis, alcohol use disorder, and seizure disorder who presented to [facility] ED on 6/30/2023 with a primary complaint of abdominal pain. He reports poor PO intake and has felt his abdomen become more distended. Does not report any fevers or chills. Denies any melena, bright red blood in the stool, or any vomiting. Was recently transferred to [facility] earlier this month for an EGD, which ruled out varices and gastropathy. Unknown if he has been taking his home medications reliably. In the ED patient was afebrile 100.2 F, pulse 96, respiratory rate 4, blood pressure 137/83, 87% on room air.  CBC revealed leukocytosis at 23, H and H 11.7/35.7, platelets 286.  CMP revealed mild hyponatremia 132, acidosis 17, BUN/CR 0.65/0.88.  Alkaline phosphatase elevated at 337, slight AST elevation at 56, INR 1.3.  Initial lactic acid 3.2, troponin negative. CT abdomen pelvis revealed trace left pleural effusion, cirrhosis ascites, previous cholecystectomy, bilateral kidneys with non occluding calculi, and noninflamed diverticulosis coli.  Paracentesis was performed and 5 L of fluid was drained.      Hospital Course/Significant events:  7/9/2023 - Admitted to ICU d/t decompensated cirrhosis with respiratory distress  7/16/2023 - Made DNR/DNI    24 Hour Interval History:  Per staff RN, bloody bowel movements is persisting. Mental status remains grossly unchanged. Labs this AM: WBC down trending to 22.8k, H/H 7.0/21.3, platelets of 85k, renal functions worsening. I/O: 1.2 L stool and 50 mL urine output past 24 hours, net positive 4.7 L.    Past Medical History:   Diagnosis Date    Seizures         Past Surgical History:   Procedure Laterality Date    APPENDECTOMY      CHOLECYSTECTOMY         Social History     Socioeconomic History    Marital status:     Number of children: 1   Occupational History    Occupation: Employed   Tobacco Use    Smoking status: Every Day     Packs/day: 1.50     Types: Cigarettes    Smokeless tobacco: Never   Substance and Sexual Activity    Alcohol use: Not Currently     Comment: no alcohol x 2 month    Drug use: Not Currently     Types: Marijuana     Comment: No Marijuana x 2 month     Social Determinants of Health     Financial Resource Strain: High Risk    Difficulty of Paying Living Expenses: Very hard   Food Insecurity: Food Insecurity Present    Worried About Running Out of Food in the Last Year: Sometimes true    Ran Out of Food in the Last Year: Sometimes true   Transportation Needs: No Transportation Needs    Lack of Transportation (Medical): No    Lack of Transportation (Non-Medical): No   Physical Activity: Inactive    Days of Exercise per Week: 0 days    Minutes of Exercise per Session: 0 min   Social Connections: Moderately Isolated    Frequency of Communication with Friends and Family: Three times a week    Frequency of Social Gatherings with Friends and Family: More than three times a week    Attends Shinto Services: Never    Active Member of Clubs or Organizations: No    Attends Club or Organization Meetings: Never    Marital Status: Living with partner   Housing Stability: High Risk    Unable to Pay for Housing in the Last Year: Yes    Number of Places Lived in the Last Year: 1    Unstable Housing in the Last Year: No       Current Outpatient Medications   Medication Instructions    LACTULOSE ORAL Oral, 2 times daily    levETIRAcetam (KEPPRA) 750 mg, Oral, 2 times daily    polyethylene glycol (GLYCOLAX) 17 g, Oral, Daily PRN     Current Inpatient Medications   hydrocortisone   Topical (Top) BID    lactulose  20 g Per NG tube TID    levETIRAcetam  500  mg Oral BID    LIDOcaine (PF) 10 mg/ml (1%)  10 mL Other Once    miconazole   Topical (Top) BID    midodrine  10 mg Oral TID WM    pantoprazole  40 mg Intravenous Daily    rifAXIMin  550 mg Oral BID    vancomycin  125 mg Oral Q6H     Current Intravenous Infusions   sodium chloride 0.45% 75 mL/hr at 07/18/23 0534    octreotide (SANDOSTATIN) infusion 50 mcg/hr (07/18/23 0534)       Objective:     Intake/Output Summary (Last 24 hours) at 7/18/2023 0638  Last data filed at 7/18/2023 0618  Gross per 24 hour   Intake 1783.99 ml   Output 1200 ml   Net 583.99 ml       Vital Signs (Most Recent):  Temp: 97.8 °F (36.6 °C) (07/18/23 0400)  Pulse: 98 (07/18/23 0600)  Resp: (!) 24 (07/18/23 0600)  BP: (!) 86/64 (07/18/23 0600)  SpO2: (!) 91 % (07/18/23 0600)  Body mass index is 24.61 kg/m².  Weight: 82.3 kg (181 lb 7 oz) (wt elevated/ fluid) Vital Signs (24h Range):  Temp:  [96.3 °F (35.7 °C)-97.8 °F (36.6 °C)] 97.8 °F (36.6 °C)  Pulse:  [] 98  Resp:  [16-26] 24  SpO2:  [73 %-100 %] 91 %  BP: ()/(47-75) 86/64     Physical Exam  General: Ill-appearing   HEENT: NC/AT; PERRL; nasal and oral mucosa moist and clear  Pulm: Diminished in lower lobes bilaterally, currently on BiPAP   CV: S1, S2 w/o murmurs or gallops; no edema noted  GI: Bowel sound present in all quadrants, distended abdomen with positive fluid wave  MSK: No obvious deformities  Derm: Generalized jaundice  Neuro: Alert and awake, disoriented, able to follow simple commands    Lines/Drains/Airways       Drain  Duration                  NG/OG Tube 07/09/23 1800 [place] sump 16 Fr. Right nostril 8 days         Urethral Catheter 07/09/23 0845 16 Fr. 8 days         Rectal Tube 07/14/23 0300 rectal tube w/ balloon (indicate number of mLs) 4 days              Peripheral Intravenous Line  Duration                  Peripheral IV - Single Lumen 07/07/23 2100 20 G Anterior;Right Forearm 10 days         Peripheral IV - Single Lumen 07/12/23 0200 20 G Anterior;Right  Forearm 6 days                  Significant Labs:  Lab Results   Component Value Date    WBC 22.82 (H) 07/18/2023    HGB 7.0 (L) 07/18/2023    HCT 21.3 (L) 07/18/2023    MCV 93.0 07/18/2023    PLT 85 (L) 07/18/2023       BMP  Lab Results   Component Value Date     07/18/2023    K 3.5 07/18/2023    CHLORIDE 104 07/18/2023    CO2 20 (L) 07/18/2023    BUN 51.2 (H) 07/18/2023    CREATININE 4.54 (H) 07/18/2023    CALCIUM 9.9 07/18/2023    AGAP 7.0 05/01/2023    ESTGFRAFRICA 105 06/08/2023    EGFRNONAA 33 06/17/2022     ABG  Recent Labs   Lab 07/17/23  0442   PH 7.320*   PO2 63.0*   HCO3 25.8       Mechanical Ventilation Support:  Oxygen Concentration (%): 80 (07/18/23 0315)    Significant Imaging:  I have reviewed the pertinent imaging within the past 24 hours.    Assessment/Plan:     Assessment  Decompensated alcoholic liver cirrhosis with ascites, hepatic encephalopathy, and hepatorenal syndrome  Acute hypoxic hypercapnic respiratory failure   Thrombocytopenia  Normocytic anemia  Electrolyte abnormalities  HX of alcohol abuse, seizure disorder    Plan  -Continue ICU level of care for ongoing monitoring and medical management  -D/C heparin DVT PPX d/t thrombocytopenia; continue to monitor bloody output and transfuse as needed  -Continue lactulose TID, rifaximin 550 BID, octreotide drip and midodrine 10 TID  -Last paracentesis was performed on 7/4/2023; will repeat as needed  -Completed a course of PO Vanc (10 days), will D/C ABX on 7/18/2023  -Nephrology and GI teams following, appreciate their assistance  -Unable to reach family members (Alexia Celi [phone] and brother Marcial Celi [phone]); law enforcement is involved in locating family members. Code status changed to DNR/DNI by two physicians as any escalation of care is futile at this time  -Will likely need to consult hospice care    DVT Prophylaxis: SCD  GI Prophylaxis: Protonix     32 minutes of critical care was time spent personally by me on  the following activities: development of treatment plan with patient or surrogate and bedside caregivers, discussions with consultants, evaluation of patient's response to treatment, examination of patient, ordering and performing treatments and interventions, ordering and review of laboratory studies, ordering and review of radiographic studies, pulse oximetry, re-evaluation of patient's condition.  This critical care time did not overlap with that of any other provider or involve time for any procedures.     Demarcus Guzman DO, PGY-II  Pulmonary Critical Care Medicine  [facility] - ICU

## 2023-07-19 NOTE — PROGRESS NOTES
[facility] GI Progress Note    Patient Name: Los Alatorre  MRN: [id number]  Admission Date: 6/30/2023  Hospital Length of Stay: 19 days  Code Status: DNR  Attending Provider: Kip Avalos MD  Primary Care Provider: DECLAN Small     Subjective:      Brief HPI:  Los Alatorre is a 58 y.o. male who  has a past medical history of Seizures.  He presented to [facility] on 6/30/2023  with a primary complaint of abdominal pain, and distension.  Patient says that when he was discharged from our [place] from [place] that he was compliant with all his medications and that he was having adequate amount of bowel movements.  Patient was discharged on rifaximin 550 b.i.d., lactulose 20 mg b.i.d..  However he said that he has little bowel movements however they were in quantity.  Patient says that he felt good for proximally 1 week however he cannot work anymore.  He said that his abdomen got so distended that he had decreased p.o. intake and had pain.  Of note patient says that he has stopped drinking for past 2 months.  He says that he was a heavy drinker since he was 19 years old.  He says that he drinks for the past 1-2 years approximately a half to 3/4 of a 5th of vodka.  Patient says that he has been taking ibuprofen for his restless legs for quite a time.  He says that he has numbness in his bilateral lower extremities when he is sleep at night.  He says he did not try put in over side of bed.  He also denies any limb claudication or numbness or tingling all ambulation.  Patient follows up with [facility].  Patient says he is currently having 4 bowel movements per day even when he was de-escalated to 10 mg b.i.d..  He is alert and oriented x3.        Interval History:  oliguric, hypotensive, increasing CHRISTEN, worsening acidosis, anemic, signs of multiorgan failure and shock, appears obtunded on 100% Fio2 sating 100%. Appears comfortable however. GCS 8.     Objective:     Vital  Signs:  Vital Signs (Most Recent):  Temp: 97.3 °F (36.3 °C) (07/19/23 0800)  Pulse: 90 (07/19/23 0900)  Resp: 19 (07/19/23 0900)  BP: (!) 87/59 (07/19/23 0900)  SpO2: 100 % (07/19/23 0900)  Body mass index is 24.61 kg/m².  Weight: 82.3 kg (181 lb 7 oz) (wt elevated/ fluid) Vital Signs (24h Range):  Temp:  [96.5 °F (35.8 °C)-98.1 °F (36.7 °C)] 97.3 °F (36.3 °C)  Pulse:  [] 90  Resp:  [17-26] 19  SpO2:  [93 %-100 %] 100 %  BP: ()/(48-69) 87/59       Input/output:     Intake/Output Summary (Last 24 hours) at 7/19/2023 0955  Last data filed at 7/19/2023 0615  Gross per 24 hour   Intake 2574.75 ml   Output 1325 ml   Net 1249.75 ml         Physical Examination:  General:  On 12/06 BiPAP 80% FiO2.  Patient is able to nod to my questions, and follow commands  Head: Normocephalic,   Eyes: PERRL, EOMI, icteric sclera  Throat: No posterior pharyngeal erythema or exudate, no tonsillar exudate  Neck: supple, normal ROM, no JVD  CVS:  RRR, S1 and S2 normal, no murmurs, no added heart sounds, rubs, gallops, regular peripheral pulses, 2+ left lower extremity edema  Resp:  Lungs distant with inspiratory crackles and expiratory wheezing with prolonged expiratory phase, scattered rhonchi throughout  GI:  Abdomen soft, non-tender, +distended, normoactive bowel sounds  MSK:  Full range of motion, no obvious deformities   Skin:  Spider angiomata notice diffusely on the body, diffuse anasarca  Neuro:  Patient is encephalopathic   Psych:  Patient is not alert oriented x3      Lines/Drains/Airways       None                    Laboratory:    Recent Labs   Lab 07/17/23  0406 07/18/23  0406 07/19/23  0412   WBC 23.23* 22.82* 34.95*   RBC 2.53* 2.29* 2.00*   HGB 7.8* 7.0* 6.1*   HCT 23.7* 21.3* 19.2*   MCV 93.7 93.0 96.0*   MCH 30.8 30.6 30.5   MCHC 32.9* 32.9* 31.8*   RDW 21.2* 21.8* 21.9*   PLT 76* 85* 117*   MPV 12.9* 13.5* 13.9*        Recent Labs   Lab 07/13/23  0611 07/14/23  0317 07/14/23  0618 07/15/23  0328 07/17/23  0406  07/17/23  0442 07/18/23  0406 07/19/23  0412   NA  --    < >  --    < > 145  --  142 141   K  --    < >  --    < > 3.5  --  3.5 3.8   CHLORIDE  --    < >  --    < > 106  --  104 105   CO2  --    < >  --    < > 22  --  20* 18*   BUN  --    < >  --    < > 45.6*  --  51.2* 50.4*   CREATININE  --    < >  --    < > 3.94*  --  4.54* 4.82*   CALCIUM  --    < >  --    < > 10.3*  --  9.9 9.3   PH 7.310*  --  7.410  --   --  7.320*  --   --    MG  --    < >  --    < > 2.00  --  1.90 1.80   ALBUMIN  --    < >  --    < > 3.1*  --  2.8* 2.6*   ALKPHOS  --    < >  --    < > 249*  --  253* 295*   ALT  --    < >  --    < > 8  --  7 9   AST  --    < >  --    < > 55*  --  68* 78*   BILITOT  --    < >  --    < > 7.1*  --  7.0* 6.8*    < > = values in this interval not displayed.          Other Results:    Current Medications:     Infusions:   lactated ringers 100 mL/hr at 07/19/23 0908    octreotide (SANDOSTATIN) infusion 50 mcg/hr (07/19/23 0908)         Scheduled:   hydrocortisone   Topical (Top) BID    lactulose  20 g Per NG tube TID    levETIRAcetam  500 mg Oral BID    LIDOcaine (PF) 10 mg/ml (1%)  10 mL Other Once    miconazole   Topical (Top) BID    midodrine  10 mg Oral TID WM    pantoprazole  40 mg Intravenous Daily    rifAXIMin  550 mg Oral BID         PRN:   sodium chloride    acetaminophen    dextrose 10%    dextrose 10%    diphenhydrAMINE    fentaNYL    glucagon (human recombinant)    glucose    glucose    HYDROmorphone    lorazepam    naloxone    ondansetron    sodium chloride 0.9%    sodium chloride 0.9%        Microbiology Data:  Microbiology Results (last 7 days)       Procedure Component Value Units Date/Time    Blood Culture [[id number]]  (Normal) Collected: 07/08/23 1453    Order Status: Completed Specimen: Blood from Hand, Left Updated: 07/13/23 1901     CULTURE, BLOOD ([facility]) No Growth at 5 days    Blood Culture [[id number]]  (Normal) Collected: 07/08/23 1453    Order Status: Completed Specimen: Blood from Hand, Right  Updated: 07/13/23 1901     CULTURE, BLOOD ([facility]) No Growth at 5 days             Antibiotics and Day Number of Therapy:  Antibiotics (From admission, onward)      Start     Stop Route Frequency Ordered    07/08/23 1200  rifAXIMin tablet 550 mg         -- Oral 2 times daily 07/08/23 1047             Imaging:  X-Ray Chest 1 View  Narrative: EXAMINATION:  XR CHEST 1 VIEW    CPT 37287    CLINICAL HISTORY:  increasing o2 requirement;    COMPARISON:  July 11th 2023    FINDINGS:  Examination reveals cardiomediastinal silhouette to be unchanged as compared with the previous exam.  Worsening of confluent airspace opacities throughout both lung fields with more confluent opacities in both perihilar regions and right cardiophrenic angle region.    No other significant change    Support catheters remain unchanged  Impression: Worsening of confluent airspace opacities throughout both lung fields more so on in the perihilar regions and right cardiophrenic angle region.    No other change    Electronically signed by: Godfrey Rodriguez  Date:    07/12/2023  Time:    14:36        Assessment & Plan:   Alcoholic cirrhosis  Alcoholic hepatitis  HRS  Acute hypoxic, hypercapnic respiratory failure  MELD 3.0: 40 at 7/18/2023 ; CTP:C  -Continue midodrine and octreotide infusions, for map goals of greater than 83 for HRS.  -patient's urine output has steadily declined however his creatinine is stable.  Nephrology is on board appreciate recommendations.  Currently trying to get a hold of patient's family with regards to patient's code status.  -per Nephrology, no emergent indications for dialysis at this time, and due to multi organ dysfunction likely not a good candidate per Nephrology  -Continue rifaximin, lactulose.   Aim for 2-3 bowel movements per day.    -patient has completed 6 days of Zosyn and continues to be on vancomycin all cultures have been negative including paracentesis, blood cultures, however respiratory cultures have been  unable to be obtained  -hemoglobin stable, no further reports of upper GI clots or bleeding at this time continue to monitor  -at this time patient's prognosis is guarded, will await to see if patient has any meaningful recovery of his liver function and kidney function.   -recommend p.r.n. medications for agitation such as benzodiazepines and opiates  -consider ordering right lower extremity DVT ultrasound given swelling, increased coagulopathy in setting of cirrhosis and lower extremity pain  -agree with palliation of suffering given severe multiorgan failure and poor chance of recovery  -again poor prognosis, no further intervention per GI, will sign off at this time.    Kyler Calzada MD  Internal Medicine Resident PGY-III

## 2023-07-19 NOTE — PROGRESS NOTES
[facility] - ICU  Pulmonary Critical Care Note    Patient Name: Los Alatorre  MRN: [id number]  Admission Date: 6/30/2023  Hospital Length of Stay: 19 days  Code Status: DNR  Attending Provider: Kip Avalos MD  Primary Care Provider: DECLAN Small     Subjective:     HPI:   Los Alatorre is a 58 y.o. male with PMH of alcoholic cirrhosis, alcohol use disorder, and seizure disorder who presented to [facility] ED on 6/30/2023 with a primary complaint of abdominal pain. He reports poor PO intake and has felt his abdomen become more distended. Does not report any fevers or chills. Denies any melena, bright red blood in the stool, or any vomiting. Was recently transferred to [facility] earlier this month for an EGD, which ruled out varices and gastropathy. Unknown if he has been taking his home medications reliably. In the ED patient was afebrile 100.2 F, pulse 96, respiratory rate 4, blood pressure 137/83, 87% on room air.  CBC revealed leukocytosis at 23, H and H 11.7/35.7, platelets 286.  CMP revealed mild hyponatremia 132, acidosis 17, BUN/CR 0.65/0.88.  Alkaline phosphatase elevated at 337, slight AST elevation at 56, INR 1.3.  Initial lactic acid 3.2, troponin negative. CT abdomen pelvis revealed trace left pleural effusion, cirrhosis ascites, previous cholecystectomy, bilateral kidneys with non occluding calculi, and noninflamed diverticulosis coli.  Paracentesis was performed and 5 L of fluid was drained.      Hospital Course/Significant events:  7/9/2023 - Admitted to ICU d/t decompensated cirrhosis with respiratory distress  7/16/2023 - Made DNR/DNI    24 Hour Interval History:  Continues to have bloody bowel movements.  Hemoglobin this morning down to 6.1.  Patient's blood pressure was maintaining maps of greater than 65 overnight, have now decreased again this morning.  Has not remained at goal MAP.  Kidney function continues to worsen, patient now oliguric with 25 mL of urine output overnight.  White  count increased to 35, no episodes of fever overnight.  Continues to be on BiPAP at 100% FiO2, saturating okay.    Past Medical History:   Diagnosis Date    Seizures      Past Surgical History:   Procedure Laterality Date    APPENDECTOMY      CHOLECYSTECTOMY       Social History     Socioeconomic History    Marital status:     Number of children: 1   Occupational History    Occupation: Employed   Tobacco Use    Smoking status: Every Day     Packs/day: 1.50     Types: Cigarettes    Smokeless tobacco: Never   Substance and Sexual Activity    Alcohol use: Not Currently     Comment: no alcohol x 2 month    Drug use: Not Currently     Types: Marijuana     Comment: No Marijuana x 2 month     Social Determinants of Health     Financial Resource Strain: High Risk    Difficulty of Paying Living Expenses: Very hard   Food Insecurity: Food Insecurity Present    Worried About Running Out of Food in the Last Year: Sometimes true    Ran Out of Food in the Last Year: Sometimes true   Transportation Needs: No Transportation Needs    Lack of Transportation (Medical): No    Lack of Transportation (Non-Medical): No   Physical Activity: Inactive    Days of Exercise per Week: 0 days    Minutes of Exercise per Session: 0 min   Social Connections: Moderately Isolated    Frequency of Communication with Friends and Family: Three times a week    Frequency of Social Gatherings with Friends and Family: More than three times a week    Attends [employer] Services: Never    Active Member of Clubs or Organizations: No    Attends Club or Organization Meetings: Never    Marital Status: Living with partner   Housing Stability: High Risk    Unable to Pay for Housing in the Last Year: Yes    Number of Places Lived in the Last Year: 1    Unstable Housing in the Last Year: No     Current Outpatient Medications   Medication Instructions    LACTULOSE ORAL Oral, 2 times daily    levETIRAcetam (KEPPRA) 750 mg, Oral, 2 times daily    polyethylene glycol  (GLYCOLAX) 17 g, Oral, Daily PRN   Current Inpatient Medications   hydrocortisone   Topical (Top) BID    lactulose  20 g Per NG tube TID    levETIRAcetam  500 mg Oral BID    LIDOcaine (PF) 10 mg/ml (1%)  10 mL Other Once    miconazole   Topical (Top) BID    midodrine  10 mg Oral TID WM    pantoprazole  40 mg Intravenous Daily    rifAXIMin  550 mg Oral BID   Current Intravenous Infusions   lactated ringers 100 mL/hr at 07/19/23 0615    octreotide (SANDOSTATIN) infusion 50 mcg/hr (07/19/23 0615)     Objective:     Intake/Output Summary (Last 24 hours) at 7/19/2023 0811  Last data filed at 7/19/2023 0615  Gross per 24 hour   Intake 2574.75 ml   Output 1325 ml   Net 1249.75 ml       Vital Signs (Most Recent):  Temp: 97.4 °F (36.3 °C) (07/19/23 0301)  Pulse: 90 (07/19/23 0741)  Resp: 17 (07/19/23 0741)  BP: (!) 94/59 (07/19/23 0700)  SpO2: 100 % (07/19/23 0741)  Body mass index is 24.61 kg/m².  Weight: 82.3 kg (181 lb 7 oz) (wt elevated/ fluid) Vital Signs (24h Range):  Temp:  [96.5 °F (35.8 °C)-98.1 °F (36.7 °C)] 97.4 °F (36.3 °C)  Pulse:  [] 90  Resp:  [17-26] 17  SpO2:  [93 %-100 %] 100 %  BP: ()/(48-69) 94/59     Physical Exam  General: Ill-appearing   HEENT: NC/AT; PERRL; nasal and oral mucosa moist and clear  Pulm: Diminished in lower lobes bilaterally, currently on BiPAP   CV: S1, S2 w/o murmurs or gallops; no edema noted  GI: Bowel sound present in all quadrants, distended abdomen with positive fluid wave  MSK: No obvious deformities  Derm: Generalized jaundice  Neuro:  Responds to painful stimulus, not following simple commands for me today.  Was spontaneously moving both arms and legs overnight.  Still disoriented and less alert this morning  Lines/Drains/Airways       Drain  Duration                  NG/OG Tube 07/09/23 1800 [place] sump 16 Fr. Right nostril 9 days         Urethral Catheter 07/09/23 0845 16 Fr. 9 days         Rectal Tube 07/14/23 0300 rectal tube w/ balloon (indicate number of mLs) 5  days              Peripheral Intravenous Line  Duration                  Peripheral IV - Single Lumen 07/07/23 2100 20 G Anterior;Right Forearm 11 days         Peripheral IV - Single Lumen 07/12/23 0200 20 G Anterior;Right Forearm 7 days         Peripheral IV - Single Lumen 07/19/23 0019 20 G Anterior;Left;Proximal Forearm <1 day                Significant Labs:  Lab Results   Component Value Date    WBC 34.95 (H) 07/19/2023    HGB 6.1 (L) 07/19/2023    HCT 19.2 (LL) 07/19/2023    MCV 96.0 (H) 07/19/2023     (L) 07/19/2023   BMP  Lab Results   Component Value Date     07/19/2023    K 3.8 07/19/2023    CHLORIDE 105 07/19/2023    CO2 18 (L) 07/19/2023    BUN 50.4 (H) 07/19/2023    CREATININE 4.82 (H) 07/19/2023    CALCIUM 9.3 07/19/2023    AGAP 7.0 05/01/2023    ESTGFRAFRICA 105 06/08/2023    EGFRNONAA 33 06/17/2022   ABG  Recent Labs   Lab 07/17/23  0442   PH 7.320*   PO2 63.0*   HCO3 25.8     Mechanical Ventilation Support:  Oxygen Concentration (%): 100 (07/19/23 0741)  Significant Imaging:  I have reviewed the pertinent imaging within the past 24 hours.    Assessment/Plan:     Assessment  Decompensated alcoholic liver cirrhosis with ascites, hepatic encephalopathy, and hepatorenal syndrome  Acute hypoxic hypercapnic respiratory failure   Thrombocytopenia  Normocytic anemia  Electrolyte abnormalities  HX of alcohol abuse, seizure disorder    Plan  -Continue ICU level of care for ongoing monitoring and medical management  -continuing lactulose 20 g 3 times daily, patient continues to put out output through rectal tube.  Remains bloody  -continuing rifaximin, octreotide drip, LR infusion at 100 mL/hr  -yesterday was last day of antibiotics, WBC continues to rise  -nephrology following, state patient is too unstable for any renal replacement therapy at this time  -continuing midodrine, attempting to get blood pressure up to goal MAP, will not escalate to vasopressors at this time  -hemoglobin now  dropping, will discuss with ICU staff whether transfusion will occur today.  Will type and screen right now to prepare  -continue p.r.n. medications for agitation, patient did receive Ativan overnight  -hospice on board, unable to accept patient as he is not able to make decisions for himself and family has not been able to be tracked down at this time.  -will continue to reach out, and initiate goals of care discussions at that time    DVT Prophylaxis: SCD  GI Prophylaxis: Protonix     32 minutes of critical care was time spent personally by me on the following activities: development of treatment plan with patient or surrogate and bedside caregivers, discussions with consultants, evaluation of patient's response to treatment, examination of patient, ordering and performing treatments and interventions, ordering and review of laboratory studies, ordering and review of radiographic studies, pulse oximetry, re-evaluation of patient's condition.  This critical care time did not overlap with that of any other provider or involve time for any procedures.     Jasiel Avina, DO  [employer] IM PGY3

## 2023-07-19 NOTE — NURSING
Patient discharged home; reviewed discharge instructions with patient. No further questions at this time.

## 2023-07-19 NOTE — PROGRESS NOTES
[facility]  Nephrology Progress Note    Patient Name: Los Alatorre  Age: 58 y.o.  : 1965  MRN: [id number]  Admission Date: 2023    Chief complaint: Abdominal Pain (Abd pain , sent from clinic)    Hospital course  Los Alatorre is a 58 y.o. White male with past medical history of alcoholic cirrhosis of the liver and seizure disorder who was admitted on 2023 with complaints of abdominal pain and distention.  Over the course of hospitalization, patient developed leukocytosis, acute kidney injury, encephalopathy and respiratory failure. Nephrology is continuing to follow for assistance of management of acute kidney injury.    Subjective  Patient is obtunded.     Review of Systems  Unable to obtain due to patient's condition.     Objective  BP (!) 87/59   Pulse 90   Temp 97.3 °F (36.3 °C)   Resp 19   Ht 6' (1.829 m)   Wt 82.3 kg (181 lb 7 oz) Comment: wt elevated/ fluid  SpO2 100%   BMI 24.61 kg/m²     Intake/Output Summary (Last 24 hours) at 2023 1018  Last data filed at 2023 0615  Gross per 24 hour   Intake 2574.75 ml   Output 1325 ml   Net 1249.75 ml       Physical Exam  General appearance: Patient is on NIPPV, lethargic  HEENT: PERRLA, EOMI, no scleral icterus, no JVD. Neck is supple.  Chest: Patient is on NIPPV, + coarse crackles bilaterally  Heart: S1, S2.   Abdomen: + ascites. Fecal collection system in place. NGT in place.   : Kang to gravity.  Extremities: No edema, peripheral pulses are palpable.   Neuro: Obtunded  Skin: Rash to lower abdomen and perineal area    Medications    Current Facility-Administered Medications:     0.9%  NaCl infusion (for blood administration), , Intravenous, Q24H PRN, Katie Smith MD    acetaminophen tablet 650 mg, 650 mg, Oral, Q6H PRN, Zeeshan Castillo MD, 650 mg at 23 1225    dextrose 10% bolus 125 mL 125 mL, 12.5 g, Intravenous, PRN, Palomo Helton DO    dextrose 10% bolus 250 mL 250 mL, 25 g, Intravenous,  PRN, Palomo Helton, DO    diphenhydrAMINE capsule 25 mg, 25 mg, Oral, Q6H PRN, Te Lui MD, 25 mg at 07/16/23 1614    fentaNYL injection 25 mcg, 25 mcg, Intravenous, Q1H PRN, Demarcus Guzman DO, 25 mcg at 07/19/23 0257    glucagon (human recombinant) injection 1 mg, 1 mg, Intramuscular, PRN, Palomo Helton,     glucose chewable tablet 16 g, 16 g, Oral, PRN, Palomo Helton, DO    glucose chewable tablet 24 g, 24 g, Oral, PRN, Palomo Helton, DO    hydrocortisone 1 % cream, , Topical (Top), BID, Te Lui MD, Given at 07/19/23 0802    HYDROmorphone injection 0.5 mg, 0.5 mg, Intravenous, Q4H PRN, Demarcus Guzman, DO    lactated ringers infusion, , Intravenous, Continuous, Mary Kay Dee MD, Last Rate: 100 mL/hr at 07/19/23 0908, New Bag at 07/19/23 0908    lactulose 20 gram/30 mL solution Soln 20 g, 20 g, Per NG tube, TID, Juanito Gorman DO, 20 g at 07/19/23 0801    levETIRAcetam tablet 500 mg, 500 mg, Oral, BID, Katie Smith MD, 500 mg at 07/19/23 0801    LIDOcaine (PF) 10 mg/ml (1%) injection 100 mg, 10 mL, Other, Once, Keely Garcia MD    LORazepam injection 1 mg, 1 mg, Intravenous, Q2H PRN, Demarcus Guzman DO, 1 mg at 07/19/23 0458    miconazole 2 % cream, , Topical (Top), BID, Katie Smith MD, Given at 07/19/23 0801    midodrine tablet 10 mg, 10 mg, Oral, TID WM, Michaela Sesay MD, 10 mg at 07/19/23 0801    naloxone 0.4 mg/mL injection 0.02 mg, 0.02 mg, Intravenous, PRN, Palomo Helton,     octreotide (SANDOSTATIN) 500 mcg in sodium chloride 0.9% 100 mL infusion, 50 mcg/hr, Intravenous, Continuous, Katie Smith MD, Last Rate: 10 mL/hr at 07/19/23 0908, 50 mcg/hr at 07/19/23 0908    ondansetron injection 4 mg, 4 mg, Intravenous, Q8H PRN, Palomo Helton DO, 4 mg at 07/05/23 2024    pantoprazole injection 40 mg, 40 mg, Intravenous, Daily, Katie Smith MD, 40 mg at 07/19/23 0801    rifAXIMin tablet 550 mg, 550 mg, Oral, BID, Katie Smith MD, 550 mg at 07/19/23 0801    sodium chloride  0.9% flush 10 mL, 10 mL, Intravenous, Q12H PRN, Palomo Helton,     sodium chloride 0.9% flush 10 mL, 10 mL, Intravenous, PRN, Katie Smith MD     Imaging:    Reviewed    Laboratory Data:  Hematology  Lab Results   Component Value Date    WBC 34.95 (H) 07/19/2023    HGB 6.1 (L) 07/19/2023    HCT 19.2 (LL) 07/19/2023     (L) 07/19/2023     07/19/2023    K 3.8 07/19/2023    CHLORIDE 105 07/19/2023    CO2 18 (L) 07/19/2023    BUN 50.4 (H) 07/19/2023    CREATININE 4.82 (H) 07/19/2023    EGFRNORACEVR 13 07/19/2023    GLUCOSE 130 (H) 07/19/2023    CALCIUM 9.3 07/19/2023    ALKPHOS 295 (H) 07/19/2023    LABPROT 5.1 (L) 07/19/2023    ALBUMIN 2.6 (L) 07/19/2023    BILIDIR 3.9 (H) 07/11/2023    IBILI 0.10 02/16/2022    AST 78 (H) 07/19/2023    ALT 9 07/19/2023    MG 1.80 07/19/2023    PHOS 4.5 07/19/2023     Lab Results   Component Value Date    ZZKLIQSO08AP 56.6 06/14/2023       Lab Results   Component Value Date    IRON 58 06/05/2023    TIBC 150 (L) 06/05/2023    TRANS 163 (L) 06/04/2023    TRANS 163 (L) 06/04/2023    LABIRON 31 06/04/2023    FERRITIN 122.2 06/05/2023    FOLATE 6.8 (L) 06/04/2023    ZXFKEJON08 >2,000 (H) 06/04/2023    HAPTO 55 07/07/2023     07/07/2023       Lab Results   Component Value Date    HIV Nonreactive 06/14/2023    HEPCAB Nonreactive 06/30/2023    HEPBSURFAG Nonreactive 06/30/2023    HEPBSAB Nonreactive 07/06/2023         Impression  Acute kidney injury   Hypercapnic hypoxemic respiratory failure  Anemia  GI bleeding  Leukocytosis   Thrombocytopenia  Coagulopathy  Cirrhosis of the liver  Ascites     Plan  Leukocytosis is worsening, hemoglobin and hematocrit have decreased, per nursing, there is evidence of gastrointestinal bleeding. Patient is not a good candidate for any form of dialysis due to multiorgan dysfunction, coagulopathy and poor prognosis.  Hospice has been consulted, however, no family members or significant other are available to discuss goals of care.   Continue supportive care.    Radha Perez NP  [facility]ph[facility]  07/19/2023

## 2023-07-20 LAB — RBCS: NORMAL

## 2023-07-20 NOTE — NURSING
Pt condition continued to decline; Natalia notified and she was at bedside when pt ; attempted to notify girlfriend on the number she provided but was unsuccessful; TOD 21:40  MISA notified 21:50  [place] [facility] [place]

## 2023-07-20 NOTE — PT/OT/SLP DISCHARGE
POST DISCHARGE DOCUMENTATION - 2023 3:20 PM    Physical Therapy Discharge Summary    Name: Los Alatorre  MRN: [id number]   Principal Problem: Alcoholic hepatitis with ascites       Recommendations - per last treatment session     Discharge Recommendations:  home health PT   Discharge Equipment Recommendations: walker, rolling     Assessment:     Refer to prior Physical Therapy note for last known functional status of patient.    Patient is .    Objective     GOALS:  Multidisciplinary Problems       Physical Therapy Goals          Problem: Physical Therapy    Goal Priority Disciplines Outcome Goal Variances Interventions   Physical Therapy Goal     PT, PT/OT Not met     Description: Goals to be met by: 2023     Patient will increase functional independence with mobility by performin. Sit to stand transfer with Modified [place]  2. Gait  x 260 feet with Modified [place] using Rolling Walker.   Reviewed 2023                           Plan     Patient Discharged to: N/A - patient  per chart.    2023

## 2023-07-20 NOTE — DISCHARGE SUMMARY
[facility]     Patient Name: Los Alatorre  Patient : 1965  Patient MRN: [id number]  Attending:      Death Note:     Called by nursing staff at 21:30 to see the patient. Upon my arrival, Los Alatorre was noted to be apneic and unresponsive to verbal, tactile, or painful stimuli. ACLS was not initiated due to DNR status. he was not moving their extremities spontaneously. Heart sounds and breath sounds are absent to auscultation. Bilateral carotid, radial, and femoral pulses were absent to palpation. Pupils fixed and dilated, corneal reflex absent bilaterally. he was pronounced dead at 21:40 on 2023. Family were unable to be contacted. Attending Dr. Mckinney was notified.    Time of Death: 21:40    Cause of Death: cardiopulmonary arrest secondary to multi-organ system failure in setting of hepatorenal syndrome    Sharri Fisher M.D.  [facility]  PGY-3

## 2023-07-23 LAB — RBCS: NORMAL

## 2023-07-27 NOTE — DISCHARGE SUMMARY
[facility] Discharge Summary    Admitting Physician: Dyana Grimes MD  Attending Physician: No att. providers found  Date of Admit: 2023  Date of Discharge: 2023    Condition:   DISPOSITION:  in Medical Facility        Discharge Diagnoses:     Patient Active Problem List   Diagnosis    History of appendectomy    History of cholecystectomy    Atrial fibrillation    Primary hypertension    Seizure disorder    Tobacco user    Alcohol abuse    Severe malnutrition    Alcoholic hepatitis with ascites    Acute renal failure    Tense ascites    Hepatic encephalopathy       Principal Problem:  Alcoholic hepatitis with ascites    Consultants and Procedures:     Consultants:  IP CONSULT TO SOCIAL WORK/CASE MANAGEMENT  IP CONSULT TO NEPHROLOGY  IP CONSULT TO GI  IP CONSULT TO SOCIAL WORK/CASE MANAGEMENT    Procedures:   * No surgery found *      Brief Admission History:      Los Alatorre is a 58 y.o. male who  has a past medical history of Seizures.  He presented to [facility] on 2023  with a primary complaint of abdominal pain, and distension.  Patient says that when he was discharged from our [place] from [place] that he was compliant with all his medications and that he was having adequate amount of bowel movements.  Patient was discharged on rifaximin 550 b.i.d., lactulose 20 mg b.i.d..  However he said that he has little bowel movements however they were in quantity.  Patient says that he felt good for proximally 1 week however he cannot work anymore.  He said that his abdomen got so distended that he had decreased p.o. intake and had pain.  Of note patient says that he has stopped drinking for past 2 months.  He says that he was a heavy drinker since he was 19 years old.  He says that he drinks for the past 1-2 years approximately a half to 3/4 of a 5th of vodka.  Patient says that he has been taking ibuprofen for his restless legs for quite a time.  He says that he has numbness in  his bilateral lower extremities when he is sleep at night.  He says he did not try put in over side of bed.  He also denies any limb claudication or numbness or tingling all ambulation.  [facility]  Patient says he is currently having 4 bowel movements per day even when he was de-escalated to 10 mg b.i.d..  He is alert and oriented x3.         Hospital Course with Pertinent Findings:      Patient initially admitted for decompensated alcoholic cirrhosis with ascites and CHRISTEN. He initially responded to fluid resuscitation and albumin, however later developed hepatorenal syndrome with worsening renal indices and mentation despite appropriate medical therapy in the setting of multiorgan failure. Later developed worsening respiratory status requiring extended use of BiPAP, from which he was unable to be weaned off of. Multiple attempts have been made to reach pt's family, however unable to get in contact with anyone. Given overall poor prognosis and no meaningful recovery or renal or hepatic function determined by several collaborating physicians, Pt eventually weaned off of pressure support and any other life prolonging measures.        Discharge physical exam:  Vitals  BP: (!) 57/36  Temp: 96.3 °F (35.7 °C)  Temp Source: Axillary  Pulse: (!) 0  Resp: (!) 0  SpO2: (!) 79 %  Height: 6' (182.9 cm)  Weight: 82.3 kg (181 lb 7 oz) (wt elevated/ fluid)    Called by nursing staff at 21:30 to see the patient. Upon my arrival, Los Alatorre was noted to be apneic and unresponsive to verbal, tactile, or painful stimuli. ACLS was not initiated due to DNR status. he was not moving their extremities spontaneously. Heart sounds and breath sounds are absent to auscultation. Bilateral carotid, radial, and femoral pulses were absent to palpation. Pupils fixed and dilated, corneal reflex absent bilaterally.       Discharge Instructions:         Los Alatorre is .  He was pronounced dead  at 21:40 on 07/19/2023. Family were unable to be contacted. Attending Dr. Mckinney was notified.    Time of Death: 21:40    Cause of Death: cardiopulmonary arrest secondary to multi-organ system failure in setting of hepatorenal syndrome        TIME SPENT ON DISCHARGE: 35 minutes    Sharri Fisher M.D.  [facility] PGY-3

## 2023-08-16 LAB
INR PPP: 1.2
INR PPP: 1.2
INR PPP: 1.3
INR PPP: 1.4
INR PPP: 2.9
INR PPP: 3
INR PPP: 3
INR PPP: 3.2
INR PPP: 3.4
INR PPP: 3.4
INR PPP: 3.5
INR PPP: 3.7
INR PPP: 3.8
INR PPP: 4.1
INR PPP: 4.2
INR PPP: 4.3
PROTHROMBIN TIME: 14.7 SECONDS (ref 11.4–14)
PROTHROMBIN TIME: 15.2 SECONDS (ref 11.4–14)
PROTHROMBIN TIME: 15.9 SECONDS (ref 11.4–14)
PROTHROMBIN TIME: 16.7 SECONDS (ref 11.4–14)
PROTHROMBIN TIME: 29.1 SECONDS (ref 11.4–14)
PROTHROMBIN TIME: 30 SECONDS (ref 11.4–14)
PROTHROMBIN TIME: 30.5 SECONDS (ref 11.4–14)
PROTHROMBIN TIME: 31.8 SECONDS (ref 11.4–14)
PROTHROMBIN TIME: 33.1 SECONDS (ref 11.4–14)
PROTHROMBIN TIME: 33.2 SECONDS (ref 11.4–14)
PROTHROMBIN TIME: 33.7 SECONDS (ref 11.4–14)
PROTHROMBIN TIME: 33.8 SECONDS (ref 11.4–14)
PROTHROMBIN TIME: 33.8 SECONDS (ref 11.4–14)
PROTHROMBIN TIME: 35.2 SECONDS (ref 11.4–14)
PROTHROMBIN TIME: 36 SECONDS (ref 11.4–14)
PROTHROMBIN TIME: 38.4 SECONDS (ref 11.4–14)
PROTHROMBIN TIME: 39.2 SECONDS (ref 11.4–14)
PROTHROMBIN TIME: 40 SECONDS (ref 11.4–14)

## 2023-08-21 LAB
INR PPP: 5
PROTHROMBIN TIME: 44.6 SECONDS (ref 11.4–14)

## 2025-04-09 NOTE — PT/OT/SLP PROGRESS
Problem: Discharge Planning  Goal: Discharge to home or other facility with appropriate resources  4/8/2025 2122 by Jeanine Watson RN  Outcome: Progressing  4/8/2025 2121 by Jeannie Watson RN  Outcome: Progressing     Problem: Pain  Goal: Verbalizes/displays adequate comfort level or baseline comfort level  4/8/2025 2122 by Jeanine Watson RN  Outcome: Progressing  4/8/2025 2121 by Jeanine Watson RN  Outcome: Progressing     Problem: Safety - Adult  Goal: Free from fall injury  4/8/2025 2122 by Jeanine Watson RN  Outcome: Progressing  4/8/2025 2121 by Jeanine Watson RN  Outcome: Progressing     Problem: Skin/Tissue Integrity - Adult  Goal: Skin integrity remains intact  Outcome: Progressing     Problem: Musculoskeletal - Adult  Goal: Return mobility to safest level of function  Outcome: Progressing     Problem: Infection - Adult  Goal: Absence of infection at discharge  Outcome: Progressing     Problem: Hematologic - Adult  Goal: Maintains hematologic stability  Outcome: Progressing      Physical Therapy    Missed Treatment Session    Patient Name:  Los Alatorre   MRN:  [id number]      Patient not seen at this time secondary to Patient unwilling to participate. 2 attempt of the day. Patient reported he didn't feel well and wanted to cancel today    Will follow-up as patient is available to participate and as therapists' schedule allows.